# Patient Record
Sex: MALE | Race: WHITE | NOT HISPANIC OR LATINO | Employment: FULL TIME | URBAN - METROPOLITAN AREA
[De-identification: names, ages, dates, MRNs, and addresses within clinical notes are randomized per-mention and may not be internally consistent; named-entity substitution may affect disease eponyms.]

---

## 2018-06-27 ENCOUNTER — NURSE TRIAGE (OUTPATIENT)
Dept: ADMINISTRATIVE | Facility: CLINIC | Age: 53
End: 2018-06-27

## 2018-06-28 NOTE — TELEPHONE ENCOUNTER
Reason for Disposition   Fever > 100.5 F (38.1 C)    Protocols used: ST URINARY CATHETER SYMPTOMS AND [id number]-NS    Pt girlfriend states that pt has to catheterize himself after having bladder surgery in January.  Girlfriend states pt has been unable to catherterize himself over the past few days. Pt fever today was 100.8. Girlfriend states she is no longer with pt.  Girlfriend advised to ED per protocol and verbalizes understanding.

## 2023-01-01 ENCOUNTER — CLINICAL SUPPORT (OUTPATIENT)
Dept: UROLOGY | Facility: CLINIC | Age: 58
End: 2023-01-01
Payer: COMMERCIAL

## 2023-01-01 ENCOUNTER — OFFICE VISIT (OUTPATIENT)
Dept: UROLOGY | Facility: CLINIC | Age: 58
End: 2023-01-01
Payer: COMMERCIAL

## 2023-01-01 VITALS
SYSTOLIC BLOOD PRESSURE: 143 MMHG | BODY MASS INDEX: 23.06 KG/M2 | HEART RATE: 85 BPM | DIASTOLIC BLOOD PRESSURE: 87 MMHG | WEIGHT: 161.06 LBS | HEIGHT: 70 IN

## 2023-01-01 VITALS
SYSTOLIC BLOOD PRESSURE: 138 MMHG | DIASTOLIC BLOOD PRESSURE: 84 MMHG | HEART RATE: 85 BPM | HEIGHT: 70 IN | BODY MASS INDEX: 22.6 KG/M2 | WEIGHT: 157.88 LBS

## 2023-01-01 DIAGNOSIS — C79.89 BLADDER CANCER METASTASIZED TO PELVIC REGION: ICD-10-CM

## 2023-01-01 DIAGNOSIS — C67.9 MALIGNANT NEOPLASM OF URINARY BLADDER, UNSPECIFIED SITE: ICD-10-CM

## 2023-01-01 DIAGNOSIS — C67.9 BLADDER CANCER METASTASIZED TO PELVIC REGION: ICD-10-CM

## 2023-01-01 DIAGNOSIS — N35.816 OTHER STRICTURE OF OVERLAPPING SITES OF URETHRA IN MALE: ICD-10-CM

## 2023-01-01 DIAGNOSIS — N18.4 CKD (CHRONIC KIDNEY DISEASE) STAGE 4, GFR 15-29 ML/MIN: Primary | ICD-10-CM

## 2023-01-01 DIAGNOSIS — N13.30 HYDRONEPHROSIS OF RIGHT KIDNEY: ICD-10-CM

## 2023-01-01 DIAGNOSIS — N13.30 HYDRONEPHROSIS OF RIGHT KIDNEY: Primary | ICD-10-CM

## 2023-01-01 DIAGNOSIS — N18.4 CKD (CHRONIC KIDNEY DISEASE) STAGE 4, GFR 15-29 ML/MIN: ICD-10-CM

## 2023-01-01 PROCEDURE — 99215 OFFICE O/P EST HI 40 MIN: CPT | Mod: S$GLB,,, | Performed by: STUDENT IN AN ORGANIZED HEALTH CARE EDUCATION/TRAINING PROGRAM

## 2023-01-01 PROCEDURE — 99214 OFFICE O/P EST MOD 30 MIN: CPT | Mod: S$GLB,,, | Performed by: STUDENT IN AN ORGANIZED HEALTH CARE EDUCATION/TRAINING PROGRAM

## 2023-01-01 PROCEDURE — 3075F SYST BP GE 130 - 139MM HG: CPT | Mod: CPTII,S$GLB,, | Performed by: STUDENT IN AN ORGANIZED HEALTH CARE EDUCATION/TRAINING PROGRAM

## 2023-01-01 PROCEDURE — 3079F DIAST BP 80-89 MM HG: CPT | Mod: CPTII,S$GLB,, | Performed by: STUDENT IN AN ORGANIZED HEALTH CARE EDUCATION/TRAINING PROGRAM

## 2023-01-01 PROCEDURE — 3008F BODY MASS INDEX DOCD: CPT | Mod: CPTII,S$GLB,, | Performed by: STUDENT IN AN ORGANIZED HEALTH CARE EDUCATION/TRAINING PROGRAM

## 2023-01-01 PROCEDURE — 99999 PR PBB SHADOW E&M-EST. PATIENT-LVL III: CPT | Mod: PBBFAC,,, | Performed by: STUDENT IN AN ORGANIZED HEALTH CARE EDUCATION/TRAINING PROGRAM

## 2023-09-11 ENCOUNTER — TELEPHONE (OUTPATIENT)
Dept: HEMATOLOGY/ONCOLOGY | Facility: CLINIC | Age: 58
End: 2023-09-11
Payer: COMMERCIAL

## 2023-09-11 NOTE — NURSING
Oncology Navigation   Intake  Cancer Type:   Internal / External Referral: External  Initial Nurse Navigator Contact: 09/11/23  Date Worked: 09/11/23     Treatment  Current Status: Active       Medical Oncologist: Bridger Abad  Consult Date: 09/12/23                       Acuity      Follow Up  No follow-ups on file.

## 2023-09-11 NOTE — TELEPHONE ENCOUNTER
Oncology nurse navigator contacted patient to discuss self referral for a second opinion of bladder cancer. Patient has already undergone cystectomy. Requested next available appointment. Scheduled tomorrow 9/12 with Dr. Bridger Abad at 4pm. Date, time, location discussed. Contact information provided for additional assistance.

## 2023-09-11 NOTE — PROGRESS NOTES
MEDICAL ONCOLOGY - NEW PATIENT VISIT    Best Contact Phone Number(s): [phone] (home)      Cancer/Stage/TNM:    Cancer Staging   No matching staging information was found for the patient.     Reason for visit: Julio Rodríguez is a 58 y.o. male with metastatic bladder cancer previously treated with neoadjuvant ddMVAC, radical cystectomy, and then carboplatin/gemcitabine and EV+ Pembro following local and metastatic recurrence. He presents to medical oncology clinic for second opinion.    History has been obtained by chart review and discussion with the patient.    HPI:     Patient was initially diagnosed with NMIBC 2016 and managed with BCG. Developed muscle invasive recurrence 2017 and was treated with neoadjuvant MVAC and cystectomy with neobladder creation.    Post operative course was complicated by bilateral hydronephrosis with progressive kidney disease. Required ongoing CIC. Subsequently developed increasing hematuria. Imaging suspicious for local recurrence with infiltration into the pubic bone and rectum and potential retroperitoneal lymph node metastases.     Started palliative chemoimmunotherapy with gemcitabine + carboplatin + pembrolizumab 09/2022 - 04/2023. Chemotherapy course complicated by persistent and recurrent blood loss anemia requiring blood transfusions. He was diagnosed with pubic fracture presumably from local progression of the cancer 04/2023. Underwent palliative RT to the pelvis, and systemic treatment was changed to enfortumab vedotin + pembrolizumab 05/2023. Last dose of EV + pembro about 2 weeks ago.    Recent PET CT with concern for new subcentimeter pulmonary nodes c/f metastases. MRI shows local progression as well with additional local progression into the rectum and pubic ramus. Saw radiation oncology at [facility] and no plan for additional radiation treatment.      Has ongoing left buttock and sacral pain up to 4/10. Uses APAP at night with good response. Ambulating independently.  Pain not significantly worsened by ambulation. Chronic constipation since prior surgery. Recently started Linzess with improvement. Typically 1-3 normal and formed stool per day. Appetite is OK. No N/V. Weight is stable in the 150s. Continues to self catheterize his neoblader. Cloudy, but significantly less hematuria. Last blood transfusion several months ago. Last biopsy 08/2022.    History of CAD sp CABGx3 in 2015. Not on any antiplatelets. On atorvastatin 20mg daily. No HTN. History of psoriasis, Vtama. Hypothyroidism on levothyroxine 50mcg daily. Quite active, cotninues frequently walking around [facility].      Oncology History   Malignant neoplasm of urinary bladder   2016 Initial Diagnosis    Bladder CIS. Managed with BCG     2017 Notable Event    Developed recurrent muscle invasive disease.     2017 - 2017 Chemotherapy    ddMVAC      Surgery    Radical cystectomy with lymph node dissection and creation of neobladder. [id number]     7/2022 Notable Event    New onset hematuria. MRI scan of the pelvis in August showed a suspected blood clot adherent to the distal Smith catheter. There was abnormal signal and enhancement of the bilateral pubic bones adjacent to the neobladder suspicious for neoplastic infiltration. There was no pelvic lymphadenopathy.      7/2022 Imaging Significant Findings    Pet scan at the same time showed hypermetabolic retroperitoneal lymphadenopathy. There was marked activity felt to involve the bladder wall suspicious for tumor.     7/2022 Biopsy    Biopsy of the bladder neck showed recurrent high-grade urothelial carcinoma.     9/28/2022 - 5/16/2023 Chemotherapy    Mr. Rodríguez was started on chemotherapy with gemcitabine and carboplatin in September 2022.      1/25/2023 Imaging Significant Findings    Pet scan on 25 January showed changes related to prior cystoprostatectomy with neobladder creation and minimal fullness to the right renal collecting system. There was no evidence of a  discrete hypermetabolic mass or lymphadenopathy. There was diffusely increased activity throughout the osseous structures, suggestive of chemotherapy with reactive marrow.     4/11/2023 Imaging Significant Findings    CT a/p  1. Pathologic fracture of the left parasymphysis pubis secondary to lytic process. Given location adjacent to neobladder, osteomyelitis is a possibility. Metastatic disease not excluded.   2. Prior cystoprostatectomy with chronic right ureteral obstruction and hydroureteronephrosis, minimally changed from the prior. All etiologies considered including malignant obstruction. Urology consultation recommended.   3. Incidental findings including cholelithiasis, bilateral multifocal renal cortical scarring, and small hiatal hernia.       5/24/2023 -  Chemotherapy    Enfortumab vedotin + Pembrolizumab     8/10/2023 Imaging Significant Findings    PET CT  Interval development of hypermetabolic pulmonary nodules within the lingula and right lower lobe likely reflecting pulmonary metastases. Some additional tiny pulmonary nodularity is new or more conspicuous from prior but too small to accurately characterize by PET/CT. Attention on follow-up recommended.     Worsening obstructive uropathy which is relatively moderate to severe the right kidney.     Similar appearance of the urinary neobladder. There is somewhat diminished due to physiologic activity to evaluate for local neoplasm but the appearance overall appears similar to prior. Assessment of local disease could be followed with CT abdomen and pelvis with contrast.     Interval fracture of the left superior and inferior pubic rami appearing subacute in nature. Please correlate for trauma and tenderness. There is no significant FDG activity within the area to favor a pathologic fracture.     Previously seen diffuse marrow activity may have been due to previous marrow rebound or drug effect.       8/25/2023 Imaging Significant Findings    MR  Pelvis  History of cystoprostatectomy and neobladder creation.     Interval increase in multilobular mass in the pelvis infiltrating the rectum, possibly due to distal sigmoid colon, anterior left pelvic bones as well as a inferior aspect of the neobladder suspicious for progression of neoplastic process as discussed above.      Melanoma        Past Medical History:   Diagnosis Date    Bladder cancer     CAD (coronary artery disease) 9/12/2023    Prior CABG. Not on antiplatelets. Home medicatiosn include atorvastatin    Carcinoma     forehead    Encounter for blood transfusion     Hypertension     Hypothyroidism 9/12/2023    Home medications include levothyroxine    Malignant melanoma of skin, unspecified     right arm    Malignant neoplasm of urinary bladder 9/12/2023    Melanoma 9/12/2023    History of T1a melanoma; 0.5mm depth without ulceartion. SP wide local excision 02/2022          Past Surgical History:   Procedure Laterality Date    CORONARY ARTERY BYPASS GRAFT  10/2015    CYSTECTOMY, ROBOT-ASSISTED, LAPAROSCOPIC, USING DA MARY XI, WITH ILEAL CONDUIT CREATION  12/2017    DILATION OF BLADDER      dialation of bladder neck- due to claudia bladder- multiple procedures    LYMPHADENECTOMY      XI ROBOTIC PROSTECTOMY, SUPRAPUBIC  12/2017        Family History   Problem Relation Age of Onset    Heart disease Father     Heart attack Paternal Uncle     Breast cancer Maternal Cousin     Colon cancer Neg Hx     Bladder Cancer Neg Hx         Social History     Tobacco Use    Smoking status: Former     Types: Cigarettes    Smokeless tobacco: Not on file   Substance Use Topics    Alcohol use: Not Currently        I have reviewed and updated the patient's past medical, surgical, family and social histories.    Review of patient's allergies indicates:  No Known Allergies     Current Outpatient Medications   Medication Sig Dispense Refill    LIDOcaine-prilocaine (EMLA) cream Apply Dime size Amount On Port One Hour Prior To  "Use, Then Apply Saran Wrap      atorvastatin (LIPITOR) 20 MG tablet Take 20 mg by mouth every evening.      docusate sodium (COLACE) 100 MG capsule Take 1 capsule by mouth every evening.      levothyroxine (SYNTHROID) 50 MCG tablet Take 50 mcg by mouth.      LINZESS 72 mcg Cap capsule Take 72 mcg by mouth every morning.      multivitamin (THERAGRAN) per tablet Take 1 tablet by mouth.      VTAMA 1 % Crea APPLY 1 APPLICATION ON THE SKIN DAILY       No current facility-administered medications for this visit.        Physical Exam:   BP (!) 148/97 (BP Location: Right arm, Patient Position: Sitting, BP Method: Large (Automatic))   Pulse 81   Temp 98.2 °F (36.8 °C) (Oral)   Resp 18   Ht 5' 10" (1.778 m)   Wt 72.3 kg (159 lb 8 oz)   SpO2 99%   BMI 22.89 kg/m²      ECOG Performance status: 1            Physical Exam  Constitutional:       General: He is not in acute distress.     Appearance: Normal appearance.   HENT:      Head: Normocephalic.   Eyes:      General: No scleral icterus.     Extraocular Movements: Extraocular movements intact.      Conjunctiva/sclera: Conjunctivae normal.   Cardiovascular:      Rate and Rhythm: Normal rate.   Pulmonary:      Effort: Pulmonary effort is normal. No respiratory distress.   Abdominal:      General: There is no distension.      Palpations: Abdomen is soft.   Skin:     General: Skin is warm and dry.   Neurological:      Mental Status: He is alert and oriented to person, place, and time.      Motor: No weakness.   Psychiatric:         Mood and Affect: Mood normal.         Behavior: Behavior normal.         Thought Content: Thought content normal.           Labs:   No results found for this or any previous visit (from the past 48 hour(s)).       Imaging:         I have personally reviewed the above imaging including recent MRI and PET CT scan    Path:   Reviewed pathology as documented in oncology history      Assessment and Plan:   1. Malignant neoplasm of urinary bladder, " unspecified site  Overview:  Locally recurrent disease with presumed pulmonary metastases sp neoadjuvant MVAC, cystectomy, gem/carbo, pebmrolizumab, and enfortumab. No known molecular profiling. Majority of disease is from his local pelvic recurrence with infiltration into the pubis and rectum    Assessment & Plan:  - Will review at TB next week to discuss any local salvage options; consider referral to urology, rad onc, and/or colorectal surgery  - Will send ctDNA NGS ASAP and try to send Tempus xT from prior bladder biopsy  - Screen for early phase clinical trials pending molecular profiling  - SOC options could include sacituzumab govitecan or if FGFR alteration erdaftinib    Orders:  -     Tumor NGS Blood; Future; Expected date: 09/12/2023  -     Tumor NGS Blood Add-on; Future; Expected date: 09/12/2023  -     Tumor NGS Tissue; Future; Expected date: 09/12/2023  -     Tumor NGS Blood; Future; Expected date: 09/12/2023  -     Tumor NGS Blood Add-on (Deselect if not ordering Tumor NGS Blood); Future; Expected date: 09/12/2023  -     CBC Oncology; Standing  -     Comprehensive Metabolic Panel; Standing  -     IRON AND TIBC; Future; Expected date: 09/12/2023  -     FERRITIN; Future; Expected date: 09/12/2023    2. Anemia in neoplastic disease  -     IRON AND TIBC; Future; Expected date: 09/12/2023  -     FERRITIN; Future; Expected date: 09/12/2023    3. Coronary artery disease, unspecified vessel or lesion type, unspecified whether angina present, unspecified whether native or transplanted heart  Overview:  Prior CABG. Not on antiplatelets. Home medicatiosn include atorvastatin      4. Hypothyroidism, unspecified type  Overview:  Home medications include levothyroxine      5. Chronic constipation    6. Psoriasis               Follow up:   Route Chart for Scheduling    Med Onc Chart Routing      Follow up with physician 2 weeks. Can be virtual visit in two weeks   Follow up with LYNDSEY    Infusion scheduling note     Injection scheduling note    Labs CBC, CMP and other   Scheduling:  Preferred lab:  Lab interval:  Blood NGS - Please send labs ASAP   Imaging    Pharmacy appointment    Other referrals                     The above information has been reviewed with the patient and all questions have been answered to their apparent satisfaction.  They understand that they can call the clinic with any questions.    Bridger Abad MD  Hematology/Oncology  [facility]

## 2023-09-12 ENCOUNTER — OFFICE VISIT (OUTPATIENT)
Dept: HEMATOLOGY/ONCOLOGY | Facility: CLINIC | Age: 58
End: 2023-09-12
Payer: COMMERCIAL

## 2023-09-12 VITALS
HEART RATE: 81 BPM | SYSTOLIC BLOOD PRESSURE: 148 MMHG | OXYGEN SATURATION: 99 % | DIASTOLIC BLOOD PRESSURE: 97 MMHG | BODY MASS INDEX: 22.83 KG/M2 | WEIGHT: 159.5 LBS | RESPIRATION RATE: 18 BRPM | HEIGHT: 70 IN | TEMPERATURE: 98 F

## 2023-09-12 DIAGNOSIS — E03.9 HYPOTHYROIDISM, UNSPECIFIED TYPE: ICD-10-CM

## 2023-09-12 DIAGNOSIS — L40.9 PSORIASIS: ICD-10-CM

## 2023-09-12 DIAGNOSIS — K59.09 CHRONIC CONSTIPATION: ICD-10-CM

## 2023-09-12 DIAGNOSIS — I25.10 CORONARY ARTERY DISEASE, UNSPECIFIED VESSEL OR LESION TYPE, UNSPECIFIED WHETHER ANGINA PRESENT, UNSPECIFIED WHETHER NATIVE OR TRANSPLANTED HEART: ICD-10-CM

## 2023-09-12 DIAGNOSIS — C67.9 MALIGNANT NEOPLASM OF URINARY BLADDER, UNSPECIFIED SITE: Primary | ICD-10-CM

## 2023-09-12 DIAGNOSIS — D63.0 ANEMIA IN NEOPLASTIC DISEASE: ICD-10-CM

## 2023-09-12 PROBLEM — C43.9 MELANOMA: Status: ACTIVE | Noted: 2023-01-01

## 2023-09-12 PROCEDURE — 3080F PR MOST RECENT DIASTOLIC BLOOD PRESSURE >= 90 MM HG: ICD-10-PCS | Mod: CPTII,S$GLB,, | Performed by: HOSPITALIST

## 2023-09-12 PROCEDURE — 3077F PR MOST RECENT SYSTOLIC BLOOD PRESSURE >= 140 MM HG: ICD-10-PCS | Mod: CPTII,S$GLB,, | Performed by: HOSPITALIST

## 2023-09-12 PROCEDURE — 99215 PR OFFICE/OUTPT VISIT, EST, LEVL V, 40-54 MIN: ICD-10-PCS | Mod: S$GLB,,, | Performed by: HOSPITALIST

## 2023-09-12 PROCEDURE — 99215 OFFICE O/P EST HI 40 MIN: CPT | Mod: S$GLB,,, | Performed by: HOSPITALIST

## 2023-09-12 PROCEDURE — 3080F DIAST BP >= 90 MM HG: CPT | Mod: CPTII,S$GLB,, | Performed by: HOSPITALIST

## 2023-09-12 PROCEDURE — 3077F SYST BP >= 140 MM HG: CPT | Mod: CPTII,S$GLB,, | Performed by: HOSPITALIST

## 2023-09-12 PROCEDURE — 99999 PR PBB SHADOW E&M-EST. PATIENT-LVL IV: CPT | Mod: PBBFAC,,, | Performed by: HOSPITALIST

## 2023-09-12 PROCEDURE — 3008F BODY MASS INDEX DOCD: CPT | Mod: CPTII,S$GLB,, | Performed by: HOSPITALIST

## 2023-09-12 PROCEDURE — 99999 PR PBB SHADOW E&M-EST. PATIENT-LVL IV: ICD-10-PCS | Mod: PBBFAC,,, | Performed by: HOSPITALIST

## 2023-09-12 PROCEDURE — 3008F PR BODY MASS INDEX (BMI) DOCUMENTED: ICD-10-PCS | Mod: CPTII,S$GLB,, | Performed by: HOSPITALIST

## 2023-09-12 RX ORDER — METHYLPREDNISOLONE 4 MG/1
TABLET ORAL
COMMUNITY
Start: 2023-07-06 | End: 2023-09-12 | Stop reason: ALTCHOICE

## 2023-09-12 RX ORDER — CALCIPOTRIENE AND BETAMETHASONE DIPROPIONATE 50; 64 UG/G; MG/G
CREAM TOPICAL
COMMUNITY
End: 2023-09-12 | Stop reason: ALTCHOICE

## 2023-09-12 RX ORDER — LIDOCAINE AND PRILOCAINE 25; 25 MG/G; MG/G
CREAM TOPICAL
COMMUNITY
Start: 2022-09-21 | End: 2023-09-21

## 2023-09-12 RX ORDER — DOCUSATE SODIUM 100 MG/1
220 CAPSULE, LIQUID FILLED ORAL NIGHTLY
COMMUNITY

## 2023-09-12 RX ORDER — LEVOTHYROXINE SODIUM 50 UG/1
50 TABLET ORAL
COMMUNITY
Start: 2023-07-17

## 2023-09-12 RX ORDER — TAPINAROF 10 MG/1000MG
CREAM TOPICAL
COMMUNITY
Start: 2023-08-14

## 2023-09-12 RX ORDER — OXYCODONE AND ACETAMINOPHEN 10; 325 MG/1; MG/1
1 TABLET ORAL
COMMUNITY
Start: 2023-04-28 | End: 2023-09-12 | Stop reason: ALTCHOICE

## 2023-09-12 RX ORDER — LINACLOTIDE 72 UG/1
72 CAPSULE, GELATIN COATED ORAL EVERY MORNING
COMMUNITY
Start: 2023-08-22

## 2023-09-12 RX ORDER — IPRATROPIUM BROMIDE 42 UG/1
SPRAY, METERED NASAL
COMMUNITY
Start: 2023-07-17 | End: 2023-09-12 | Stop reason: ALTCHOICE

## 2023-09-12 RX ORDER — DICYCLOMINE HYDROCHLORIDE 20 MG/1
20 TABLET ORAL EVERY 6 HOURS PRN
COMMUNITY
Start: 2023-04-06 | End: 2023-09-12 | Stop reason: ALTCHOICE

## 2023-09-12 RX ORDER — AZELASTINE 1 MG/ML
2 SPRAY, METERED NASAL 2 TIMES DAILY
COMMUNITY
Start: 2023-07-02 | End: 2023-09-12 | Stop reason: ALTCHOICE

## 2023-09-12 RX ORDER — METHOCARBAMOL 500 MG/1
500 TABLET, FILM COATED ORAL 4 TIMES DAILY
COMMUNITY
Start: 2023-04-01 | End: 2023-09-12 | Stop reason: ALTCHOICE

## 2023-09-12 RX ORDER — TIZANIDINE 2 MG/1
2 TABLET ORAL 2 TIMES DAILY
COMMUNITY
Start: 2023-04-14 | End: 2023-09-12 | Stop reason: ALTCHOICE

## 2023-09-12 RX ORDER — ATORVASTATIN CALCIUM 20 MG/1
20 TABLET, FILM COATED ORAL NIGHTLY
COMMUNITY
Start: 2023-07-19

## 2023-09-12 RX ORDER — MULTIVITAMIN
1 TABLET ORAL EVERY MORNING
COMMUNITY

## 2023-09-12 NOTE — ASSESSMENT & PLAN NOTE
- Will review at TB next week to discuss any local salvage options; consider referral to urology, rad onc, and/or colorectal surgery  - Will send ctDNA NGS ASAP and try to send Tempus xT from prior bladder biopsy  - Screen for early phase clinical trials pending molecular profiling  - SOC options could include sacituzumab govitecan or if FGFR alteration erdaftinib

## 2023-09-12 NOTE — PATIENT INSTRUCTIONS
We discussed your recent bladder course. You have metastatic and locally progressive bladder cancer after prior treatment with preoperative cisplatin based chemotherapy and palliative carboplatin, gemcitabine, pembrolizumab, and enfortumab.    Recent PET CT is suspicous for new lung metastases. These are too small to biopsy at present, but will warrant close watching.    The majority of your disease is localized in the locally recurrent pelvic mass that has invaded the pelvic bone and potentially rectum.    We discussed need to answer two questions - what is the next best systemic therapy, and is there any local treatment that could help delay cancer growth.    We will review recent imaging at our tumour board with our radiation oncologists and urologic surgeons to consider local treatment options. Admittedly, any surgical procedure would likely be quite large and could not be expected to cure the cancer. Radiation may be made difficult by proximity to bladder and prior radiation.    For systemic options, we need to characterize the genetic makeup of the cancer. I will send your blood to identify and circulating tumor DNA. We will also track down you prior bladder biopsy to send for sequencing. Alternatively, may get additional tissue if urology or colorectal surgery evaluation is needed.     Potential options could include clinical trials depending on cancer genotyping (although we have no late phase clinical trials at present). Additionally, erdafitinib is an approved medication for patients with certain mutations on genetic profiling. Finally sacituzumab govitecan is another FDA approved mediation we could consider.

## 2023-09-15 ENCOUNTER — LAB VISIT (OUTPATIENT)
Dept: LAB | Facility: HOSPITAL | Age: 58
End: 2023-09-15
Attending: HOSPITALIST
Payer: COMMERCIAL

## 2023-09-15 ENCOUNTER — TELEPHONE (OUTPATIENT)
Dept: HEMATOLOGY/ONCOLOGY | Facility: CLINIC | Age: 58
End: 2023-09-15
Payer: COMMERCIAL

## 2023-09-15 DIAGNOSIS — C67.9 MALIGNANT NEOPLASM OF URINARY BLADDER, UNSPECIFIED SITE: ICD-10-CM

## 2023-09-15 DIAGNOSIS — D63.0 ANEMIA IN NEOPLASTIC DISEASE: ICD-10-CM

## 2023-09-15 LAB
ALBUMIN SERPL BCP-MCNC: 3.1 G/DL (ref 3.5–5.2)
ALP SERPL-CCNC: 84 U/L (ref 55–135)
ALT SERPL W/O P-5'-P-CCNC: 9 U/L (ref 10–44)
ANION GAP SERPL CALC-SCNC: 12 MMOL/L (ref 8–16)
AST SERPL-CCNC: 11 U/L (ref 10–40)
BILIRUB SERPL-MCNC: 0.5 MG/DL (ref 0.1–1)
BUN SERPL-MCNC: 31 MG/DL (ref 6–20)
CALCIUM SERPL-MCNC: 9.4 MG/DL (ref 8.7–10.5)
CHLORIDE SERPL-SCNC: 106 MMOL/L (ref 95–110)
CO2 SERPL-SCNC: 19 MMOL/L (ref 23–29)
CREAT SERPL-MCNC: 2.5 MG/DL (ref 0.5–1.4)
ERYTHROCYTE [DISTWIDTH] IN BLOOD BY AUTOMATED COUNT: 13.9 % (ref 11.5–14.5)
EST. GFR  (NO RACE VARIABLE): 29.1 ML/MIN/1.73 M^2
FERRITIN SERPL-MCNC: 606 NG/ML (ref 20–300)
GLUCOSE SERPL-MCNC: 104 MG/DL (ref 70–110)
HCT VFR BLD AUTO: 31 % (ref 40–54)
HGB BLD-MCNC: 10.5 G/DL (ref 14–18)
IMM GRANULOCYTES # BLD AUTO: 0.03 K/UL (ref 0–0.04)
IRON SERPL-MCNC: 60 UG/DL (ref 45–160)
MCH RBC QN AUTO: 30.3 PG (ref 27–31)
MCHC RBC AUTO-ENTMCNC: 33.9 G/DL (ref 32–36)
MCV RBC AUTO: 89 FL (ref 82–98)
NEUTROPHILS # BLD AUTO: 5.6 K/UL (ref 1.8–7.7)
PLATELET # BLD AUTO: 226 K/UL (ref 150–450)
PMV BLD AUTO: 9.6 FL (ref 9.2–12.9)
POTASSIUM SERPL-SCNC: 4.3 MMOL/L (ref 3.5–5.1)
PROT SERPL-MCNC: 7.3 G/DL (ref 6–8.4)
RBC # BLD AUTO: 3.47 M/UL (ref 4.6–6.2)
SATURATED IRON: 24 % (ref 20–50)
SODIUM SERPL-SCNC: 137 MMOL/L (ref 136–145)
TOTAL IRON BINDING CAPACITY: 253 UG/DL (ref 250–450)
TRANSFERRIN SERPL-MCNC: 171 MG/DL (ref 200–375)
WBC # BLD AUTO: 7.78 K/UL (ref 3.9–12.7)

## 2023-09-15 PROCEDURE — 84466 ASSAY OF TRANSFERRIN: CPT | Performed by: HOSPITALIST

## 2023-09-15 PROCEDURE — 80053 COMPREHEN METABOLIC PANEL: CPT | Performed by: HOSPITALIST

## 2023-09-15 PROCEDURE — 85027 COMPLETE CBC AUTOMATED: CPT | Performed by: HOSPITALIST

## 2023-09-15 PROCEDURE — 36415 COLL VENOUS BLD VENIPUNCTURE: CPT | Performed by: HOSPITALIST

## 2023-09-15 PROCEDURE — 83540 ASSAY OF IRON: CPT | Performed by: HOSPITALIST

## 2023-09-15 PROCEDURE — 82728 ASSAY OF FERRITIN: CPT | Performed by: HOSPITALIST

## 2023-09-15 NOTE — TELEPHONE ENCOUNTER
----- Message from Bridger Abad MD sent at 9/14/2023  6:10 PM CDT -----  Regarding: RE: Callback  Can we scheduled to get labs I ordered at his visit on 9/12?    Thanks!  Bridger    ----- Message -----  From: Az Tam  Sent: 9/14/2023  10:14 AM CDT  To: Bridger Abad MD  Subject: Callback                                         Patient is requesting a callback regarding his lab work that was suppose to be done after his visit. Please give the patient a callback at [phone]

## 2023-09-19 ENCOUNTER — TUMOR BOARD CONFERENCE (OUTPATIENT)
Dept: UROLOGY | Facility: HOSPITAL | Age: 58
End: 2023-09-19
Payer: COMMERCIAL

## 2023-09-19 NOTE — PROGRESS NOTES
Oncology History   Malignant neoplasm of urinary bladder   Melanoma      PATIENT SUMMARY:     59 yo M with bladder cancer initially NMIBC managed with BCG progressed to MIBC now s/p NAC (ddMVAC) followed by radical cystectomy with neobladder (2017). Developed local and metastatic disease with imaging concerning for local infiltration into pubic bone and rectum as well as RP disease. Began palliative carboplatin/gemcitabine and pembrolizumab (9/22-4/23). Underwent palliative RT to pelvis after pathologic fracture, systemic treatment changed to enfortumab vedotin + pembolizumab 5/2023.    Recent PET CT with concern for new subcentimeter pulmonary nodes c/f metastases. MRI (8/25/2023) shows local progression as well with additional local progression into the rectum and pubic ramus. Saw radiation oncology at [facility] and no plan for additional radiation treatment. Has ongoing left buttock and sacral pain up to 4/10. Uses APAP at night with good response. Ambulating independently. Weight is stable in the 150s. Continues to self catheterize his neobladder.    PERFORMANCE STATUS:  ECOG 1    Estimated GFR/CKD Stage: Cr 2.5; GFR 29.1    Clinical/Pathologic Stage (TNM): ypT2b N1c M0    DISCUSSION:  Question: any local salvage options?     No local EBRT thus far. Referred for palliative RT at  but they decided against it.      Only surgical option would be complete exenteration. Does has scattered pulmonary nodules so oncologic control is unlikely.    FACULTY IN ATTENDANCE:    Urologic Oncology: Santi Hinkle MD [x], Keon Pacheco MD [] , Reynaldo Aparicio MD [], Eder Oconnor MD [], Selwyn Leach MD []     Medical Oncology: Chan Leos MD [x], Marium Lang MD [x] , Bridger Abad MD [x], John Alcaraz MD [] Raf Miles MD [], Mila Almanzar MD [] , Nancy Rizzo MD [], Kenji Kinsey MD []    Radiation Oncology: Rubio Slater MD [x], Hoang Lo MD [x]     CONSULT NEEDED:     [] Urologic Oncology    [] Med Onc    [] Rad/Onc  [] CRS  [] Surg Onc    [x] Treatment Guidelines (NCCN and AUA) reviewed and care planned is consistent with guidelines.    TUMOR BOARD RECOMMENDATIONS/PLAN/CONSENSUS:     Case discussed among group. Pathology and radiologic images were reviewed (if applicable).    Could consider low dose palliative XRT to pubis    Due to low symptoms at the moment, would lean towards systemic therapy before palliative XRT. Defer XRT for now.    Recommend third line systemic therapy. Will defer palliative XRT at this time. Minimal if any benefit from surgical resection at this time.    Royal Conte MD  [facility] - PGY3

## 2023-09-25 LAB
DNA RANGE(S) EXAMINED NAR: NORMAL
GENE DIS ANL INTERP-IMP: NORMAL
GENE DIS ASSESSED: NORMAL
REASON FOR STUDY: NORMAL
TEMPUS LCA: NORMAL
TEMPUS PORTAL: NORMAL

## 2023-09-26 LAB
DNA RANGE(S) EXAMINED NAR: NORMAL
GENE DIS ANL INTERP-IMP: POSITIVE
GENE DIS ASSESSED: NORMAL
GENE MUT TESTED BLD/T: 10.5 M/MB
MSI CA SPEC-IMP: NORMAL
REASON FOR STUDY: NORMAL
TEMPUS FUSIONADDENDUM: NORMAL
TEMPUS PD-L1 (22C3) COMBINED POSITIVE SCORE: 50
TEMPUS PD-L1 (22C3) TUMOR PROPORTION SCORE: 45 %
TEMPUS PORTAL: NORMAL
TEMPUS TRIAL1: NORMAL
TEMPUS TRIAL2: NORMAL
TEMPUS TRIAL3: NORMAL
TEMPUS TRIALCOUNT: 3

## 2023-09-28 ENCOUNTER — OFFICE VISIT (OUTPATIENT)
Dept: HEMATOLOGY/ONCOLOGY | Facility: CLINIC | Age: 58
End: 2023-09-28
Payer: COMMERCIAL

## 2023-09-28 ENCOUNTER — LAB VISIT (OUTPATIENT)
Dept: LAB | Facility: HOSPITAL | Age: 58
End: 2023-09-28
Attending: HOSPITALIST
Payer: COMMERCIAL

## 2023-09-28 VITALS
RESPIRATION RATE: 18 BRPM | DIASTOLIC BLOOD PRESSURE: 93 MMHG | HEART RATE: 88 BPM | OXYGEN SATURATION: 100 % | SYSTOLIC BLOOD PRESSURE: 156 MMHG | HEIGHT: 70 IN | WEIGHT: 158.63 LBS | BODY MASS INDEX: 22.71 KG/M2

## 2023-09-28 DIAGNOSIS — R03.0 ELEVATED BLOOD PRESSURE READING: ICD-10-CM

## 2023-09-28 DIAGNOSIS — E03.9 HYPOTHYROIDISM, UNSPECIFIED TYPE: ICD-10-CM

## 2023-09-28 DIAGNOSIS — I25.10 CORONARY ARTERY DISEASE, UNSPECIFIED VESSEL OR LESION TYPE, UNSPECIFIED WHETHER ANGINA PRESENT, UNSPECIFIED WHETHER NATIVE OR TRANSPLANTED HEART: ICD-10-CM

## 2023-09-28 DIAGNOSIS — C67.9 MALIGNANT NEOPLASM OF URINARY BLADDER, UNSPECIFIED SITE: Primary | ICD-10-CM

## 2023-09-28 DIAGNOSIS — C67.9 MALIGNANT NEOPLASM OF URINARY BLADDER, UNSPECIFIED SITE: ICD-10-CM

## 2023-09-28 LAB
ALBUMIN SERPL BCP-MCNC: 3.2 G/DL (ref 3.5–5.2)
ALP SERPL-CCNC: 78 U/L (ref 55–135)
ALT SERPL W/O P-5'-P-CCNC: 9 U/L (ref 10–44)
ANION GAP SERPL CALC-SCNC: 8 MMOL/L (ref 8–16)
AST SERPL-CCNC: 11 U/L (ref 10–40)
BILIRUB SERPL-MCNC: 0.6 MG/DL (ref 0.1–1)
BUN SERPL-MCNC: 32 MG/DL (ref 6–20)
CALCIUM SERPL-MCNC: 9.4 MG/DL (ref 8.7–10.5)
CHLORIDE SERPL-SCNC: 105 MMOL/L (ref 95–110)
CO2 SERPL-SCNC: 22 MMOL/L (ref 23–29)
CREAT SERPL-MCNC: 2.5 MG/DL (ref 0.5–1.4)
ERYTHROCYTE [DISTWIDTH] IN BLOOD BY AUTOMATED COUNT: 13.5 % (ref 11.5–14.5)
EST. GFR  (NO RACE VARIABLE): 29.1 ML/MIN/1.73 M^2
GLUCOSE SERPL-MCNC: 88 MG/DL (ref 70–110)
HCT VFR BLD AUTO: 33.1 % (ref 40–54)
HGB BLD-MCNC: 10.5 G/DL (ref 14–18)
IMM GRANULOCYTES # BLD AUTO: 0.03 K/UL (ref 0–0.04)
MCH RBC QN AUTO: 30.9 PG (ref 27–31)
MCHC RBC AUTO-ENTMCNC: 31.7 G/DL (ref 32–36)
MCV RBC AUTO: 97 FL (ref 82–98)
NEUTROPHILS # BLD AUTO: 7.5 K/UL (ref 1.8–7.7)
PLATELET # BLD AUTO: 259 K/UL (ref 150–450)
PMV BLD AUTO: 10.2 FL (ref 9.2–12.9)
POTASSIUM SERPL-SCNC: 4.4 MMOL/L (ref 3.5–5.1)
PROT SERPL-MCNC: 7.4 G/DL (ref 6–8.4)
RBC # BLD AUTO: 3.4 M/UL (ref 4.6–6.2)
SODIUM SERPL-SCNC: 135 MMOL/L (ref 136–145)
WBC # BLD AUTO: 9.87 K/UL (ref 3.9–12.7)

## 2023-09-28 PROCEDURE — 3008F BODY MASS INDEX DOCD: CPT | Mod: CPTII,S$GLB,, | Performed by: HOSPITALIST

## 2023-09-28 PROCEDURE — 3077F SYST BP >= 140 MM HG: CPT | Mod: CPTII,S$GLB,, | Performed by: HOSPITALIST

## 2023-09-28 PROCEDURE — 99215 PR OFFICE/OUTPT VISIT, EST, LEVL V, 40-54 MIN: ICD-10-PCS | Mod: S$GLB,,, | Performed by: HOSPITALIST

## 2023-09-28 PROCEDURE — 3008F PR BODY MASS INDEX (BMI) DOCUMENTED: ICD-10-PCS | Mod: CPTII,S$GLB,, | Performed by: HOSPITALIST

## 2023-09-28 PROCEDURE — 80053 COMPREHEN METABOLIC PANEL: CPT | Performed by: HOSPITALIST

## 2023-09-28 PROCEDURE — 36415 COLL VENOUS BLD VENIPUNCTURE: CPT | Performed by: HOSPITALIST

## 2023-09-28 PROCEDURE — 99999 PR PBB SHADOW E&M-EST. PATIENT-LVL IV: ICD-10-PCS | Mod: PBBFAC,,, | Performed by: HOSPITALIST

## 2023-09-28 PROCEDURE — 99215 OFFICE O/P EST HI 40 MIN: CPT | Mod: S$GLB,,, | Performed by: HOSPITALIST

## 2023-09-28 PROCEDURE — 3077F PR MOST RECENT SYSTOLIC BLOOD PRESSURE >= 140 MM HG: ICD-10-PCS | Mod: CPTII,S$GLB,, | Performed by: HOSPITALIST

## 2023-09-28 PROCEDURE — 85027 COMPLETE CBC AUTOMATED: CPT | Performed by: HOSPITALIST

## 2023-09-28 PROCEDURE — 3080F PR MOST RECENT DIASTOLIC BLOOD PRESSURE >= 90 MM HG: ICD-10-PCS | Mod: CPTII,S$GLB,, | Performed by: HOSPITALIST

## 2023-09-28 PROCEDURE — 3080F DIAST BP >= 90 MM HG: CPT | Mod: CPTII,S$GLB,, | Performed by: HOSPITALIST

## 2023-09-28 PROCEDURE — 99999 PR PBB SHADOW E&M-EST. PATIENT-LVL IV: CPT | Mod: PBBFAC,,, | Performed by: HOSPITALIST

## 2023-09-28 NOTE — PATIENT INSTRUCTIONS
We discussed recent molecular profile results and tumor board discussion. At present, no targetable mutation and no plan for local intervention (surgery or radiation). Recommend proceeding with standard of care medical therapy with sacituzumab govitecan. Treatment is given by IV infusion weekly for two weeks in a row and then a week off. We discussed sided effects including low blood counts, infection risk, diarrhea, nausea, fatigue among others.     Simultaneously we will review your case with our early phase clinical trials team and with our [facility] colleagues.     - Repeat MRI pelvis and CT chest  - Chemo education class with our nurse practioner team  - Follow up in 2-3 weeks to start treatment

## 2023-09-28 NOTE — PROGRESS NOTES
MEDICAL ONCOLOGY - FOLLOW UP VISIT    Best Contact Phone Number(s): There are no phone numbers on file.     Cancer/Stage/TNM:    Cancer Staging   Malignant neoplasm of urinary bladder  Staging form: Urinary Bladder, AJCC 8th Edition  - Clinical: Stage IVB (cT4b, cNX, pM1b) - Signed by Bridger Abad MD on 9/28/2023       Reason for visit: Julio Rodríguez is a 58 y.o. male with metastatic bladder cancer previously treated with neoadjuvant ddMVAC, radical cystectomy, and then carboplatin/gemcitabine and EV+ Pembro following local and metastatic recurrence. He presents to medical oncology clinic for ongoing treatment planning.    History has been obtained by chart review and discussion with the patient.    Interval Events:    Reviewed case at tumor board. No plan for consolidative surgery or RT. Patient reports improvement in his right sided hip pain. Notes some increase left sided discomfort. No other new concerns. Continues to self catheterize neobladder. Also ongoing Linzess use for chronic constipation that is well controlled.      Oncology History   Malignant neoplasm of urinary bladder   2016 Initial Diagnosis    Bladder CIS. Managed with BCG     2017 Notable Event    Developed recurrent muscle invasive disease.     2017 - 2017 Chemotherapy    ddMVAC      Surgery    Radical cystectomy with lymph node dissection and creation of neobladder. [id number]     7/2022 Notable Event    New onset hematuria. MRI scan of the pelvis in August showed a suspected blood clot adherent to the distal Smith catheter. There was abnormal signal and enhancement of the bilateral pubic bones adjacent to the neobladder suspicious for neoplastic infiltration. There was no pelvic lymphadenopathy.      7/2022 Imaging Significant Findings    Pet scan at the same time showed hypermetabolic retroperitoneal lymphadenopathy. There was marked activity felt to involve the bladder wall suspicious for tumor.     7/2022 Biopsy    Biopsy of the  bladder neck showed recurrent high-grade urothelial carcinoma.     9/28/2022 - 5/16/2023 Chemotherapy    Mr. Rodríguez was started on chemotherapy with gemcitabine and carboplatin in September 2022.      1/25/2023 Imaging Significant Findings    Pet scan on 25 January showed changes related to prior cystoprostatectomy with neobladder creation and minimal fullness to the right renal collecting system. There was no evidence of a discrete hypermetabolic mass or lymphadenopathy. There was diffusely increased activity throughout the osseous structures, suggestive of chemotherapy with reactive marrow.     4/11/2023 Imaging Significant Findings    CT a/p  1. Pathologic fracture of the left parasymphysis pubis secondary to lytic process. Given location adjacent to neobladder, osteomyelitis is a possibility. Metastatic disease not excluded.   2. Prior cystoprostatectomy with chronic right ureteral obstruction and hydroureteronephrosis, minimally changed from the prior. All etiologies considered including malignant obstruction. Urology consultation recommended.   3. Incidental findings including cholelithiasis, bilateral multifocal renal cortical scarring, and small hiatal hernia.       5/24/2023 -  Chemotherapy    Enfortumab vedotin + Pembrolizumab     8/10/2023 Imaging Significant Findings    PET CT  Interval development of hypermetabolic pulmonary nodules within the lingula and right lower lobe likely reflecting pulmonary metastases. Some additional tiny pulmonary nodularity is new or more conspicuous from prior but too small to accurately characterize by PET/CT. Attention on follow-up recommended.     Worsening obstructive uropathy which is relatively moderate to severe the right kidney.     Similar appearance of the urinary neobladder. There is somewhat diminished due to physiologic activity to evaluate for local neoplasm but the appearance overall appears similar to prior. Assessment of local disease could be followed  with CT abdomen and pelvis with contrast.     Interval fracture of the left superior and inferior pubic rami appearing subacute in nature. Please correlate for trauma and tenderness. There is no significant FDG activity within the area to favor a pathologic fracture.     Previously seen diffuse marrow activity may have been due to previous marrow rebound or drug effect.       8/25/2023 Imaging Significant Findings    MR Pelvis  History of cystoprostatectomy and neobladder creation.     Interval increase in multilobular mass in the pelvis infiltrating the rectum, possibly due to distal sigmoid colon, anterior left pelvic bones as well as a inferior aspect of the neobladder suspicious for progression of neoplastic process as discussed above.      9/28/2023 Cancer Staged    Staging form: Urinary Bladder, AJCC 8th Edition  - Clinical: Stage IVB (cT4b, cNX, pM1b)     10/5/2023 -  Chemotherapy    Treatment Summary   Plan Name: OP SACITUZUMAB GOVITECAN-HZIY Q3W  Treatment Goal: Palliative  Status: Active  Start Date: 10/5/2023 (Planned)  End Date: 9/12/2024 (Planned)  Provider: Bridger Abad MD  Chemotherapy: sacituzumab govitecan-hziy (TRODELVY) 724 mg in sodium chloride 0.9% 500 mL chemo infusion, 10 mg/kg, Intravenous, Clinic/HOD 1 time, 0 of 17 cycles     Melanoma        Past Medical History:   Diagnosis Date    Bladder cancer     CAD (coronary artery disease) 9/12/2023    Prior CABG. Not on antiplatelets. Home medicatiosn include atorvastatin    Carcinoma     forehead    Encounter for blood transfusion     Hypertension     Hypothyroidism 9/12/2023    Home medications include levothyroxine    Malignant melanoma of skin, unspecified     right arm    Malignant neoplasm of urinary bladder 9/12/2023    Melanoma 9/12/2023    History of T1a melanoma; 0.5mm depth without ulceartion. SP wide local excision 02/2022          Past Surgical History:   Procedure Laterality Date    CORONARY ARTERY BYPASS GRAFT  10/2015     "CYSTECTOMY, ROBOT-ASSISTED, LAPAROSCOPIC, USING DA MARY XI, WITH ILEAL CONDUIT CREATION  12/2017    DILATION OF BLADDER      dialation of bladder neck- due to claudia bladder- multiple procedures    LYMPHADENECTOMY      XI ROBOTIC PROSTECTOMY, SUPRAPUBIC  12/2017        Family History   Problem Relation Age of Onset    Heart disease Father     Heart attack Paternal Uncle     Breast cancer Maternal Cousin     Colon cancer Neg Hx     Bladder Cancer Neg Hx         Social History     Tobacco Use    Smoking status: Former     Types: Cigarettes    Smokeless tobacco: Not on file   Substance Use Topics    Alcohol use: Not Currently        I have reviewed and updated the patient's past medical, surgical, family and social histories.    Review of patient's allergies indicates:  No Known Allergies     Current Outpatient Medications   Medication Sig Dispense Refill    atorvastatin (LIPITOR) 20 MG tablet Take 20 mg by mouth every evening.      docusate sodium (COLACE) 100 MG capsule Take 1 capsule by mouth every evening.      levothyroxine (SYNTHROID) 50 MCG tablet Take 50 mcg by mouth.      LINZESS 72 mcg Cap capsule Take 72 mcg by mouth every morning.      multivitamin (THERAGRAN) per tablet Take 1 tablet by mouth.      VTAMA 1 % Crea APPLY 1 APPLICATION ON THE SKIN DAILY       No current facility-administered medications for this visit.        Physical Exam:   BP (!) 156/93 (BP Location: Left arm, Patient Position: Sitting, BP Method: Medium (Automatic))   Pulse 88   Resp 18   Ht 5' 10" (1.778 m)   Wt 72 kg (158 lb 9.9 oz)   SpO2 100%   BMI 22.76 kg/m²      ECOG Performance status: 1            Physical Exam  Constitutional:       General: He is not in acute distress.     Appearance: Normal appearance.   HENT:      Head: Normocephalic.   Eyes:      General: No scleral icterus.     Extraocular Movements: Extraocular movements intact.      Conjunctiva/sclera: Conjunctivae normal.   Cardiovascular:      Rate and Rhythm: " Normal rate.   Pulmonary:      Effort: Pulmonary effort is normal. No respiratory distress.   Abdominal:      General: There is no distension.      Palpations: Abdomen is soft.   Skin:     General: Skin is warm and dry.   Neurological:      Mental Status: He is alert and oriented to person, place, and time.      Motor: No weakness.   Psychiatric:         Mood and Affect: Mood normal.         Behavior: Behavior normal.         Thought Content: Thought content normal.           Labs:                 Lab Results   Component Value Date     09/15/2023    K 4.3 09/15/2023    CREATININE 2.5 (H) 09/15/2023    WBC 7.78 09/15/2023    HGB 10.5 (L) 09/15/2023     09/15/2023    AST 11 09/15/2023    ALT 9 (L) 09/15/2023    ALKPHOS 84 09/15/2023    BILITOT 0.5 09/15/2023          Imaging:         I have personally reviewed the above imaging including recent MRI and PET CT scan    Path:   Reviewed pathology as documented in oncology history      Assessment and Plan:   1. Malignant neoplasm of urinary bladder, unspecified site  Overview:  Locally recurrent disease with presumed pulmonary metastases sp neoadjuvant MVAC, cystectomy, gem/carbo, pebmrolizumab, and enfortumab. Molecular profiling without FGFR alteration.  Majority of disease is from his local pelvic recurrence with infiltration into the pubis and rectum. ECOG PS remains quite good.    Assessment & Plan:  Reviewed at  TB. No plan for local intervention. No late phase trials open here. Will run though our early phase clinical trial program and also through [facility]. In the meantime plan to start sacituzumab govitecan.  - Early phase clinical trial review  - Repeat MR pelvis and CT chest for new treatment baseline  - FU in 2 weeks for chemo teachning and sacituzumab govitecan    Orders:  -     PHARMACOGENOMICS PANEL; Future; Expected date: 09/28/2023  -     MRI Abdomen-Pelvis w w/o Contrast (XPD); Future; Expected date: 09/28/2023  -     CT Chest Without  Contrast; Future; Expected date: 09/28/2023    2. Coronary artery disease, unspecified vessel or lesion type, unspecified whether angina present, unspecified whether native or transplanted heart  Overview:  Prior CABG. Not on antiplatelets. Home medicatiosn include atorvastatin      3. Elevated blood pressure reading  Assessment & Plan:  No known history of HTN. Offfered Care Companion. Patient to consider.      4. Hypothyroidism, unspecified type  Overview:  Home medications include levothyroxine                   Follow up:   Route Chart for Scheduling    Med Onc Chart Routing      Follow up with physician Kennedy Abad 2-3 weeks to start sacituzumab   Follow up with LYNDSEY . 1-2 weeks for chemo education   Infusion scheduling note New or changed treatment   sacizumab goviten to start in 2-3 weeks; please schedule D1 and D8 treatment   Injection scheduling note    Labs CBC and CMP   Scheduling:  Preferred lab:  Lab interval:     Imaging CT chest abdomen pelvis and MRI      Pharmacy appointment    Other referrals                  Treatment Plan Information   OP SACITUZUMAB GOVITECAN-HZIY Q3W   Bridger Abad MD   Upcoming Treatment Dates - OP SACITUZUMAB GOVITECAN-HZIY Q3W    10/5/2023       Pre-Medications       acetaminophen tablet 650 mg       diphenhydrAMINE (BENADRYL) 25 mg in sodium chloride 0.9% 50 mL IVPB       famotidine (PF) injection 20 mg       Chemotherapy       sacituzumab govitecan-hziy (TRODELVY) 724 mg in sodium chloride 0.9% 500 mL chemo infusion       Supportive Care       atropine injection 0.4 mg       Antiemetics       aprepitant (CINVANTI) injection 130 mg       palonosetron (ALOXI) 0.25 mg with Dexamethasone (DECADRON) 12 mg in NS 50 mL IVPB  10/12/2023       Pre-Medications       ACETAMINOPHEN  325 MG ORAL TAB       DIPHENHYDRAMINE IV ORDERABLE       FAMOTIDINE (PF) 20 MG/2 ML IV SOLN       Chemotherapy       sacituzumab govitecan-hziy (TRODELVY) 724 mg in sodium chloride 0.9% 500 mL chemo  infusion       Supportive Care       atropine injection 0.4 mg       Antiemetics       APREPITANT 7.2 MG/ML IV EMUL       PALONOSETRON 0.25MG/DEXAMETHASONE 12MG ORDERABLE  10/26/2023       Pre-Medications       ACETAMINOPHEN  325 MG ORAL TAB       DIPHENHYDRAMINE IV ORDERABLE       FAMOTIDINE (PF) 20 MG/2 ML IV SOLN       Chemotherapy       sacituzumab govitecan-hziy (TRODELVY) 724 mg in sodium chloride 0.9% 500 mL chemo infusion       Supportive Care       atropine injection 0.4 mg       Antiemetics       APREPITANT 7.2 MG/ML IV EMUL       PALONOSETRON 0.25MG/DEXAMETHASONE 12MG ORDERABLE  11/2/2023       Pre-Medications       ACETAMINOPHEN  325 MG ORAL TAB       DIPHENHYDRAMINE IV ORDERABLE       FAMOTIDINE (PF) 20 MG/2 ML IV SOLN       Chemotherapy       sacituzumab govitecan-hziy (TRODELVY) 724 mg in sodium chloride 0.9% 500 mL chemo infusion       Supportive Care       atropine injection 0.4 mg       Antiemetics       APREPITANT 7.2 MG/ML IV EMUL       PALONOSETRON 0.25MG/DEXAMETHASONE 12MG ORDERABLE        The above information has been reviewed with the patient and all questions have been answered to their apparent satisfaction.  They understand that they can call the clinic with any questions.    Bridger Abad MD  Hematology/Oncology  [facility]

## 2023-09-28 NOTE — ASSESSMENT & PLAN NOTE
Reviewed at  TB. No plan for local intervention. No late phase trials open here. Will run though our early phase clinical trial program and also through [facility]. In the meantime plan to start sacituzumab govitecan.  - Early phase clinical trial review  - Repeat MR pelvis and CT chest for new treatment baseline  - FU in 2 weeks for chemo teachning and sacituzumab govitecan

## 2023-09-28 NOTE — Clinical Note
Ava / Marium - do you know how we can have a patient case get the expedited reveiew through [facility]? Bladder cancer patient that has progressed through platinum, pembro, and EV. I'm going to start sacituzumab govitecan, but he would be a good early phase trial if our PTCP doesn't haven' anything here.  Bridger

## 2023-10-04 ENCOUNTER — TUMOR BOARD CONFERENCE (OUTPATIENT)
Dept: HEMATOLOGY/ONCOLOGY | Facility: CLINIC | Age: 58
End: 2023-10-04
Payer: COMMERCIAL

## 2023-10-04 NOTE — Clinical Note
Dr. Abad,  Thank you for sending this patient for review at the [facility] Tumor Board. We do not have any trials for you patient at this time. I will add him to our phase I waitlist so we can review him again if something becomes available in the future.    Thank You, Catalina Luu, NP

## 2023-10-04 NOTE — PROGRESS NOTES
[facility] Tumor Board    Date: 10/4/2023    Patient Name: Julio Rodríguez    MRN: [id number]    Diagnosis: Metastatic Bladder Cancer -  Diagnosed in 2016.    Referring Provider: Dr. Abad    Present PCTP Providers:     Dr. John Alcaraz, Shirley Kaur NP, Catalina Luu NP    Patient Summary:  Pathology:    Has failed Chemotherapy  Failed chemotherapy: 1.[place]/Carbo (9/2022-?) - progression. 2. Padcev+ Keytruda (5/2023-8/2023) - progression.    Current treatment(s):    ECOG: Restricted in physically strenuous activity but ambulatory and able to carry out work of a light or sedentary nature, e.g., light house work, office work    Molecular Workup:    Other  Other Molecular Workup: Tempus: PD-L1 50%, , TP53, TERT, CASP8, PTPRD, ATRX, CDKN2A, CDKN2B, TMB 10.5      Board Recommendations:    Standard of care recommendations:     Trial recommendations: Late phase: no trials. Early phase: no trials. Add to phase I waitlist.

## 2023-10-06 DIAGNOSIS — C67.9 MALIGNANT NEOPLASM OF URINARY BLADDER, UNSPECIFIED SITE: Primary | ICD-10-CM

## 2023-10-06 DIAGNOSIS — E88.89 UGT1A1 POOR METABOLIZER: ICD-10-CM

## 2023-10-06 LAB
ONEOME COMMENT: NORMAL
ONEOME METHOD: NORMAL

## 2023-10-09 ENCOUNTER — DOCUMENTATION ONLY (OUTPATIENT)
Dept: HEMATOLOGY/ONCOLOGY | Facility: CLINIC | Age: 58
End: 2023-10-09
Payer: COMMERCIAL

## 2023-10-09 DIAGNOSIS — C67.9 MALIGNANT NEOPLASM OF URINARY BLADDER, UNSPECIFIED SITE: Primary | ICD-10-CM

## 2023-10-09 NOTE — Clinical Note
Patient is at an increased risk of developing severe neutropenia and other adverse effects given his UGT1A1 Poor Metabolism. Recommend to increase monitoring and dose per individual tolerability as current guidelines do not list dose adjustment recommendations.

## 2023-10-09 NOTE — PROGRESS NOTES
PGx Consult Note    Clinical Recommendations based on PGx Results  Patient is at an increased risk for neutropenia and other adverse reactions with sacituzumab due to his UGT1A1 poor metabolism. Current FDA recommendations are to closely monitor patients with reduced UGT1A1 activity for severe neutropenia.   There are currently no dose adjustment recommendations for patients with UGT1A1 reduced activity and dose should be adjusted based on individual patient tolerance to treatment.    Past intolerance/inefficacies and allergies   No comments on previous medication therapies and PGx     Future Medication Considerations based on PGx Results  Patient has 5 actionable phenotypes that may impact future medication selection.   UGT1A1 Poor Metabolizer:  Irinotecan: Serious life-threatening adverse reactions can occur. Start with 70% of the standard dose and guide therapy by the neutrophil count.   Belinostat: Reduce the starting dose to 750 mg/m2 due to higher systemic concentrations and higher adverse reaction risk.   HLA-B*15:07 Detected:  Abacavir: Use is contraindicated due to the strongly increased risk of hypersensitivity to abacavir. 48% develop severe, life-threatening reactions.   KTD6Q12 Ultrarapid Metabolizer  Citalopram/Escitalopram: Consider an alternative agent not metabolized by ZAG1C37 due to increased metabolism, leading to lower plasma concentrations and higher probability of therapeutic failure.  Voriconazole: Choose an alternative agent that is not dependent on SVA3Z49 metabolism as primary therapy in lieu of voriconazole. Such agents include isavuconazole, liposomal amphotericin B, and posaconazole. The probability of therapeutic concentrations is modest with standard voriconazole dosing.   TCAs: Avoid tertiary amine use (I.e., Amitriptyline, Doxepin, etc.) due to potential for sub-optimal response. Consider alternative drug not metabolized by PIS5W25. TCAs without major PRG0L61 metabolism include the  secondary amines nortriptyline and desipramine. If a tertiary amine is warranted, utilize therapeutic drug monitoring to guide dose adjustments.   PPIs (excluding esomeprazole): Initiate standard starting daily dose. Consider increasing dose by % for treatment of H. pylori and erosive esophagitis. Monitor for efficacy.    CYP2D6 Intermediate Metabolizer:  Tramadol/Codeine: Use tramadol or codeine label recommended dosing. If no response and opioid use is warranted, consider alternative to codeine and tramadol due to reduced active metabolite formation.   Hydrocodone: Use hydrocodone label recommended dosing. If no response and opioid use is warranted, consider non-codeine or non-tramadol opioid.   Metoprolol: If a gradual reduction in HR is desired, or in the event of symptomatic bradycardia: use smaller steps in dose titration and/or prescribe no more than 50% of the standard dose. All other cases: no action required.   Paroxetine: Consider a lower starting dose and slower titration schedule due to reduced metabolism to less active compounds.    CYP2B6 Intermediate Metabolizer:  Efavirenz: Consider initiating efavirenz with decreased dose of 400 mg/day due to increased risk of CNS adverse effects.  Sertraline: Initiate therapy with the recommended starting dose. Consider a slower titration schedule and lower maintenance dose due to reduced metabolism to less active compounds.    Mari Lino, PharmD  Clinical Pharmacogenomics Pharmacist

## 2023-10-12 ENCOUNTER — TELEPHONE (OUTPATIENT)
Dept: HEMATOLOGY/ONCOLOGY | Facility: CLINIC | Age: 58
End: 2023-10-12
Payer: COMMERCIAL

## 2023-10-12 NOTE — TELEPHONE ENCOUNTER
"----- Message from Porter Aramis sent at 10/12/2023  2:17 PM CDT -----  Consult/Advisory:          Name Of Caller: Self      Contact Preference?: [phone] (Mobile)      Provider Name: Jenniffer      Does patient feel the need to be seen today? No      What is the nature of the call?: Inquiring if he needs to fast prior to 10/15 MRI and 10/16 CT appts          Additional Notes:  "Thank you for all that you do for our patients"       "

## 2023-10-15 ENCOUNTER — HOSPITAL ENCOUNTER (OUTPATIENT)
Dept: RADIOLOGY | Facility: HOSPITAL | Age: 58
Discharge: HOME | End: 2023-10-15
Attending: HOSPITALIST
Payer: COMMERCIAL

## 2023-10-15 DIAGNOSIS — C67.9 MALIGNANT NEOPLASM OF URINARY BLADDER, UNSPECIFIED SITE: ICD-10-CM

## 2023-10-15 PROCEDURE — 72195 MRI PELVIS W/O DYE: CPT | Mod: 26,,, | Performed by: RADIOLOGY

## 2023-10-15 PROCEDURE — 72195 MRI PELVIS WITHOUT CONTRAST: ICD-10-PCS | Mod: 26,,, | Performed by: RADIOLOGY

## 2023-10-15 PROCEDURE — 72195 MRI PELVIS W/O DYE: CPT | Mod: TC

## 2023-10-16 ENCOUNTER — HOSPITAL ENCOUNTER (OUTPATIENT)
Dept: RADIOLOGY | Facility: HOSPITAL | Age: 58
Discharge: HOME | End: 2023-10-16
Attending: HOSPITALIST
Payer: COMMERCIAL

## 2023-10-16 DIAGNOSIS — C67.9 MALIGNANT NEOPLASM OF URINARY BLADDER, UNSPECIFIED SITE: ICD-10-CM

## 2023-10-16 PROCEDURE — 71250 CT CHEST WITHOUT CONTRAST: ICD-10-PCS | Mod: 26,,, | Performed by: STUDENT IN AN ORGANIZED HEALTH CARE EDUCATION/TRAINING PROGRAM

## 2023-10-16 PROCEDURE — 71250 CT THORAX DX C-: CPT | Mod: TC

## 2023-10-16 PROCEDURE — 71250 CT THORAX DX C-: CPT | Mod: 26,,, | Performed by: STUDENT IN AN ORGANIZED HEALTH CARE EDUCATION/TRAINING PROGRAM

## 2023-10-17 ENCOUNTER — TELEPHONE (OUTPATIENT)
Dept: HEMATOLOGY/ONCOLOGY | Facility: CLINIC | Age: 58
End: 2023-10-17
Payer: COMMERCIAL

## 2023-10-17 NOTE — TELEPHONE ENCOUNTER
Called patient to inquire about interest in a possible phase I trial through the [facility].     No answer. He is coming in tomorrow for education regarding sacituzumab govitecan for standard of care. Will keep that visit, but I'll try to connect with him to see if [facility].        ----- Message from John Alcaraz MD sent at 10/17/2023  2:27 PM CDT -----  Regarding: RE: Tumor Board Patient for NTX for Mr. Rodríguez  Happy to see him for visits until they get Dr. Abad on trial. Gilisa, could you see when Dr. Leos, myself, or Dr. Miles could see this patient in PCTP clinic?    ----- Message -----  From: Evelyn Rogers RN  Sent: 10/17/2023   8:54 AM CDT  To: July Sommers RN; Catalina Luu NP; #  Subject: RE: Tumor Board Patient for NTX for Mr. Hilton#    Hi Dr. Abad,    I asked the sponsor if they are able to provide more details. Mr. Rodríguez would be assigned the combo arm, likely receiving 300 mg NTX study drug + 200 mg pembro. At this dose level of study drug, the sponsor would NOT require a biopsy. However, they cautioned that they cannot guarantee that this would be the assigned dose until after the safety meeting on Nov 1. Per the sponsor we could anticipate C1D1 to be Nov 2 or very shortly thereafter.    The most recent MRI does not have recist measurements so we would need a CT CAP with recist to ensure that he has measurable disease per the protocol.     Did you already speak to the patient about potentially enrolling in the trial? If you and the patient are agreeable, I could reach out to him this week to provide further info and send the consent for him to review. It looks like he has an appt with you next Monday. Would you want to consent him Monday so we could start screening him?    Also you are not listed on the trial as a sub-I. Would you prefer for me to ask the regulatory department to add you to the study so you can continue to see this patient? Or would you  prefer to transfer care to one of the doctors on the trial? (He might need to start with one of the doctors already listed on the trial)    Let me know your thoughts.    Evelyn    ----- Message -----  From: Bridger Abad MD  Sent: 10/16/2023   6:04 PM CDT  To: July Sommers RN; Catalina Luu NP; #  Subject: RE: Tumor Board Patient for NTX for Mr. Hilton#    Mayela Rivas - biopsy may not be the easiest, but he does have a ~9-10mm left lung nodule that may be gettable, along with intrapelvic recurrence but I'm not sure if that would be targetable or not.    Do you know how quickly he would be able to start treatment after November 1? Will want to be able to give him a timeline when I see him in clinic next week.    Bridger    ----- Message -----  From: Evelyn Rogers RN  Sent: 10/16/2023   4:00 PM CDT  To: July Sommers RN; Catalina Luu NP; #  Subject: RE: Tumor Board Patient for NTX for Mr. Hilton#    Good Afternoon Dr. Abad,    I wanted to give you an update regarding the NTX clinical trial. The sponsor will be holding their Safety Review Committee on November 1 and will announce their next dose level after the meeting. I reached out to the sponsor and as of today, they stated they plan to offer a slot to Mr. Rodríguez in one of the upcoming dose cohorts. It is probably going to be 600 mg NTX 1088 for the monotherapy arm and 300 mg NTX 1088+200 mg pembrolizumab for the combo arm. Once these dose levels have been confirmed, I will be able to schedule a consent visit if the patient is interested in participating.     If Mr. Rodríguez is in the 600mg NTX monotherapy arm, he will need a biopsy at screening and another biopsy at the end of cycle 4. In your opinion, does he have anything accessible that we could biopsy.  He would need repeat imaging in order to fall within the window of 28 days prior to C1D1.  His CrCl has been 31-33. The inclusion criteria states CrCl needs to be >30, so   Marcos technically qualifies at this time. I just wanted to point it out in case his CrCl drops .  He would need an Echo at screening, and then every 2 cycles.  He appears to be hypertensive (-150's as documented at [facility]). He would be excluded if he is considered to have uncontrolled hypertension.  It looks like his last dose of treatment was 8/24/2023. There is a 28 day washout period, so that should not limit his NTX start day.    I'll let you know as soon as I hear from the sponsor regarding the dose level. Please let me know if you would like any further information on the trial (see below for very brief summary of protocol)    Thanks!    Evelyn          Basic Info on the NTX Trial:    Phase 1 trial in dose escalation. NTX  is a monoclonal antibody targeting PVR. The trial is looking at NTX-1088 as a monotherapy vs in combo with pembro. It's a 21 day cycle, with infusion on day 1 of each cycle. The infusion takes 60 minutes and the first day of each cycle has labs through 4 hours post infusion. Observation for 6 hours after infusion for the first two infusion. He would have to come in on days 2, 8, and 15 of each cycles for labs, EKGs, AE assessment. MD appt once per week.     Imaging/ CT at screening and then every 2 cycles. He may need a screening biopsy depending on dose of study drug assigned. There's a screening ECHO and Echo every 2 cycles.     Potential side effects based on animal research: CAD, blood clots to coronary arteries, cardiac inflammation, elevated liver enzymes., build up of antibodies against NTX.     ----- Message -----  From: Catalina Luu NP  Sent: 10/16/2023   9:12 AM CDT  To: July Sommers RN; John Alcaraz MD; #  Subject: RE: Tumor Board Patient for NTX                  Adding Dr. Abad.   ----- Message -----  From: Evelyn Rogers RN  Sent: 10/11/2023   9:20 AM CDT  To: July Sommers RN; Catalina Luu NP; #  Subject: RE: Tumor Board Patient  for NTX                  Request emailed to NTX. They said they will have an update on slot availability by the end of the week.    ----- Message -----  From: John Alcaraz MD  Sent: 10/10/2023   3:42 PM CDT  To: July Sommers RN; Catalina Luu NP; #  Subject: RE: Tumor Board Patient for NTX                  Yes, definitely! Do we know who referred.    ----- Message -----  From: Evelyn Rogers RN  Sent: 10/10/2023   2:46 PM CDT  To: July Sommers RN; Catalina Luu NP; #  Subject: Tumor Board Patient for NTX                      Hi All,    We discussed this patient at tumor board last week. NTX stated they will take patients with bladder cancer. His CrCl is marginal (31-33, needs to be >30) but overall he looks like he may qualify based on prescreening his chart. Would you like for me to ask the sponsor to add him to their wait list?    Evelyn

## 2023-10-18 ENCOUNTER — CLINICAL SUPPORT (OUTPATIENT)
Dept: HEMATOLOGY/ONCOLOGY | Facility: CLINIC | Age: 58
End: 2023-10-18
Payer: COMMERCIAL

## 2023-10-18 VITALS
RESPIRATION RATE: 18 BRPM | WEIGHT: 158.94 LBS | DIASTOLIC BLOOD PRESSURE: 71 MMHG | OXYGEN SATURATION: 98 % | HEIGHT: 70 IN | SYSTOLIC BLOOD PRESSURE: 133 MMHG | HEART RATE: 97 BPM | BODY MASS INDEX: 22.75 KG/M2

## 2023-10-18 DIAGNOSIS — D49.9 IMMUNODEFICIENCY SECONDARY TO NEOPLASM: ICD-10-CM

## 2023-10-18 DIAGNOSIS — C67.9 MALIGNANT NEOPLASM OF URINARY BLADDER, UNSPECIFIED SITE: Primary | ICD-10-CM

## 2023-10-18 DIAGNOSIS — D84.81 IMMUNODEFICIENCY SECONDARY TO NEOPLASM: ICD-10-CM

## 2023-10-18 DIAGNOSIS — E88.89 UGT1A1 POOR METABOLIZER: ICD-10-CM

## 2023-10-18 PROCEDURE — 99215 OFFICE O/P EST HI 40 MIN: CPT | Mod: S$GLB,,, | Performed by: REGISTERED NURSE

## 2023-10-18 PROCEDURE — 99999 PR PBB SHADOW E&M-EST. PATIENT-LVL III: CPT | Mod: PBBFAC,,, | Performed by: REGISTERED NURSE

## 2023-10-18 PROCEDURE — 99999 PR PBB SHADOW E&M-EST. PATIENT-LVL III: ICD-10-PCS | Mod: PBBFAC,,, | Performed by: REGISTERED NURSE

## 2023-10-18 PROCEDURE — 99215 PR OFFICE/OUTPT VISIT, EST, LEVL V, 40-54 MIN: ICD-10-PCS | Mod: S$GLB,,, | Performed by: REGISTERED NURSE

## 2023-10-18 NOTE — PROGRESS NOTES
Julio Rodríguez was consented for chemotherapy/immunotherapy to treat bladder cancer. Julio Rodríguez was consented to receive sacituzumab govitecan-hziy. The consent was discussed and reviewed with patient and mother. The patient is aware that he may be eligible for phase I clinical trial. He is agreeable to meeting with PCTP team to discuss best options at this time.    2. Patient was educated on what to expect when receiving chemotherapy including: checking in, receiving an ID band, 1 guest allowed in the infusion suite during infusion, can alternate people as well, pole going with you once you are hooked up, warm blankets are available, you may bring lunch and snacks, minimal snacks are available, take medications as regularly unless told otherwise, warm blankets, will be available. Education about what to expect during their chemo cycle and how often their regimen is given.     3. An extensive discussion was had which included a thorough discussion of the risk and benefits of treatment and alternatives.  Risks, including but not limited to, possible hair loss, bone marrow damage (anemia, thrombocytopenia, immune suppression, neutropenia), damage to body organs (brain, heart, liver, kidney, lungs, nervous system, skin, and others), allergic reactions, sterility, nausea/vomiting, constipation/diarrhea, sores in the mouth, secondary cancers, local damage at possible injection sites, and rarely death were all discussed. Specific side effects pertaining to their chemotherapy/immunotherapy medications were discussed as well. Consented the patient to the treatment plan and the patient was educated on the planned duration of the treatment and schedule of the treatment administration. The patient agrees with the plan, and all questions and their support system's questions have been answered to their satisfaction. Contraindications and potential side effects discussed as listed in chemocare.     4. Patient was given binder  which includes: contact information for the [facility], an immunotherapy side effect guide (if applicable to patient), resources including, but not limited to: women's wellness, acupuncture, physical therapy, , urgent care within oncology and financial assistance.     5. Patient was educated on when to call (and given the numbers to call and knows to message via [facility] if possible) or notify the provider including, but not limited to:   Persistent Nausea and/or Vomiting  Dehydration  Persistent Diarrhea  Fever of 100.4 > 1 hour in duration or any isolated fever > 101   Rash (while on active chemotherapy or immunotherapy)   Severe pain or new onset pain not controlled by current medication regimen  Or any other symptom you feel is related to your current hematology or oncology treatment    6. Patient was provided with additional resources that [facility] offers including, but not limited to: financial counseling, , psychologist, palliative care, support groups, transportation, dietician, rehab services, women's wellness and urgent care visits within the oncology department.     7. Patient was offered and refused chemo care companion. Educated on daily vital signs and daily questionnaire. He is hesitant to enroll now but will consider this and let us know if he decides to enroll in the future.     8. Patient has an established PCP Dr. Coffey/Dr. Ng at [facility].    9. Advance Care Planning     Date: 10/18/2023    I engaged the patient and family in a voluntary conversation about advance care planning and we specifically addressed what the goals of care would be moving forward, in light of the patient's change in clinical status, specifically cancer diagnosis. We have decided that the best plan to meet the patient's goals includes continuing with treatment. I provided the ACP planning booklet to the patient and reviewed the included documents. Encouraged the patient to complete and  return for upload into the chart.     I did not explain the role for hospice care at this stage of the patient's illness, including its ability to help the patient live with the best quality of life possible.  We will not be making a hospice referral.    I spent a total of 10 minutes engaging the patient in this advance care planning discussion.     Patient will Proceed with cycle 1 day 1 of sacituzumab govitecan-hziy as scheduled on 10/24/23 unless he decides to enroll in clinical trial.     Patient is in agreement with the proposed treatment plan. All questions were answered to the patient's satisfaction. Pt knows to call clinic if anything is needed before the next clinic visit.    Patient discussed with collaborating physician, Dr. Abad.    At least 40 minutes were spent today on this encounter including face to face time with the patient, data gathering/interpretation and documentation.       Isabell Summers, MSN, APRN, ACCNS-AG  Hematology and Medical Oncology  Clinical Nurse Specialist to Dr. Miles, Dr. Lang & Dr. Kinsey    Route Chart for Scheduling    Med Onc Chart Routing      Follow up with physician . As scheduled   Follow up with LYNDSEY    Infusion scheduling note    Injection scheduling note    Labs    Imaging    Pharmacy appointment    Other referrals                  Treatment Plan Information   OP SACITUZUMAB GOVITECAN-HZIY Q3W   Bridger Abad MD   Upcoming Treatment Dates - OP SACITUZUMAB GOVITECAN-HZIY Q3W    10/24/2023       Pre-Medications       acetaminophen tablet 650 mg       diphenhydrAMINE (BENADRYL) 25 mg in sodium chloride 0.9% 50 mL IVPB       famotidine (PF) injection 20 mg       Chemotherapy       sacituzumab govitecan-hziy (TRODELVY) 543 mg in sodium chloride 0.9% 500 mL chemo infusion       Supportive Care       atropine injection 0.4 mg       prochlorperazine injection Soln 10 mg       Antiemetics       aprepitant (CINVANTI) injection 130 mg       palonosetron (ALOXI) 0.25 mg  with Dexamethasone (DECADRON) 12 mg in NS 50 mL IVPB  10/31/2023       Pre-Medications       acetaminophen tablet 650 mg       diphenhydrAMINE (BENADRYL) 25 mg in sodium chloride 0.9% 50 mL IVPB       famotidine (PF) injection 20 mg       Chemotherapy       sacituzumab govitecan-hziy (TRODELVY) 543 mg in sodium chloride 0.9% 500 mL chemo infusion       Supportive Care       atropine injection 0.4 mg       prochlorperazine injection Soln 10 mg       Antiemetics       aprepitant (CINVANTI) injection 130 mg       palonosetron (ALOXI) 0.25 mg with Dexamethasone (DECADRON) 12 mg in NS 50 mL IVPB  11/14/2023       Pre-Medications       acetaminophen tablet 650 mg       diphenhydrAMINE (BENADRYL) 25 mg in sodium chloride 0.9% 50 mL IVPB       famotidine (PF) injection 20 mg       Chemotherapy       sacituzumab govitecan-hziy (TRODELVY) 543 mg in sodium chloride 0.9% 500 mL chemo infusion       Supportive Care       atropine injection 0.4 mg       prochlorperazine injection Soln 10 mg       Antiemetics       aprepitant (CINVANTI) injection 130 mg       palonosetron (ALOXI) 0.25 mg with Dexamethasone (DECADRON) 12 mg in NS 50 mL IVPB  11/21/2023       Pre-Medications       acetaminophen tablet 650 mg       diphenhydrAMINE (BENADRYL) 25 mg in sodium chloride 0.9% 50 mL IVPB       famotidine (PF) injection 20 mg       Chemotherapy       sacituzumab govitecan-hziy (TRODELVY) 543 mg in sodium chloride 0.9% 500 mL chemo infusion       Supportive Care       atropine injection 0.4 mg       prochlorperazine injection Soln 10 mg       Antiemetics       aprepitant (CINVANTI) injection 130 mg       palonosetron (ALOXI) 0.25 mg with Dexamethasone (DECADRON) 12 mg in NS 50 mL IVPB

## 2023-10-19 ENCOUNTER — TELEPHONE (OUTPATIENT)
Dept: RESEARCH | Facility: HOSPITAL | Age: 58
End: 2023-10-19
Payer: COMMERCIAL

## 2023-10-19 DIAGNOSIS — C67.9 MALIGNANT NEOPLASM OF URINARY BLADDER, UNSPECIFIED SITE: Primary | ICD-10-CM

## 2023-10-19 NOTE — TELEPHONE ENCOUNTER
Spoke with patient via telephone. Gave patient brief information on the NTX clinical trial. Consent form sent to patient via [employer] for review. This RNs contact information provided to the patient. Plan to follow-up with patient in person following his appt with Dr. Abad on Monday October 23. Patient verbalized understanding.

## 2023-10-23 ENCOUNTER — HOSPITAL ENCOUNTER (OUTPATIENT)
Dept: CARDIOLOGY | Facility: HOSPITAL | Age: 58
Discharge: HOME | End: 2023-10-23
Attending: INTERNAL MEDICINE
Payer: COMMERCIAL

## 2023-10-23 ENCOUNTER — OFFICE VISIT (OUTPATIENT)
Dept: HEMATOLOGY/ONCOLOGY | Facility: CLINIC | Age: 58
End: 2023-10-23
Payer: COMMERCIAL

## 2023-10-23 ENCOUNTER — RESEARCH ENCOUNTER (OUTPATIENT)
Dept: RESEARCH | Facility: HOSPITAL | Age: 58
End: 2023-10-23
Payer: COMMERCIAL

## 2023-10-23 VITALS
HEART RATE: 76 BPM | DIASTOLIC BLOOD PRESSURE: 70 MMHG | BODY MASS INDEX: 22.85 KG/M2 | SYSTOLIC BLOOD PRESSURE: 130 MMHG | RESPIRATION RATE: 18 BRPM | HEIGHT: 70 IN | OXYGEN SATURATION: 100 % | WEIGHT: 159.63 LBS

## 2023-10-23 VITALS
HEART RATE: 78 BPM | BODY MASS INDEX: 22.76 KG/M2 | WEIGHT: 159 LBS | SYSTOLIC BLOOD PRESSURE: 130 MMHG | HEIGHT: 70 IN | DIASTOLIC BLOOD PRESSURE: 70 MMHG

## 2023-10-23 DIAGNOSIS — C67.9 MALIGNANT NEOPLASM OF URINARY BLADDER, UNSPECIFIED SITE: Primary | ICD-10-CM

## 2023-10-23 DIAGNOSIS — N18.32 STAGE 3B CHRONIC KIDNEY DISEASE: ICD-10-CM

## 2023-10-23 DIAGNOSIS — I25.10 CORONARY ARTERY DISEASE, UNSPECIFIED VESSEL OR LESION TYPE, UNSPECIFIED WHETHER ANGINA PRESENT, UNSPECIFIED WHETHER NATIVE OR TRANSPLANTED HEART: ICD-10-CM

## 2023-10-23 DIAGNOSIS — C67.9 MALIGNANT NEOPLASM OF URINARY BLADDER, UNSPECIFIED SITE: ICD-10-CM

## 2023-10-23 DIAGNOSIS — E03.9 HYPOTHYROIDISM, UNSPECIFIED TYPE: ICD-10-CM

## 2023-10-23 PROBLEM — N18.9 CKD (CHRONIC KIDNEY DISEASE): Status: ACTIVE | Noted: 2023-10-23

## 2023-10-23 LAB
ASCENDING AORTA: 3.53 CM
AV INDEX (PROSTH): 0.91
AV MEAN GRADIENT: 4 MMHG
AV PEAK GRADIENT: 6 MMHG
AV VALVE AREA BY VELOCITY RATIO: 3.75 CM²
AV VALVE AREA: 3.79 CM²
AV VELOCITY RATIO: 0.9
BSA FOR ECHO PROCEDURE: 1.89 M2
CV ECHO LV RWT: 0.28 CM
DOP CALC AO PEAK VEL: 1.25 M/S
DOP CALC AO VTI: 21.21 CM
DOP CALC LVOT AREA: 4.2 CM2
DOP CALC LVOT DIAMETER: 2.31 CM
DOP CALC LVOT PEAK VEL: 1.12 M/S
DOP CALC LVOT STROKE VOLUME: 80.43 CM3
DOP CALCLVOT PEAK VEL VTI: 19.2 CM
E WAVE DECELERATION TIME: 210.36 MSEC
E/A RATIO: 0.64
E/E' RATIO: 4.56 M/S
ECHO LV POSTERIOR WALL: 0.73 CM (ref 0.6–1.1)
FRACTIONAL SHORTENING: 31 % (ref 28–44)
INTERVENTRICULAR SEPTUM: 0.68 CM (ref 0.6–1.1)
IVRT: 76.12 MSEC
LA MAJOR: 4.78 CM
LA MINOR: 5.06 CM
LA WIDTH: 3.75 CM
LEFT ATRIUM SIZE: 3.44 CM
LEFT ATRIUM VOLUME INDEX MOD: 24 ML/M2
LEFT ATRIUM VOLUME INDEX: 28.5 ML/M2
LEFT ATRIUM VOLUME MOD: 45.36 CM3
LEFT ATRIUM VOLUME: 53.9 CM3
LEFT INTERNAL DIMENSION IN SYSTOLE: 3.55 CM (ref 2.1–4)
LEFT VENTRICLE DIASTOLIC VOLUME INDEX: 67.17 ML/M2
LEFT VENTRICLE DIASTOLIC VOLUME: 126.96 ML
LEFT VENTRICLE MASS INDEX: 65 G/M2
LEFT VENTRICLE SYSTOLIC VOLUME INDEX: 27.9 ML/M2
LEFT VENTRICLE SYSTOLIC VOLUME: 52.68 ML
LEFT VENTRICULAR INTERNAL DIMENSION IN DIASTOLE: 5.16 CM (ref 3.5–6)
LEFT VENTRICULAR MASS: 122.24 G
LV LATERAL E/E' RATIO: 3.73 M/S
LV SEPTAL E/E' RATIO: 5.86 M/S
MV A" WAVE DURATION": 9.13 MSEC
MV PEAK A VEL: 0.64 M/S
MV PEAK E VEL: 0.41 M/S
MV STENOSIS PRESSURE HALF TIME: 61.01 MS
MV VALVE AREA P 1/2 METHOD: 3.61 CM2
OHS LV EJECTION FRACTION SIMPSONS BIPLANE MOD: 56 %
PISA TR MAX VEL: 2.33 M/S
PULM VEIN S/D RATIO: 1.02
PV PEAK D VEL: 0.55 M/S
PV PEAK S VEL: 0.56 M/S
RA MAJOR: 4.88 CM
RA PRESSURE ESTIMATED: 3 MMHG
RA WIDTH: 3.85 CM
RIGHT VENTRICULAR END-DIASTOLIC DIMENSION: 4.62 CM
RV TB RVSP: 5 MMHG
SINUS: 3.6 CM
STJ: 3.11 CM
TDI LATERAL: 0.11 M/S
TDI SEPTAL: 0.07 M/S
TDI: 0.09 M/S
TR MAX PG: 22 MMHG
TRICUSPID ANNULAR PLANE SYSTOLIC EXCURSION: 1.78 CM
TV REST PULMONARY ARTERY PRESSURE: 25 MMHG
Z-SCORE OF LEFT VENTRICULAR DIMENSION IN END DIASTOLE: -0.25
Z-SCORE OF LEFT VENTRICULAR DIMENSION IN END SYSTOLE: 0.67

## 2023-10-23 PROCEDURE — 93306 TTE W/DOPPLER COMPLETE: CPT | Mod: 26,,, | Performed by: INTERNAL MEDICINE

## 2023-10-23 PROCEDURE — 93306 TTE W/DOPPLER COMPLETE: CPT

## 2023-10-23 PROCEDURE — 3075F PR MOST RECENT SYSTOLIC BLOOD PRESS GE 130-139MM HG: ICD-10-PCS | Mod: CPTII,S$GLB,, | Performed by: HOSPITALIST

## 2023-10-23 PROCEDURE — 99214 OFFICE O/P EST MOD 30 MIN: CPT | Mod: S$GLB,,, | Performed by: HOSPITALIST

## 2023-10-23 PROCEDURE — 99999 PR PBB SHADOW E&M-EST. PATIENT-LVL III: ICD-10-PCS | Mod: PBBFAC,,, | Performed by: HOSPITALIST

## 2023-10-23 PROCEDURE — 99214 PR OFFICE/OUTPT VISIT, EST, LEVL IV, 30-39 MIN: ICD-10-PCS | Mod: S$GLB,,, | Performed by: HOSPITALIST

## 2023-10-23 PROCEDURE — 3008F PR BODY MASS INDEX (BMI) DOCUMENTED: ICD-10-PCS | Mod: CPTII,S$GLB,, | Performed by: HOSPITALIST

## 2023-10-23 PROCEDURE — 3078F PR MOST RECENT DIASTOLIC BLOOD PRESSURE < 80 MM HG: ICD-10-PCS | Mod: CPTII,S$GLB,, | Performed by: HOSPITALIST

## 2023-10-23 PROCEDURE — 3075F SYST BP GE 130 - 139MM HG: CPT | Mod: CPTII,S$GLB,, | Performed by: HOSPITALIST

## 2023-10-23 PROCEDURE — 3078F DIAST BP <80 MM HG: CPT | Mod: CPTII,S$GLB,, | Performed by: HOSPITALIST

## 2023-10-23 PROCEDURE — 99999 PR PBB SHADOW E&M-EST. PATIENT-LVL III: CPT | Mod: PBBFAC,,, | Performed by: HOSPITALIST

## 2023-10-23 PROCEDURE — 93306 ECHO (CUPID ONLY): ICD-10-PCS | Mod: 26,,, | Performed by: INTERNAL MEDICINE

## 2023-10-23 PROCEDURE — 3008F BODY MASS INDEX DOCD: CPT | Mod: CPTII,S$GLB,, | Performed by: HOSPITALIST

## 2023-10-23 NOTE — PROGRESS NOTES
Protocol: A Phase 1, First-in-Human Study of NTX-1088, a Monoclonal Antibody Targeting the Poliovirus Receptor (PVR), as Monotherapy and Combined with Pembrolizumab, in Patients with Advanced Solid Malignancies  Protocol #: NTX 1088-01  Protocol Version: V2; [id number]  Sponsor: [employer]  IRB #: [id number]  P.I.: Chan Leos MD        Informed Consent Note 23 October 2023     This research RN and Amena Stephens RN, met with subject and his mother today in person to discuss the above mentioned clinical trial at Dr. Abad's request. Dr. Abad would like to offer this patient clinical trials as a treatment option and briefly introduced the NTX 1088 clinical trial to the patient. The following aspects of the consent version 3.0 dated 07Mar2023, IRB approved [id number] were discussed in detail by this RN with the patient and his mother:  - the purpose of the study, why he is being asked to participate.  - dose escalation and dose expansion. Pt understands he will be in the dose escalation portion of the trial and will receive 300 mg NTX-1088 along with 200 mg pembrolizumab combination therapy. Discussed with patient that the sponsor is hosting a safety meeting on November 1 to confirm or adjust this dose level. Patient verbalized understanding.  - the length of the study, that he will be on the medication until progression, if he can no longer tolerate the treatment or complete remission.  - the tasks he will need to complete prior to enrolling in the study, which tasks he has completed already and which he will still need to complete.  - the tasks he will need to complete during the study timepoints and why.  - the risks associated with study medication and study tasks and the likelihood of these risks, including (but not limited to) general/unforeseeable risks and changes in liver or heart function.  - the benefits of participating, for both the patient and future cancer patients.  - the costs of the  study, what will be provided by the study and what will be covered by insurance.   -  alternative options besides study participation.  - the contact information for the PI Dr. Leos and the [facility] Institutional Review Board.  - that participation in the study is completely voluntary. That he can withdraw his consent at any time without penalty. That  Dr. Leos and/or one the site's study sub-I's can also withdraw consent if they no longer thinks the study is medically beneficial.    - that we keep all information we share with study personnel for 10 years after the study closes.   - Confidentiality and HIPAA authorization. What information may be shared with the study, what groups may have access to that information, and why.      After all of the above was discussed in detail, patient was given the opportunity to ask questions. Patient's questions answered to his satisfaction by RNs. Patient verbalized understanding of all information. Patient given the opportunity to take the consent form home to review prior to signing. Patient states he is ready to sign the consent and freely signed main consent at 0920 on 23Oct2023. This research RN gave patient a copy of the signed and dated consent along with contact card, and encouraged patient to call with any questions or concerns. Patient consented to protocol version 2.0 dated 21Feb2023. Subject began screening procedures on 23Oct2023 at 0930 with potential start date of 02Nov2023. Patient provided with a calendar listing dates and times of screening activities. Patient verbalizes understanding of schedule and agrees with plan. Patient provided with this RN and Amena Stephens's contact information and instructed patient to call us with questions or concerns.     No study procedures were completed before consent was signed.     Please see attached copy of signed ICF.

## 2023-10-23 NOTE — ASSESSMENT & PLAN NOTE
Will hold off on starting sacituzumab govitecan pending potential enrollment in phase I trial. Meetign with PCTP next week. Notably, his eGFR is borderline which may be an enrollment issue. Will schedule C1 SG in 3 weeks in case trial is not possible.  - FU with PCTP next week  - Tentative plan for C1 dose reduced govitecent (UGT1A1 intermediate metabolism) 11/13/23

## 2023-10-23 NOTE — PROGRESS NOTES
MEDICAL ONCOLOGY - FOLLOW UP VISIT    Best Contact Phone Number(s): There are no phone numbers on file.     Cancer/Stage/TNM:    Cancer Staging   Malignant neoplasm of urinary bladder  Staging form: Urinary Bladder, AJCC 8th Edition  - Clinical: Stage IVB (cT4b, cNX, pM1b) - Signed by Bridger Abad MD on 9/28/2023       Reason for visit: Julio Rodríguez is a 58 y.o. male with metastatic bladder cancer previously treated with neoadjuvant ddMVAC, radical cystectomy, and then carboplatin/gemcitabine and EV+ Pembro following local and metastatic recurrence. He presents to medical oncology clinic for ongoing treatment planning.    History has been obtained by chart review and discussion with the patient.    Interval Events:    Potentially eligible for NTX 1088 phase I trial. Meeting with PCTP next week to discuss pending eligibility. Anticipate start would be early November.    Overall feels good. Went zip-lining yestedray requiring climbing up platform ladders etc. Ongoing pain within his left ischium. Generally mild. Occassionally uses tylenol at nigh. Has not had to use over the last few nights. Continues on Linzess. Up to 2-3 BM per day, but miss a day occassionally. On average 1 per day. Normal stool without blood. Sometimes with mucous. Ongoing catheterization for neobladder. No CP, SOB or cough. Notes a chronic abdominal hernia    Had COVID vaccine last Saturday.         Oncology History   Malignant neoplasm of urinary bladder   2016 Initial Diagnosis    Bladder CIS. Managed with BCG     2017 Notable Event    Developed recurrent muscle invasive disease.     2017 - 2017 Chemotherapy    ddMVAC      Surgery    Radical cystectomy with lymph node dissection and creation of neobladder. [id number]     7/2022 Notable Event    New onset hematuria. MRI scan of the pelvis in August showed a suspected blood clot adherent to the distal Smith catheter. There was abnormal signal and enhancement of the bilateral pubic bones  adjacent to the neobladder suspicious for neoplastic infiltration. There was no pelvic lymphadenopathy.      7/2022 Imaging Significant Findings    Pet scan at the same time showed hypermetabolic retroperitoneal lymphadenopathy. There was marked activity felt to involve the bladder wall suspicious for tumor.     7/2022 Biopsy    Biopsy of the bladder neck showed recurrent high-grade urothelial carcinoma.     9/28/2022 - 5/16/2023 Chemotherapy    Mr. Rodríguez was started on chemotherapy with gemcitabine and carboplatin in September 2022.      1/25/2023 Imaging Significant Findings    Pet scan on 25 January showed changes related to prior cystoprostatectomy with neobladder creation and minimal fullness to the right renal collecting system. There was no evidence of a discrete hypermetabolic mass or lymphadenopathy. There was diffusely increased activity throughout the osseous structures, suggestive of chemotherapy with reactive marrow.     4/11/2023 Imaging Significant Findings    CT a/p  1. Pathologic fracture of the left parasymphysis pubis secondary to lytic process. Given location adjacent to neobladder, osteomyelitis is a possibility. Metastatic disease not excluded.   2. Prior cystoprostatectomy with chronic right ureteral obstruction and hydroureteronephrosis, minimally changed from the prior. All etiologies considered including malignant obstruction. Urology consultation recommended.   3. Incidental findings including cholelithiasis, bilateral multifocal renal cortical scarring, and small hiatal hernia.       5/24/2023 -  Chemotherapy    Enfortumab vedotin + Pembrolizumab     8/10/2023 Imaging Significant Findings    PET CT  Interval development of hypermetabolic pulmonary nodules within the lingula and right lower lobe likely reflecting pulmonary metastases. Some additional tiny pulmonary nodularity is new or more conspicuous from prior but too small to accurately characterize by PET/CT. Attention on follow-up  recommended.     Worsening obstructive uropathy which is relatively moderate to severe the right kidney.     Similar appearance of the urinary neobladder. There is somewhat diminished due to physiologic activity to evaluate for local neoplasm but the appearance overall appears similar to prior. Assessment of local disease could be followed with CT abdomen and pelvis with contrast.     Interval fracture of the left superior and inferior pubic rami appearing subacute in nature. Please correlate for trauma and tenderness. There is no significant FDG activity within the area to favor a pathologic fracture.     Previously seen diffuse marrow activity may have been due to previous marrow rebound or drug effect.       8/25/2023 Imaging Significant Findings    MR Pelvis  History of cystoprostatectomy and neobladder creation.     Interval increase in multilobular mass in the pelvis infiltrating the rectum, possibly due to distal sigmoid colon, anterior left pelvic bones as well as a inferior aspect of the neobladder suspicious for progression of neoplastic process as discussed above.      9/28/2023 Cancer Staged    Staging form: Urinary Bladder, AJCC 8th Edition  - Clinical: Stage IVB (cT4b, cNX, pM1b)     10/16/2023 Imaging Significant Findings    CT chest   Impression:  - Multiple solid bilateral pulmonary nodules up to 9mm showing interval increase in size concerning for metastatic disease in this patient with history of prior bladder malignancy.  - Multiple hepatic hypodensities, which may represent cystic lesions however some which are too small to characterize.  - Partially imaged right kidney showing parenchymal atrophy and suspected hydronephrosis..    MR Pelvis   Impression:     Interval increased size of multilobular mass in the cystoprostatectomy surgical bed that invades the neobladder, rectum, sigmoid colon, and left pelvis.  Multiple pelvic lymph nodes are more conspicuous from prior exam and concerning for  additional metastatic disease.     10/24/2023 -  Chemotherapy    Treatment Summary   Plan Name: OP SACITUZUMAB GOVITECAN-HZIY Q3W  Treatment Goal: Palliative  Status: Active  Start Date: 10/24/2023 (Planned)  End Date: 10/2/2024 (Planned)  Provider: Bridger Abad MD  Chemotherapy: sacituzumab govitecan-hziy (TRODELVY) 543 mg in sodium chloride 0.9% 500 mL chemo infusion, 7.5 mg/kg = 543 mg (original dose ), Intravenous, Clinic/HOD 1 time, 0 of 17 cycles  Dose modification: 7.5 mg/kg (Cycle 1, Reason: MD Discretion, Comment: UGT1A1 PM )     Melanoma        Past Medical History:   Diagnosis Date    Bladder cancer     CAD (coronary artery disease) 9/12/2023    Prior CABG. Not on antiplatelets. Home medicatiosn include atorvastatin    Carcinoma     forehead    Encounter for blood transfusion     Hypertension     Hypothyroidism 9/12/2023    Home medications include levothyroxine    Malignant melanoma of skin, unspecified     right arm    Malignant neoplasm of urinary bladder 9/12/2023    Melanoma 9/12/2023    History of T1a melanoma; 0.5mm depth without ulceartion. SP wide local excision 02/2022          Past Surgical History:   Procedure Laterality Date    CORONARY ARTERY BYPASS GRAFT  10/2015    CYSTECTOMY, ROBOT-ASSISTED, LAPAROSCOPIC, USING DA MARY XI, WITH ILEAL CONDUIT CREATION  12/2017    DILATION OF BLADDER      dialation of bladder neck- due to claudia bladder- multiple procedures    LYMPHADENECTOMY      XI ROBOTIC PROSTECTOMY, SUPRAPUBIC  12/2017        Family History   Problem Relation Age of Onset    Heart disease Father     Heart attack Paternal Uncle     Breast cancer Maternal Cousin     Colon cancer Neg Hx     Bladder Cancer Neg Hx         Social History     Tobacco Use    Smoking status: Former     Types: Cigarettes    Smokeless tobacco: Not on file   Substance Use Topics    Alcohol use: Not Currently        I have reviewed and updated the patient's past medical, surgical, family and social  "histories.    Review of patient's allergies indicates:  No Known Allergies     Current Outpatient Medications   Medication Sig Dispense Refill    atorvastatin (LIPITOR) 20 MG tablet Take 20 mg by mouth every evening.      docusate sodium (COLACE) 100 MG capsule Take 1 capsule by mouth every evening.      levothyroxine (SYNTHROID) 50 MCG tablet Take 50 mcg by mouth.      LINZESS 72 mcg Cap capsule Take 72 mcg by mouth every morning.      multivitamin (THERAGRAN) per tablet Take 1 tablet by mouth.      VTAMA 1 % Crea APPLY 1 APPLICATION ON THE SKIN DAILY       No current facility-administered medications for this visit.        Physical Exam:   /70 (BP Location: Left arm, Patient Position: Sitting, BP Method: Medium (Automatic))   Pulse 76   Resp 18   Ht 5' 10" (1.778 m)   Wt 72.4 kg (159 lb 9.8 oz)   SpO2 100%   BMI 22.90 kg/m²      ECOG Performance status: 1            Physical Exam  Constitutional:       General: He is not in acute distress.     Appearance: Normal appearance.   HENT:      Head: Normocephalic.   Eyes:      General: No scleral icterus.     Extraocular Movements: Extraocular movements intact.      Conjunctiva/sclera: Conjunctivae normal.   Cardiovascular:      Rate and Rhythm: Normal rate.   Pulmonary:      Effort: Pulmonary effort is normal. No respiratory distress.   Abdominal:      General: There is no distension.      Palpations: Abdomen is soft.   Skin:     General: Skin is warm and dry.   Neurological:      Mental Status: He is alert and oriented to person, place, and time.      Motor: No weakness.   Psychiatric:         Mood and Affect: Mood normal.         Behavior: Behavior normal.         Thought Content: Thought content normal.           Labs:                 Lab Results   Component Value Date     (L) 10/23/2023    K 4.4 10/23/2023    CREATININE 2.6 (H) 10/23/2023    WBC 8.40 10/23/2023    HGB 10.2 (L) 10/23/2023     10/23/2023    AST 10 10/23/2023    ALT 10 " 10/23/2023    ALKPHOS 74 10/23/2023    BILITOT 0.5 10/23/2023          Imaging:         I have personally reviewed the above imaging including recent MRI and PET CT scan    Path:   Reviewed pathology as documented in oncology history      Assessment and Plan:   1. Malignant neoplasm of urinary bladder, unspecified site  Overview:  Locally recurrent disease with presumed pulmonary metastases sp neoadjuvant MVAC, cystectomy, gem/carbo, pebmrolizumab, and enfortumab. Molecular profiling without FGFR alteration.  Majority of disease is from his local pelvic recurrence with infiltration into the pubis and rectum. Also with pulmonary mets on imaging. ECOG PS remains quite good.    Assessment & Plan:  Will hold off on starting sacituzumab govitecan pending potential enrollment in phase I trial. Meetign with PCTP next week. Notably, his eGFR is borderline which may be an enrollment issue. Will schedule C1 SG in 3 weeks in case trial is not possible.  - FU with PCTP next week  - Tentative plan for C1 dose reduced govitecent (UGT1A1 intermediate metabolism) 11/13/23      2. Coronary artery disease, unspecified vessel or lesion type, unspecified whether angina present, unspecified whether native or transplanted heart  Overview:  Prior CABG. Not on antiplatelets. Home medicatiosn include atorvastatin      3. Stage 3b chronic kidney disease    4. Hypothyroidism, unspecified type  Overview:  Home medications include levothyroxine                     Follow up:   Route Chart for Scheduling    Med Onc Chart Routing      Follow up with physician . Jenniffer 11/13/23   Follow up with [facility]    Infusion scheduling note New or changed treatment   Sacituzumab Govitecan 11/13/23   Injection scheduling note    Labs CBC and CMP   Scheduling:  Preferred lab:  Lab interval:     Imaging    Pharmacy appointment    Other referrals                  Treatment Plan Information   OP SACITUZUMAB GOVITECAN-HZIY Q3W   Bridger Abad MD   Upcoming  Treatment Dates - OP SACITUZUMAB GOVITECAN-HZIY Q3W    10/24/2023       Pre-Medications       acetaminophen tablet 650 mg       diphenhydrAMINE (BENADRYL) 25 mg in sodium chloride 0.9% 50 mL IVPB       famotidine (PF) injection 20 mg       Chemotherapy       sacituzumab govitecan-hziy (TRODELVY) 543 mg in sodium chloride 0.9% 500 mL chemo infusion       Supportive Care       atropine injection 0.4 mg       prochlorperazine injection Soln 10 mg       Antiemetics       aprepitant (CINVANTI) injection 130 mg       palonosetron (ALOXI) 0.25 mg with Dexamethasone (DECADRON) 12 mg in NS 50 mL IVPB  10/31/2023       Pre-Medications       acetaminophen tablet 650 mg       diphenhydrAMINE (BENADRYL) 25 mg in sodium chloride 0.9% 50 mL IVPB       famotidine (PF) injection 20 mg       Chemotherapy       sacituzumab govitecan-hziy (TRODELVY) 543 mg in sodium chloride 0.9% 500 mL chemo infusion       Supportive Care       atropine injection 0.4 mg       prochlorperazine injection Soln 10 mg       Antiemetics       aprepitant (CINVANTI) injection 130 mg       palonosetron (ALOXI) 0.25 mg with Dexamethasone (DECADRON) 12 mg in NS 50 mL IVPB  11/14/2023       Pre-Medications       acetaminophen tablet 650 mg       diphenhydrAMINE (BENADRYL) 25 mg in sodium chloride 0.9% 50 mL IVPB       famotidine (PF) injection 20 mg       Chemotherapy       sacituzumab govitecan-hziy (TRODELVY) 543 mg in sodium chloride 0.9% 500 mL chemo infusion       Supportive Care       atropine injection 0.4 mg       prochlorperazine injection Soln 10 mg       Antiemetics       aprepitant (CINVANTI) injection 130 mg       palonosetron (ALOXI) 0.25 mg with Dexamethasone (DECADRON) 12 mg in NS 50 mL IVPB  11/21/2023       Pre-Medications       acetaminophen tablet 650 mg       diphenhydrAMINE (BENADRYL) 25 mg in sodium chloride 0.9% 50 mL IVPB       famotidine (PF) injection 20 mg       Chemotherapy       sacituzumab govitecan-hziy (TRODELVY) 543 mg in sodium  chloride 0.9% 500 mL chemo infusion       Supportive Care       atropine injection 0.4 mg       prochlorperazine injection Soln 10 mg       Antiemetics       aprepitant (CINVANTI) injection 130 mg       palonosetron (ALOXI) 0.25 mg with Dexamethasone (DECADRON) 12 mg in NS 50 mL IVPB        The above information has been reviewed with the patient and all questions have been answered to their apparent satisfaction.  They understand that they can call the clinic with any questions.    Bridger Abad MD  Hematology/Oncology  [facility]

## 2023-10-27 DIAGNOSIS — Z00.6 RESEARCH STUDY PATIENT: ICD-10-CM

## 2023-10-27 DIAGNOSIS — C67.9 MALIGNANT NEOPLASM OF URINARY BLADDER, UNSPECIFIED SITE: Primary | ICD-10-CM

## 2023-10-30 ENCOUNTER — HOSPITAL ENCOUNTER (OUTPATIENT)
Dept: RADIOLOGY | Facility: HOSPITAL | Age: 58
Discharge: HOME | End: 2023-10-30
Attending: INTERNAL MEDICINE
Payer: COMMERCIAL

## 2023-10-30 DIAGNOSIS — Z00.6 RESEARCH STUDY PATIENT: ICD-10-CM

## 2023-10-30 DIAGNOSIS — C67.9 MALIGNANT NEOPLASM OF URINARY BLADDER, UNSPECIFIED SITE: Primary | ICD-10-CM

## 2023-10-30 DIAGNOSIS — C67.9 MALIGNANT NEOPLASM OF URINARY BLADDER, UNSPECIFIED SITE: ICD-10-CM

## 2023-10-30 PROCEDURE — 74176 CT ABD & PELVIS W/O CONTRAST: CPT | Mod: 26,,, | Performed by: STUDENT IN AN ORGANIZED HEALTH CARE EDUCATION/TRAINING PROGRAM

## 2023-10-30 PROCEDURE — 71250 CT THORAX DX C-: CPT | Mod: 26,,, | Performed by: STUDENT IN AN ORGANIZED HEALTH CARE EDUCATION/TRAINING PROGRAM

## 2023-10-30 PROCEDURE — 74176 CT CHEST ABDOMEN PELVIS WITHOUT CONTRAST(XPD): ICD-10-PCS | Mod: 26,,, | Performed by: STUDENT IN AN ORGANIZED HEALTH CARE EDUCATION/TRAINING PROGRAM

## 2023-10-30 PROCEDURE — 74176 CT ABD & PELVIS W/O CONTRAST: CPT | Mod: TC

## 2023-10-30 PROCEDURE — 71250 CT CHEST ABDOMEN PELVIS WITHOUT CONTRAST(XPD): ICD-10-PCS | Mod: 26,,, | Performed by: STUDENT IN AN ORGANIZED HEALTH CARE EDUCATION/TRAINING PROGRAM

## 2023-10-31 ENCOUNTER — TELEPHONE (OUTPATIENT)
Dept: HEMATOLOGY/ONCOLOGY | Facility: CLINIC | Age: 58
End: 2023-10-31
Payer: COMMERCIAL

## 2023-10-31 ENCOUNTER — OFFICE VISIT (OUTPATIENT)
Dept: HEMATOLOGY/ONCOLOGY | Facility: CLINIC | Age: 58
End: 2023-10-31
Payer: COMMERCIAL

## 2023-10-31 ENCOUNTER — LAB VISIT (OUTPATIENT)
Dept: LAB | Facility: HOSPITAL | Age: 58
End: 2023-10-31
Payer: COMMERCIAL

## 2023-10-31 ENCOUNTER — RESEARCH ENCOUNTER (OUTPATIENT)
Dept: RESEARCH | Facility: HOSPITAL | Age: 58
End: 2023-10-31
Payer: COMMERCIAL

## 2023-10-31 VITALS
OXYGEN SATURATION: 99 % | HEIGHT: 70 IN | SYSTOLIC BLOOD PRESSURE: 124 MMHG | TEMPERATURE: 98 F | BODY MASS INDEX: 23.07 KG/M2 | DIASTOLIC BLOOD PRESSURE: 77 MMHG | HEART RATE: 99 BPM | WEIGHT: 161.19 LBS | RESPIRATION RATE: 18 BRPM

## 2023-10-31 DIAGNOSIS — Z00.6 RESEARCH STUDY PATIENT: ICD-10-CM

## 2023-10-31 DIAGNOSIS — N18.32 STAGE 3B CHRONIC KIDNEY DISEASE: ICD-10-CM

## 2023-10-31 DIAGNOSIS — C67.9 MALIGNANT NEOPLASM OF URINARY BLADDER, UNSPECIFIED SITE: ICD-10-CM

## 2023-10-31 DIAGNOSIS — C67.9 MALIGNANT NEOPLASM OF URINARY BLADDER, UNSPECIFIED SITE: Primary | ICD-10-CM

## 2023-10-31 DIAGNOSIS — I25.10 CORONARY ARTERY DISEASE, UNSPECIFIED VESSEL OR LESION TYPE, UNSPECIFIED WHETHER ANGINA PRESENT, UNSPECIFIED WHETHER NATIVE OR TRANSPLANTED HEART: ICD-10-CM

## 2023-10-31 LAB
ALBUMIN SERPL BCP-MCNC: 2.7 G/DL (ref 3.5–5.2)
ALP SERPL-CCNC: 72 U/L (ref 55–135)
ALT SERPL W/O P-5'-P-CCNC: 8 U/L (ref 10–44)
ANION GAP SERPL CALC-SCNC: 9 MMOL/L (ref 8–16)
APTT PPP: 24.9 SEC (ref 21–32)
AST SERPL-CCNC: 13 U/L (ref 10–40)
BASOPHILS # BLD AUTO: 0.05 K/UL (ref 0–0.2)
BASOPHILS NFR BLD: 0.6 % (ref 0–1.9)
BILIRUB SERPL-MCNC: 0.3 MG/DL (ref 0.1–1)
BUN SERPL-MCNC: 39 MG/DL (ref 6–20)
CALCIUM SERPL-MCNC: 9.5 MG/DL (ref 8.7–10.5)
CHLORIDE SERPL-SCNC: 104 MMOL/L (ref 95–110)
CK SERPL-CCNC: 98 U/L (ref 20–200)
CO2 SERPL-SCNC: 22 MMOL/L (ref 23–29)
CREAT SERPL-MCNC: 2.6 MG/DL (ref 0.5–1.4)
CRP SERPL-MCNC: 52.6 MG/L (ref 0–8.2)
DIFFERENTIAL METHOD: ABNORMAL
EOSINOPHIL # BLD AUTO: 0.2 K/UL (ref 0–0.5)
EOSINOPHIL NFR BLD: 2.6 % (ref 0–8)
ERYTHROCYTE [DISTWIDTH] IN BLOOD BY AUTOMATED COUNT: 12.8 % (ref 11.5–14.5)
EST. GFR  (NO RACE VARIABLE): 27.7 ML/MIN/1.73 M^2
FERRITIN SERPL-MCNC: 649 NG/ML (ref 20–300)
GLUCOSE SERPL-MCNC: 89 MG/DL (ref 70–110)
HCT VFR BLD AUTO: 32.1 % (ref 40–54)
HGB BLD-MCNC: 10.1 G/DL (ref 14–18)
IMM GRANULOCYTES # BLD AUTO: 0.02 K/UL (ref 0–0.04)
IMM GRANULOCYTES NFR BLD AUTO: 0.2 % (ref 0–0.5)
INR PPP: 1 (ref 0.8–1.2)
LYMPHOCYTES # BLD AUTO: 1.1 K/UL (ref 1–4.8)
LYMPHOCYTES NFR BLD: 12.3 % (ref 18–48)
MCH RBC QN AUTO: 29.8 PG (ref 27–31)
MCHC RBC AUTO-ENTMCNC: 31.5 G/DL (ref 32–36)
MCV RBC AUTO: 95 FL (ref 82–98)
MONOCYTES # BLD AUTO: 0.7 K/UL (ref 0.3–1)
MONOCYTES NFR BLD: 7.9 % (ref 4–15)
NEUTROPHILS # BLD AUTO: 6.5 K/UL (ref 1.8–7.7)
NEUTROPHILS NFR BLD: 76.4 % (ref 38–73)
NRBC BLD-RTO: 0 /100 WBC
PHOSPHATE SERPL-MCNC: 3.4 MG/DL (ref 2.7–4.5)
PLATELET # BLD AUTO: 288 K/UL (ref 150–450)
PMV BLD AUTO: 9.5 FL (ref 9.2–12.9)
POTASSIUM SERPL-SCNC: 4.5 MMOL/L (ref 3.5–5.1)
PROT SERPL-MCNC: 6.7 G/DL (ref 6–8.4)
PROTHROMBIN TIME: 10.6 SEC (ref 9–12.5)
RBC # BLD AUTO: 3.39 M/UL (ref 4.6–6.2)
SODIUM SERPL-SCNC: 135 MMOL/L (ref 136–145)
T3FREE SERPL-MCNC: 2.2 PG/ML (ref 2.3–4.2)
T4 FREE SERPL-MCNC: 0.87 NG/DL (ref 0.71–1.51)
TRIGL SERPL-MCNC: 52 MG/DL (ref 30–150)
TSH SERPL DL<=0.005 MIU/L-ACNC: 4.44 UIU/ML (ref 0.4–4)
WBC # BLD AUTO: 8.51 K/UL (ref 3.9–12.7)

## 2023-10-31 PROCEDURE — 99215 PR OFFICE/OUTPT VISIT, EST, LEVL V, 40-54 MIN: ICD-10-PCS | Mod: S$GLB,,, | Performed by: INTERNAL MEDICINE

## 2023-10-31 PROCEDURE — 85025 COMPLETE CBC W/AUTO DIFF WBC: CPT | Performed by: INTERNAL MEDICINE

## 2023-10-31 PROCEDURE — 84100 ASSAY OF PHOSPHORUS: CPT | Performed by: INTERNAL MEDICINE

## 2023-10-31 PROCEDURE — 3074F SYST BP LT 130 MM HG: CPT | Mod: CPTII,S$GLB,, | Performed by: INTERNAL MEDICINE

## 2023-10-31 PROCEDURE — 3078F PR MOST RECENT DIASTOLIC BLOOD PRESSURE < 80 MM HG: ICD-10-PCS | Mod: CPTII,S$GLB,, | Performed by: INTERNAL MEDICINE

## 2023-10-31 PROCEDURE — 82550 ASSAY OF CK (CPK): CPT | Mod: 91 | Performed by: INTERNAL MEDICINE

## 2023-10-31 PROCEDURE — 3078F DIAST BP <80 MM HG: CPT | Mod: CPTII,S$GLB,, | Performed by: INTERNAL MEDICINE

## 2023-10-31 PROCEDURE — 99999 PR PBB SHADOW E&M-EST. PATIENT-LVL III: CPT | Mod: PBBFAC,,, | Performed by: INTERNAL MEDICINE

## 2023-10-31 PROCEDURE — 1159F MED LIST DOCD IN RCRD: CPT | Mod: CPTII,S$GLB,, | Performed by: INTERNAL MEDICINE

## 2023-10-31 PROCEDURE — 84484 ASSAY OF TROPONIN QUANT: CPT | Performed by: INTERNAL MEDICINE

## 2023-10-31 PROCEDURE — 85610 PROTHROMBIN TIME: CPT | Performed by: INTERNAL MEDICINE

## 2023-10-31 PROCEDURE — 82552 ASSAY OF CPK IN BLOOD: CPT | Performed by: INTERNAL MEDICINE

## 2023-10-31 PROCEDURE — 84481 FREE ASSAY (FT-3): CPT | Performed by: INTERNAL MEDICINE

## 2023-10-31 PROCEDURE — 85730 THROMBOPLASTIN TIME PARTIAL: CPT | Performed by: INTERNAL MEDICINE

## 2023-10-31 PROCEDURE — 99999 PR PBB SHADOW E&M-EST. PATIENT-LVL III: ICD-10-PCS | Mod: PBBFAC,,, | Performed by: INTERNAL MEDICINE

## 2023-10-31 PROCEDURE — 3008F PR BODY MASS INDEX (BMI) DOCUMENTED: ICD-10-PCS | Mod: CPTII,S$GLB,, | Performed by: INTERNAL MEDICINE

## 2023-10-31 PROCEDURE — 82728 ASSAY OF FERRITIN: CPT | Performed by: INTERNAL MEDICINE

## 2023-10-31 PROCEDURE — 84439 ASSAY OF FREE THYROXINE: CPT | Performed by: INTERNAL MEDICINE

## 2023-10-31 PROCEDURE — 80053 COMPREHEN METABOLIC PANEL: CPT | Performed by: INTERNAL MEDICINE

## 2023-10-31 PROCEDURE — 99215 OFFICE O/P EST HI 40 MIN: CPT | Mod: S$GLB,,, | Performed by: INTERNAL MEDICINE

## 2023-10-31 PROCEDURE — 86140 C-REACTIVE PROTEIN: CPT | Performed by: INTERNAL MEDICINE

## 2023-10-31 PROCEDURE — 84443 ASSAY THYROID STIM HORMONE: CPT | Performed by: INTERNAL MEDICINE

## 2023-10-31 PROCEDURE — 36415 COLL VENOUS BLD VENIPUNCTURE: CPT | Performed by: INTERNAL MEDICINE

## 2023-10-31 PROCEDURE — 3008F BODY MASS INDEX DOCD: CPT | Mod: CPTII,S$GLB,, | Performed by: INTERNAL MEDICINE

## 2023-10-31 PROCEDURE — 84478 ASSAY OF TRIGLYCERIDES: CPT | Performed by: INTERNAL MEDICINE

## 2023-10-31 PROCEDURE — 1159F PR MEDICATION LIST DOCUMENTED IN MEDICAL RECORD: ICD-10-PCS | Mod: CPTII,S$GLB,, | Performed by: INTERNAL MEDICINE

## 2023-10-31 PROCEDURE — 3074F PR MOST RECENT SYSTOLIC BLOOD PRESSURE < 130 MM HG: ICD-10-PCS | Mod: CPTII,S$GLB,, | Performed by: INTERNAL MEDICINE

## 2023-10-31 NOTE — PROGRESS NOTES
0Protocol: A Phase 1, First-in-Human Study of NTX-1088, a Monoclonal Antibody Targeting the Poliovirus Receptor (PVR), as Monotherapy and Combined with Pembrolizumab, in Patients with Advanced Solid Malignancies  Protocol #: NTX 1088-01  Protocol Version: V2; [id number]  Sponsor: [employer]  IRB #: [id number]  P.I.: Chan Leos MD     Subject ID: [id number]     Significant Events:  23.Oct.2023: signed ICF       Screening Visit: 31.Oct.2023:  Patient presents to appointment today with his mom. Patient is AAOx3, ambulating w/o difficulty, of appropriate mood and affect. Patient expresses willingness to continue to participate in above mentioned protocol. Patient denies any c/o pain, nausea/vomiting, diarrhea, SOB, fever, chills, headaches or dizziness.      Fasting labs drawn at 0751 am from left AC, peripheral stick, per lab department. Patient tolerated well, gauze and coban placed over the site. Urine specimen collected by lab department. Reviewed medical and surgical history with patient. Reviewed and confirmed with concomitant medications the patient is currently taking. Refer to shadow chart for baseline history and most up-to-date medication list.    Dr. Alcaraz performed physical exam, reviewed VS and labs. Lab work out of range, not clinically significant unless otherwise indicated. Per MD note, patient's ECOG status is 0. Dr. Alcaraz reviewed treatment plan with patient.     All of the patient's questions were answered to his satisfaction by Dr. Alcaraz and myself. Instructed patient to notify Dr. Alcaraz, Dr. Leos, and/or me with any questions, concerns, or changes in health status. Patient verbalized understanding.       Reviewed upcoming appointments with patient. Tentative C1D1 of NTX treatment scheduled for Thursday 02.Nov.2023. Activities scheduled for C1D1 reviewed with patient. Patient with lab appt on Thursday 02.Nov.2023 and will return to clinic on Thursday 02.Nov.2023 for C1D1.  Patient is aware that once screening procedures are complete, patient enrollment eligibility form will be sent to sponsor for review to confirm eligibly.

## 2023-10-31 NOTE — PROGRESS NOTES
MEDICAL ONCOLOGY - FOLLOW UP VISIT    Cancer/Stage/TNM:    Cancer Staging   Malignant neoplasm of urinary bladder  Staging form: Urinary Bladder, AJCC 8th Edition  - Clinical: Stage IVB (cT4b, cNX, pM1b) - Signed by Bridger Abad MD on 9/28/2023       Reason for visit: Julio Rodríguez is a 58 y.o. male with metastatic bladder cancer previously treated with neoadjuvant ddMVAC, radical cystectomy, and then carboplatin/gemcitabine and EV+ Pembro following local and metastatic recurrence. He presents to medical oncology clinic for enrollment onto clinical trial.    History has been obtained by chart review and discussion with the patient.    Interval Events:    Enrolling onto NTX 1088 phase I trial. Patient appears to be a candidate. He exercises regularly without much limitations. Recent beach trip where he was able to swim in the [place] without limitations. ECOG PS 0. Ongoing pain within his left ischium. Generally mild. Occassionally uses tylenol at nigh. Continues on Linzess for constipation. Ongoing catheterization for neobladder. No CP, SOB or cough. Notes a chronic abdominal hernia      Oncology History   Malignant neoplasm of urinary bladder   2016 Initial Diagnosis    Bladder CIS. Managed with BCG     2017 Notable Event    Developed recurrent muscle invasive disease.     2017 - 2017 Chemotherapy    ddMVAC      Surgery    Radical cystectomy with lymph node dissection and creation of neobladder. [id number]     7/2022 Notable Event    New onset hematuria. MRI scan of the pelvis in August showed a suspected blood clot adherent to the distal Smith catheter. There was abnormal signal and enhancement of the bilateral pubic bones adjacent to the neobladder suspicious for neoplastic infiltration. There was no pelvic lymphadenopathy.      7/2022 Imaging Significant Findings    Pet scan at the same time showed hypermetabolic retroperitoneal lymphadenopathy. There was marked activity felt to involve the bladder wall  suspicious for tumor.     7/2022 Biopsy    Biopsy of the bladder neck showed recurrent high-grade urothelial carcinoma.     9/28/2022 - 5/16/2023 Chemotherapy    Mr. Rodríguez was started on chemotherapy with gemcitabine and carboplatin in September 2022.      1/25/2023 Imaging Significant Findings    Pet scan on 25 January showed changes related to prior cystoprostatectomy with neobladder creation and minimal fullness to the right renal collecting system. There was no evidence of a discrete hypermetabolic mass or lymphadenopathy. There was diffusely increased activity throughout the osseous structures, suggestive of chemotherapy with reactive marrow.     4/11/2023 Imaging Significant Findings    CT a/p  1. Pathologic fracture of the left parasymphysis pubis secondary to lytic process. Given location adjacent to neobladder, osteomyelitis is a possibility. Metastatic disease not excluded.   2. Prior cystoprostatectomy with chronic right ureteral obstruction and hydroureteronephrosis, minimally changed from the prior. All etiologies considered including malignant obstruction. Urology consultation recommended.   3. Incidental findings including cholelithiasis, bilateral multifocal renal cortical scarring, and small hiatal hernia.       5/24/2023 -  Chemotherapy    Enfortumab vedotin + Pembrolizumab     8/10/2023 Imaging Significant Findings    PET CT  Interval development of hypermetabolic pulmonary nodules within the lingula and right lower lobe likely reflecting pulmonary metastases. Some additional tiny pulmonary nodularity is new or more conspicuous from prior but too small to accurately characterize by PET/CT. Attention on follow-up recommended.     Worsening obstructive uropathy which is relatively moderate to severe the right kidney.     Similar appearance of the urinary neobladder. There is somewhat diminished due to physiologic activity to evaluate for local neoplasm but the appearance overall appears similar to  prior. Assessment of local disease could be followed with CT abdomen and pelvis with contrast.     Interval fracture of the left superior and inferior pubic rami appearing subacute in nature. Please correlate for trauma and tenderness. There is no significant FDG activity within the area to favor a pathologic fracture.     Previously seen diffuse marrow activity may have been due to previous marrow rebound or drug effect.       8/25/2023 Imaging Significant Findings    MR Pelvis  History of cystoprostatectomy and neobladder creation.     Interval increase in multilobular mass in the pelvis infiltrating the rectum, possibly due to distal sigmoid colon, anterior left pelvic bones as well as a inferior aspect of the neobladder suspicious for progression of neoplastic process as discussed above.      9/28/2023 Cancer Staged    Staging form: Urinary Bladder, AJCC 8th Edition  - Clinical: Stage IVB (cT4b, cNX, pM1b)     10/16/2023 Imaging Significant Findings    CT chest   Impression:  - Multiple solid bilateral pulmonary nodules up to 9mm showing interval increase in size concerning for metastatic disease in this patient with history of prior bladder malignancy.  - Multiple hepatic hypodensities, which may represent cystic lesions however some which are too small to characterize.  - Partially imaged right kidney showing parenchymal atrophy and suspected hydronephrosis..    MR Pelvis   Impression:     Interval increased size of multilobular mass in the cystoprostatectomy surgical bed that invades the neobladder, rectum, sigmoid colon, and left pelvis.  Multiple pelvic lymph nodes are more conspicuous from prior exam and concerning for additional metastatic disease.     10/24/2023 -  Chemotherapy    Treatment Summary   Plan Name: OP SACITUZUMAB GOVITECAN-HZIY Q3W  Treatment Goal: Palliative  Status: Active  Start Date: 10/24/2023 (Planned)  End Date: 10/2/2024 (Planned)  Provider: Bridger Abad MD  Chemotherapy:  sacituzumab govitecan-hziy (TRODELVY) 543 mg in sodium chloride 0.9% 500 mL chemo infusion, 7.5 mg/kg = 543 mg (original dose ), Intravenous, Clinic/HOD 1 time, 0 of 17 cycles  Dose modification: 7.5 mg/kg (Cycle 1, Reason: MD Discretion, Comment: [id number] PM )     Melanoma        Past Medical History:   Diagnosis Date    Bladder cancer     CAD (coronary artery disease) 9/12/2023    Prior CABG. Not on antiplatelets. Home medicatiosn include atorvastatin    Carcinoma     forehead    Encounter for blood transfusion     Hypertension     Hypothyroidism 9/12/2023    Home medications include levothyroxine    Malignant melanoma of skin, unspecified     right arm    Malignant neoplasm of urinary bladder 9/12/2023    Melanoma 9/12/2023    History of T1a melanoma; 0.5mm depth without ulceartion. SP wide local excision 02/2022          Past Surgical History:   Procedure Laterality Date    CORONARY ARTERY BYPASS GRAFT  10/2015    CYSTECTOMY, ROBOT-ASSISTED, LAPAROSCOPIC, USING DA MARY XI, WITH ILEAL CONDUIT CREATION  12/2017    DILATION OF BLADDER      dialation of bladder neck- due to claudia bladder- multiple procedures    LYMPHADENECTOMY      XI ROBOTIC PROSTECTOMY, SUPRAPUBIC  12/2017        Family History   Problem Relation Age of Onset    Heart disease Father     Heart attack Paternal Uncle     Breast cancer Maternal Cousin     Colon cancer Neg Hx     Bladder Cancer Neg Hx         Social History     Tobacco Use    Smoking status: Former     Types: Cigarettes    Smokeless tobacco: Not on file   Substance Use Topics    Alcohol use: Not Currently        I have reviewed and updated the patient's past medical, surgical, family and social histories.    Review of patient's allergies indicates:  No Known Allergies     Current Outpatient Medications   Medication Sig Dispense Refill    atorvastatin (LIPITOR) 20 MG tablet Take 20 mg by mouth every evening.      docusate sodium (COLACE) 100 MG capsule Take 1  "capsule by mouth every evening.      levothyroxine (SYNTHROID) 50 MCG tablet Take 50 mcg by mouth.      LINZESS 72 mcg Cap capsule Take 72 mcg by mouth every morning.      multivitamin (THERAGRAN) per tablet Take 1 tablet by mouth.      VTAMA 1 % Crea APPLY 1 APPLICATION ON THE SKIN DAILY       No current facility-administered medications for this visit.        Physical Exam:   /77 (BP Location: Right arm, Patient Position: Sitting, BP Method: Medium (Automatic))   Pulse 99   Temp 98.1 °F (36.7 °C) (Oral)   Resp 18   Ht 5' 10" (1.778 m)   Wt 73.1 kg (161 lb 2.5 oz)   SpO2 99%   BMI 23.12 kg/m²      ECOG Performance status: 0  Life expectancy > 6 months            Physical Exam  Constitutional:       General: He is not in acute distress.     Appearance: Normal appearance.   HENT:      Head: Normocephalic.   Eyes:      General: No scleral icterus.     Extraocular Movements: Extraocular movements intact.      Conjunctiva/sclera: Conjunctivae normal.   Cardiovascular:      Rate and Rhythm: Normal rate.   Pulmonary:      Effort: Pulmonary effort is normal. No respiratory distress.   Abdominal:      General: There is no distension.      Palpations: Abdomen is soft.   Skin:     General: Skin is warm and dry.   Neurological:      Mental Status: He is alert and oriented to person, place, and time.      Motor: No weakness.   Psychiatric:         Mood and Affect: Mood normal.         Behavior: Behavior normal.         Thought Content: Thought content normal.         Labs:                 Lab Results   Component Value Date     (L) 10/31/2023    K 4.5 10/31/2023    CREATININE 2.6 (H) 10/31/2023    WBC 8.51 10/31/2023    HGB 10.1 (L) 10/31/2023     10/31/2023    AST 13 10/31/2023    ALT 8 (L) 10/31/2023    ALKPHOS 72 10/31/2023    BILITOT 0.3 10/31/2023          Imaging:         I have personally reviewed the above imaging including recent MRI and PET CT scan    Path:   Reviewed pathology as " documented in oncology history      Assessment and Plan:   1. Malignant neoplasm of urinary bladder, unspecified site  Overview:  Locally recurrent disease with presumed pulmonary metastases sp neoadjuvant MVAC, cystectomy, gem/carbo, pebmrolizumab, and enfortumab. Molecular profiling without FGFR alteration.  Majority of disease is from his local pelvic recurrence with infiltration into the pubis and rectum. Also with pulmonary mets on imaging. ECOG PS remains quite good.      2. Coronary artery disease, unspecified vessel or lesion type, unspecified whether angina present, unspecified whether native or transplanted heart  Overview:  Prior CABG. Not on antiplatelets. Home medicatiosn include atorvastatin      3. Stage 3b chronic kidney disease                   Follow up:   Route Chart for Scheduling    Med Onc Chart Routing      Follow up with physician No follow up needed. RTC per research nurse   Follow up with [facility]    Infusion scheduling note    Injection scheduling note    Labs    Imaging    Pharmacy appointment    Other referrals              Treatment Plan Information   OP SACITUZUMAB GOVITECAN-HZIY Q3W   Bridger Abad MD   Upcoming Treatment Dates - OP SACITUZUMAB GOVITECAN-HZIY Q3W    10/24/2023       Pre-Medications       acetaminophen tablet 650 mg       diphenhydrAMINE (BENADRYL) 25 mg in sodium chloride 0.9% 50 mL IVPB       famotidine (PF) injection 20 mg       Chemotherapy       sacituzumab govitecan-hziy (TRODELVY) 543 mg in sodium chloride 0.9% 500 mL chemo infusion       Supportive Care       atropine injection 0.4 mg       prochlorperazine injection Soln 10 mg       Antiemetics       aprepitant (CINVANTI) injection 130 mg       palonosetron (ALOXI) 0.25 mg with Dexamethasone (DECADRON) 12 mg in NS 50 mL IVPB  10/31/2023       Pre-Medications       acetaminophen tablet 650 mg       diphenhydrAMINE (BENADRYL) 25 mg in sodium chloride 0.9% 50 mL IVPB       famotidine (PF) injection 20 mg        Chemotherapy       sacituzumab govitecan-hziy (TRODELVY) 543 mg in sodium chloride 0.9% 500 mL chemo infusion       Supportive Care       atropine injection 0.4 mg       prochlorperazine injection Soln 10 mg       Antiemetics       aprepitant (CINVANTI) injection 130 mg       palonosetron (ALOXI) 0.25 mg with Dexamethasone (DECADRON) 12 mg in NS 50 mL IVPB  11/14/2023       Pre-Medications       acetaminophen tablet 650 mg       diphenhydrAMINE (BENADRYL) 25 mg in sodium chloride 0.9% 50 mL IVPB       famotidine (PF) injection 20 mg       Chemotherapy       sacituzumab govitecan-hziy (TRODELVY) 543 mg in sodium chloride 0.9% 500 mL chemo infusion       Supportive Care       atropine injection 0.4 mg       prochlorperazine injection Soln 10 mg       Antiemetics       aprepitant (CINVANTI) injection 130 mg       palonosetron (ALOXI) 0.25 mg with Dexamethasone (DECADRON) 12 mg in NS 50 mL IVPB  11/21/2023       Pre-Medications       acetaminophen tablet 650 mg       diphenhydrAMINE (BENADRYL) 25 mg in sodium chloride 0.9% 50 mL IVPB       famotidine (PF) injection 20 mg       Chemotherapy       sacituzumab govitecan-hziy (TRODELVY) 543 mg in sodium chloride 0.9% 500 mL chemo infusion       Supportive Care       atropine injection 0.4 mg       prochlorperazine injection Soln 10 mg       Antiemetics       aprepitant (CINVANTI) injection 130 mg       palonosetron (ALOXI) 0.25 mg with Dexamethasone (DECADRON) 12 mg in NS 50 mL IVPB        The above information has been reviewed with the patient and all questions have been answered to their apparent satisfaction.  They understand that they can call the clinic with any questions.    MDM includes:    - Acute or chronic illness or injury that poses a threat to life or bodily function  - Independent review and explanation of 2 results from unique tests  - Discussion of management and ordering 2 unique tests  - Extensive discussion of treatment and management  - Prescription  drug management  - Drug therapy requiring intensive monitoring for toxicity    John Alcaraz M.D.  Hematology/Oncology Attending  Director [facility]  [facility]

## 2023-11-01 ENCOUNTER — RESEARCH ENCOUNTER (OUTPATIENT)
Dept: RESEARCH | Facility: HOSPITAL | Age: 58
End: 2023-11-01
Payer: COMMERCIAL

## 2023-11-01 ENCOUNTER — TELEPHONE (OUTPATIENT)
Dept: RESEARCH | Facility: HOSPITAL | Age: 58
End: 2023-11-01
Payer: COMMERCIAL

## 2023-11-01 LAB — TROPONIN T SERPL-MCNC: 18 NG/L

## 2023-11-01 NOTE — PROGRESS NOTES
MEDICAL ONCOLOGY - FOLLOW UP VISIT    Cancer/Stage/TNM:    Cancer Staging   Malignant neoplasm of urinary bladder  Staging form: Urinary Bladder, AJCC 8th Edition  - Clinical: Stage IVB (cT4b, cNX, pM1b) - Signed by Bridger Abad MD on 9/28/2023       Reason for visit: Julio Rodríguez is a 58 y.o. male with metastatic bladder cancer previously treated with neoadjuvant ddMVAC, radical cystectomy, and then carboplatin/gemcitabine and EV+ Pembro following local and metastatic recurrence. He presents to our Phase 1 Clinic today for C1D1 on our NTX trial.      History has been obtained by chart review and discussion with the patient.    Interval Events:    Overall feels good.  Enjoying outdoor activities.  Ongoing pain within his left ischium. Generally mild. Occassionally uses tylenol at nigh. Ongoing catheterization for neobladder. No CP, SOB or cough. Notes a chronic abdominal hernia    Presents with his mother.  They are [place], but have lived here many years.  Resides in [place].  And works for the [employer].      ECOG is 0 and life expectancy is >6 mos.     Oncology History   Malignant neoplasm of urinary bladder   2016 Initial Diagnosis    Bladder CIS. Managed with BCG     2017 Notable Event    Developed recurrent muscle invasive disease.     2017 - 2017 Chemotherapy    ddMVAC      Surgery    Radical cystectomy with lymph node dissection and creation of neobladder. [id number]     7/2022 Notable Event    New onset hematuria. MRI scan of the pelvis in August showed a suspected blood clot adherent to the distal Smith catheter. There was abnormal signal and enhancement of the bilateral pubic bones adjacent to the neobladder suspicious for neoplastic infiltration. There was no pelvic lymphadenopathy.      7/2022 Imaging Significant Findings    Pet scan at the same time showed hypermetabolic retroperitoneal lymphadenopathy. There was marked activity felt to involve the bladder wall suspicious for  tumor.     7/2022 Biopsy    Biopsy of the bladder neck showed recurrent high-grade urothelial carcinoma.     9/28/2022 - 5/16/2023 Chemotherapy    Mr. Rodríguez was started on chemotherapy with gemcitabine and carboplatin in September 2022.      1/25/2023 Imaging Significant Findings    Pet scan on 25 January showed changes related to prior cystoprostatectomy with neobladder creation and minimal fullness to the right renal collecting system. There was no evidence of a discrete hypermetabolic mass or lymphadenopathy. There was diffusely increased activity throughout the osseous structures, suggestive of chemotherapy with reactive marrow.     4/11/2023 Imaging Significant Findings    CT a/p  1. Pathologic fracture of the left parasymphysis pubis secondary to lytic process. Given location adjacent to neobladder, osteomyelitis is a possibility. Metastatic disease not excluded.   2. Prior cystoprostatectomy with chronic right ureteral obstruction and hydroureteronephrosis, minimally changed from the prior. All etiologies considered including malignant obstruction. Urology consultation recommended.   3. Incidental findings including cholelithiasis, bilateral multifocal renal cortical scarring, and small hiatal hernia.       5/24/2023 -  Chemotherapy    Enfortumab vedotin + Pembrolizumab     8/10/2023 Imaging Significant Findings    PET CT  Interval development of hypermetabolic pulmonary nodules within the lingula and right lower lobe likely reflecting pulmonary metastases. Some additional tiny pulmonary nodularity is new or more conspicuous from prior but too small to accurately characterize by PET/CT. Attention on follow-up recommended.     Worsening obstructive uropathy which is relatively moderate to severe the right kidney.     Similar appearance of the urinary neobladder. There is somewhat diminished due to physiologic activity to evaluate for local neoplasm but the appearance overall appears similar to prior.  Assessment of local disease could be followed with CT abdomen and pelvis with contrast.     Interval fracture of the left superior and inferior pubic rami appearing subacute in nature. Please correlate for trauma and tenderness. There is no significant FDG activity within the area to favor a pathologic fracture.     Previously seen diffuse marrow activity may have been due to previous marrow rebound or drug effect.       8/25/2023 Imaging Significant Findings    MR Pelvis  History of cystoprostatectomy and neobladder creation.     Interval increase in multilobular mass in the pelvis infiltrating the rectum, possibly due to distal sigmoid colon, anterior left pelvic bones as well as a inferior aspect of the neobladder suspicious for progression of neoplastic process as discussed above.      9/28/2023 Cancer Staged    Staging form: Urinary Bladder, AJCC 8th Edition  - Clinical: Stage IVB (cT4b, cNX, pM1b)     10/16/2023 Imaging Significant Findings    CT chest   Impression:  - Multiple solid bilateral pulmonary nodules up to 9mm showing interval increase in size concerning for metastatic disease in this patient with history of prior bladder malignancy.  - Multiple hepatic hypodensities, which may represent cystic lesions however some which are too small to characterize.  - Partially imaged right kidney showing parenchymal atrophy and suspected hydronephrosis..    MR Pelvis   Impression:     Interval increased size of multilobular mass in the cystoprostatectomy surgical bed that invades the neobladder, rectum, sigmoid colon, and left pelvis.  Multiple pelvic lymph nodes are more conspicuous from prior exam and concerning for additional metastatic disease.     10/24/2023 -  Chemotherapy    Treatment Summary   Plan Name: OP SACITUZUMAB GOVITECAN-HZIY Q3W  Treatment Goal: Palliative  Status: Active  Start Date: 10/24/2023 (Planned)  End Date: 10/2/2024 (Planned)  Provider: Bridger Abad MD  Chemotherapy: sacituzumab  govitecan-hziy (TRODELVY) 543 mg in sodium chloride 0.9% 500 mL chemo infusion, 7.5 mg/kg = 543 mg (original dose ), Intravenous, Clinic/HOD 1 time, 0 of 17 cycles  Dose modification: 7.5 mg/kg (Cycle 1, Reason: MD Discretion, Comment: UGT1A1 PM )     Melanoma        Past Medical History:   Diagnosis Date    Bladder cancer     CAD (coronary artery disease) 9/12/2023    Prior CABG. Not on antiplatelets. Home medicatiosn include atorvastatin    Carcinoma     forehead    Encounter for blood transfusion     Hypertension     Hypothyroidism 9/12/2023    Home medications include levothyroxine    Malignant melanoma of skin, unspecified     right arm    Malignant neoplasm of urinary bladder 9/12/2023    Melanoma 9/12/2023    History of T1a melanoma; 0.5mm depth without ulceartion. SP wide local excision 02/2022          Past Surgical History:   Procedure Laterality Date    CORONARY ARTERY BYPASS GRAFT  10/2015    CYSTECTOMY, ROBOT-ASSISTED, LAPAROSCOPIC, USING DA MARY XI, WITH ILEAL CONDUIT CREATION  12/2017    DILATION OF BLADDER      dialation of bladder neck- due to claudia bladder- multiple procedures    LYMPHADENECTOMY      XI ROBOTIC PROSTECTOMY, SUPRAPUBIC  12/2017        Family History   Problem Relation Age of Onset    Heart disease Father     Heart attack Paternal Uncle     Breast cancer Maternal Cousin     Colon cancer Neg Hx     Bladder Cancer Neg Hx         Social History     Tobacco Use    Smoking status: Former     Types: Cigarettes    Smokeless tobacco: Not on file   Substance Use Topics    Alcohol use: Not Currently        I have reviewed and updated the patient's past medical, surgical, family and social histories.    Review of patient's allergies indicates:  No Known Allergies     Current Outpatient Medications   Medication Sig Dispense Refill    atorvastatin (LIPITOR) 20 MG tablet Take 20 mg by mouth every evening.      docusate sodium (COLACE) 100 MG capsule Take 1 capsule by mouth every evening.       "levothyroxine (SYNTHROID) 50 MCG tablet Take 50 mcg by mouth.      LINZESS 72 mcg Cap capsule Take 72 mcg by mouth every morning.      multivitamin (THERAGRAN) per tablet Take 1 tablet by mouth.      VTAMA 1 % Crea APPLY 1 APPLICATION ON THE SKIN DAILY       No current facility-administered medications for this visit.        Physical Exam:   /86   Pulse 83   Temp 98 °F (36.7 °C) (Oral)   Resp 18   Ht 5' 10" (1.778 m)   Wt 74.7 kg (164 lb 10.9 oz)   SpO2 100%   BMI 23.63 kg/m²      ECOG Performance status: 1            Physical Exam  Constitutional:       General: He is not in acute distress.     Appearance: Normal appearance.   HENT:      Head: Normocephalic.   Eyes:      General: No scleral icterus.     Extraocular Movements: Extraocular movements intact.      Conjunctiva/sclera: Conjunctivae normal.   Cardiovascular:      Rate and Rhythm: Normal rate and regular rhythm.      Heart sounds: No murmur heard.     No friction rub. No gallop.   Pulmonary:      Effort: Pulmonary effort is normal. No respiratory distress.      Breath sounds: Normal breath sounds. No stridor. No wheezing, rhonchi or rales.   Abdominal:      General: There is no distension.   Skin:     General: Skin is warm and dry.   Neurological:      Mental Status: He is alert and oriented to person, place, and time.      Motor: No weakness.   Psychiatric:         Mood and Affect: Mood normal.         Behavior: Behavior normal.         Thought Content: Thought content normal.         Labs:                 Lab Results   Component Value Date     (L) 10/31/2023    K 4.5 10/31/2023    CREATININE 2.6 (H) 10/31/2023    WBC 8.51 10/31/2023    HGB 10.1 (L) 10/31/2023     10/31/2023    AST 13 10/31/2023    ALT 8 (L) 10/31/2023    ALKPHOS 72 10/31/2023    BILITOT 0.3 10/31/2023          Imaging:         I have personally reviewed the above imaging including recent MRI and PET CT scan    Path:   Reviewed pathology as documented in oncology " history      Assessment and Plan:      Metastatic Bladder Cancer:    Pt recently signed consent for NTX trial, combo novel agent with pembro.  Here for C1D1.   Labs are acceptable to proceed with therapy.  Will add 1L NS IV given slightly incr creatinine at 2.7.     RTC per Research RN    The patient agrees with the plan, and all questions have been answered to their satisfaction.      More than 40 mins total time were spent during this encounter.    Chan Leos M.D., M.S., F.A.C.P.  Hematology and Oncology Attending  [facility]             Follow up:   Route Chart for Scheduling    Med Onc Chart Routing      Follow up with physician Other. RTC per Research RN   Follow up with LYNDSEY    Infusion scheduling note    Injection scheduling note    Labs    Imaging    Pharmacy appointment    Other referrals

## 2023-11-01 NOTE — PROGRESS NOTES
"Protocol: A Phase 1, First-in-Human Study of NTX-1088, a Monoclonal Antibody Targeting the Poliovirus Receptor (PVR), as Monotherapy and Combined with Pembrolizumab, in Patients with Advanced Solid Malignancies  Protocol #: NTX 1088-01  Protocol Version: V2; 21Feb2023  Sponsor: [employer]  IRB #: 2023.068  P.I.: Chan Leos MD     Subject ID: [id number]     Significant Events:  23OCT2023: signed ICF     Eligibility Note: 01NOV2023        Subject deemed eligible for above mentioned trial by meeting all of the inclusion criteria and none of the exclusion criteria per protocol version 2.0 dated 21Feb2023. Please see attached "Patient Enrollment Eligibility Form" signed by Sub-I Dr. Alcaraz and medical monitor. Inclusion/exclusion criteria attached and is double checked and signed by Evelyn beckham RN and JC Correa. Per sponsor, patient assigned to the combination therapy cohort, dose NTX 1088 300mg and Pembro 200mg.      Subject is aware and in agreement and wishes to continue to participate in the trial. He is scheduled to begin cycle 1 day 1 on Thursday, 02NOV2023.      Please see attached copy of signed "Patient Enrollment Eligibility Form" and eligibility checklist.     "

## 2023-11-01 NOTE — TELEPHONE ENCOUNTER
Spoke with patient via telephone regarding NTX -1088-01 clinical trial. Updated patient that the sponsor confirmed his eligibility and that he will be able to start treatment tomorrow 02.Nov.2023. Reviewed schedule of events tomorrow  including lab appts, urine appt, MD appt with Dr. Leos, infusion appt and post-infusion monitoring. Patient verbalized acknowledgment and is agreeable with plan of care.

## 2023-11-02 ENCOUNTER — OFFICE VISIT (OUTPATIENT)
Dept: HEMATOLOGY/ONCOLOGY | Facility: CLINIC | Age: 58
End: 2023-11-02
Payer: COMMERCIAL

## 2023-11-02 ENCOUNTER — RESEARCH ENCOUNTER (OUTPATIENT)
Dept: RESEARCH | Facility: HOSPITAL | Age: 58
End: 2023-11-02
Payer: COMMERCIAL

## 2023-11-02 ENCOUNTER — LAB VISIT (OUTPATIENT)
Dept: LAB | Facility: HOSPITAL | Age: 58
End: 2023-11-02
Payer: COMMERCIAL

## 2023-11-02 ENCOUNTER — INFUSION (OUTPATIENT)
Dept: INFUSION THERAPY | Facility: HOSPITAL | Age: 58
End: 2023-11-02
Payer: COMMERCIAL

## 2023-11-02 VITALS
SYSTOLIC BLOOD PRESSURE: 129 MMHG | RESPIRATION RATE: 18 BRPM | HEART RATE: 83 BPM | OXYGEN SATURATION: 100 % | TEMPERATURE: 98 F | DIASTOLIC BLOOD PRESSURE: 86 MMHG | BODY MASS INDEX: 23.58 KG/M2 | WEIGHT: 164.69 LBS | HEIGHT: 70 IN

## 2023-11-02 VITALS
RESPIRATION RATE: 18 BRPM | HEART RATE: 77 BPM | SYSTOLIC BLOOD PRESSURE: 137 MMHG | DIASTOLIC BLOOD PRESSURE: 77 MMHG | TEMPERATURE: 99 F | OXYGEN SATURATION: 99 %

## 2023-11-02 VITALS
RESPIRATION RATE: 18 BRPM | SYSTOLIC BLOOD PRESSURE: 140 MMHG | DIASTOLIC BLOOD PRESSURE: 80 MMHG | OXYGEN SATURATION: 100 % | HEART RATE: 81 BPM | TEMPERATURE: 98 F

## 2023-11-02 DIAGNOSIS — C67.9 MALIGNANT NEOPLASM OF URINARY BLADDER, UNSPECIFIED SITE: Primary | ICD-10-CM

## 2023-11-02 DIAGNOSIS — Z00.6 RESEARCH STUDY PATIENT: ICD-10-CM

## 2023-11-02 DIAGNOSIS — N18.32 STAGE 3B CHRONIC KIDNEY DISEASE: ICD-10-CM

## 2023-11-02 DIAGNOSIS — D63.0 ANEMIA IN NEOPLASTIC DISEASE: ICD-10-CM

## 2023-11-02 DIAGNOSIS — C67.9 MALIGNANT NEOPLASM OF URINARY BLADDER, UNSPECIFIED SITE: ICD-10-CM

## 2023-11-02 DIAGNOSIS — E88.89 UGT1A1 POOR METABOLIZER: ICD-10-CM

## 2023-11-02 LAB
ALBUMIN SERPL BCP-MCNC: 2.8 G/DL (ref 3.5–5.2)
ALP SERPL-CCNC: 73 U/L (ref 55–135)
ALT SERPL W/O P-5'-P-CCNC: 9 U/L (ref 10–44)
ANION GAP SERPL CALC-SCNC: 10 MMOL/L (ref 8–16)
APTT PPP: 25.3 SEC (ref 21–32)
AST SERPL-CCNC: 10 U/L (ref 10–40)
BASOPHILS # BLD AUTO: 0.06 K/UL (ref 0–0.2)
BASOPHILS NFR BLD: 0.6 % (ref 0–1.9)
BILIRUB SERPL-MCNC: 0.3 MG/DL (ref 0.1–1)
BUN SERPL-MCNC: 37 MG/DL (ref 6–20)
CALCIUM SERPL-MCNC: 9.1 MG/DL (ref 8.7–10.5)
CHLORIDE SERPL-SCNC: 104 MMOL/L (ref 95–110)
CK SERPL-CCNC: 92 U/L (ref 20–200)
CO2 SERPL-SCNC: 21 MMOL/L (ref 23–29)
CREAT SERPL-MCNC: 2.7 MG/DL (ref 0.5–1.4)
CRP SERPL-MCNC: 41.2 MG/L (ref 0–8.2)
DIFFERENTIAL METHOD: ABNORMAL
EOSINOPHIL # BLD AUTO: 0.2 K/UL (ref 0–0.5)
EOSINOPHIL NFR BLD: 2.4 % (ref 0–8)
ERYTHROCYTE [DISTWIDTH] IN BLOOD BY AUTOMATED COUNT: 12.8 % (ref 11.5–14.5)
EST. GFR  (NO RACE VARIABLE): 26.5 ML/MIN/1.73 M^2
FERRITIN SERPL-MCNC: 526 NG/ML (ref 20–300)
GLUCOSE SERPL-MCNC: 88 MG/DL (ref 70–110)
HCT VFR BLD AUTO: 31.5 % (ref 40–54)
HGB BLD-MCNC: 10.3 G/DL (ref 14–18)
IMM GRANULOCYTES # BLD AUTO: 0.04 K/UL (ref 0–0.04)
IMM GRANULOCYTES NFR BLD AUTO: 0.4 % (ref 0–0.5)
INR PPP: 1 (ref 0.8–1.2)
LYMPHOCYTES # BLD AUTO: 0.9 K/UL (ref 1–4.8)
LYMPHOCYTES NFR BLD: 9.7 % (ref 18–48)
MCH RBC QN AUTO: 30.9 PG (ref 27–31)
MCHC RBC AUTO-ENTMCNC: 32.7 G/DL (ref 32–36)
MCV RBC AUTO: 95 FL (ref 82–98)
MONOCYTES # BLD AUTO: 0.7 K/UL (ref 0.3–1)
MONOCYTES NFR BLD: 7.9 % (ref 4–15)
NEUTROPHILS # BLD AUTO: 7.3 K/UL (ref 1.8–7.7)
NEUTROPHILS NFR BLD: 79 % (ref 38–73)
NRBC BLD-RTO: 0 /100 WBC
PHOSPHATE SERPL-MCNC: 3 MG/DL (ref 2.7–4.5)
PLATELET # BLD AUTO: 300 K/UL (ref 150–450)
PMV BLD AUTO: 9.6 FL (ref 9.2–12.9)
POTASSIUM SERPL-SCNC: 4.4 MMOL/L (ref 3.5–5.1)
PROT SERPL-MCNC: 7 G/DL (ref 6–8.4)
PROTHROMBIN TIME: 10.4 SEC (ref 9–12.5)
RBC # BLD AUTO: 3.33 M/UL (ref 4.6–6.2)
SODIUM SERPL-SCNC: 135 MMOL/L (ref 136–145)
TRIGL SERPL-MCNC: 64 MG/DL (ref 30–150)
WBC # BLD AUTO: 9.27 K/UL (ref 3.9–12.7)

## 2023-11-02 PROCEDURE — 99999 PR PBB SHADOW E&M-EST. PATIENT-LVL III: ICD-10-PCS | Mod: PBBFAC,,, | Performed by: INTERNAL MEDICINE

## 2023-11-02 PROCEDURE — 86140 C-REACTIVE PROTEIN: CPT | Performed by: INTERNAL MEDICINE

## 2023-11-02 PROCEDURE — 93010 ELECTROCARDIOGRAM REPORT: CPT | Mod: 76,S$GLB,, | Performed by: INTERNAL MEDICINE

## 2023-11-02 PROCEDURE — 3074F PR MOST RECENT SYSTOLIC BLOOD PRESSURE < 130 MM HG: ICD-10-PCS | Mod: CPTII,S$GLB,, | Performed by: INTERNAL MEDICINE

## 2023-11-02 PROCEDURE — 85610 PROTHROMBIN TIME: CPT | Performed by: INTERNAL MEDICINE

## 2023-11-02 PROCEDURE — 3008F PR BODY MASS INDEX (BMI) DOCUMENTED: ICD-10-PCS | Mod: CPTII,S$GLB,, | Performed by: INTERNAL MEDICINE

## 2023-11-02 PROCEDURE — 96413 CHEMO IV INFUSION 1 HR: CPT

## 2023-11-02 PROCEDURE — 99215 PR OFFICE/OUTPT VISIT, EST, LEVL V, 40-54 MIN: ICD-10-PCS | Mod: S$GLB,,, | Performed by: INTERNAL MEDICINE

## 2023-11-02 PROCEDURE — 96417 CHEMO IV INFUS EACH ADDL SEQ: CPT

## 2023-11-02 PROCEDURE — 3079F PR MOST RECENT DIASTOLIC BLOOD PRESSURE 80-89 MM HG: ICD-10-PCS | Mod: CPTII,S$GLB,, | Performed by: INTERNAL MEDICINE

## 2023-11-02 PROCEDURE — 1159F PR MEDICATION LIST DOCUMENTED IN MEDICAL RECORD: ICD-10-PCS | Mod: CPTII,S$GLB,, | Performed by: INTERNAL MEDICINE

## 2023-11-02 PROCEDURE — 82728 ASSAY OF FERRITIN: CPT | Performed by: INTERNAL MEDICINE

## 2023-11-02 PROCEDURE — 3074F SYST BP LT 130 MM HG: CPT | Mod: CPTII,S$GLB,, | Performed by: INTERNAL MEDICINE

## 2023-11-02 PROCEDURE — 85025 COMPLETE CBC W/AUTO DIFF WBC: CPT | Performed by: INTERNAL MEDICINE

## 2023-11-02 PROCEDURE — 96361 HYDRATE IV INFUSION ADD-ON: CPT

## 2023-11-02 PROCEDURE — 82552 ASSAY OF CPK IN BLOOD: CPT | Performed by: INTERNAL MEDICINE

## 2023-11-02 PROCEDURE — 3008F BODY MASS INDEX DOCD: CPT | Mod: CPTII,S$GLB,, | Performed by: INTERNAL MEDICINE

## 2023-11-02 PROCEDURE — 93005 EKG 12-LEAD: ICD-10-PCS | Mod: 76,S$GLB,, | Performed by: INTERNAL MEDICINE

## 2023-11-02 PROCEDURE — 3079F DIAST BP 80-89 MM HG: CPT | Mod: CPTII,S$GLB,, | Performed by: INTERNAL MEDICINE

## 2023-11-02 PROCEDURE — 80053 COMPREHEN METABOLIC PANEL: CPT | Performed by: INTERNAL MEDICINE

## 2023-11-02 PROCEDURE — 93005 ELECTROCARDIOGRAM TRACING: CPT | Mod: 76,S$GLB,, | Performed by: INTERNAL MEDICINE

## 2023-11-02 PROCEDURE — 82550 ASSAY OF CK (CPK): CPT | Performed by: INTERNAL MEDICINE

## 2023-11-02 PROCEDURE — 99999 PR PBB SHADOW E&M-EST. PATIENT-LVL III: CPT | Mod: PBBFAC,,, | Performed by: INTERNAL MEDICINE

## 2023-11-02 PROCEDURE — 84478 ASSAY OF TRIGLYCERIDES: CPT | Performed by: INTERNAL MEDICINE

## 2023-11-02 PROCEDURE — 84100 ASSAY OF PHOSPHORUS: CPT | Performed by: INTERNAL MEDICINE

## 2023-11-02 PROCEDURE — 1159F MED LIST DOCD IN RCRD: CPT | Mod: CPTII,S$GLB,, | Performed by: INTERNAL MEDICINE

## 2023-11-02 PROCEDURE — 99215 OFFICE O/P EST HI 40 MIN: CPT | Mod: S$GLB,,, | Performed by: INTERNAL MEDICINE

## 2023-11-02 PROCEDURE — 84484 ASSAY OF TROPONIN QUANT: CPT | Performed by: INTERNAL MEDICINE

## 2023-11-02 PROCEDURE — 85730 THROMBOPLASTIN TIME PARTIAL: CPT | Performed by: INTERNAL MEDICINE

## 2023-11-02 PROCEDURE — 25000003 PHARM REV CODE 250: Performed by: INTERNAL MEDICINE

## 2023-11-02 PROCEDURE — 93010 EKG 12-LEAD: ICD-10-PCS | Mod: 76,S$GLB,, | Performed by: INTERNAL MEDICINE

## 2023-11-02 RX ORDER — EPINEPHRINE 0.3 MG/.3ML
0.3 INJECTION SUBCUTANEOUS ONCE AS NEEDED
Status: CANCELLED | OUTPATIENT
Start: 2023-11-02

## 2023-11-02 RX ORDER — DIPHENHYDRAMINE HYDROCHLORIDE 50 MG/ML
50 INJECTION INTRAMUSCULAR; INTRAVENOUS ONCE AS NEEDED
Status: CANCELLED | OUTPATIENT
Start: 2023-11-02

## 2023-11-02 RX ORDER — EPINEPHRINE 0.3 MG/.3ML
0.3 INJECTION SUBCUTANEOUS ONCE AS NEEDED
Status: DISCONTINUED | OUTPATIENT
Start: 2023-11-02 | End: 2023-11-02 | Stop reason: HOSPADM

## 2023-11-02 RX ORDER — SODIUM CHLORIDE 9 MG/ML
INJECTION, SOLUTION INTRAVENOUS
Status: COMPLETED | OUTPATIENT
Start: 2023-11-02 | End: 2023-11-02

## 2023-11-02 RX ORDER — SODIUM CHLORIDE 0.9 % (FLUSH) 0.9 %
10 SYRINGE (ML) INJECTION
Status: CANCELLED | OUTPATIENT
Start: 2023-11-02

## 2023-11-02 RX ORDER — HEPARIN 100 UNIT/ML
500 SYRINGE INTRAVENOUS
Status: DISCONTINUED | OUTPATIENT
Start: 2023-11-02 | End: 2023-11-02 | Stop reason: HOSPADM

## 2023-11-02 RX ORDER — SODIUM CHLORIDE 0.9 % (FLUSH) 0.9 %
10 SYRINGE (ML) INJECTION
Status: DISCONTINUED | OUTPATIENT
Start: 2023-11-02 | End: 2023-11-02 | Stop reason: HOSPADM

## 2023-11-02 RX ORDER — DIPHENHYDRAMINE HYDROCHLORIDE 50 MG/ML
50 INJECTION INTRAMUSCULAR; INTRAVENOUS ONCE AS NEEDED
Status: DISCONTINUED | OUTPATIENT
Start: 2023-11-02 | End: 2023-11-02 | Stop reason: HOSPADM

## 2023-11-02 RX ORDER — SODIUM CHLORIDE 9 MG/ML
INJECTION, SOLUTION INTRAVENOUS
Status: CANCELLED
Start: 2023-11-02

## 2023-11-02 RX ORDER — HEPARIN 100 UNIT/ML
500 SYRINGE INTRAVENOUS
Status: CANCELLED | OUTPATIENT
Start: 2023-11-02

## 2023-11-02 RX ADMIN — SODIUM CHLORIDE: 9 INJECTION, SOLUTION INTRAVENOUS at 11:11

## 2023-11-02 NOTE — PLAN OF CARE
1030 Labs, hx, and medications reviewed, pt meets parameters for treatment today. Assessment completed and plan of care reviewed. Pt verbalized understanding. Huddle with research CRC Evelyn prior to treatment. PAC accessed and labs drawn per research protocol with no complications. Pt voices no new complaints or concerns, will continue to monitor for safety.

## 2023-11-02 NOTE — PROGRESS NOTES
Protocol: A Phase 1, First-in-Human Study of NTX-1088, a Monoclonal Antibody Targeting the Poliovirus Receptor (PVR), as Monotherapy and Combined with Pembrolizumab, in Patients with Advanced Solid Malignancies  Protocol #: NTX 1088-01  Protocol Version: V2; [id number]  Sponsor: [employer]  IRB #: [id number]  P.I.: Chan Leos MD     Subject ID: [id number]     Significant Events:  23OCT2023 signed ICF  02NOV2023 Begin Treatment C1D1     NTX 1088-01 C1D1 Visit: 02NOV2023     Patient presented to the clinic today with his mother to start treatment on the above mentioned protocol. Patient alert and oriented x 3. Mood and affect are appropriate to the situation. Patient expresses willingness to continue to participate in above mentioned protocol. Pt denies fever, nausea, vomiting, chest pain, shortness of breath, headaches, and dizziness.      Fasting local labs performed in the morning prior to C1D1. MD Leos completed patient physical examination, reviewed VS, labs, and AEs. Labs nWNL not deemed clinically significant unless noted in MD note or research shadow chart. MD re-reviewed the clinical trial with the patient including potential side effects. MD reiterated to patient to report any new symptoms to research since not all side effects of study drug are known. Patient verbalized understanding. Per MD, ok to proceed with Pembro/NTX-1088 infusion today. MD ordered additional 1L NS for low CrCl.      Reviewed and confirmed which concomitant medications the patient is currently taking. See concomitant therapy worksheet.      Pt given opportunity to ask questions, all concerns addressed, and pt verbalized understanding. Instructed patient to notify MD and/or RN with any questions, concerns, or changes in health status. Patient verbalized understanding.       Research time out completed with MD Leos before signing orders in Epic. Patient assigned to NTX Combination Therapy arm and will be receiving 200mg  Pembro infusion and 300mg NTX-1088 infusion. All doses agreed upon and per protocol. Infusion protocol and hand-out on labs, EKGs, Pks and VS discussed with Olivia Mejia RN who voices understanding.      EKG times noted below. Patient allowed to rest in supine position for at least 10 minutes prior to performing EKGs.  Pre-dose triplicate EKGs: 1039, 1041, 1044  1-hour post-dose triplicate EKGs: 1450, 1452, 1455    Vitals noted below. Patient allowed to rest in seated position for 5 minutes prior to collection.  Pre dose: 1208 T 36.6C /85 HR 70 O2 100 RR 18  Post dose: 1348 T 36.6C /80 HR 81 O2 100 RR 18    Central Lab-Draw Times noted below. All central labs drawn from right subclavian port.  Pre-dose central labs: 1110  EOI central labs: 1350  30-minute post-infusion central labs: 1416  1-hour post infusion central labs: 1445  2 hour post infusion central labs: 1546  4-hour post infusion central labs: 1743     Per infusion nurse notes: Pembro infusion began at 1209 and ended at 1239 and NTX infusion began at 1243 and ended at 1344. Intermittent hypertensive episodes noted after Pembro infusion, pt asymptomatic. All blood pressures measured in right arm. Following infusion, EOI labs and VS, patient transported to clinic PK room for further observation. Patient observed in PK room by clinic research staff for roughly 6 hours after infusion complete. Patient remains free of complaints, no adverse events occurred this visit. Patient discharged from clinic at 1950, able to ambulate freely.     Pt to return clinic tomorrow for C1D2. Patient instructed to arrive to clinic at 1145. Calendar of upcoming appointments provided to patient along with this RNs contact information.      Most up to date Baseline log and Adverse event log in shadow, paper, research chart.     Active Adverse Events:  Proteinuria Grade 1  Intermittent Hypertension Grade 2

## 2023-11-02 NOTE — PLAN OF CARE
1400 Pt tolerated C1 NTX 1088 trial infusion well with no complaints or complications, VSS throughout treatment.Educated patient on medications administered including side effects, possible reactions, and chemotherapy precautions, verbalized understanding. Pt aware to call provider with any questions or concerns, aware of upcoming appts, ambulatory from clinic with steady gait, no distress noted, with research RN for post treatment 6 hour obs.

## 2023-11-03 ENCOUNTER — CLINICAL SUPPORT (OUTPATIENT)
Dept: HEMATOLOGY/ONCOLOGY | Facility: CLINIC | Age: 58
End: 2023-11-03
Payer: COMMERCIAL

## 2023-11-03 ENCOUNTER — RESEARCH ENCOUNTER (OUTPATIENT)
Dept: RESEARCH | Facility: HOSPITAL | Age: 58
End: 2023-11-03
Payer: COMMERCIAL

## 2023-11-03 VITALS
RESPIRATION RATE: 18 BRPM | SYSTOLIC BLOOD PRESSURE: 130 MMHG | DIASTOLIC BLOOD PRESSURE: 98 MMHG | HEART RATE: 85 BPM | OXYGEN SATURATION: 100 % | TEMPERATURE: 98 F

## 2023-11-03 DIAGNOSIS — Z00.6 RESEARCH STUDY PATIENT: ICD-10-CM

## 2023-11-03 DIAGNOSIS — C67.9 MALIGNANT NEOPLASM OF URINARY BLADDER, UNSPECIFIED SITE: ICD-10-CM

## 2023-11-03 LAB
CK BB CFR SERPL ELPH: 0 %
CK MB CFR SERPL ELPH: 0 % (ref 0–3.3)
CK MM CFR SERPL ELPH: 100 % (ref 96.7–100)
CK SERPL-CCNC: 82 U/L (ref 30–223)
TROPONIN T SERPL-MCNC: 18 NG/L

## 2023-11-03 PROCEDURE — 93010 EKG 12-LEAD: ICD-10-PCS | Mod: S$GLB,,, | Performed by: INTERNAL MEDICINE

## 2023-11-03 PROCEDURE — 93010 ELECTROCARDIOGRAM REPORT: CPT | Mod: S$GLB,,, | Performed by: INTERNAL MEDICINE

## 2023-11-03 PROCEDURE — 93005 EKG 12-LEAD: ICD-10-PCS | Mod: S$GLB,,, | Performed by: INTERNAL MEDICINE

## 2023-11-03 PROCEDURE — 93005 ELECTROCARDIOGRAM TRACING: CPT | Mod: S$GLB,,, | Performed by: INTERNAL MEDICINE

## 2023-11-03 NOTE — PROGRESS NOTES
Protocol: A Phase 1, First-in-Human Study of NTX-1088, a Monoclonal Antibody Targeting the Poliovirus Receptor (PVR), as Monotherapy and Combined with Pembrolizumab, in Patients with Advanced Solid Malignancies  Protocol #: NTX 1088-01  Protocol Version: V2; [id number]  Sponsor: [employer]  IRB #: [id number]  P.I.: Chan Leos MD     Subject ID: [id number]     Significant Events:  23OCT2023: signed ICF  02NOV2023: Begin Treatment C1D1     NTX 1088-01 C1D2 Visit: 03NOV2023     Pt presented to the clinic today with self for C1D2 of the above mentioned protocol. Alert and oriented x 3. Mood and affect are appropriate to the situation. Pt expresses willingness to continue to participate in above mentioned protocol. No new symptoms or complaints to report. Denies fever, vomiting, new pain, chest pain, shortness of breath, headaches, and dizziness.      EKG times noted below. Patient allowed to rest in supine position for at least 10 minutes prior to performing EKGs:  24 hour post dose triplicate EKGs: 1151, 1153, 1155     Central Lab-Draw Time noted below:  24 hour pose dose: 1202 via peripheral stick to the right arm      Reviewed and confirmed which concomitant medications the patient is currently taking. See concomitant therapy worksheet.      Pt verbalized understanding of above information and is in agreement of this treatment plan.      Pt to return clinic for C1D8 on 07NOV2023 at 0730 for protocol defined assessments. Pt given opportunity to ask questions, all concerns addressed, and pt verbalized understanding.       Most up to date Baseline log and Adverse event log in shadow, paper, research chart.     Active Adverse Events:   Proteinuria Grade 1  Intermittent Hypertension Grade 2

## 2023-11-07 ENCOUNTER — LAB VISIT (OUTPATIENT)
Dept: LAB | Facility: HOSPITAL | Age: 58
End: 2023-11-07
Payer: COMMERCIAL

## 2023-11-07 ENCOUNTER — PATIENT MESSAGE (OUTPATIENT)
Dept: RESEARCH | Facility: HOSPITAL | Age: 58
End: 2023-11-07
Payer: COMMERCIAL

## 2023-11-07 ENCOUNTER — RESEARCH ENCOUNTER (OUTPATIENT)
Dept: RESEARCH | Facility: HOSPITAL | Age: 58
End: 2023-11-07
Payer: COMMERCIAL

## 2023-11-07 ENCOUNTER — OFFICE VISIT (OUTPATIENT)
Dept: HEMATOLOGY/ONCOLOGY | Facility: CLINIC | Age: 58
End: 2023-11-07
Payer: COMMERCIAL

## 2023-11-07 VITALS
BODY MASS INDEX: 22.82 KG/M2 | RESPIRATION RATE: 18 BRPM | SYSTOLIC BLOOD PRESSURE: 110 MMHG | WEIGHT: 159.38 LBS | TEMPERATURE: 99 F | OXYGEN SATURATION: 99 % | HEIGHT: 70 IN | DIASTOLIC BLOOD PRESSURE: 73 MMHG | HEART RATE: 96 BPM

## 2023-11-07 DIAGNOSIS — Z00.6 RESEARCH STUDY PATIENT: ICD-10-CM

## 2023-11-07 DIAGNOSIS — C67.9 MALIGNANT NEOPLASM OF URINARY BLADDER, UNSPECIFIED SITE: ICD-10-CM

## 2023-11-07 DIAGNOSIS — N18.32 STAGE 3B CHRONIC KIDNEY DISEASE: Primary | ICD-10-CM

## 2023-11-07 LAB
ALBUMIN SERPL BCP-MCNC: 2.8 G/DL (ref 3.5–5.2)
ALP SERPL-CCNC: 74 U/L (ref 55–135)
ALT SERPL W/O P-5'-P-CCNC: 8 U/L (ref 10–44)
ANION GAP SERPL CALC-SCNC: 7 MMOL/L (ref 8–16)
AST SERPL-CCNC: 10 U/L (ref 10–40)
BASOPHILS # BLD AUTO: 0.05 K/UL (ref 0–0.2)
BASOPHILS NFR BLD: 0.5 % (ref 0–1.9)
BILIRUB SERPL-MCNC: 0.8 MG/DL (ref 0.1–1)
BUN SERPL-MCNC: 36 MG/DL (ref 6–20)
CALCIUM SERPL-MCNC: 9.7 MG/DL (ref 8.7–10.5)
CHLORIDE SERPL-SCNC: 103 MMOL/L (ref 95–110)
CK BB CFR SERPL ELPH: 0 %
CK MB CFR SERPL ELPH: 0 % (ref 0–3.3)
CK MM CFR SERPL ELPH: 100 % (ref 96.7–100)
CK SERPL-CCNC: 67 U/L (ref 20–200)
CK SERPL-CCNC: 76 U/L (ref 30–223)
CO2 SERPL-SCNC: 24 MMOL/L (ref 23–29)
CREAT SERPL-MCNC: 2.6 MG/DL (ref 0.5–1.4)
CRP SERPL-MCNC: 153.2 MG/L (ref 0–8.2)
DIFFERENTIAL METHOD: ABNORMAL
DRUG STUDY SPECIMEN TYPE: NORMAL
DRUG STUDY TEST NAME: NORMAL
DRUG STUDY TEST RESULT: NORMAL
EOSINOPHIL # BLD AUTO: 0.1 K/UL (ref 0–0.5)
EOSINOPHIL NFR BLD: 1.1 % (ref 0–8)
ERYTHROCYTE [DISTWIDTH] IN BLOOD BY AUTOMATED COUNT: 12.5 % (ref 11.5–14.5)
EST. GFR  (NO RACE VARIABLE): 27.7 ML/MIN/1.73 M^2
FERRITIN SERPL-MCNC: 825 NG/ML (ref 20–300)
GLUCOSE SERPL-MCNC: 100 MG/DL (ref 70–110)
HCT VFR BLD AUTO: 31.5 % (ref 40–54)
HGB BLD-MCNC: 10.3 G/DL (ref 14–18)
IMM GRANULOCYTES # BLD AUTO: 0.05 K/UL (ref 0–0.04)
IMM GRANULOCYTES NFR BLD AUTO: 0.5 % (ref 0–0.5)
LYMPHOCYTES # BLD AUTO: 1.3 K/UL (ref 1–4.8)
LYMPHOCYTES NFR BLD: 12.2 % (ref 18–48)
MCH RBC QN AUTO: 30.7 PG (ref 27–31)
MCHC RBC AUTO-ENTMCNC: 32.7 G/DL (ref 32–36)
MCV RBC AUTO: 94 FL (ref 82–98)
MONOCYTES # BLD AUTO: 1 K/UL (ref 0.3–1)
MONOCYTES NFR BLD: 8.8 % (ref 4–15)
NEUTROPHILS # BLD AUTO: 8.4 K/UL (ref 1.8–7.7)
NEUTROPHILS NFR BLD: 76.9 % (ref 38–73)
NRBC BLD-RTO: 0 /100 WBC
PHOSPHATE SERPL-MCNC: 3.2 MG/DL (ref 2.7–4.5)
PLATELET # BLD AUTO: 308 K/UL (ref 150–450)
PMV BLD AUTO: 9.1 FL (ref 9.2–12.9)
POTASSIUM SERPL-SCNC: 4.5 MMOL/L (ref 3.5–5.1)
PROT SERPL-MCNC: 7.3 G/DL (ref 6–8.4)
RBC # BLD AUTO: 3.36 M/UL (ref 4.6–6.2)
SODIUM SERPL-SCNC: 134 MMOL/L (ref 136–145)
TRIGL SERPL-MCNC: 62 MG/DL (ref 30–150)
WBC # BLD AUTO: 10.85 K/UL (ref 3.9–12.7)

## 2023-11-07 PROCEDURE — 99999 PR PBB SHADOW E&M-EST. PATIENT-LVL III: CPT | Mod: PBBFAC,,, | Performed by: INTERNAL MEDICINE

## 2023-11-07 PROCEDURE — 84100 ASSAY OF PHOSPHORUS: CPT | Performed by: INTERNAL MEDICINE

## 2023-11-07 PROCEDURE — 3008F BODY MASS INDEX DOCD: CPT | Mod: CPTII,S$GLB,, | Performed by: INTERNAL MEDICINE

## 2023-11-07 PROCEDURE — 86140 C-REACTIVE PROTEIN: CPT | Performed by: INTERNAL MEDICINE

## 2023-11-07 PROCEDURE — 82552 ASSAY OF CPK IN BLOOD: CPT | Performed by: INTERNAL MEDICINE

## 2023-11-07 PROCEDURE — 99000 SPECIMEN HANDLING OFFICE-LAB: CPT | Performed by: INTERNAL MEDICINE

## 2023-11-07 PROCEDURE — 3078F PR MOST RECENT DIASTOLIC BLOOD PRESSURE < 80 MM HG: ICD-10-PCS | Mod: CPTII,S$GLB,, | Performed by: INTERNAL MEDICINE

## 2023-11-07 PROCEDURE — 82728 ASSAY OF FERRITIN: CPT | Performed by: INTERNAL MEDICINE

## 2023-11-07 PROCEDURE — 80053 COMPREHEN METABOLIC PANEL: CPT | Performed by: INTERNAL MEDICINE

## 2023-11-07 PROCEDURE — 85025 COMPLETE CBC W/AUTO DIFF WBC: CPT | Performed by: INTERNAL MEDICINE

## 2023-11-07 PROCEDURE — 99215 PR OFFICE/OUTPT VISIT, EST, LEVL V, 40-54 MIN: ICD-10-PCS | Mod: S$GLB,,, | Performed by: INTERNAL MEDICINE

## 2023-11-07 PROCEDURE — 93005 ELECTROCARDIOGRAM TRACING: CPT | Mod: 76,S$GLB,, | Performed by: INTERNAL MEDICINE

## 2023-11-07 PROCEDURE — 99215 OFFICE O/P EST HI 40 MIN: CPT | Mod: S$GLB,,, | Performed by: INTERNAL MEDICINE

## 2023-11-07 PROCEDURE — 93005 EKG 12-LEAD: ICD-10-PCS | Mod: 76,S$GLB,, | Performed by: INTERNAL MEDICINE

## 2023-11-07 PROCEDURE — 1159F PR MEDICATION LIST DOCUMENTED IN MEDICAL RECORD: ICD-10-PCS | Mod: CPTII,S$GLB,, | Performed by: INTERNAL MEDICINE

## 2023-11-07 PROCEDURE — 93010 ELECTROCARDIOGRAM REPORT: CPT | Mod: 76,S$GLB,, | Performed by: INTERNAL MEDICINE

## 2023-11-07 PROCEDURE — 3008F PR BODY MASS INDEX (BMI) DOCUMENTED: ICD-10-PCS | Mod: CPTII,S$GLB,, | Performed by: INTERNAL MEDICINE

## 2023-11-07 PROCEDURE — 99999 PR PBB SHADOW E&M-EST. PATIENT-LVL III: ICD-10-PCS | Mod: PBBFAC,,, | Performed by: INTERNAL MEDICINE

## 2023-11-07 PROCEDURE — 3078F DIAST BP <80 MM HG: CPT | Mod: CPTII,S$GLB,, | Performed by: INTERNAL MEDICINE

## 2023-11-07 PROCEDURE — 3074F PR MOST RECENT SYSTOLIC BLOOD PRESSURE < 130 MM HG: ICD-10-PCS | Mod: CPTII,S$GLB,, | Performed by: INTERNAL MEDICINE

## 2023-11-07 PROCEDURE — 82550 ASSAY OF CK (CPK): CPT | Mod: 91 | Performed by: INTERNAL MEDICINE

## 2023-11-07 PROCEDURE — 93010 EKG 12-LEAD: ICD-10-PCS | Mod: 76,S$GLB,, | Performed by: INTERNAL MEDICINE

## 2023-11-07 PROCEDURE — 84484 ASSAY OF TROPONIN QUANT: CPT | Performed by: INTERNAL MEDICINE

## 2023-11-07 PROCEDURE — 3074F SYST BP LT 130 MM HG: CPT | Mod: CPTII,S$GLB,, | Performed by: INTERNAL MEDICINE

## 2023-11-07 PROCEDURE — 1159F MED LIST DOCD IN RCRD: CPT | Mod: CPTII,S$GLB,, | Performed by: INTERNAL MEDICINE

## 2023-11-07 PROCEDURE — 84478 ASSAY OF TRIGLYCERIDES: CPT | Performed by: INTERNAL MEDICINE

## 2023-11-07 NOTE — PROGRESS NOTES
"MEDICAL ONCOLOGY - FOLLOW UP VISIT    Cancer/Stage/TNM:    Cancer Staging   Malignant neoplasm of urinary bladder  Staging form: Urinary Bladder, AJCC 8th Edition  - Clinical: Stage IVB (cT4b, cNX, pM1b) - Signed by Bridger Abad MD on 9/28/2023       Reason for visit: Julio Rodríguez is a 58 y.o. male with metastatic bladder cancer previously treated with neoadjuvant ddMVAC, radical cystectomy, and then carboplatin/gemcitabine and EV+ Pembro following local and metastatic recurrence. He presents to medical oncology clinic for enrollment onto clinical trial.    History has been obtained by chart review and discussion with the patient.    Interval Events:    S/p one cycle on NTX 1088 phase I trial. Only complaint was one day of "stomach upset". He continues to struggle with diarrhea versus constipation, which is chronic since before treatment. He is still searching for a good bowel regimen. ECOG PS 0. Continues on Linzess for constipation. Ongoing catheterization for neobladder. No CP, SOB or cough. Notes a chronic abdominal hernia. Discussed in detail timing of a hernia repair.      Oncology History   Malignant neoplasm of urinary bladder   2016 Initial Diagnosis    Bladder CIS. Managed with BCG     2017 Notable Event    Developed recurrent muscle invasive disease.     2017 - 2017 Chemotherapy    ddMVAC      Surgery    Radical cystectomy with lymph node dissection and creation of neobladder. [id number]     7/2022 Notable Event    New onset hematuria. MRI scan of the pelvis in August showed a suspected blood clot adherent to the distal Smith catheter. There was abnormal signal and enhancement of the bilateral pubic bones adjacent to the neobladder suspicious for neoplastic infiltration. There was no pelvic lymphadenopathy.      7/2022 Imaging Significant Findings    Pet scan at the same time showed hypermetabolic retroperitoneal lymphadenopathy. There was marked activity felt to involve the bladder wall suspicious " for tumor.     7/2022 Biopsy    Biopsy of the bladder neck showed recurrent high-grade urothelial carcinoma.     9/28/2022 - 5/16/2023 Chemotherapy    Mr. Rodríguez was started on chemotherapy with gemcitabine and carboplatin in September 2022.      1/25/2023 Imaging Significant Findings    Pet scan on 25 January showed changes related to prior cystoprostatectomy with neobladder creation and minimal fullness to the right renal collecting system. There was no evidence of a discrete hypermetabolic mass or lymphadenopathy. There was diffusely increased activity throughout the osseous structures, suggestive of chemotherapy with reactive marrow.     4/11/2023 Imaging Significant Findings    CT a/p  1. Pathologic fracture of the left parasymphysis pubis secondary to lytic process. Given location adjacent to neobladder, osteomyelitis is a possibility. Metastatic disease not excluded.   2. Prior cystoprostatectomy with chronic right ureteral obstruction and hydroureteronephrosis, minimally changed from the prior. All etiologies considered including malignant obstruction. Urology consultation recommended.   3. Incidental findings including cholelithiasis, bilateral multifocal renal cortical scarring, and small hiatal hernia.       5/24/2023 -  Chemotherapy    Enfortumab vedotin + Pembrolizumab     8/10/2023 Imaging Significant Findings    PET CT  Interval development of hypermetabolic pulmonary nodules within the lingula and right lower lobe likely reflecting pulmonary metastases. Some additional tiny pulmonary nodularity is new or more conspicuous from prior but too small to accurately characterize by PET/CT. Attention on follow-up recommended.     Worsening obstructive uropathy which is relatively moderate to severe the right kidney.     Similar appearance of the urinary neobladder. There is somewhat diminished due to physiologic activity to evaluate for local neoplasm but the appearance overall appears similar to prior.  Assessment of local disease could be followed with CT abdomen and pelvis with contrast.     Interval fracture of the left superior and inferior pubic rami appearing subacute in nature. Please correlate for trauma and tenderness. There is no significant FDG activity within the area to favor a pathologic fracture.     Previously seen diffuse marrow activity may have been due to previous marrow rebound or drug effect.       8/25/2023 Imaging Significant Findings    MR Pelvis  History of cystoprostatectomy and neobladder creation.     Interval increase in multilobular mass in the pelvis infiltrating the rectum, possibly due to distal sigmoid colon, anterior left pelvic bones as well as a inferior aspect of the neobladder suspicious for progression of neoplastic process as discussed above.      9/28/2023 Cancer Staged    Staging form: Urinary Bladder, AJCC 8th Edition  - Clinical: Stage IVB (cT4b, cNX, pM1b)     10/16/2023 Imaging Significant Findings    CT chest   Impression:  - Multiple solid bilateral pulmonary nodules up to 9mm showing interval increase in size concerning for metastatic disease in this patient with history of prior bladder malignancy.  - Multiple hepatic hypodensities, which may represent cystic lesions however some which are too small to characterize.  - Partially imaged right kidney showing parenchymal atrophy and suspected hydronephrosis..    MR Pelvis   Impression:     Interval increased size of multilobular mass in the cystoprostatectomy surgical bed that invades the neobladder, rectum, sigmoid colon, and left pelvis.  Multiple pelvic lymph nodes are more conspicuous from prior exam and concerning for additional metastatic disease.     10/24/2023 - 10/24/2023 Chemotherapy    Treatment Summary   Plan Name: OP SACITUZUMAB GOVITECAN-HZIY Q3W  Treatment Goal: Palliative  Status: Inactive  Start Date:   End Date:   Provider: Bridger Abad MD  Chemotherapy: sacituzumab govitecan-hziy (TRODELVY)  543 mg in sodium chloride 0.9% 500 mL chemo infusion, 7.5 mg/kg = 543 mg (original dose ), Intravenous, Clinic/HOD 1 time, 0 of 17 cycles  Dose modification: 7.5 mg/kg (Cycle 1, Reason: MD Discretion, Comment: UGT1A1 PM )     11/2/2023 -  Chemotherapy    Treatment Summary   Plan Name: [facility] NTX-1088-01 Dose Escalation INV-NTX + INV pembrolizumab  Treatment Goal: Control  Status: Active  Start Date: 11/2/2023  End Date: 10/16/2025 (Planned)  Provider: Chan Leos MD  Chemotherapy: INV [id number] (pembrolizumab) 200 mg in INV sodium chloride 0.9 % 100 mL chemo infusion, 200 mg, Intravenous, Clinic/HOD 1 time, 1 of 35 cycles     Melanoma        Past Medical History:   Diagnosis Date    Bladder cancer     CAD (coronary artery disease) 9/12/2023    Prior CABG. Not on antiplatelets. Home medicatiosn include atorvastatin    Carcinoma     forehead    Encounter for blood transfusion     Hypertension     Hypothyroidism 9/12/2023    Home medications include levothyroxine    Malignant melanoma of skin, unspecified     right arm    Malignant neoplasm of urinary bladder 9/12/2023    Melanoma 9/12/2023    History of T1a melanoma; 0.5mm depth without ulceartion. SP wide local excision 02/2022          Past Surgical History:   Procedure Laterality Date    CORONARY ARTERY BYPASS GRAFT  10/2015    CYSTECTOMY, ROBOT-ASSISTED, LAPAROSCOPIC, USING DA MARY XI, WITH ILEAL CONDUIT CREATION  12/2017    DILATION OF BLADDER      dialation of bladder neck- due to claudia bladder- multiple procedures    LYMPHADENECTOMY      XI ROBOTIC PROSTECTOMY, SUPRAPUBIC  12/2017        Family History   Problem Relation Age of Onset    Heart disease Father     Heart attack Paternal Uncle     Breast cancer Maternal Cousin     Colon cancer Neg Hx     Bladder Cancer Neg Hx         Social History     Tobacco Use    Smoking status: Former     Types: Cigarettes    Smokeless tobacco: Not on file   Substance Use Topics    Alcohol use: Not Currently        I have  "reviewed and updated the patient's past medical, surgical, family and social histories.    Review of patient's allergies indicates:  No Known Allergies     Current Outpatient Medications   Medication Sig Dispense Refill    atorvastatin (LIPITOR) 20 MG tablet Take 20 mg by mouth every evening.      docusate sodium (COLACE) 100 MG capsule Take 1 capsule by mouth every evening.      levothyroxine (SYNTHROID) 50 MCG tablet Take 50 mcg by mouth.      LINZESS 72 mcg Cap capsule Take 72 mcg by mouth every morning.      multivitamin (THERAGRAN) per tablet Take 1 tablet by mouth.      VTAMA 1 % Crea APPLY 1 APPLICATION ON THE SKIN DAILY       No current facility-administered medications for this visit.        Physical Exam:   /73 (BP Location: Left arm, Patient Position: Sitting, BP Method: Medium (Automatic))   Pulse 96   Temp 98.5 °F (36.9 °C) (Oral)   Resp 18   Ht 5' 10" (1.778 m)   Wt 72.3 kg (159 lb 6.3 oz)   SpO2 99%   BMI 22.87 kg/m²      ECOG Performance status: 0  Life expectancy > 6 months            Physical Exam  Constitutional:       General: He is not in acute distress.     Appearance: Normal appearance.   HENT:      Head: Normocephalic.   Eyes:      General: No scleral icterus.     Extraocular Movements: Extraocular movements intact.      Conjunctiva/sclera: Conjunctivae normal.   Cardiovascular:      Rate and Rhythm: Normal rate.   Pulmonary:      Effort: Pulmonary effort is normal. No respiratory distress.   Abdominal:      General: There is no distension.      Palpations: Abdomen is soft.   Skin:     General: Skin is warm and dry.   Neurological:      Mental Status: He is alert and oriented to person, place, and time.      Motor: No weakness.   Psychiatric:         Mood and Affect: Mood normal.         Behavior: Behavior normal.         Thought Content: Thought content normal.           Labs:                 Lab Results   Component Value Date     (L) 11/07/2023    K 4.5 11/07/2023    " CREATININE 2.6 (H) 11/07/2023    WBC 10.85 11/07/2023    HGB 10.3 (L) 11/07/2023     11/07/2023    AST 10 11/07/2023    ALT 8 (L) 11/07/2023    ALKPHOS 74 11/07/2023    BILITOT 0.8 11/07/2023          Imaging:         I have personally reviewed the above imaging including recent MRI and PET CT scan    Path:   Reviewed pathology as documented in oncology history      Assessment and Plan:   1. Stage 3b chronic kidney disease    2. Malignant neoplasm of urinary bladder, unspecified site  Overview:  Locally recurrent disease with presumed pulmonary metastases sp neoadjuvant MVAC, cystectomy, gem/carbo, pebmrolizumab and enfortumab. Molecular profiling without FGFR alteration.  Majority of disease is from his local pelvic recurrence with infiltration into the pubis and rectum. Also with pulmonary mets on imaging. ECOG PS 0.  Tolerated first cycle with no AEs. Labs without clinically significant abnormalities.    Orders:  -     EKG 12-lead  -     EKG 12-lead  -     EKG 12-lead    3. Research study patient  -     EKG 12-lead  -     EKG 12-lead  -     EKG 12-lead                   Follow up:   Route Chart for Scheduling  Med Onc Route Chart for Scheduling  Treatment Plan Information   [facility] NTX-1088-01 Dose Escalation INV-NTX + INV pembrolizumab   Chan Leos MD   Upcoming Treatment Dates - [facility] NTX-1088-01 Dose Escalation INV-NTX + INV pembrolizumab    11/23/2023       Chemotherapy       INV ntx-1088 300 mg in INV sodium chloride 0.9 % SolP 50 mL chemo infusion  12/14/2023       Chemotherapy       INV ntx-1088 300 mg in INV sodium chloride 0.9 % SolP 50 mL chemo infusion  1/4/2024       Chemotherapy       INV ntx-1088 300 mg in INV sodium chloride 0.9 % SolP 50 mL chemo infusion  1/25/2024       Chemotherapy       INV ntx-1088 300 mg in INV sodium chloride 0.9 % SolP 50 mL chemo infusion        The above information has been reviewed with the patient and all questions have been answered to their apparent  satisfaction.  They understand that they can call the clinic with any questions.    MDM includes:    - Acute or chronic illness or injury that poses a threat to life or bodily function  - Independent review and explanation of 2 results from unique tests  - Discussion of management and ordering 2 unique tests  - Extensive discussion of treatment and management  - Prescription drug management  - Drug therapy requiring intensive monitoring for toxicity    John Alcaraz M.D.  Hematology/Oncology Attending  Director [facility] Program  [facility]

## 2023-11-07 NOTE — PROGRESS NOTES
Protocol: A Phase 1, First-in-Human Study of NTX-1088, a Monoclonal Antibody Targeting the Poliovirus Receptor (PVR), as Monotherapy and Combined with Pembrolizumab, in Patients with Advanced Solid Malignancies  Protocol #: NTX 1088-01  Protocol Version: V2; [id number]  Sponsor: [employer]  IRB #: [id number]  P.I.: Chan Leos MD     Subject ID: [id number]     Significant Events:  23OCT2023: signed ICF  02NOV2023: Begin Treatment C1D1    NTX 1088-01 C1D8 Visit: 07NOV2023     Pt presented to the clinic today with his mother for C1D8 of the above mentioned protocol. Alert and oriented x 3. Mood and affect are appropriate to the situation. Pt expresses willingness to continue to participate in above mentioned protocol. No new symptoms or complaints to report. Denies fever, vomiting, new pain, chest pain, shortness of breath, headaches, and dizziness.       EKG times noted below. Patient allowed to rest in supine position for at least 10 minutes prior to performing EKGs:  Triplicate EKGs: 0853, 0854, 0857     Vitals noted below. Patient allowed to rest in seated position for 5 minutes prior to collection.  Time: 0850  T 36.9C /73 HR 96 O2 99 RR 18    Central Lab-Draw Time: 0739     Reviewed and confirmed which concomitant medications the patient is currently taking. See concomitant therapy worksheet.      Pt verbalized understanding of above information and is in agreement of this treatment plan.      Pt to return clinic for C1D15 on [id number] at 0730 for protocol defined assessments. Pt given opportunity to ask questions, all concerns addressed, and pt verbalized understanding.      Most up to date Baseline log and Adverse event log in shadow, paper, research chart.     Active Adverse Events:   Proteinuria Grade 1  Intermittent Hypertension Grade 2

## 2023-11-08 DIAGNOSIS — C67.9 MALIGNANT NEOPLASM OF URINARY BLADDER, UNSPECIFIED SITE: Primary | ICD-10-CM

## 2023-11-08 DIAGNOSIS — Z00.6 RESEARCH STUDY PATIENT: ICD-10-CM

## 2023-11-08 LAB — TROPONIN T SERPL-MCNC: 21 NG/L

## 2023-11-10 ENCOUNTER — PATIENT MESSAGE (OUTPATIENT)
Dept: RESEARCH | Facility: HOSPITAL | Age: 58
End: 2023-11-10
Payer: COMMERCIAL

## 2023-11-10 LAB
CK BB CFR SERPL ELPH: 0 %
CK MB CFR SERPL ELPH: 0 % (ref 0–3.3)
CK MM CFR SERPL ELPH: 100 % (ref 96.7–100)
CK SERPL-CCNC: 58 U/L (ref 30–223)

## 2023-11-14 NOTE — PROGRESS NOTES
MEDICAL ONCOLOGY - FOLLOW UP VISIT    Cancer/Stage/TNM:    Cancer Staging   Malignant neoplasm of urinary bladder  Staging form: Urinary Bladder, AJCC 8th Edition  - Clinical: Stage IVB (cT4b, cNX, pM1b) - Signed by Bridger Abad MD on 9/28/2023       Reason for visit: Julio Rodríguez is a 58 y.o. male with metastatic bladder cancer previously treated with neoadjuvant ddMVAC, radical cystectomy, and then carboplatin/gemcitabine and EV+ Pembro following local and metastatic recurrence. He presents to our Phase 1 Clinic today following C1D1 on our NTX trial.   Tolerated well, no AEs.  Feeling well.       History has been obtained by chart review and discussion with the patient.    Interval Events:    Overall feels good.  Enjoying outdoor activities.  Ongoing pain within his left ischium. Generally mild. Occassionally uses tylenol at nigh. Ongoing catheterization for neobladder. No CP, SOB or cough. Notes a chronic abdominal hernia    Presents with his mother.  They are Citizen of the [place], but have lived here many years.  Resides in [place].  And works for the [employer].      ECOG is 0 and life expectancy is >6 mos.     Oncology History   Malignant neoplasm of urinary bladder   2016 Initial Diagnosis    Bladder CIS. Managed with BCG     2017 Notable Event    Developed recurrent muscle invasive disease.     2017 - 2017 Chemotherapy    ddMVAC      Surgery    Radical cystectomy with lymph node dissection and creation of neobladder. [id number]     7/2022 Notable Event    New onset hematuria. MRI scan of the pelvis in August showed a suspected blood clot adherent to the distal Smith catheter. There was abnormal signal and enhancement of the bilateral pubic bones adjacent to the neobladder suspicious for neoplastic infiltration. There was no pelvic lymphadenopathy.      7/2022 Imaging Significant Findings    Pet scan at the same time showed hypermetabolic retroperitoneal lymphadenopathy. There was marked activity  felt to involve the bladder wall suspicious for tumor.     7/2022 Biopsy    Biopsy of the bladder neck showed recurrent high-grade urothelial carcinoma.     9/28/2022 - 5/16/2023 Chemotherapy    Mr. Rodríguez was started on chemotherapy with gemcitabine and carboplatin in September 2022.      1/25/2023 Imaging Significant Findings    Pet scan on 25 January showed changes related to prior cystoprostatectomy with neobladder creation and minimal fullness to the right renal collecting system. There was no evidence of a discrete hypermetabolic mass or lymphadenopathy. There was diffusely increased activity throughout the osseous structures, suggestive of chemotherapy with reactive marrow.     4/11/2023 Imaging Significant Findings    CT a/p  1. Pathologic fracture of the left parasymphysis pubis secondary to lytic process. Given location adjacent to neobladder, osteomyelitis is a possibility. Metastatic disease not excluded.   2. Prior cystoprostatectomy with chronic right ureteral obstruction and hydroureteronephrosis, minimally changed from the prior. All etiologies considered including malignant obstruction. Urology consultation recommended.   3. Incidental findings including cholelithiasis, bilateral multifocal renal cortical scarring, and small hiatal hernia.       5/24/2023 -  Chemotherapy    Enfortumab vedotin + Pembrolizumab     8/10/2023 Imaging Significant Findings    PET CT  Interval development of hypermetabolic pulmonary nodules within the lingula and right lower lobe likely reflecting pulmonary metastases. Some additional tiny pulmonary nodularity is new or more conspicuous from prior but too small to accurately characterize by PET/CT. Attention on follow-up recommended.     Worsening obstructive uropathy which is relatively moderate to severe the right kidney.     Similar appearance of the urinary neobladder. There is somewhat diminished due to physiologic activity to evaluate for local neoplasm but the  appearance overall appears similar to prior. Assessment of local disease could be followed with CT abdomen and pelvis with contrast.     Interval fracture of the left superior and inferior pubic rami appearing subacute in nature. Please correlate for trauma and tenderness. There is no significant FDG activity within the area to favor a pathologic fracture.     Previously seen diffuse marrow activity may have been due to previous marrow rebound or drug effect.       8/25/2023 Imaging Significant Findings    MR Pelvis  History of cystoprostatectomy and neobladder creation.     Interval increase in multilobular mass in the pelvis infiltrating the rectum, possibly due to distal sigmoid colon, anterior left pelvic bones as well as a inferior aspect of the neobladder suspicious for progression of neoplastic process as discussed above.      9/28/2023 Cancer Staged    Staging form: Urinary Bladder, AJCC 8th Edition  - Clinical: Stage IVB (cT4b, cNX, pM1b)     10/16/2023 Imaging Significant Findings    CT chest   Impression:  - Multiple solid bilateral pulmonary nodules up to 9mm showing interval increase in size concerning for metastatic disease in this patient with history of prior bladder malignancy.  - Multiple hepatic hypodensities, which may represent cystic lesions however some which are too small to characterize.  - Partially imaged right kidney showing parenchymal atrophy and suspected hydronephrosis..    MR Pelvis   Impression:     Interval increased size of multilobular mass in the cystoprostatectomy surgical bed that invades the neobladder, rectum, sigmoid colon, and left pelvis.  Multiple pelvic lymph nodes are more conspicuous from prior exam and concerning for additional metastatic disease.     10/24/2023 - 10/24/2023 Chemotherapy    Treatment Summary   Plan Name: OP SACITUZUMAB GOVITECAN-HZIY Q3W  Treatment Goal: Palliative  Status: Inactive  Start Date:   End Date:   Provider: Bridger Abad,  MD  Chemotherapy: sacituzumab govitecan-hziy (TRODELVY) 543 mg in sodium chloride 0.9% 500 mL chemo infusion, 7.5 mg/kg = 543 mg (original dose ), Intravenous, Clinic/HOD 1 time, 0 of 17 cycles  Dose modification: 7.5 mg/kg (Cycle 1, Reason: MD Discretion, Comment: UGT1A1 PM )     11/2/2023 -  Chemotherapy    Treatment Summary   Plan Name: [facility] NTX-1088-01 Dose Escalation INV-NTX + INV pembrolizumab  Treatment Goal: Control  Status: Active  Start Date: 11/2/2023  End Date: 10/16/2025 (Planned)  Provider: Chan Leos MD  Chemotherapy: INV [id number] (pembrolizumab) 200 mg in INV sodium chloride 0.9 % 100 mL chemo infusion, 200 mg, Intravenous, Clinic/HOD 1 time, 1 of 35 cycles     Melanoma        Past Medical History:   Diagnosis Date    Bladder cancer     CAD (coronary artery disease) 9/12/2023    Prior CABG. Not on antiplatelets. Home medicatiosn include atorvastatin    Carcinoma     forehead    Encounter for blood transfusion     Hypertension     Hypothyroidism 9/12/2023    Home medications include levothyroxine    Malignant melanoma of skin, unspecified     right arm    Malignant neoplasm of urinary bladder 9/12/2023    Melanoma 9/12/2023    History of T1a melanoma; 0.5mm depth without ulceartion. SP wide local excision 02/2022          Past Surgical History:   Procedure Laterality Date    CORONARY ARTERY BYPASS GRAFT  10/2015    CYSTECTOMY, ROBOT-ASSISTED, LAPAROSCOPIC, USING DA MARY XI, WITH ILEAL CONDUIT CREATION  12/2017    DILATION OF BLADDER      dialation of bladder neck- due to claudia bladder- multiple procedures    LYMPHADENECTOMY      XI ROBOTIC PROSTECTOMY, SUPRAPUBIC  12/2017        Family History   Problem Relation Age of Onset    Heart disease Father     Heart attack Paternal Uncle     Breast cancer Maternal Cousin     Colon cancer Neg Hx     Bladder Cancer Neg Hx         Social History     Tobacco Use    Smoking status: Former     Types: Cigarettes    Smokeless tobacco: Not on file   Substance Use  Topics    Alcohol use: Not Currently        I have reviewed and updated the patient's past medical, surgical, family and social histories.    Review of patient's allergies indicates:  No Known Allergies     Current Outpatient Medications   Medication Sig Dispense Refill    atorvastatin (LIPITOR) 20 MG tablet Take 20 mg by mouth every evening.      docusate sodium (COLACE) 100 MG capsule Take 1 capsule by mouth every evening.      levothyroxine (SYNTHROID) 50 MCG tablet Take 50 mcg by mouth.      LINZESS 72 mcg Cap capsule Take 72 mcg by mouth every morning.      multivitamin (THERAGRAN) per tablet Take 1 tablet by mouth.      VTAMA 1 % Crea APPLY 1 APPLICATION ON THE SKIN DAILY       No current facility-administered medications for this visit.        Physical Exam:   There were no vitals taken for this visit.     ECOG Performance status: 1            Physical Exam  Constitutional:       General: He is not in acute distress.     Appearance: Normal appearance.   HENT:      Head: Normocephalic.   Eyes:      General: No scleral icterus.     Extraocular Movements: Extraocular movements intact.      Conjunctiva/sclera: Conjunctivae normal.   Cardiovascular:      Rate and Rhythm: Normal rate and regular rhythm.      Heart sounds: No murmur heard.     No friction rub. No gallop.   Pulmonary:      Effort: Pulmonary effort is normal. No respiratory distress.      Breath sounds: Normal breath sounds. No stridor. No wheezing, rhonchi or rales.   Abdominal:      General: There is no distension.   Skin:     General: Skin is warm and dry.   Neurological:      Mental Status: He is alert and oriented to person, place, and time.      Motor: No weakness.   Psychiatric:         Mood and Affect: Mood normal.         Behavior: Behavior normal.         Thought Content: Thought content normal.           Labs:                 Lab Results   Component Value Date     (L) 11/07/2023    K 4.5 11/07/2023    CREATININE 2.6 (H) 11/07/2023     WBC 10.85 11/07/2023    HGB 10.3 (L) 11/07/2023     11/07/2023    AST 10 11/07/2023    ALT 8 (L) 11/07/2023    ALKPHOS 74 11/07/2023    BILITOT 0.8 11/07/2023          Imaging:         I have personally reviewed the above imaging including recent MRI and PET CT scan    Path:   Reviewed pathology as documented in oncology history      Assessment and Plan:      Metastatic Bladder Cancer:    Pt recently signed consent for NTX trial, combo novel agent with pembro.  Here following C1D1.       Labs reviewed.  Creatinine increasing.  Will refer to nephrology.      RTC per Research RN    The patient agrees with the plan, and all questions have been answered to their satisfaction.      More than 40 mins total time were spent during this encounter.    Chan Leos M.D., M.S., F.A.C.P.  Hematology and Oncology Attending  [facility]             Follow up:   Route Chart for Scheduling    Med Onc Chart Routing      Follow up with physician Other. RTC per Research RN   Follow up with LYNDSEY    Infusion scheduling note    Injection scheduling note    Labs    Imaging    Pharmacy appointment    Other referrals       Additional referrals needed  Neprhrology ASAP

## 2023-11-15 ENCOUNTER — RESEARCH ENCOUNTER (OUTPATIENT)
Dept: RESEARCH | Facility: HOSPITAL | Age: 58
End: 2023-11-15
Payer: COMMERCIAL

## 2023-11-15 ENCOUNTER — LAB VISIT (OUTPATIENT)
Dept: LAB | Facility: HOSPITAL | Age: 58
End: 2023-11-15
Attending: HOSPITALIST
Payer: COMMERCIAL

## 2023-11-15 ENCOUNTER — OFFICE VISIT (OUTPATIENT)
Dept: HEMATOLOGY/ONCOLOGY | Facility: CLINIC | Age: 58
End: 2023-11-15
Payer: COMMERCIAL

## 2023-11-15 VITALS
SYSTOLIC BLOOD PRESSURE: 136 MMHG | WEIGHT: 158.06 LBS | BODY MASS INDEX: 22.63 KG/M2 | DIASTOLIC BLOOD PRESSURE: 89 MMHG | OXYGEN SATURATION: 99 % | HEART RATE: 99 BPM | HEIGHT: 70 IN | TEMPERATURE: 98 F | RESPIRATION RATE: 18 BRPM

## 2023-11-15 DIAGNOSIS — Z00.6 RESEARCH STUDY PATIENT: ICD-10-CM

## 2023-11-15 DIAGNOSIS — C67.9 MALIGNANT NEOPLASM OF URINARY BLADDER, UNSPECIFIED SITE: Primary | ICD-10-CM

## 2023-11-15 DIAGNOSIS — C67.9 MALIGNANT NEOPLASM OF URINARY BLADDER, UNSPECIFIED SITE: ICD-10-CM

## 2023-11-15 LAB
ALBUMIN SERPL BCP-MCNC: 2.9 G/DL (ref 3.5–5.2)
ALP SERPL-CCNC: 84 U/L (ref 55–135)
ALT SERPL W/O P-5'-P-CCNC: 14 U/L (ref 10–44)
ANION GAP SERPL CALC-SCNC: 10 MMOL/L (ref 8–16)
AST SERPL-CCNC: 11 U/L (ref 10–40)
BASOPHILS # BLD AUTO: 0.06 K/UL (ref 0–0.2)
BASOPHILS NFR BLD: 0.8 % (ref 0–1.9)
BILIRUB SERPL-MCNC: 0.3 MG/DL (ref 0.1–1)
BUN SERPL-MCNC: 40 MG/DL (ref 6–20)
CALCIUM SERPL-MCNC: 9.9 MG/DL (ref 8.7–10.5)
CHLORIDE SERPL-SCNC: 102 MMOL/L (ref 95–110)
CK SERPL-CCNC: 98 U/L (ref 20–200)
CO2 SERPL-SCNC: 23 MMOL/L (ref 23–29)
CREAT SERPL-MCNC: 3.1 MG/DL (ref 0.5–1.4)
CRP SERPL-MCNC: 49.2 MG/L (ref 0–8.2)
DIFFERENTIAL METHOD: ABNORMAL
DRUG STUDY SPECIMEN TYPE: NORMAL
DRUG STUDY TEST NAME: NORMAL
DRUG STUDY TEST RESULT: NORMAL
EOSINOPHIL # BLD AUTO: 0.2 K/UL (ref 0–0.5)
EOSINOPHIL NFR BLD: 2.8 % (ref 0–8)
ERYTHROCYTE [DISTWIDTH] IN BLOOD BY AUTOMATED COUNT: 12.2 % (ref 11.5–14.5)
EST. GFR  (NO RACE VARIABLE): 22.4 ML/MIN/1.73 M^2
FERRITIN SERPL-MCNC: 910 NG/ML (ref 20–300)
GLUCOSE SERPL-MCNC: 92 MG/DL (ref 70–110)
HCT VFR BLD AUTO: 32.3 % (ref 40–54)
HGB BLD-MCNC: 10.4 G/DL (ref 14–18)
IMM GRANULOCYTES # BLD AUTO: 0.03 K/UL (ref 0–0.04)
IMM GRANULOCYTES NFR BLD AUTO: 0.4 % (ref 0–0.5)
LYMPHOCYTES # BLD AUTO: 1.4 K/UL (ref 1–4.8)
LYMPHOCYTES NFR BLD: 19.6 % (ref 18–48)
MCH RBC QN AUTO: 29.9 PG (ref 27–31)
MCHC RBC AUTO-ENTMCNC: 32.2 G/DL (ref 32–36)
MCV RBC AUTO: 93 FL (ref 82–98)
MONOCYTES # BLD AUTO: 0.6 K/UL (ref 0.3–1)
MONOCYTES NFR BLD: 8.1 % (ref 4–15)
NEUTROPHILS # BLD AUTO: 4.9 K/UL (ref 1.8–7.7)
NEUTROPHILS NFR BLD: 68.3 % (ref 38–73)
NRBC BLD-RTO: 0 /100 WBC
PHOSPHATE SERPL-MCNC: 2.7 MG/DL (ref 2.7–4.5)
PLATELET # BLD AUTO: 387 K/UL (ref 150–450)
PMV BLD AUTO: 9.2 FL (ref 9.2–12.9)
POTASSIUM SERPL-SCNC: 4.5 MMOL/L (ref 3.5–5.1)
PROT SERPL-MCNC: 7.7 G/DL (ref 6–8.4)
RBC # BLD AUTO: 3.48 M/UL (ref 4.6–6.2)
SODIUM SERPL-SCNC: 135 MMOL/L (ref 136–145)
TRIGL SERPL-MCNC: 64 MG/DL (ref 30–150)
WBC # BLD AUTO: 7.2 K/UL (ref 3.9–12.7)

## 2023-11-15 PROCEDURE — 84478 ASSAY OF TRIGLYCERIDES: CPT | Performed by: INTERNAL MEDICINE

## 2023-11-15 PROCEDURE — 3008F BODY MASS INDEX DOCD: CPT | Mod: CPTII,S$GLB,, | Performed by: INTERNAL MEDICINE

## 2023-11-15 PROCEDURE — 82552 ASSAY OF CPK IN BLOOD: CPT | Performed by: INTERNAL MEDICINE

## 2023-11-15 PROCEDURE — 3079F DIAST BP 80-89 MM HG: CPT | Mod: CPTII,S$GLB,, | Performed by: INTERNAL MEDICINE

## 2023-11-15 PROCEDURE — 99215 PR OFFICE/OUTPT VISIT, EST, LEVL V, 40-54 MIN: ICD-10-PCS | Mod: S$GLB,,, | Performed by: INTERNAL MEDICINE

## 2023-11-15 PROCEDURE — 84484 ASSAY OF TROPONIN QUANT: CPT | Performed by: INTERNAL MEDICINE

## 2023-11-15 PROCEDURE — 3079F PR MOST RECENT DIASTOLIC BLOOD PRESSURE 80-89 MM HG: ICD-10-PCS | Mod: CPTII,S$GLB,, | Performed by: INTERNAL MEDICINE

## 2023-11-15 PROCEDURE — 99000 SPECIMEN HANDLING OFFICE-LAB: CPT | Performed by: INTERNAL MEDICINE

## 2023-11-15 PROCEDURE — 82550 ASSAY OF CK (CPK): CPT | Mod: 91 | Performed by: INTERNAL MEDICINE

## 2023-11-15 PROCEDURE — 99215 OFFICE O/P EST HI 40 MIN: CPT | Mod: S$GLB,,, | Performed by: INTERNAL MEDICINE

## 2023-11-15 PROCEDURE — 1159F PR MEDICATION LIST DOCUMENTED IN MEDICAL RECORD: ICD-10-PCS | Mod: CPTII,S$GLB,, | Performed by: INTERNAL MEDICINE

## 2023-11-15 PROCEDURE — 1159F MED LIST DOCD IN RCRD: CPT | Mod: CPTII,S$GLB,, | Performed by: INTERNAL MEDICINE

## 2023-11-15 PROCEDURE — 85025 COMPLETE CBC W/AUTO DIFF WBC: CPT | Performed by: INTERNAL MEDICINE

## 2023-11-15 PROCEDURE — 99999 PR PBB SHADOW E&M-EST. PATIENT-LVL IV: ICD-10-PCS | Mod: PBBFAC,,, | Performed by: INTERNAL MEDICINE

## 2023-11-15 PROCEDURE — 99999 PR PBB SHADOW E&M-EST. PATIENT-LVL IV: CPT | Mod: PBBFAC,,, | Performed by: INTERNAL MEDICINE

## 2023-11-15 PROCEDURE — 82728 ASSAY OF FERRITIN: CPT | Performed by: INTERNAL MEDICINE

## 2023-11-15 PROCEDURE — 3008F PR BODY MASS INDEX (BMI) DOCUMENTED: ICD-10-PCS | Mod: CPTII,S$GLB,, | Performed by: INTERNAL MEDICINE

## 2023-11-15 PROCEDURE — 3075F SYST BP GE 130 - 139MM HG: CPT | Mod: CPTII,S$GLB,, | Performed by: INTERNAL MEDICINE

## 2023-11-15 PROCEDURE — 36415 COLL VENOUS BLD VENIPUNCTURE: CPT | Performed by: INTERNAL MEDICINE

## 2023-11-15 PROCEDURE — 84100 ASSAY OF PHOSPHORUS: CPT | Performed by: INTERNAL MEDICINE

## 2023-11-15 PROCEDURE — 80053 COMPREHEN METABOLIC PANEL: CPT | Performed by: INTERNAL MEDICINE

## 2023-11-15 PROCEDURE — 3075F PR MOST RECENT SYSTOLIC BLOOD PRESS GE 130-139MM HG: ICD-10-PCS | Mod: CPTII,S$GLB,, | Performed by: INTERNAL MEDICINE

## 2023-11-15 PROCEDURE — 86140 C-REACTIVE PROTEIN: CPT | Performed by: INTERNAL MEDICINE

## 2023-11-15 NOTE — PROGRESS NOTES
Protocol: A Phase 1, First-in-Human Study of NTX-1088, a Monoclonal Antibody Targeting the Poliovirus Receptor (PVR), as Monotherapy and Combined with Pembrolizumab, in Patients with Advanced Solid Malignancies  Protocol #: NTX 1088-01  Protocol Version: V2; [id number]  Sponsor: [employer]  IRB #: 2023.068  P.I.: Chan Leos MD     Subject ID: [id number]     Significant Events:  23OCT2023: signed ICF  02NOV2023: Begin Treatment C1D1  NTX 1088-01 C1D15 Visit: 15NOV2023     Pt presented to the clinic today with his mother for C1D15 of the above mentioned protocol. Alert and oriented x 3. Mood and affect are appropriate to the situation. Pt expresses willingness to continue to participate in above mentioned protocol. No new symptoms or complaints to report. Denies fever, vomiting, new pain, chest pain, shortness of breath, headaches, and dizziness.      Vitals noted below. Patient allowed to rest in seated position for 5 minutes prior to collection.  Time: 0809   T 36.9C /99 HR 99 O2 99% RR 18    Central Lab-Draw Time: 0756     Reviewed and confirmed which concomitant medications the patient is currently taking. See concomitant therapy worksheet.      Pt verbalized understanding of above information and is in agreement of this treatment plan.      Pt to return clinic for C2D1 on 20NOV2023 at 0700 for protocol defined assessments. Pt given opportunity to ask questions, all concerns addressed, and pt verbalized understanding.      Most up to date Baseline log and Adverse event log in shadow, paper, research chart.     Active Adverse Events:   Proteinuria Grade 2  Intermittent Hypertension Grade 2   Creatinine Clearance Grade 3  Chronic Kidney Disease Grade 3

## 2023-11-16 LAB — TROPONIN T SERPL-MCNC: 20 NG/L

## 2023-11-17 ENCOUNTER — PATIENT MESSAGE (OUTPATIENT)
Dept: RESEARCH | Facility: HOSPITAL | Age: 58
End: 2023-11-17
Payer: COMMERCIAL

## 2023-11-17 LAB
CK BB CFR SERPL ELPH: 0 %
CK MB CFR SERPL ELPH: 0 % (ref 0–3.3)
CK MM CFR SERPL ELPH: 100 % (ref 96.7–100)
CK SERPL-CCNC: 79 U/L (ref 30–223)

## 2023-11-18 ENCOUNTER — LAB VISIT (OUTPATIENT)
Dept: LAB | Facility: HOSPITAL | Age: 58
End: 2023-11-18
Attending: INTERNAL MEDICINE
Payer: COMMERCIAL

## 2023-11-18 DIAGNOSIS — C67.9 MALIGNANT NEOPLASM OF URINARY BLADDER, UNSPECIFIED SITE: ICD-10-CM

## 2023-11-18 DIAGNOSIS — Z00.6 RESEARCH STUDY PATIENT: ICD-10-CM

## 2023-11-18 LAB
ALBUMIN SERPL BCP-MCNC: 3 G/DL (ref 3.5–5.2)
ALP SERPL-CCNC: 89 U/L (ref 55–135)
ALT SERPL W/O P-5'-P-CCNC: 10 U/L (ref 10–44)
ANION GAP SERPL CALC-SCNC: 10 MMOL/L (ref 8–16)
APTT PPP: 23.5 SEC (ref 21–32)
AST SERPL-CCNC: 14 U/L (ref 10–40)
BASOPHILS # BLD AUTO: 0.06 K/UL (ref 0–0.2)
BASOPHILS NFR BLD: 0.7 % (ref 0–1.9)
BILIRUB SERPL-MCNC: 0.4 MG/DL (ref 0.1–1)
BUN SERPL-MCNC: 35 MG/DL (ref 6–20)
CALCIUM SERPL-MCNC: 9.9 MG/DL (ref 8.7–10.5)
CHLORIDE SERPL-SCNC: 103 MMOL/L (ref 95–110)
CK SERPL-CCNC: 101 U/L (ref 20–200)
CO2 SERPL-SCNC: 23 MMOL/L (ref 23–29)
CREAT SERPL-MCNC: 2.8 MG/DL (ref 0.5–1.4)
CRP SERPL-MCNC: 27.4 MG/L (ref 0–8.2)
DIFFERENTIAL METHOD: ABNORMAL
EOSINOPHIL # BLD AUTO: 0.2 K/UL (ref 0–0.5)
EOSINOPHIL NFR BLD: 1.9 % (ref 0–8)
ERYTHROCYTE [DISTWIDTH] IN BLOOD BY AUTOMATED COUNT: 12.2 % (ref 11.5–14.5)
EST. GFR  (NO RACE VARIABLE): 25.4 ML/MIN/1.73 M^2
FERRITIN SERPL-MCNC: 747 NG/ML (ref 20–300)
GLUCOSE SERPL-MCNC: 93 MG/DL (ref 70–110)
HCT VFR BLD AUTO: 34.6 % (ref 40–54)
HGB BLD-MCNC: 11.2 G/DL (ref 14–18)
IMM GRANULOCYTES # BLD AUTO: 0.03 K/UL (ref 0–0.04)
IMM GRANULOCYTES NFR BLD AUTO: 0.4 % (ref 0–0.5)
INR PPP: 1 (ref 0.8–1.2)
LYMPHOCYTES # BLD AUTO: 1.3 K/UL (ref 1–4.8)
LYMPHOCYTES NFR BLD: 15.8 % (ref 18–48)
MCH RBC QN AUTO: 30 PG (ref 27–31)
MCHC RBC AUTO-ENTMCNC: 32.4 G/DL (ref 32–36)
MCV RBC AUTO: 93 FL (ref 82–98)
MONOCYTES # BLD AUTO: 0.6 K/UL (ref 0.3–1)
MONOCYTES NFR BLD: 6.6 % (ref 4–15)
NEUTROPHILS # BLD AUTO: 6.3 K/UL (ref 1.8–7.7)
NEUTROPHILS NFR BLD: 74.6 % (ref 38–73)
NRBC BLD-RTO: 0 /100 WBC
PHOSPHATE SERPL-MCNC: 3.2 MG/DL (ref 2.7–4.5)
PLATELET # BLD AUTO: 458 K/UL (ref 150–450)
PMV BLD AUTO: 9.1 FL (ref 9.2–12.9)
POTASSIUM SERPL-SCNC: 4.8 MMOL/L (ref 3.5–5.1)
PROT SERPL-MCNC: 7.8 G/DL (ref 6–8.4)
PROTHROMBIN TIME: 10.6 SEC (ref 9–12.5)
RBC # BLD AUTO: 3.73 M/UL (ref 4.6–6.2)
SODIUM SERPL-SCNC: 136 MMOL/L (ref 136–145)
TRIGL SERPL-MCNC: 73 MG/DL (ref 30–150)
WBC # BLD AUTO: 8.44 K/UL (ref 3.9–12.7)

## 2023-11-18 PROCEDURE — 82550 ASSAY OF CK (CPK): CPT | Performed by: INTERNAL MEDICINE

## 2023-11-18 PROCEDURE — 36415 COLL VENOUS BLD VENIPUNCTURE: CPT | Performed by: INTERNAL MEDICINE

## 2023-11-18 PROCEDURE — 85610 PROTHROMBIN TIME: CPT | Performed by: INTERNAL MEDICINE

## 2023-11-18 PROCEDURE — 84100 ASSAY OF PHOSPHORUS: CPT | Performed by: INTERNAL MEDICINE

## 2023-11-18 PROCEDURE — 84478 ASSAY OF TRIGLYCERIDES: CPT | Performed by: INTERNAL MEDICINE

## 2023-11-18 PROCEDURE — 80053 COMPREHEN METABOLIC PANEL: CPT | Performed by: INTERNAL MEDICINE

## 2023-11-18 PROCEDURE — 84484 ASSAY OF TROPONIN QUANT: CPT | Performed by: INTERNAL MEDICINE

## 2023-11-18 PROCEDURE — 82552 ASSAY OF CPK IN BLOOD: CPT | Performed by: INTERNAL MEDICINE

## 2023-11-18 PROCEDURE — 85730 THROMBOPLASTIN TIME PARTIAL: CPT | Performed by: INTERNAL MEDICINE

## 2023-11-18 PROCEDURE — 85025 COMPLETE CBC W/AUTO DIFF WBC: CPT | Performed by: INTERNAL MEDICINE

## 2023-11-18 PROCEDURE — 82728 ASSAY OF FERRITIN: CPT | Performed by: INTERNAL MEDICINE

## 2023-11-18 PROCEDURE — 86140 C-REACTIVE PROTEIN: CPT | Performed by: INTERNAL MEDICINE

## 2023-11-20 ENCOUNTER — PATIENT MESSAGE (OUTPATIENT)
Dept: RESEARCH | Facility: HOSPITAL | Age: 58
End: 2023-11-20
Payer: COMMERCIAL

## 2023-11-20 ENCOUNTER — LAB VISIT (OUTPATIENT)
Dept: LAB | Facility: HOSPITAL | Age: 58
End: 2023-11-20
Attending: INTERNAL MEDICINE
Payer: COMMERCIAL

## 2023-11-20 ENCOUNTER — INFUSION (OUTPATIENT)
Dept: INFUSION THERAPY | Facility: HOSPITAL | Age: 58
End: 2023-11-20
Payer: COMMERCIAL

## 2023-11-20 ENCOUNTER — RESEARCH ENCOUNTER (OUTPATIENT)
Dept: RESEARCH | Facility: HOSPITAL | Age: 58
End: 2023-11-20
Payer: COMMERCIAL

## 2023-11-20 ENCOUNTER — OFFICE VISIT (OUTPATIENT)
Dept: HEMATOLOGY/ONCOLOGY | Facility: CLINIC | Age: 58
End: 2023-11-20
Payer: COMMERCIAL

## 2023-11-20 VITALS
SYSTOLIC BLOOD PRESSURE: 150 MMHG | OXYGEN SATURATION: 99 % | HEART RATE: 72 BPM | TEMPERATURE: 98 F | DIASTOLIC BLOOD PRESSURE: 87 MMHG

## 2023-11-20 DIAGNOSIS — Z00.6 RESEARCH STUDY PATIENT: ICD-10-CM

## 2023-11-20 DIAGNOSIS — C67.9 MALIGNANT NEOPLASM OF URINARY BLADDER, UNSPECIFIED SITE: Primary | ICD-10-CM

## 2023-11-20 DIAGNOSIS — C67.9 MALIGNANT NEOPLASM OF URINARY BLADDER, UNSPECIFIED SITE: ICD-10-CM

## 2023-11-20 LAB
BACTERIA #/AREA URNS AUTO: ABNORMAL /HPF
BILIRUB UR QL STRIP: NEGATIVE
CLARITY UR REFRACT.AUTO: ABNORMAL
COLOR UR AUTO: YELLOW
GLUCOSE UR QL STRIP: NEGATIVE
HGB UR QL STRIP: ABNORMAL
HYALINE CASTS UR QL AUTO: 0 /LPF
KETONES UR QL STRIP: NEGATIVE
LEUKOCYTE ESTERASE UR QL STRIP: ABNORMAL
MICROSCOPIC COMMENT: ABNORMAL
NITRITE UR QL STRIP: NEGATIVE
PH UR STRIP: 7 [PH] (ref 5–8)
PROT UR QL STRIP: ABNORMAL
RBC #/AREA URNS AUTO: 11 /HPF (ref 0–4)
SP GR UR STRIP: 1 (ref 1–1.03)
SQUAMOUS #/AREA URNS AUTO: 0 /HPF
URN SPEC COLLECT METH UR: ABNORMAL
WBC #/AREA URNS AUTO: >100 /HPF (ref 0–5)
WBC CLUMPS UR QL AUTO: ABNORMAL

## 2023-11-20 PROCEDURE — 99999 PR PBB SHADOW E&M-EST. PATIENT-LVL I: ICD-10-PCS | Mod: PBBFAC,,, | Performed by: INTERNAL MEDICINE

## 2023-11-20 PROCEDURE — 99999 PR PBB SHADOW E&M-EST. PATIENT-LVL I: CPT | Mod: PBBFAC,,, | Performed by: INTERNAL MEDICINE

## 2023-11-20 PROCEDURE — 99215 OFFICE O/P EST HI 40 MIN: CPT | Mod: S$GLB,,, | Performed by: INTERNAL MEDICINE

## 2023-11-20 PROCEDURE — 25000003 PHARM REV CODE 250: Performed by: INTERNAL MEDICINE

## 2023-11-20 PROCEDURE — 96360 HYDRATION IV INFUSION INIT: CPT

## 2023-11-20 PROCEDURE — 99215 PR OFFICE/OUTPT VISIT, EST, LEVL V, 40-54 MIN: ICD-10-PCS | Mod: S$GLB,,, | Performed by: INTERNAL MEDICINE

## 2023-11-20 PROCEDURE — 81001 URINALYSIS AUTO W/SCOPE: CPT | Performed by: INTERNAL MEDICINE

## 2023-11-20 RX ORDER — SODIUM CHLORIDE 9 MG/ML
INJECTION, SOLUTION INTRAVENOUS
Status: CANCELLED
Start: 2023-11-20

## 2023-11-20 RX ORDER — SODIUM CHLORIDE 9 MG/ML
INJECTION, SOLUTION INTRAVENOUS
Status: COMPLETED | OUTPATIENT
Start: 2023-11-20 | End: 2023-11-20

## 2023-11-20 RX ADMIN — SODIUM CHLORIDE: 9 INJECTION, SOLUTION INTRAVENOUS at 10:11

## 2023-11-20 NOTE — PROGRESS NOTES
Protocol: A Phase 1, First-in-Human Study of NTX-1088, a Monoclonal Antibody Targeting the Poliovirus Receptor (PVR), as Monotherapy and Combined with Pembrolizumab, in Patients with Advanced Solid Malignancies  Protocol #: NTX 1088-01  Protocol Version: V2; [id number]  Sponsor: [employer]  IRB #: 2023.068  P.I.: Chan Leos MD     Subject ID: [id number]     Significant Events:  23OCT2023: signed ICF  02NOV2023: Begin Treatment C1D1  20NOV2023: C2D1 dose held    NTX 1088-01 Unscheduled Visit: 20NOV2023     Pt presented to the clinic today with his mother for an unscheduled visit of the above mentioned protocol. Alert and oriented x 3. Mood and affect are appropriate to the situation. Pt expresses willingness to continue to participate in above mentioned protocol. Pt reports visit to urgent care on 17NOV2023 for kidney infection for which he is taking Levofloxacin 500mg QD starting on 18NOV2023. Mild kidney pain associated with this new AE. Denies fever, vomiting, chest pain, shortness of breath, headaches, and dizziness.      Per sponsor request, C2D1 infusion is being held until kidney status can be further evaluated. Pt referred to nephrology and kidney US scheduled for 24NOV2023. Pt also to receive 1L of NS IV fluids today.      Reviewed and confirmed which concomitant medications the patient is currently taking. See concomitant therapy worksheet.      Pt verbalized understanding of above information and is in agreement of this treatment plan.      Pt to return clinic for C2D1 pending nephrology and US results and communication from sponsor on retreatment. Pt given opportunity to ask questions, all concerns addressed, and pt verbalized understanding.      Most up to date Baseline log and Adverse event log in shadow, paper, research chart.     Active Adverse Events:   Proteinuria Grade 2  Intermittent Hypertension Grade 2   Chronic Kidney Disease Grade 3   Kidney Infection Grade 2

## 2023-11-20 NOTE — PLAN OF CARE
Ambulatory to clinic with mother.  No C/O pain, adverse effects or S/S of infection.  VSS. Scheduled therapy treatment cancelled due to BUN 35 and crt 2.8.  Port accessed and flushed without difficulty.  Blood return noted. IVF tolerated well without problems.  Port flush, heparin locked and de accessed.  Ambulatory home with mother.  NAD noted.

## 2023-11-21 LAB — TROPONIN T SERPL-MCNC: 19 NG/L

## 2023-11-21 NOTE — PROGRESS NOTES
MEDICAL ONCOLOGY - FOLLOW UP VISIT    Cancer/Stage/TNM:    Cancer Staging   Malignant neoplasm of urinary bladder  Staging form: Urinary Bladder, AJCC 8th Edition  - Clinical: Stage IVB (cT4b, cNX, pM1b) - Signed by Bridger Abad MD on 9/28/2023       Reason for visit: Julio Rodríguez is a 58 y.o. male with metastatic bladder cancer previously treated with neoadjuvant ddMVAC, radical cystectomy, and then carboplatin/gemcitabine and EV+ Pembro following local and metastatic recurrence. He presents to our Phase 1 Clinic today following C1D1 on our NTX trial.   Tolerated well, but creatinine has risen.      History has been obtained by chart review and discussion with the patient.    Interval Events:    Overall feels good.  Enjoying outdoor activities.  Ongoing pain within his left ischium. Generally mild. Occassionally uses tylenol at nigh. Ongoing catheterization for neobladder. No CP, SOB or cough. Notes a chronic abdominal hernia    Presents with his mother.  They are [place], but have lived here many years.  Resides in [place].  And works for the [employer].      ECOG is 0 and life expectancy is >6 mos.     Oncology History   Malignant neoplasm of urinary bladder   2016 Initial Diagnosis    Bladder CIS. Managed with BCG     2017 Notable Event    Developed recurrent muscle invasive disease.     2017 - 2017 Chemotherapy    ddMVAC      Surgery    Radical cystectomy with lymph node dissection and creation of neobladder. [id number]     7/2022 Notable Event    New onset hematuria. MRI scan of the pelvis in August showed a suspected blood clot adherent to the distal Smith catheter. There was abnormal signal and enhancement of the bilateral pubic bones adjacent to the neobladder suspicious for neoplastic infiltration. There was no pelvic lymphadenopathy.      7/2022 Imaging Significant Findings    Pet scan at the same time showed hypermetabolic retroperitoneal lymphadenopathy. There was marked  activity felt to involve the bladder wall suspicious for tumor.     7/2022 Biopsy    Biopsy of the bladder neck showed recurrent high-grade urothelial carcinoma.     9/28/2022 - 5/16/2023 Chemotherapy    Mr. Rodríguez was started on chemotherapy with gemcitabine and carboplatin in September 2022.      1/25/2023 Imaging Significant Findings    Pet scan on 25 January showed changes related to prior cystoprostatectomy with neobladder creation and minimal fullness to the right renal collecting system. There was no evidence of a discrete hypermetabolic mass or lymphadenopathy. There was diffusely increased activity throughout the osseous structures, suggestive of chemotherapy with reactive marrow.     4/11/2023 Imaging Significant Findings    CT a/p  1. Pathologic fracture of the left parasymphysis pubis secondary to lytic process. Given location adjacent to neobladder, osteomyelitis is a possibility. Metastatic disease not excluded.   2. Prior cystoprostatectomy with chronic right ureteral obstruction and hydroureteronephrosis, minimally changed from the prior. All etiologies considered including malignant obstruction. Urology consultation recommended.   3. Incidental findings including cholelithiasis, bilateral multifocal renal cortical scarring, and small hiatal hernia.       5/24/2023 -  Chemotherapy    Enfortumab vedotin + Pembrolizumab     8/10/2023 Imaging Significant Findings    PET CT  Interval development of hypermetabolic pulmonary nodules within the lingula and right lower lobe likely reflecting pulmonary metastases. Some additional tiny pulmonary nodularity is new or more conspicuous from prior but too small to accurately characterize by PET/CT. Attention on follow-up recommended.     Worsening obstructive uropathy which is relatively moderate to severe the right kidney.     Similar appearance of the urinary neobladder. There is somewhat diminished due to physiologic activity to evaluate for local neoplasm but  the appearance overall appears similar to prior. Assessment of local disease could be followed with CT abdomen and pelvis with contrast.     Interval fracture of the left superior and inferior pubic rami appearing subacute in nature. Please correlate for trauma and tenderness. There is no significant FDG activity within the area to favor a pathologic fracture.     Previously seen diffuse marrow activity may have been due to previous marrow rebound or drug effect.       8/25/2023 Imaging Significant Findings    MR Pelvis  History of cystoprostatectomy and neobladder creation.     Interval increase in multilobular mass in the pelvis infiltrating the rectum, possibly due to distal sigmoid colon, anterior left pelvic bones as well as a inferior aspect of the neobladder suspicious for progression of neoplastic process as discussed above.      9/28/2023 Cancer Staged    Staging form: Urinary Bladder, AJCC 8th Edition  - Clinical: Stage IVB (cT4b, cNX, pM1b)     10/16/2023 Imaging Significant Findings    CT chest   Impression:  - Multiple solid bilateral pulmonary nodules up to 9mm showing interval increase in size concerning for metastatic disease in this patient with history of prior bladder malignancy.  - Multiple hepatic hypodensities, which may represent cystic lesions however some which are too small to characterize.  - Partially imaged right kidney showing parenchymal atrophy and suspected hydronephrosis..    MR Pelvis   Impression:     Interval increased size of multilobular mass in the cystoprostatectomy surgical bed that invades the neobladder, rectum, sigmoid colon, and left pelvis.  Multiple pelvic lymph nodes are more conspicuous from prior exam and concerning for additional metastatic disease.     10/24/2023 - 10/24/2023 Chemotherapy    Treatment Summary   Plan Name: OP SACITUZUMAB GOVITECAN-HZIY Q3W  Treatment Goal: Palliative  Status: Inactive  Start Date:   End Date:   Provider: Bridger Abad,  MD  Chemotherapy: sacituzumab govitecan-hziy (TRODELVY) 543 mg in sodium chloride 0.9% 500 mL chemo infusion, 7.5 mg/kg = 543 mg (original dose ), Intravenous, Clinic/HOD 1 time, 0 of 17 cycles  Dose modification: 7.5 mg/kg (Cycle 1, Reason: MD Discretion, Comment: [id number] PM )     11/2/2023 -  Chemotherapy    Treatment Summary   Plan Name: [facility] [id number] Dose Escalation INV-NTX + INV pembrolizumab  Treatment Goal: Control  Status: Active  Start Date: 11/2/2023  End Date: 10/16/2025 (Planned)  Provider: Chan Leos MD  Chemotherapy: INV [id number] (pembrolizumab) 200 mg in INV sodium chloride 0.9 % 100 mL chemo infusion, 200 mg, Intravenous, Clinic/HOD 1 time, 1 of 35 cycles  Administration: 200 mg (11/2/2023)     Melanoma        Past Medical History:   Diagnosis Date    Bladder cancer     CAD (coronary artery disease) 9/12/2023    Prior CABG. Not on antiplatelets. Home medicatiosn include atorvastatin    Carcinoma     forehead    Encounter for blood transfusion     Hypertension     Hypothyroidism 9/12/2023    Home medications include levothyroxine    Malignant melanoma of skin, unspecified     right arm    Malignant neoplasm of urinary bladder 9/12/2023    Melanoma 9/12/2023    History of T1a melanoma; 0.5mm depth without ulceartion. SP wide local excision 02/2022          Past Surgical History:   Procedure Laterality Date    CORONARY ARTERY BYPASS GRAFT  10/2015    CYSTECTOMY, ROBOT-ASSISTED, LAPAROSCOPIC, USING DA MARY XI, WITH ILEAL CONDUIT CREATION  12/2017    DILATION OF BLADDER      dialation of bladder neck- due to claudia bladder- multiple procedures    LYMPHADENECTOMY      XI ROBOTIC PROSTECTOMY, SUPRAPUBIC  12/2017        Family History   Problem Relation Age of Onset    Heart disease Father     Heart attack Paternal Uncle     Breast cancer Maternal Cousin     Colon cancer Neg Hx     Bladder Cancer Neg Hx         Social History     Tobacco Use    Smoking status: Former     Types:  Cigarettes    Smokeless tobacco: Not on file   Substance Use Topics    Alcohol use: Not Currently        I have reviewed and updated the patient's past medical, surgical, family and social histories.    Review of patient's allergies indicates:  No Known Allergies     Current Outpatient Medications   Medication Sig Dispense Refill    atorvastatin (LIPITOR) 20 MG tablet Take 20 mg by mouth every evening.      docusate sodium (COLACE) 100 MG capsule Take 1 capsule by mouth every evening.      levothyroxine (SYNTHROID) 50 MCG tablet Take 50 mcg by mouth.      LINZESS 72 mcg Cap capsule Take 72 mcg by mouth every morning.      multivitamin (THERAGRAN) per tablet Take 1 tablet by mouth.      VTAMA 1 % Crea APPLY 1 APPLICATION ON THE SKIN DAILY       No current facility-administered medications for this visit.        Physical Exam:   There were no vitals taken for this visit.     ECOG Performance status: 1            Physical Exam  Constitutional:       General: He is not in acute distress.     Appearance: Normal appearance.   HENT:      Head: Normocephalic.   Eyes:      General: No scleral icterus.     Extraocular Movements: Extraocular movements intact.      Conjunctiva/sclera: Conjunctivae normal.   Cardiovascular:      Rate and Rhythm: Normal rate and regular rhythm.      Heart sounds: No murmur heard.     No friction rub. No gallop.   Pulmonary:      Effort: Pulmonary effort is normal. No respiratory distress.      Breath sounds: Normal breath sounds. No stridor. No wheezing, rhonchi or rales.   Abdominal:      General: There is no distension.   Skin:     General: Skin is warm and dry.   Neurological:      Mental Status: He is alert and oriented to person, place, and time.      Motor: No weakness.   Psychiatric:         Mood and Affect: Mood normal.         Behavior: Behavior normal.         Thought Content: Thought content normal.         Labs:                 Lab Results   Component Value Date      11/18/2023    K 4.8 11/18/2023    CREATININE 2.8 (H) 11/18/2023    WBC 8.44 11/18/2023    HGB 11.2 (L) 11/18/2023     (H) 11/18/2023    AST 14 11/18/2023    ALT 10 11/18/2023    ALKPHOS 89 11/18/2023    BILITOT 0.4 11/18/2023          Imaging:         I have personally reviewed the above imaging including recent MRI and PET CT scan    Path:   Reviewed pathology as documented in oncology history      Assessment and Plan:      Metastatic Bladder Cancer:    Pt recently signed consent for NTX trial, combo novel agent with pembro.  Here following C1D1.       Labs reviewed.  Creatinine increasing.  Will refer to nephrology and obtain a renal US.  Discussed with study team and sponsor.  Will hold therapy today while awaiting kidney function to improve.      RTC per Research RN    The patient agrees with the plan, and all questions have been answered to their satisfaction.      More than 40 mins total time were spent during this encounter.    Chan Leos M.D., M.S., F.A.C.P.  Hematology and Oncology Attending  [facility]             Follow up:   Route Chart for Scheduling    Med Onc Chart Routing      Follow up with physician Other. RTC per Research RN   Follow up with LYNDSEY    Infusion scheduling note    Injection scheduling note    Labs    Imaging    Pharmacy appointment    Other referrals

## 2023-11-22 ENCOUNTER — TELEPHONE (OUTPATIENT)
Dept: HEMATOLOGY/ONCOLOGY | Facility: CLINIC | Age: 58
End: 2023-11-22
Payer: COMMERCIAL

## 2023-11-22 ENCOUNTER — HOSPITAL ENCOUNTER (OUTPATIENT)
Dept: RADIOLOGY | Facility: HOSPITAL | Age: 58
Discharge: HOME | End: 2023-11-22
Attending: INTERNAL MEDICINE
Payer: COMMERCIAL

## 2023-11-22 DIAGNOSIS — Z00.6 RESEARCH STUDY PATIENT: ICD-10-CM

## 2023-11-22 DIAGNOSIS — C67.9 MALIGNANT NEOPLASM OF URINARY BLADDER, UNSPECIFIED SITE: ICD-10-CM

## 2023-11-22 DIAGNOSIS — C67.9 MALIGNANT NEOPLASM OF URINARY BLADDER, UNSPECIFIED SITE: Primary | ICD-10-CM

## 2023-11-22 PROCEDURE — 76775 US KIDNEY: ICD-10-PCS | Mod: 26,,, | Performed by: RADIOLOGY

## 2023-11-22 PROCEDURE — 76775 US EXAM ABDO BACK WALL LIM: CPT | Mod: 26,,, | Performed by: RADIOLOGY

## 2023-11-22 PROCEDURE — 76770 US EXAM ABDO BACK WALL COMP: CPT | Mod: TC

## 2023-11-22 NOTE — NURSING
Oncology nurse navigator contacted patient for scheduling urology referral placed by Dr. Leos. Patient agreed to appointment on 11/27/23 at 10:45am. Date, time, and location discussed with patient. All questions/concerns addressed. Patient verbalized understanding. Contact information provided for further assistance.

## 2023-11-22 NOTE — TELEPHONE ENCOUNTER
----- Message from Judi Balderrama sent at 11/22/2023 12:45 PM CST -----  Regarding: FW: Urology Referral  Here you go!    ----- Message -----  From: Chan Leos MD  Sent: 11/22/2023  12:38 PM CST  To: Judi Balderrama; Trinidad Hutchinson RN; #  Subject: Urology Referral                                 Please schedule this phase 1 clinical trial patient with urology ASAP for chronic hydronephrosis with worsening kidney function.  He needs to see him next week at the latest.  If an appt is not available next week, please message me back so that I can escalate.      Trinidad, please keep an eye on this and let me know when he is scheduled so that I can reach out to the urologist to discuss with them ahead of his appt.       Thank you.     Chan Leos M.D., M.S., F.A.C.P.  Hematology and Oncology Attending  [facility] [place]  [facility]

## 2023-11-24 LAB
CK BB CFR SERPL ELPH: 0 %
CK MB CFR SERPL ELPH: 0 % (ref 0–3.3)
CK MM CFR SERPL ELPH: 100 % (ref 96.7–100)
CK SERPL-CCNC: 74 U/L (ref 30–223)

## 2023-11-27 ENCOUNTER — PATIENT MESSAGE (OUTPATIENT)
Dept: SURGERY | Facility: HOSPITAL | Age: 58
End: 2023-11-27
Payer: COMMERCIAL

## 2023-11-27 ENCOUNTER — OFFICE VISIT (OUTPATIENT)
Dept: UROLOGY | Facility: CLINIC | Age: 58
End: 2023-11-27
Payer: COMMERCIAL

## 2023-11-27 VITALS
DIASTOLIC BLOOD PRESSURE: 88 MMHG | BODY MASS INDEX: 23.42 KG/M2 | WEIGHT: 163.56 LBS | HEART RATE: 76 BPM | SYSTOLIC BLOOD PRESSURE: 151 MMHG | HEIGHT: 70 IN

## 2023-11-27 DIAGNOSIS — Z00.6 RESEARCH STUDY PATIENT: ICD-10-CM

## 2023-11-27 DIAGNOSIS — N13.30 HYDRONEPHROSIS OF RIGHT KIDNEY: ICD-10-CM

## 2023-11-27 DIAGNOSIS — N35.816 OTHER STRICTURE OF OVERLAPPING SITES OF URETHRA IN MALE: Primary | ICD-10-CM

## 2023-11-27 DIAGNOSIS — C67.9 MALIGNANT NEOPLASM OF URINARY BLADDER, UNSPECIFIED SITE: ICD-10-CM

## 2023-11-27 DIAGNOSIS — C67.9 MALIGNANT NEOPLASM OF URINARY BLADDER, UNSPECIFIED SITE: Primary | ICD-10-CM

## 2023-11-27 PROCEDURE — 3079F DIAST BP 80-89 MM HG: CPT | Mod: CPTII,S$GLB,, | Performed by: STUDENT IN AN ORGANIZED HEALTH CARE EDUCATION/TRAINING PROGRAM

## 2023-11-27 PROCEDURE — 1159F PR MEDICATION LIST DOCUMENTED IN MEDICAL RECORD: ICD-10-PCS | Mod: CPTII,S$GLB,, | Performed by: STUDENT IN AN ORGANIZED HEALTH CARE EDUCATION/TRAINING PROGRAM

## 2023-11-27 PROCEDURE — 1159F MED LIST DOCD IN RCRD: CPT | Mod: CPTII,S$GLB,, | Performed by: STUDENT IN AN ORGANIZED HEALTH CARE EDUCATION/TRAINING PROGRAM

## 2023-11-27 PROCEDURE — 99205 PR OFFICE/OUTPT VISIT, NEW, LEVL V, 60-74 MIN: ICD-10-PCS | Mod: S$GLB,,, | Performed by: STUDENT IN AN ORGANIZED HEALTH CARE EDUCATION/TRAINING PROGRAM

## 2023-11-27 PROCEDURE — 99205 OFFICE O/P NEW HI 60 MIN: CPT | Mod: S$GLB,,, | Performed by: STUDENT IN AN ORGANIZED HEALTH CARE EDUCATION/TRAINING PROGRAM

## 2023-11-27 PROCEDURE — 99999 PR PBB SHADOW E&M-EST. PATIENT-LVL V: CPT | Mod: PBBFAC,,, | Performed by: STUDENT IN AN ORGANIZED HEALTH CARE EDUCATION/TRAINING PROGRAM

## 2023-11-27 PROCEDURE — 3008F BODY MASS INDEX DOCD: CPT | Mod: CPTII,S$GLB,, | Performed by: STUDENT IN AN ORGANIZED HEALTH CARE EDUCATION/TRAINING PROGRAM

## 2023-11-27 PROCEDURE — 3008F PR BODY MASS INDEX (BMI) DOCUMENTED: ICD-10-PCS | Mod: CPTII,S$GLB,, | Performed by: STUDENT IN AN ORGANIZED HEALTH CARE EDUCATION/TRAINING PROGRAM

## 2023-11-27 PROCEDURE — 99999 PR PBB SHADOW E&M-EST. PATIENT-LVL V: ICD-10-PCS | Mod: PBBFAC,,, | Performed by: STUDENT IN AN ORGANIZED HEALTH CARE EDUCATION/TRAINING PROGRAM

## 2023-11-27 PROCEDURE — 3077F PR MOST RECENT SYSTOLIC BLOOD PRESSURE >= 140 MM HG: ICD-10-PCS | Mod: CPTII,S$GLB,, | Performed by: STUDENT IN AN ORGANIZED HEALTH CARE EDUCATION/TRAINING PROGRAM

## 2023-11-27 PROCEDURE — 3077F SYST BP >= 140 MM HG: CPT | Mod: CPTII,S$GLB,, | Performed by: STUDENT IN AN ORGANIZED HEALTH CARE EDUCATION/TRAINING PROGRAM

## 2023-11-27 PROCEDURE — 3079F PR MOST RECENT DIASTOLIC BLOOD PRESSURE 80-89 MM HG: ICD-10-PCS | Mod: CPTII,S$GLB,, | Performed by: STUDENT IN AN ORGANIZED HEALTH CARE EDUCATION/TRAINING PROGRAM

## 2023-11-27 NOTE — PROGRESS NOTES
[facility]  Urologic Oncology Clinic Note        Date of Service: 11/27/2023      Chief Complaint/Reason for Consultation: Metastatic Bladder Cancer    Requesting Provider:   Chan Leos MD  [address] [place] [address]      History of Present Illness:   Patient 58 y.o. 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 He underwent radical cystectomy w/ ileal neobladder in 2017. He then subsequently developed local and systemic recurrence. He is being followed by Dr Leos and part of the Phase 1 program. He did also receive palliative radiation to the pelvis after pathologic pelvic fracture.     He currently catheterizes his pouch as needed, but reports he has had multiple dilations in the past and continues to struggle to catheterize his bladder. He is currently down to 12F.. He says this is negatively impacting his quality of life.    He presents today due to a decline in his renal function since the start of the Phase 1 program. GFR at enrollment was 30, currently at 22-24 with a creatinine at 2.7. He was hospitalized in February with right hydronephrosis and [facility] and images obtained at that time showed a soft tissue recurrence in the left distal ureter proximal to his anastomosis. He is not aware of any functional studies for his hydronephrosis and has not received a stent/nephrostomy tube as his hydronephrosis appeared chronic with thin parenchyma.      Blood thinners:  None    No Hx of TIA, MI, and CVA   Abdominal surgeries:  Radical cystectomy with creation of ileal neobladder        Patient Active Problem List    Diagnosis Date Noted    CKD (chronic kidney disease) 10/23/2023    Elevated blood pressure reading 09/28/2023    Malignant neoplasm of urinary bladder 09/12/2023    Melanoma 09/12/2023    CAD (coronary artery disease) 09/12/2023    Hypothyroidism 09/12/2023    Psoriasis 09/12/2023          Review of patient's allergies indicates:  No Known Allergies      Past Medical History:   Diagnosis Date    Bladder cancer     CAD (coronary artery disease) 9/12/2023    Prior CABG. Not on antiplatelets. Home medicatiosn include atorvastatin    Carcinoma     forehead    Encounter for blood transfusion     Hypertension     Hypothyroidism 9/12/2023    Home medications include levothyroxine    Malignant melanoma of skin, unspecified     right arm    Malignant neoplasm of urinary bladder 9/12/2023    Melanoma 9/12/2023    History of T1a melanoma; 0.5mm depth without ulceartion. SP wide local excision 02/2022      Past Surgical History:   Procedure Laterality Date    CORONARY ARTERY BYPASS GRAFT  10/2015    CYSTECTOMY, ROBOT-ASSISTED, LAPAROSCOPIC, USING DA MARY XI, WITH ILEAL CONDUIT CREATION  12/2017    DILATION OF BLADDER      dialation of bladder neck- due to claudia bladder- multiple procedures    LYMPHADENECTOMY      XI ROBOTIC PROSTECTOMY, SUPRAPUBIC  12/2017      Family History   Problem Relation Age of Onset    Heart disease Father     Heart attack Paternal Uncle     Breast cancer Maternal Cousin     Colon cancer Neg Hx     Bladder Cancer Neg Hx       Social History     Tobacco Use    Smoking status: Former     Types: Cigarettes    Smokeless tobacco: Not on file   Substance Use Topics    Alcohol use: Not Currently        Review of Systems   Genitourinary:  Negative for difficulty urinating, flank pain, hematuria and urgency.             OBJECTIVE:     There were no vitals filed for this visit.       General Appearance: Alert, cooperative, no distress  Head: Normocephalic  Eyes: Clear conjunctiva  Neck: No obvious LND or JVD  Lungs: Normal chest excursion, no accessory muscle use  Chest: Regular rate rhythm by palpation, no pedal edema  Abdomen: Soft, non-tender, non-distended, surgical scars lower midline well-healed  Extremities: Atraumatic   Lymph Nodes: No appreciable lymph adenopathy  Neurologic: No gross gait, motor or sensory deficits      LAB:      CMP  Sodium   Date  Value Ref Range Status   2023 136 136 - 145 mmol/L Final     Potassium   Date Value Ref Range Status   2023 4.8 3.5 - 5.1 mmol/L Final     Chloride   Date Value Ref Range Status   2023 103 95 - 110 mmol/L Final     CO2   Date Value Ref Range Status   2023 23 23 - 29 mmol/L Final     Glucose   Date Value Ref Range Status   2023 93 70 - 110 mg/dL Final     BUN   Date Value Ref Range Status   2023 35 (H) 6 - 20 mg/dL Final     Creatinine   Date Value Ref Range Status   2023 2.8 (H) 0.5 - 1.4 mg/dL Final     Calcium   Date Value Ref Range Status   2023 9.9 8.7 - 10.5 mg/dL Final     Total Protein   Date Value Ref Range Status   2023 7.8 6.0 - 8.4 g/dL Final     Albumin   Date Value Ref Range Status   2023 3.0 (L) 3.5 - 5.2 g/dL Final     Total Bilirubin   Date Value Ref Range Status   2023 0.4 0.1 - 1.0 mg/dL Final     Comment:     For infants and newborns, interpretation of results should be based  on gestational age, weight and in agreement with clinical  observations.    Premature Infant recommended reference ranges:  Up to 24 hours.............<8.0 mg/dL  Up to 48 hours............<12.0 mg/dL  3-5 days..................<15.0 mg/dL  6-29 days.................<15.0 mg/dL       Alkaline Phosphatase   Date Value Ref Range Status   2023 89 55 - 135 U/L Final     AST   Date Value Ref Range Status   2023 14 10 - 40 U/L Final     ALT   Date Value Ref Range Status   2023 10 10 - 44 U/L Final     Anion Gap   Date Value Ref Range Status   2023 10 8 - 16 mmol/L Final     eGFR   Date Value Ref Range Status   2023 25.4 (A) >60 mL/min/1.73 m^2 Final     Lab Results   Component Value Date    WBC 8.44 2023    HGB 11.2 (L) 2023    HCT 34.6 (L) 2023    MCV 93 2023     (H) 2023             IMAGIN/22/2023  US KIDNEY     CLINICAL HISTORY:  NTX clinical trial; Encounter for examination for normal  comparison and control in clinical research program     TECHNIQUE:  Ultrasound of the kidneys was performed including color flow and Doppler evaluation of the kidneys.     COMPARISON:  CT chest abdomen pelvis 10/30/2023     FINDINGS:  Right kidney: The right kidney measures 12.1 cm. Cortical thinning and loss of corticomedullary distinction.  Resistive index measures 0.74.  No mass. No renal stone. Severe hydronephrosis with dilatation of partially imaged proximal ureter.  There is associated cortical thinning.     Left kidney: The left kidney measures 10.5 cm. No cortical thinning. No loss of corticomedullary distinction. Resistive index measures 0.57.  No mass. No renal stone. No hydronephrosis.     Splenic resistive index measures 0.48.     Impression:     Severe right-sided hydronephrosis with cortical thinning suggesting that this is chronic.  Findings are similar to recent 10/30/2023 CT.     Electronically signed by resident: Chan Cedillo  Date:                                            11/22/2023  Time:                                           07:50     Electronically signed by: Ermias Matthew MD  Date:                                            11/22/2023  Time:                                           08:06      10/30/2023  CT CHEST ABDOMEN PELVIS WITHOUT CONTRAST(XPD)     CLINICAL HISTORY:  Bladder cancer, invasive, assess treatment response;Research Patient Evaluation... Please include Recist Measurements in report.;Malignant neoplasm of bladder, unspecified     TECHNIQUE:  Low dose axial images, sagittal and coronal reformations were obtained from the thoracic inlet to the pubic synthesis without contrast.     COMPARISON:  CT chest 10/16/2023, MRI pelvis 10/15/2023, PET-CT 08/10/2023     FINDINGS:  Thoracic soft tissues: Right chest port with transvenous catheter tip terminating in the SVC.     Aorta: Normal caliber.  Mild calcific atherosclerosis.     Heart: Normal size.  No pericardial effusion.   Postoperative changes of CABG.  Coronary artery calcific atherosclerosis.     Ashley/Mediastinum: No pathologic lymphadenopathy.     Lungs: Stable scattered bilateral solid pulmonary nodules.  Largest nodule within the left lung measures 0.9 x 0.9 cm within the upper lobe (series 6, image 230), previously 0.9 x 0.9 cm.  Largest nodule within the right lung measures 0.9 x 0.8 cm within the lower lobe (series 6, image 340), previously 0.9 x 0.8 cm.  No definite new nodule.  No consolidation or pleural effusion.     Liver: Few scattered hepatic cysts.  Few additional subcentimeter hypodensities too small to characterize, similar to prior exam.     Gallbladder: Calcified gallstones.  No wall thickening.     Bile Ducts: No evidence of dilated ducts.     Pancreas: No mass or peripancreatic fat stranding.     Spleen: Unremarkable.     Adrenals: Unremarkable.     Kidneys/ Ureters: Similar bilateral renal cortical thinning and cortical scarring.  Stable severe right hydronephrosis and ureteral dilatation.  Abrupt caliber change of the distal right ureter with soft tissue attenuation (series 3, image 111).     Bladder: Postoperative changes of cystoproctectomy with creation of neobladder.  Redemonstration of a multilobular soft tissue mass within the surgical bed involving the neobladder.  Mass abuts the rectum and sigmoid colon.  Findings appear similar compared to prior MRI allowing for differences in modality.     Reproductive organs: As above.     GI Tract/Mesentery: Postsurgical changes of the bowel.  No evidence of bowel obstruction or inflammation.     Peritoneal Space: No ascites. No free air.     Lymph nodes: Few mildly prominent pelvic and retroperitoneal lymph nodes, similar to prior exam.  Mildly prominent left para-aortic lymph node measuring 1 cm (series 3, image 65), previously 1 cm.  Right external iliac chain lymph node measuring 0.9 cm (series 3, image 132), previously 1.0 cm.     Abdominal wall: Small fat  containing supraumbilical hernia.     Vasculature: Mild aortoiliac calcific atherosclerosis.  No aneurysm.     Bones: Remote fracture of the left pubic symphysis involving the superior and inferior pubic rami with associated lytic lesion, similar to prior exam.  Prior median sternotomy.  No new aggressive lesion or acute fracture.     Impression:     1. Postoperative changes of cystoproctectomy with creation of neobladder.  Persistent multilobular soft tissue mass within the surgical bed involving the neobladder and abutting the rectum and sigmoid colon, difficult to evaluate on noncontrast CT.  Overall findings appear similar to prior MRI 10/15/2023.  2. Few stable mildly prominent pelvic and retroperitoneal lymph nodes.  3. Stable bilateral subcentimeter pulmonary nodules.  4. Stable severe right hydronephrosis and ureteral dilatation concerning for obstruction.  Abrupt caliber change of the distal right ureter with soft tissue attenuation which may represent stricture or neoplasm.  5. Stable appearing fracture of the left pubic symphysis with associated lytic lesion.  6. Additional findings as above.  Pelvic mass is difficult to measure on noncontrast CT.  Remain described lymph nodes and pulmonary nodules do not meet RECIST inclusion criteria.        Electronically signed by: Marcelino Miguel  Date:                                            10/30/2023  Time:                                           10:22        ASSESSMENT/PLAN:     57 yo M w hx of MIBC s/p radical cystectomy w/ ileal neobladder in 2017 now with local and systemic recurrence undergoing phase 1 clinical trial     Plan:  Right Hydronephrosis  - Images reviewed: he has severe right hydronephrosis with a thin parenchyma. There is hydroureter to the level of a soft tissue recurrence. We discussed management options for him in order to maximize his renal function for the phase 1 program. I do not believe that a stent is feasible given the soft tissue  recurrence. Will refer to IR for a nephrostomy tube    Muscle Invasive Bladder Cancer  - see above. Will need to optimize his renal function with nephrostomy drainage  - Phase 1 program per med onc    Bladder Neck Contracture  -Discussed his inability to pass catheter into his bladder effectively, last dilation was one year ago  -Will take to OR for cystoscopy and urethral dilation on 12/5       Teodoro Collins MD  Urology Resident PGY-5      Attending Note:   I have personally seen and examined the patient and agree with the findings above.       The total time for the new patient visit was at least 60 minutes in both face-to-face and non-face-to-face activities, which included chart review, interpretation of results, counseling, education, ordering meds/tests/procedures, and/or coordination of care.        Eder Oconnor MD  Urologic Oncology  P: [phone]

## 2023-11-27 NOTE — H&P (VIEW-ONLY)
[facility]  Urologic Oncology Clinic Note        Date of Service: 11/27/2023      Chief Complaint/Reason for Consultation: Metastatic Bladder Cancer    Requesting Provider:   Chan Leos MD  [address] [place] [address]      History of Present Illness:   Patient 58 y.o. 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 He underwent radical cystectomy w/ ileal neobladder in 2017. He then subsequently developed local and systemic recurrence. He is being followed by Dr Leos and part of the Phase 1 program. He did also receive palliative radiation to the pelvis after pathologic pelvic fracture.     He currently catheterizes his pouch as needed, but reports he has had multiple dilations in the past and continues to struggle to catheterize his bladder. He is currently down to 12F.. He says this is negatively impacting his quality of life.    He presents today due to a decline in his renal function since the start of the Phase 1 program. GFR at enrollment was 30, currently at 22-24 with a creatinine at 2.7. He was hospitalized in February with right hydronephrosis and [facility] and images obtained at that time showed a soft tissue recurrence in the left distal ureter proximal to his anastomosis. He is not aware of any functional studies for his hydronephrosis and has not received a stent/nephrostomy tube as his hydronephrosis appeared chronic with thin parenchyma.      Blood thinners:  None    No Hx of TIA, MI, and CVA   Abdominal surgeries:  Radical cystectomy with creation of ileal neobladder        Patient Active Problem List    Diagnosis Date Noted    CKD (chronic kidney disease) 10/23/2023    Elevated blood pressure reading 09/28/2023    Malignant neoplasm of urinary bladder 09/12/2023    Melanoma 09/12/2023    CAD (coronary artery disease) 09/12/2023    Hypothyroidism 09/12/2023    Psoriasis 09/12/2023          Review of patient's allergies indicates:  No Known Allergies      Past Medical History:   Diagnosis Date    Bladder cancer     CAD (coronary artery disease) 9/12/2023    Prior CABG. Not on antiplatelets. Home medicatiosn include atorvastatin    Carcinoma     forehead    Encounter for blood transfusion     Hypertension     Hypothyroidism 9/12/2023    Home medications include levothyroxine    Malignant melanoma of skin, unspecified     right arm    Malignant neoplasm of urinary bladder 9/12/2023    Melanoma 9/12/2023    History of T1a melanoma; 0.5mm depth without ulceartion. SP wide local excision 02/2022      Past Surgical History:   Procedure Laterality Date    CORONARY ARTERY BYPASS GRAFT  10/2015    CYSTECTOMY, ROBOT-ASSISTED, LAPAROSCOPIC, USING DA MARY XI, WITH ILEAL CONDUIT CREATION  12/2017    DILATION OF BLADDER      dialation of bladder neck- due to claudia bladder- multiple procedures    LYMPHADENECTOMY      XI ROBOTIC PROSTECTOMY, SUPRAPUBIC  12/2017      Family History   Problem Relation Age of Onset    Heart disease Father     Heart attack Paternal Uncle     Breast cancer Maternal Cousin     Colon cancer Neg Hx     Bladder Cancer Neg Hx       Social History     Tobacco Use    Smoking status: Former     Types: Cigarettes    Smokeless tobacco: Not on file   Substance Use Topics    Alcohol use: Not Currently        Review of Systems   Genitourinary:  Negative for difficulty urinating, flank pain, hematuria and urgency.             OBJECTIVE:     There were no vitals filed for this visit.       General Appearance: Alert, cooperative, no distress  Head: Normocephalic  Eyes: Clear conjunctiva  Neck: No obvious LND or JVD  Lungs: Normal chest excursion, no accessory muscle use  Chest: Regular rate rhythm by palpation, no pedal edema  Abdomen: Soft, non-tender, non-distended, surgical scars lower midline well-healed  Extremities: Atraumatic   Lymph Nodes: No appreciable lymph adenopathy  Neurologic: No gross gait, motor or sensory deficits      LAB:      CMP  Sodium   Date  Value Ref Range Status   2023 136 136 - 145 mmol/L Final     Potassium   Date Value Ref Range Status   2023 4.8 3.5 - 5.1 mmol/L Final     Chloride   Date Value Ref Range Status   2023 103 95 - 110 mmol/L Final     CO2   Date Value Ref Range Status   2023 23 23 - 29 mmol/L Final     Glucose   Date Value Ref Range Status   2023 93 70 - 110 mg/dL Final     BUN   Date Value Ref Range Status   2023 35 (H) 6 - 20 mg/dL Final     Creatinine   Date Value Ref Range Status   2023 2.8 (H) 0.5 - 1.4 mg/dL Final     Calcium   Date Value Ref Range Status   2023 9.9 8.7 - 10.5 mg/dL Final     Total Protein   Date Value Ref Range Status   2023 7.8 6.0 - 8.4 g/dL Final     Albumin   Date Value Ref Range Status   2023 3.0 (L) 3.5 - 5.2 g/dL Final     Total Bilirubin   Date Value Ref Range Status   2023 0.4 0.1 - 1.0 mg/dL Final     Comment:     For infants and newborns, interpretation of results should be based  on gestational age, weight and in agreement with clinical  observations.    Premature Infant recommended reference ranges:  Up to 24 hours.............<8.0 mg/dL  Up to 48 hours............<12.0 mg/dL  3-5 days..................<15.0 mg/dL  6-29 days.................<15.0 mg/dL       Alkaline Phosphatase   Date Value Ref Range Status   2023 89 55 - 135 U/L Final     AST   Date Value Ref Range Status   2023 14 10 - 40 U/L Final     ALT   Date Value Ref Range Status   2023 10 10 - 44 U/L Final     Anion Gap   Date Value Ref Range Status   2023 10 8 - 16 mmol/L Final     eGFR   Date Value Ref Range Status   2023 25.4 (A) >60 mL/min/1.73 m^2 Final     Lab Results   Component Value Date    WBC 8.44 2023    HGB 11.2 (L) 2023    HCT 34.6 (L) 2023    MCV 93 2023     (H) 2023             IMAGIN/22/2023  US KIDNEY     CLINICAL HISTORY:  NTX clinical trial; Encounter for examination for normal  comparison and control in clinical research program     TECHNIQUE:  Ultrasound of the kidneys was performed including color flow and Doppler evaluation of the kidneys.     COMPARISON:  CT chest abdomen pelvis 10/30/2023     FINDINGS:  Right kidney: The right kidney measures 12.1 cm. Cortical thinning and loss of corticomedullary distinction.  Resistive index measures 0.74.  No mass. No renal stone. Severe hydronephrosis with dilatation of partially imaged proximal ureter.  There is associated cortical thinning.     Left kidney: The left kidney measures 10.5 cm. No cortical thinning. No loss of corticomedullary distinction. Resistive index measures 0.57.  No mass. No renal stone. No hydronephrosis.     Splenic resistive index measures 0.48.     Impression:     Severe right-sided hydronephrosis with cortical thinning suggesting that this is chronic.  Findings are similar to recent 10/30/2023 CT.     Electronically signed by resident: Chan Cedillo  Date:                                            11/22/2023  Time:                                           07:50     Electronically signed by: Ermias Matthew MD  Date:                                            11/22/2023  Time:                                           08:06      10/30/2023  CT CHEST ABDOMEN PELVIS WITHOUT CONTRAST(XPD)     CLINICAL HISTORY:  Bladder cancer, invasive, assess treatment response;Research Patient Evaluation... Please include Recist Measurements in report.;Malignant neoplasm of bladder, unspecified     TECHNIQUE:  Low dose axial images, sagittal and coronal reformations were obtained from the thoracic inlet to the pubic synthesis without contrast.     COMPARISON:  CT chest 10/16/2023, MRI pelvis 10/15/2023, PET-CT 08/10/2023     FINDINGS:  Thoracic soft tissues: Right chest port with transvenous catheter tip terminating in the SVC.     Aorta: Normal caliber.  Mild calcific atherosclerosis.     Heart: Normal size.  No pericardial effusion.   Postoperative changes of CABG.  Coronary artery calcific atherosclerosis.     Ashley/Mediastinum: No pathologic lymphadenopathy.     Lungs: Stable scattered bilateral solid pulmonary nodules.  Largest nodule within the left lung measures 0.9 x 0.9 cm within the upper lobe (series 6, image 230), previously 0.9 x 0.9 cm.  Largest nodule within the right lung measures 0.9 x 0.8 cm within the lower lobe (series 6, image 340), previously 0.9 x 0.8 cm.  No definite new nodule.  No consolidation or pleural effusion.     Liver: Few scattered hepatic cysts.  Few additional subcentimeter hypodensities too small to characterize, similar to prior exam.     Gallbladder: Calcified gallstones.  No wall thickening.     Bile Ducts: No evidence of dilated ducts.     Pancreas: No mass or peripancreatic fat stranding.     Spleen: Unremarkable.     Adrenals: Unremarkable.     Kidneys/ Ureters: Similar bilateral renal cortical thinning and cortical scarring.  Stable severe right hydronephrosis and ureteral dilatation.  Abrupt caliber change of the distal right ureter with soft tissue attenuation (series 3, image 111).     Bladder: Postoperative changes of cystoproctectomy with creation of neobladder.  Redemonstration of a multilobular soft tissue mass within the surgical bed involving the neobladder.  Mass abuts the rectum and sigmoid colon.  Findings appear similar compared to prior MRI allowing for differences in modality.     Reproductive organs: As above.     GI Tract/Mesentery: Postsurgical changes of the bowel.  No evidence of bowel obstruction or inflammation.     Peritoneal Space: No ascites. No free air.     Lymph nodes: Few mildly prominent pelvic and retroperitoneal lymph nodes, similar to prior exam.  Mildly prominent left para-aortic lymph node measuring 1 cm (series 3, image 65), previously 1 cm.  Right external iliac chain lymph node measuring 0.9 cm (series 3, image 132), previously 1.0 cm.     Abdominal wall: Small fat  containing supraumbilical hernia.     Vasculature: Mild aortoiliac calcific atherosclerosis.  No aneurysm.     Bones: Remote fracture of the left pubic symphysis involving the superior and inferior pubic rami with associated lytic lesion, similar to prior exam.  Prior median sternotomy.  No new aggressive lesion or acute fracture.     Impression:     1. Postoperative changes of cystoproctectomy with creation of neobladder.  Persistent multilobular soft tissue mass within the surgical bed involving the neobladder and abutting the rectum and sigmoid colon, difficult to evaluate on noncontrast CT.  Overall findings appear similar to prior MRI 10/15/2023.  2. Few stable mildly prominent pelvic and retroperitoneal lymph nodes.  3. Stable bilateral subcentimeter pulmonary nodules.  4. Stable severe right hydronephrosis and ureteral dilatation concerning for obstruction.  Abrupt caliber change of the distal right ureter with soft tissue attenuation which may represent stricture or neoplasm.  5. Stable appearing fracture of the left pubic symphysis with associated lytic lesion.  6. Additional findings as above.  Pelvic mass is difficult to measure on noncontrast CT.  Remain described lymph nodes and pulmonary nodules do not meet RECIST inclusion criteria.        Electronically signed by: Marcelino Miguel  Date:                                            10/30/2023  Time:                                           10:22        ASSESSMENT/PLAN:     59 yo M w hx of MIBC s/p radical cystectomy w/ ileal neobladder in 2017 now with local and systemic recurrence undergoing phase 1 clinical trial     Plan:  Right Hydronephrosis  - Images reviewed: he has severe right hydronephrosis with a thin parenchyma. There is hydroureter to the level of a soft tissue recurrence. We discussed management options for him in order to maximize his renal function for the phase 1 program. I do not believe that a stent is feasible given the soft tissue  recurrence. Will refer to IR for a nephrostomy tube    Muscle Invasive Bladder Cancer  - see above. Will need to optimize his renal function with nephrostomy drainage  - Phase 1 program per med onc    Bladder Neck Contracture  -Discussed his inability to pass catheter into his bladder effectively, last dilation was one year ago  -Will take to OR for cystoscopy and urethral dilation on 12/5       Teodoro Collins MD  Urology Resident PGY-5      Attending Note:   I have personally seen and examined the patient and agree with the findings above.       The total time for the new patient visit was at least 60 minutes in both face-to-face and non-face-to-face activities, which included chart review, interpretation of results, counseling, education, ordering meds/tests/procedures, and/or coordination of care.        Eder Oconnor MD  Urologic Oncology  P: [phone]

## 2023-11-29 ENCOUNTER — HOSPITAL ENCOUNTER (OUTPATIENT)
Dept: RADIOLOGY | Facility: HOSPITAL | Age: 58
Discharge: HOME | End: 2023-11-29
Attending: INTERNAL MEDICINE
Payer: COMMERCIAL

## 2023-11-29 DIAGNOSIS — C67.9 MALIGNANT NEOPLASM OF URINARY BLADDER, UNSPECIFIED SITE: ICD-10-CM

## 2023-11-29 DIAGNOSIS — Z00.6 RESEARCH STUDY PATIENT: ICD-10-CM

## 2023-11-29 PROCEDURE — 74176 CT ABD & PELVIS W/O CONTRAST: CPT | Mod: TC

## 2023-11-29 PROCEDURE — 71250 CT CHEST ABDOMEN PELVIS WITHOUT CONTRAST(XPD): ICD-10-PCS | Mod: 26,,, | Performed by: STUDENT IN AN ORGANIZED HEALTH CARE EDUCATION/TRAINING PROGRAM

## 2023-11-29 PROCEDURE — 74176 CT ABD & PELVIS W/O CONTRAST: CPT | Mod: 26,,, | Performed by: STUDENT IN AN ORGANIZED HEALTH CARE EDUCATION/TRAINING PROGRAM

## 2023-11-29 PROCEDURE — 74176 CT CHEST ABDOMEN PELVIS WITHOUT CONTRAST(XPD): ICD-10-PCS | Mod: 26,,, | Performed by: STUDENT IN AN ORGANIZED HEALTH CARE EDUCATION/TRAINING PROGRAM

## 2023-11-29 PROCEDURE — 71250 CT THORAX DX C-: CPT | Mod: 26,,, | Performed by: STUDENT IN AN ORGANIZED HEALTH CARE EDUCATION/TRAINING PROGRAM

## 2023-12-03 ENCOUNTER — PATIENT MESSAGE (OUTPATIENT)
Dept: UROLOGY | Facility: CLINIC | Age: 58
End: 2023-12-03
Payer: COMMERCIAL

## 2023-12-04 ENCOUNTER — ANESTHESIA EVENT (OUTPATIENT)
Dept: SURGERY | Facility: HOSPITAL | Age: 58
End: 2023-12-04
Payer: COMMERCIAL

## 2023-12-04 ENCOUNTER — TELEPHONE (OUTPATIENT)
Dept: UROLOGY | Facility: CLINIC | Age: 58
End: 2023-12-04
Payer: COMMERCIAL

## 2023-12-04 DIAGNOSIS — C67.9 MALIGNANT NEOPLASM OF URINARY BLADDER, UNSPECIFIED SITE: Primary | ICD-10-CM

## 2023-12-04 NOTE — TELEPHONE ENCOUNTER
Spoke with patient who was able to provide acceptable patient identifiers prior to start of conversation. The following instructions provided:  Instructions for Day of Surgery at [facility]      Report To:    The [facility], located across from [place] side of the 1st floor of the hospital    Arrival time: 1115    Please note:     If you are allergic to any medication please let your care team know the day of surgery.  If you have ever had adverse reaction to anesthesia please let your care team know the day of surgery   Please arrange transportation from the hospital, you will not be allowed to drive yourself home from surgery since you have been under sedation or anesthesia   Notify your doctor or care team of any diabetic medications that you take as these may need to be held or adjusted prior to surgery     Before Surgery     You should stop taking any blood thinners for up to 7 days depending on the blood thinner, please let care team know what you are taking so you can be directed when to stop certain medications.    You should hold any aspirin containing products for 7 days    Stop taking any herbal supplements 14 days prior to surgery     Night Before Surgery     Nothing to eat or drink after midnight the night before your surgery  Take medications as instructed the morning of surgery  No alcoholic beverages 24 hours prior to surgery

## 2023-12-05 ENCOUNTER — HOSPITAL ENCOUNTER (OUTPATIENT)
Facility: HOSPITAL | Age: 58
Discharge: HOME | End: 2023-12-05
Attending: STUDENT IN AN ORGANIZED HEALTH CARE EDUCATION/TRAINING PROGRAM | Admitting: STUDENT IN AN ORGANIZED HEALTH CARE EDUCATION/TRAINING PROGRAM
Payer: COMMERCIAL

## 2023-12-05 ENCOUNTER — ANESTHESIA (OUTPATIENT)
Dept: SURGERY | Facility: HOSPITAL | Age: 58
End: 2023-12-05
Payer: COMMERCIAL

## 2023-12-05 VITALS
RESPIRATION RATE: 20 BRPM | SYSTOLIC BLOOD PRESSURE: 117 MMHG | BODY MASS INDEX: 22.62 KG/M2 | WEIGHT: 158 LBS | HEART RATE: 59 BPM | OXYGEN SATURATION: 100 % | DIASTOLIC BLOOD PRESSURE: 73 MMHG | HEIGHT: 70 IN | TEMPERATURE: 98 F

## 2023-12-05 DIAGNOSIS — C67.9 BLADDER CANCER: ICD-10-CM

## 2023-12-05 PROCEDURE — 52281 PR CYSTOSCOPY,DIL URETHRAL STRICTURE: ICD-10-PCS | Mod: ,,, | Performed by: STUDENT IN AN ORGANIZED HEALTH CARE EDUCATION/TRAINING PROGRAM

## 2023-12-05 PROCEDURE — 37000008 HC ANESTHESIA 1ST 15 MINUTES: Performed by: STUDENT IN AN ORGANIZED HEALTH CARE EDUCATION/TRAINING PROGRAM

## 2023-12-05 PROCEDURE — C1758 CATHETER, URETERAL: HCPCS | Performed by: STUDENT IN AN ORGANIZED HEALTH CARE EDUCATION/TRAINING PROGRAM

## 2023-12-05 PROCEDURE — 25500020 PHARM REV CODE 255: Performed by: STUDENT IN AN ORGANIZED HEALTH CARE EDUCATION/TRAINING PROGRAM

## 2023-12-05 PROCEDURE — 63600175 PHARM REV CODE 636 W HCPCS: Performed by: NURSE ANESTHETIST, CERTIFIED REGISTERED

## 2023-12-05 PROCEDURE — D9220A PRA ANESTHESIA: ICD-10-PCS | Mod: CRNA,,, | Performed by: NURSE ANESTHETIST, CERTIFIED REGISTERED

## 2023-12-05 PROCEDURE — 25000003 PHARM REV CODE 250: Performed by: STUDENT IN AN ORGANIZED HEALTH CARE EDUCATION/TRAINING PROGRAM

## 2023-12-05 PROCEDURE — 25000003 PHARM REV CODE 250: Performed by: NURSE ANESTHETIST, CERTIFIED REGISTERED

## 2023-12-05 PROCEDURE — C1769 GUIDE WIRE: HCPCS | Performed by: STUDENT IN AN ORGANIZED HEALTH CARE EDUCATION/TRAINING PROGRAM

## 2023-12-05 PROCEDURE — D9220A PRA ANESTHESIA: Mod: ANES,,, | Performed by: STUDENT IN AN ORGANIZED HEALTH CARE EDUCATION/TRAINING PROGRAM

## 2023-12-05 PROCEDURE — 74430 CONTRAST X-RAY BLADDER: CPT | Mod: 26,,, | Performed by: STUDENT IN AN ORGANIZED HEALTH CARE EDUCATION/TRAINING PROGRAM

## 2023-12-05 PROCEDURE — 71000044 HC DOSC ROUTINE RECOVERY FIRST HOUR: Performed by: STUDENT IN AN ORGANIZED HEALTH CARE EDUCATION/TRAINING PROGRAM

## 2023-12-05 PROCEDURE — 36000707: Performed by: STUDENT IN AN ORGANIZED HEALTH CARE EDUCATION/TRAINING PROGRAM

## 2023-12-05 PROCEDURE — D9220A PRA ANESTHESIA: ICD-10-PCS | Mod: ANES,,, | Performed by: STUDENT IN AN ORGANIZED HEALTH CARE EDUCATION/TRAINING PROGRAM

## 2023-12-05 PROCEDURE — 74430 PR  X-RAY CYSTOGRAM, MIN 3 VIEW: ICD-10-PCS | Mod: 26,,, | Performed by: STUDENT IN AN ORGANIZED HEALTH CARE EDUCATION/TRAINING PROGRAM

## 2023-12-05 PROCEDURE — 71000015 HC POSTOP RECOV 1ST HR: Performed by: STUDENT IN AN ORGANIZED HEALTH CARE EDUCATION/TRAINING PROGRAM

## 2023-12-05 PROCEDURE — 27201423 OPTIME MED/SURG SUP & DEVICES STERILE SUPPLY: Performed by: STUDENT IN AN ORGANIZED HEALTH CARE EDUCATION/TRAINING PROGRAM

## 2023-12-05 PROCEDURE — 37000009 HC ANESTHESIA EA ADD 15 MINS: Performed by: STUDENT IN AN ORGANIZED HEALTH CARE EDUCATION/TRAINING PROGRAM

## 2023-12-05 PROCEDURE — 52281 CYSTOSCOPY AND TREATMENT: CPT | Mod: ,,, | Performed by: STUDENT IN AN ORGANIZED HEALTH CARE EDUCATION/TRAINING PROGRAM

## 2023-12-05 PROCEDURE — D9220A PRA ANESTHESIA: Mod: CRNA,,, | Performed by: NURSE ANESTHETIST, CERTIFIED REGISTERED

## 2023-12-05 PROCEDURE — 36000706: Performed by: STUDENT IN AN ORGANIZED HEALTH CARE EDUCATION/TRAINING PROGRAM

## 2023-12-05 RX ORDER — FENTANYL CITRATE 50 UG/ML
INJECTION, SOLUTION INTRAMUSCULAR; INTRAVENOUS
Status: DISCONTINUED | OUTPATIENT
Start: 2023-12-05 | End: 2023-12-05

## 2023-12-05 RX ORDER — MIDAZOLAM HYDROCHLORIDE 1 MG/ML
INJECTION, SOLUTION INTRAMUSCULAR; INTRAVENOUS
Status: DISCONTINUED | OUTPATIENT
Start: 2023-12-05 | End: 2023-12-05

## 2023-12-05 RX ORDER — PROPOFOL 10 MG/ML
VIAL (ML) INTRAVENOUS CONTINUOUS PRN
Status: DISCONTINUED | OUTPATIENT
Start: 2023-12-05 | End: 2023-12-05

## 2023-12-05 RX ORDER — LIDOCAINE HYDROCHLORIDE 10 MG/ML
1 INJECTION, SOLUTION EPIDURAL; INFILTRATION; INTRACAUDAL; PERINEURAL ONCE AS NEEDED
Status: COMPLETED | OUTPATIENT
Start: 2023-12-05 | End: 2023-12-05

## 2023-12-05 RX ORDER — DEXMEDETOMIDINE HYDROCHLORIDE 100 UG/ML
INJECTION, SOLUTION INTRAVENOUS
Status: DISCONTINUED | OUTPATIENT
Start: 2023-12-05 | End: 2023-12-05

## 2023-12-05 RX ORDER — SODIUM CHLORIDE 9 MG/ML
INJECTION, SOLUTION INTRAVENOUS CONTINUOUS
Status: DISCONTINUED | OUTPATIENT
Start: 2023-12-05 | End: 2023-12-05 | Stop reason: HOSPADM

## 2023-12-05 RX ORDER — CEFAZOLIN SODIUM 1 G/3ML
INJECTION, POWDER, FOR SOLUTION INTRAMUSCULAR; INTRAVENOUS
Status: DISCONTINUED | OUTPATIENT
Start: 2023-12-05 | End: 2023-12-05

## 2023-12-05 RX ADMIN — SODIUM CHLORIDE: 9 INJECTION, SOLUTION INTRAVENOUS at 09:12

## 2023-12-05 RX ADMIN — Medication 85 MCG/KG/MIN: at 10:12

## 2023-12-05 RX ADMIN — DEXMEDETOMIDINE 8 MCG: 100 INJECTION, SOLUTION, CONCENTRATE INTRAVENOUS at 10:12

## 2023-12-05 RX ADMIN — CEFAZOLIN 2 G: 330 INJECTION, POWDER, FOR SOLUTION INTRAMUSCULAR; INTRAVENOUS at 10:12

## 2023-12-05 RX ADMIN — MIDAZOLAM HYDROCHLORIDE 2 MG: 1 INJECTION, SOLUTION INTRAMUSCULAR; INTRAVENOUS at 10:12

## 2023-12-05 RX ADMIN — SODIUM CHLORIDE: 0.9 INJECTION, SOLUTION INTRAVENOUS at 10:12

## 2023-12-05 RX ADMIN — LIDOCAINE HYDROCHLORIDE 10 MG: 10 INJECTION, SOLUTION EPIDURAL; INFILTRATION; INTRACAUDAL; PERINEURAL at 09:12

## 2023-12-05 RX ADMIN — FENTANYL CITRATE 100 MCG: 50 INJECTION, SOLUTION INTRAMUSCULAR; INTRAVENOUS at 10:12

## 2023-12-05 RX ADMIN — Medication 30 MG: at 10:12

## 2023-12-05 NOTE — TRANSFER OF CARE
"Anesthesia Transfer of Care Note    Patient: Julio Rodríguez    Procedure(s) Performed: Procedure(s) (LRB):  CYSTOSCOPY (N/A)  DILATION, URETHRA (N/A)  CYSTOGRAM (N/A)  PLACEMENT (N/A)    Patient location: PACU    Anesthesia Type: general    Transport from OR: Transported from OR on 6-10 L/min O2 by face mask with adequate spontaneous ventilation    Post pain: adequate analgesia    Post assessment: no apparent anesthetic complications and tolerated procedure well    Post vital signs: stable    Level of consciousness: sedated    Nausea/Vomiting: no nausea/vomiting    Complications: none    Transfer of care protocol was followed      Last vitals: Visit Vitals  /79 (BP Location: Left arm, Patient Position: Lying)   Pulse 78   Temp 36.6 °C (97.9 °F) (Temporal)   Resp 16   Ht 5' 10" (1.778 m)   Wt 71.7 kg (158 lb)   SpO2 100%   BMI 22.67 kg/m²     "

## 2023-12-05 NOTE — ANESTHESIA POSTPROCEDURE EVALUATION
Anesthesia Post Evaluation    Patient: Julio Rodríguez    Procedure(s) Performed: Procedure(s) (LRB):  CYSTOSCOPY (N/A)  DILATION, URETHRA (N/A)  CYSTOGRAM (N/A)  PLACEMENT (N/A)    Final Anesthesia Type: general      Patient location during evaluation: PACU  Patient participation: Yes- Able to Participate  Level of consciousness: awake  Post-procedure vital signs: reviewed and stable  Pain management: adequate  Airway patency: patent    PONV status at discharge: No PONV  Anesthetic complications: no      Cardiovascular status: blood pressure returned to baseline  Respiratory status: unassisted, spontaneous ventilation and room air                Vitals Value Taken Time   /87 12/05/23 1216   Temp 36.7 °C (98 °F) 12/05/23 1123   Pulse 59 12/05/23 1221   Resp 20 12/05/23 1145   SpO2 100 % 12/05/23 1221   Vitals shown include unvalidated device data.      No case tracking events are documented in the log.      Pain/Aurelio Score: Aurelio Score: 10 (12/5/2023 11:45 AM)

## 2023-12-05 NOTE — PLAN OF CARE
Awake , alert, oriented. Wife at bedside. No apparent distress. Discharge instructions given with wife in attendance. Accepting PO fluids

## 2023-12-05 NOTE — BRIEF OP NOTE
[facility] - Surgery (1st Fl)  Brief Operative Note    Surgery Date: 12/5/2023     Surgeon(s) and Role:     * Eder Oconnor MD - Primary     * Michelle Bradley MD - Resident - Assisting     * Harris Duke MD - Resident - Assisting        Pre-op Diagnosis:  Malignant neoplasm of urinary bladder, unspecified site [C67.9]  Other stricture of overlapping sites of urethra in male [N35.816]    Post-op Diagnosis:  Post-Op Diagnosis Codes:     * Malignant neoplasm of urinary bladder, unspecified site [C67.9]     * Other stricture of overlapping sites of urethra in male [N35.816]    Procedure(s) (LRB):  CYSTOSCOPY (N/A)  DILATION, URETHRA (N/A)  CYSTOGRAM (N/A)  PLACEMENT (N/A)    Anesthesia: General/MAC    Operative Findings:   - Unable to pass cystoscope into bladder due to stricture. Cystogram to confirm proper wire placement. Stricture dilated from 10Fr-20Fr over a wire. 16Fr Smith placed.     Estimated Blood Loss: * No values recorded between 12/5/2023 10:44 AM and 12/5/2023 11:12 AM *         Specimens:   Specimen (24h ago, onward)      None              Discharge Note    OUTCOME: Patient tolerated treatment/procedure well without complication and is now ready for discharge.    DISPOSITION: Home or Self Care    FINAL DIAGNOSIS:  <principal problem not specified>    FOLLOWUP: In clinic    DISCHARGE INSTRUCTIONS:  No discharge procedures on file.

## 2023-12-05 NOTE — ANESTHESIA PREPROCEDURE EVALUATION
Pre-operative evaluation for Procedure(s) (LRB):  CYSTOSCOPY (N/A)  CYSTOSCOPY, WITH URETHRAL DILATION (N/A)    Julio Rodríguez is a 58 y.o. male w/ HLD, CAD (s/p 3v CABG in 2015) and bladder cancer (now with neobladder, self-catheterizes daily) with lung mets who presents for the above procedure.       Prev airway (8/4/2022):   Direct laryngoscopy; Oral Standard; 7.5 mm; Cuffed; Auscultation, Capnometry, Symmetrical chest wall movement; Nava; 1     EKG (11/7/2023):   Vent. Rate : 093 BPM     Atrial Rate : 093 BPM      P-R Int : 136 ms          QRS Dur : 086 ms       QT Int : 322 ms       P-R-T Axes : 082 048 -23 degrees      QTc Int : 400 ms     Normal sinus rhythm   ST and T wave abnormality, consider inferolateral ischemia   Abnormal ECG     2D Echo (10/18/2023):    Left Ventricle: The left ventricle is normal in size. Normal wall thickness. Normal wall motion. There is normal systolic function with a visually estimated ejection fraction of 55 - 60%. Biplane (2D) method of discs ejection fraction is 56%. There is normal diastolic function.    Right Ventricle: Normal right ventricular cavity size. Wall thickness is normal. Right ventricle wall motion  is normal. Systolic function is normal.    Aortic Valve: The aortic valve is a trileaflet valve.    Tricuspid Valve: There is mild regurgitation.    IVC/SVC: Normal venous pressure at 3 mmHg.      Patient Active Problem List   Diagnosis    Malignant neoplasm of urinary bladder    Melanoma    CAD (coronary artery disease)    Hypothyroidism    Psoriasis    Elevated blood pressure reading    CKD (chronic kidney disease)       Review of patient's allergies indicates:  No Known Allergies     No current facility-administered medications on file prior to encounter.     Current Outpatient Medications on File Prior to Encounter   Medication Sig Dispense Refill    atorvastatin (LIPITOR) 20 MG tablet Take 20 mg by mouth every evening.      docusate sodium (COLACE) 100 MG  "capsule Take 1 capsule by mouth every evening.      levothyroxine (SYNTHROID) 50 MCG tablet Take 50 mcg by mouth before breakfast.      LINZESS 72 mcg Cap capsule Take 72 mcg by mouth every morning.      multivitamin (THERAGRAN) per tablet Take 1 tablet by mouth every morning.      Lactobacillus rhamnosus GG (CULTURELLE) 10 billion cell capsule Take 1 capsule by mouth once daily.      TURMERIC ORAL Take by mouth every morning.      VTAMA 1 % Crea APPLY 1 APPLICATION ON THE SKIN DAILY         Past Surgical History:   Procedure Laterality Date    CORONARY ARTERY BYPASS GRAFT  10/2015    CYSTECTOMY, ROBOT-ASSISTED, LAPAROSCOPIC, USING DA MARY XI, WITH ILEAL CONDUIT CREATION  12/2017    DILATION OF BLADDER      dialation of bladder neck- due to claudia bladder- multiple procedures    LYMPHADENECTOMY      XI ROBOTIC PROSTECTOMY, SUPRAPUBIC  12/2017       Social History     Socioeconomic History    Marital status: Single   Tobacco Use    Smoking status: Former     Types: Cigarettes   Substance and Sexual Activity    Alcohol use: Not Currently    Drug use: Never   Social History Narrative    Lives independently in [facility]. 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          Vital Signs Range (Last 24H):  Temp:  [36.6 °C (97.9 °F)]       CBC: No results for input(s): "WBC", "RBC", "HGB", "HCT", "PLT", "MCV", "MCH", "MCHC" in the last 72 hours.    CMP: No results for input(s): "NA", "K", "CL", "CO2", "BUN", "CREATININE", "GLU", "MG", "PHOS", "CALCIUM", "ALBUMIN", "PROT", "ALKPHOS", "ALT", "AST", "BILITOT" in the last 72 hours.    INR  No results for input(s): "PT", "INR", "PROTIME", "APTT" in the last 72 hours.              Pre-op Assessment    I have reviewed the Patient Summary Reports.     I have reviewed the Nursing Notes. I have reviewed the NPO Status.   I have reviewed the Medications.     Review of Systems  Anesthesia Hx:  No problems with previous Anesthesia             Denies Family Hx " of Anesthesia complications.     Hematology/Oncology:                      Current/Recent Cancer.                Cardiovascular:     Hypertension  Denies Valvular problems/Murmurs.  CAD              ECG has been reviewed.                          Renal/:  Chronic Renal Disease                Hepatic/GI:  Hepatic/GI Normal                 Endocrine:   Hypothyroidism              Physical Exam  General: Alert and Oriented    Airway:  Mallampati: II   Mouth Opening: Normal  TM Distance: Normal  Tongue: Normal    Dental:  Intact    Chest/Lungs:  Clear to auscultation, Normal Respiratory Rate    Heart:  Rate: Normal  Rhythm: Regular Rhythm        Anesthesia Plan  Type of Anesthesia, risks & benefits discussed:    Anesthesia Type: Gen Natural Airway  Intra-op Monitoring Plan: Standard ASA Monitors  Post Op Pain Control Plan: IV/PO Opioids PRN and multimodal analgesia  Induction:  IV  Airway Plan: Direct and Video  Informed Consent: Informed consent signed with the Patient and all parties understand the risks and agree with anesthesia plan.  All questions answered.   ASA Score: 3  Day of Surgery Review of History & Physical: H&P Update referred to the surgeon/provider.    Ready For Surgery From Anesthesia Perspective.     .

## 2023-12-05 NOTE — PROGRESS NOTES
Per brody NORMAN w/ cysto, no pre op urine needed. Patient has neobladder and self cath at home, last self cath was 0800.    imelda

## 2023-12-05 NOTE — OP NOTE
[facility]  Operative Note    Date: 12/05/2023    Pre-Op Diagnosis: urethral stricture  Patient Active Problem List    Diagnosis Date Noted    CKD (chronic kidney disease) 10/23/2023    Elevated blood pressure reading 09/28/2023    Malignant neoplasm of urinary bladder 09/12/2023    Melanoma 09/12/2023    CAD (coronary artery disease) 09/12/2023    Hypothyroidism 09/12/2023    Psoriasis 09/12/2023      Post-Op Diagnosis: same    Procedure(s) Performed:   1. Cystourethroscopy   2. Urethral dilation  3. Cystogram    Specimen(s): none    Staff Surgeon: Eder Oconnor MD    Assistant Surgeon: Michelle Bradley MD     Anesthesia: General mask inhalational anesthesia    Indications: Julio Rodríguez is a 58 y.o. male with history of cystectomy with Neobladder now with urethral stricture. He presents today for cystoscopy    Findings:   - Bulbar urethral stricture, unable to pass with scope.   - Cystogram performed to ensure proper wire placement.  - Dilated from 10Fr to 20Fr.     Estimated Blood Loss: min    Drains: 16Fr    Procedure in Detail:  After risks, benefits and possible complications of cystoscopy were explained, the patient elected to undergo the procedure and informed consent was obtained. All questions were answered in the puja-operative area. The patient was transferred to the cystoscopy suite and placed in the supine position.  SCDs were applied and working.  Anesthesia was administered.  Once the patient was adequately sedated, he was placed in the dorsal lithotomy position and prepped and draped in the usual sterile fashion. Time out was performed, puja-procedural antibiotics were confirmed.     A rigid cystoscope in a 22 Fr sheath was introduced into the urethra. Stricture encountered in the bulbar urethra. A wire was passed through the stricture into the Neobladder.  The wire was exchanged for a 5 Albanian open-ended ureteral catheter and the wire was removed.  A cystogram was performed to  ensure proper wire placement.  The 5 Belgian open-ended ureteral catheter was exchanged for a super stiff wire. The urethral stricture was dilated serially from 10-20 Belgian.  A 26Fr [place] tipped Smith catheter was placed over the wire into the Neobladder. Wire removed. 10cc in balloon.    The bladder was drained, and the patient was removed from lithotomy.     The patient tolerated the procedure well and was transferred to recovery in stable condition.    Disposition:  The patient will follow up with Dr. Oconnor in clinic.      Michelle Bradley MD

## 2023-12-05 NOTE — DISCHARGE INSTRUCTIONS
Post Cystoscopy Instructions  Do not strain to have a bowel movement  No strenuous exercise x 7 days  No driving while you are on narcotic pain medications or if your valiente  catheter is in place    You can expect:  See blood in your urine for a few days    If you have a catheter, please return to the ER if your catheter stops draining or you are having abdominal pain.    Call the doctor if:  Temperature is greater than 101F  Persistent vomiting and inability to keep food down  Inability to void if you do not have a catheter     Mikala Miguel), Neurology  [facility]  [address] [place] [address]  Phone: [phone]  Fax: [phone]

## 2023-12-05 NOTE — INTERVAL H&P NOTE
The patient has been examined and the H&P has been reviewed:    I concur with the findings and no changes have occurred since H&P was written.    Surgery risks, benefits and alternative options discussed and understood by patient/family.\    No blood thinners.     Patient Active Problem List   Diagnosis    Malignant neoplasm of urinary bladder    Melanoma    CAD (coronary artery disease)    Hypothyroidism    Psoriasis    Elevated blood pressure reading    CKD (chronic kidney disease)

## 2023-12-06 ENCOUNTER — PATIENT MESSAGE (OUTPATIENT)
Dept: RESEARCH | Facility: HOSPITAL | Age: 58
End: 2023-12-06
Payer: COMMERCIAL

## 2023-12-07 ENCOUNTER — LAB VISIT (OUTPATIENT)
Dept: LAB | Facility: HOSPITAL | Age: 58
End: 2023-12-07
Payer: COMMERCIAL

## 2023-12-07 ENCOUNTER — PATIENT MESSAGE (OUTPATIENT)
Dept: UROLOGY | Facility: CLINIC | Age: 58
End: 2023-12-07
Payer: COMMERCIAL

## 2023-12-07 DIAGNOSIS — Z00.6 RESEARCH STUDY PATIENT: ICD-10-CM

## 2023-12-07 DIAGNOSIS — C67.9 MALIGNANT NEOPLASM OF URINARY BLADDER, UNSPECIFIED SITE: ICD-10-CM

## 2023-12-07 LAB
ALBUMIN SERPL BCP-MCNC: 3 G/DL (ref 3.5–5.2)
ALP SERPL-CCNC: 78 U/L (ref 55–135)
ALT SERPL W/O P-5'-P-CCNC: 5 U/L (ref 10–44)
ANION GAP SERPL CALC-SCNC: 10 MMOL/L (ref 8–16)
AST SERPL-CCNC: 13 U/L (ref 10–40)
BASOPHILS # BLD AUTO: 0.04 K/UL (ref 0–0.2)
BASOPHILS NFR BLD: 0.5 % (ref 0–1.9)
BILIRUB SERPL-MCNC: 0.5 MG/DL (ref 0.1–1)
BUN SERPL-MCNC: 36 MG/DL (ref 6–20)
CALCIUM SERPL-MCNC: 9.7 MG/DL (ref 8.7–10.5)
CHLORIDE SERPL-SCNC: 104 MMOL/L (ref 95–110)
CO2 SERPL-SCNC: 24 MMOL/L (ref 23–29)
CREAT SERPL-MCNC: 2.7 MG/DL (ref 0.5–1.4)
DIFFERENTIAL METHOD: ABNORMAL
EOSINOPHIL # BLD AUTO: 0.2 K/UL (ref 0–0.5)
EOSINOPHIL NFR BLD: 2.8 % (ref 0–8)
ERYTHROCYTE [DISTWIDTH] IN BLOOD BY AUTOMATED COUNT: 12.1 % (ref 11.5–14.5)
EST. GFR  (NO RACE VARIABLE): 26.5 ML/MIN/1.73 M^2
GLUCOSE SERPL-MCNC: 94 MG/DL (ref 70–110)
HCT VFR BLD AUTO: 33.4 % (ref 40–54)
HGB BLD-MCNC: 11.1 G/DL (ref 14–18)
IMM GRANULOCYTES # BLD AUTO: 0.02 K/UL (ref 0–0.04)
IMM GRANULOCYTES NFR BLD AUTO: 0.3 % (ref 0–0.5)
LYMPHOCYTES # BLD AUTO: 1.2 K/UL (ref 1–4.8)
LYMPHOCYTES NFR BLD: 16.5 % (ref 18–48)
MCH RBC QN AUTO: 30.8 PG (ref 27–31)
MCHC RBC AUTO-ENTMCNC: 33.2 G/DL (ref 32–36)
MCV RBC AUTO: 93 FL (ref 82–98)
MONOCYTES # BLD AUTO: 0.7 K/UL (ref 0.3–1)
MONOCYTES NFR BLD: 8.9 % (ref 4–15)
NEUTROPHILS # BLD AUTO: 5.3 K/UL (ref 1.8–7.7)
NEUTROPHILS NFR BLD: 71 % (ref 38–73)
NRBC BLD-RTO: 0 /100 WBC
PLATELET # BLD AUTO: 254 K/UL (ref 150–450)
PMV BLD AUTO: 9.5 FL (ref 9.2–12.9)
POTASSIUM SERPL-SCNC: 4.3 MMOL/L (ref 3.5–5.1)
PROT SERPL-MCNC: 7.3 G/DL (ref 6–8.4)
RBC # BLD AUTO: 3.6 M/UL (ref 4.6–6.2)
SODIUM SERPL-SCNC: 138 MMOL/L (ref 136–145)
WBC # BLD AUTO: 7.52 K/UL (ref 3.9–12.7)

## 2023-12-07 PROCEDURE — 36415 COLL VENOUS BLD VENIPUNCTURE: CPT | Performed by: INTERNAL MEDICINE

## 2023-12-07 PROCEDURE — 80053 COMPREHEN METABOLIC PANEL: CPT | Performed by: INTERNAL MEDICINE

## 2023-12-07 PROCEDURE — 85025 COMPLETE CBC W/AUTO DIFF WBC: CPT | Performed by: INTERNAL MEDICINE

## 2023-12-08 ENCOUNTER — PATIENT MESSAGE (OUTPATIENT)
Dept: UROLOGY | Facility: CLINIC | Age: 58
End: 2023-12-08
Payer: COMMERCIAL

## 2023-12-08 ENCOUNTER — HOSPITAL ENCOUNTER (OUTPATIENT)
Facility: OTHER | Age: 58
Discharge: HOME | End: 2023-12-08
Attending: RADIOLOGY | Admitting: RADIOLOGY
Payer: COMMERCIAL

## 2023-12-08 ENCOUNTER — NURSE TRIAGE (OUTPATIENT)
Dept: ADMINISTRATIVE | Facility: CLINIC | Age: 58
End: 2023-12-08
Payer: COMMERCIAL

## 2023-12-08 VITALS
SYSTOLIC BLOOD PRESSURE: 130 MMHG | DIASTOLIC BLOOD PRESSURE: 76 MMHG | HEART RATE: 77 BPM | TEMPERATURE: 98 F | RESPIRATION RATE: 16 BRPM | OXYGEN SATURATION: 98 %

## 2023-12-08 DIAGNOSIS — C67.9 MALIGNANT NEOPLASM OF URINARY BLADDER, UNSPECIFIED SITE: ICD-10-CM

## 2023-12-08 DIAGNOSIS — N13.30 HYDRONEPHROSIS OF RIGHT KIDNEY: ICD-10-CM

## 2023-12-08 PROCEDURE — C1761 OPTIME CATH, TRANSLUMINAL INTRAVASC LITHO, CORONARY: HCPCS | Performed by: RADIOLOGY

## 2023-12-08 PROCEDURE — 63600175 PHARM REV CODE 636 W HCPCS: Performed by: RADIOLOGY

## 2023-12-08 PROCEDURE — C1769 GUIDE WIRE: HCPCS | Performed by: RADIOLOGY

## 2023-12-08 PROCEDURE — 87086 URINE CULTURE/COLONY COUNT: CPT | Performed by: RADIOLOGY

## 2023-12-08 PROCEDURE — 99152 MOD SED SAME PHYS/QHP 5/>YRS: CPT | Performed by: RADIOLOGY

## 2023-12-08 PROCEDURE — 25000003 PHARM REV CODE 250: Performed by: RADIOLOGY

## 2023-12-08 RX ORDER — MIDAZOLAM HYDROCHLORIDE 1 MG/ML
INJECTION INTRAMUSCULAR; INTRAVENOUS
Status: DISCONTINUED | OUTPATIENT
Start: 2023-12-08 | End: 2023-12-08 | Stop reason: HOSPADM

## 2023-12-08 RX ORDER — FENTANYL CITRATE 50 UG/ML
INJECTION, SOLUTION INTRAMUSCULAR; INTRAVENOUS
Status: DISCONTINUED | OUTPATIENT
Start: 2023-12-08 | End: 2023-12-08 | Stop reason: HOSPADM

## 2023-12-08 RX ORDER — HEPARIN SOD,PORCINE/0.9 % NACL 1000/500ML
INTRAVENOUS SOLUTION INTRAVENOUS
Status: DISCONTINUED | OUTPATIENT
Start: 2023-12-08 | End: 2023-12-08 | Stop reason: HOSPADM

## 2023-12-08 RX ORDER — ACETAMINOPHEN 325 MG/1
650 TABLET ORAL ONCE
Status: COMPLETED | OUTPATIENT
Start: 2023-12-08 | End: 2023-12-08

## 2023-12-08 RX ORDER — CEFTRIAXONE 1 G/1
INJECTION, POWDER, FOR SOLUTION INTRAMUSCULAR; INTRAVENOUS
Status: DISCONTINUED | OUTPATIENT
Start: 2023-12-08 | End: 2023-12-08 | Stop reason: HOSPADM

## 2023-12-08 RX ADMIN — ACETAMINOPHEN 650 MG: 325 TABLET, FILM COATED ORAL at 11:12

## 2023-12-08 NOTE — H&P
Radiology History & Physical      SUBJECTIVE:     Chief Complaint: bladder cancer s/p ileal neobladder with right hydronephrosis    History of Present Illness:  Julio Rodríguez is a 58 y.o. male who presents for right percutaneous nephrostomy  Past Medical History:   Diagnosis Date    Bladder cancer     CAD (coronary artery disease) 9/12/2023    Prior CABG. Not on antiplatelets. Home medicatiosn include atorvastatin    Carcinoma     forehead    Encounter for blood transfusion     Hypertension     Hypothyroidism 9/12/2023    Home medications include levothyroxine    Malignant melanoma of skin, unspecified     right arm    Malignant neoplasm of urinary bladder 9/12/2023    Melanoma 9/12/2023    History of T1a melanoma; 0.5mm depth without ulceartion. SP wide local excision 02/2022     Past Surgical History:   Procedure Laterality Date    CORONARY ARTERY BYPASS GRAFT  10/2015    CYSTECTOMY, ROBOT-ASSISTED, LAPAROSCOPIC, USING DA Clarisonic XI, WITH ILEAL CONDUIT CREATION  12/2017    CYSTOGRAM N/A 12/5/2023    Procedure: CYSTOGRAM;  Surgeon: Eder Oconnor MD;  Location: [facility] OR [address] [place] [address];  Service: Urology;  Laterality: N/A;    CYSTOSCOPY N/A 12/5/2023    Procedure: CYSTOSCOPY;  Surgeon: Eder Oconnor MD;  Location: [facility] OR [address] [place] [address];  Service: Urology;  Laterality: N/A;    DILATION OF BLADDER      dialation of bladder neck- due to claudia bladder- multiple procedures    DILATION OF URETHRA N/A 12/5/2023    Procedure: DILATION, URETHRA;  Surgeon: Eder Oconnor MD;  Location: [facility] OR [address] [place] [address];  Service: Urology;  Laterality: N/A;    LYMPHADENECTOMY      PLACEMENT N/A 12/5/2023    Procedure: PLACEMENT;  Surgeon: Eder Oconnor MD;  Location: [facility] OR [address] [place] [address];  Service: Urology;  Laterality: N/A;  placement of valiente over a wire by MD MOHR ROBOTIC PROSTECTOMY, SUPRAPUBIC  12/2017       Home Meds:   Prior to Admission medications    Medication Sig Start Date End Date Taking? Authorizing Provider   atorvastatin (LIPITOR) 20 MG tablet Take  20 mg by mouth every evening. 7/19/23  Yes Provider, Historical   docusate sodium (COLACE) 100 MG capsule Take 220 capsules by mouth every evening.   Yes Provider, Historical   Lactobacillus rhamnosus GG (CULTURELLE) 10 billion cell capsule Take 1 capsule by mouth once daily.   Yes Provider, Historical   levothyroxine (SYNTHROID) 50 MCG tablet Take 50 mcg by mouth before breakfast. 7/17/23  Yes Provider, Historical   LINZESS 72 mcg Cap capsule Take 72 mcg by mouth every morning. 8/22/23  Yes Provider, Historical   multivitamin (THERAGRAN) per tablet Take 1 tablet by mouth every morning.   Yes Provider, Historical   TURMERIC ORAL Take by mouth every morning.   Yes Provider, Historical   VTAMA 1 % Crea APPLY 1 APPLICATION ON THE SKIN DAILY 8/14/23  Yes Provider, Historical     Anticoagulants/Antiplatelets: no anticoagulation    Allergies: Review of patient's allergies indicates:  No Known Allergies  Sedation History:  no adverse reactions    Review of Systems:   Hematological: no known coagulopathies  Respiratory: no shortness of breath  Cardiovascular: no chest pain  Gastrointestinal: no abdominal pain, c/o bloating sensation  Genito-Urinary: no dysuria, valiente catheter in place  Musculoskeletal: negative  Neurological: no TIA or stroke symptoms         OBJECTIVE:     Vital Signs (Most Recent)  Temp: 98.3 °F (36.8 °C) (12/08/23 0829)  Pulse: 75 (12/08/23 0829)  Resp: 16 (12/08/23 0829)  BP: 138/83 (12/08/23 0829)  SpO2: 100 % (12/08/23 0829)    Physical Exam:  ASA: 2  Mallampati: 2    General: no acute distress  Mental Status: alert and oriented to person, place and time  HEENT: normocephalic, atraumatic  Chest: unlabored breathing  Heart: regular heart rate  Abdomen: nondistended, valiente catheter in place  Extremity: moves all extremities    Laboratory  Lab Results   Component Value Date    INR 1.0 11/18/2023       Lab Results   Component Value Date    WBC 7.52 12/07/2023    HGB 11.1 (L) 12/07/2023    HCT 33.4 (L)  12/07/2023    MCV 93 12/07/2023     12/07/2023      Lab Results   Component Value Date    GLU 94 12/07/2023     12/07/2023    K 4.3 12/07/2023     12/07/2023    CO2 24 12/07/2023    BUN 36 (H) 12/07/2023    CREATININE 2.7 (H) 12/07/2023    CALCIUM 9.7 12/07/2023    ALT 5 (L) 12/07/2023    AST 13 12/07/2023    ALBUMIN 3.0 (L) 12/07/2023    BILITOT 0.5 12/07/2023       ASSESSMENT/PLAN:     Sedation Plan: moderate  Patient will undergo right PCN placement.    Jens Nunez MD  Staff Radiologist  Department of Radiology  Pager: [phone]

## 2023-12-08 NOTE — Clinical Note
The site was marked. The right back and right flank was prepped. The site was prepped with ChloraPrep. The site was clipped. The patient was draped.

## 2023-12-08 NOTE — OR NURSING
Patient has many questions and would like DR Nunez to come talk to him prior to discharge.  Dr Nunez notified.

## 2023-12-08 NOTE — PROCEDURES
Radiology Post-Procedure Note    Pre Op Diagnosis: right hydronephrosis, hx of bladder cancer s/p ileal neobladder    Post Op Diagnosis: same    Procedure: right percutaneous nephrostomy    Procedure performed by: Jens Nunez MD    Written Informed Consent Obtained: Yes    Specimen Removed: YES cloudy pink urine for C/S    Estimated Blood Loss: Less than 50 mL    Findings: Local anesthesia and moderate sedation were used.      Ultrasound and fluoroscopy were used to localize the right collecting system and a percutaneous catheter was introduced.  Position of the catheter in the collecting system was confirmed using injection of iodinated contrast.      The patient tolerated the procedure well and there were no complications.  A specimen was sent to the lab for further analysis and culture. Please see Imaging report for further details.    Jens Nunez MD  Staff Radiologist  Department of Radiology  Pager: [phone]

## 2023-12-08 NOTE — DISCHARGE SUMMARY
Radiology Discharge Summary      Hospital Course: No complications    Admit Date: 12/8/2023  Discharge Date: 12/08/2023     Instructions Given to Patient: Yes  Diet: Resume prior diet  Activity: activity as tolerated and no driving for today    Description of Condition on Discharge: Stable  Vital Signs (Most Recent): Temp: 98 °F (36.7 °C) (12/08/23 1103)  Pulse: 77 (12/08/23 1333)  Resp: 16 (12/08/23 1333)  BP: 130/76 (12/08/23 1333)  SpO2: 98 % (12/08/23 1333)    Discharge Disposition: Home    Discharge Diagnosis: bladder cancer with ileal neobladder s/p right PCN placement for hydronephrosis     Follow-up: with Urology    Jens Nunez MD  Staff Radiologist  Department of Radiology  Pager: [phone]

## 2023-12-08 NOTE — OR NURSING
Dr Nunez and Alicia RN came to bedside and gave thorough instructions to the patient.  He is comfortable going home now.

## 2023-12-08 NOTE — DISCHARGE INSTRUCTIONS
Anesthesia: Monitored Anesthesia Care (MAC)    Anesthesia Safety  Have an adult family member or friend drive you home after the procedure.  For the first 24 hours after your surgery:  Do not drive or use heavy equipment.  Do not make important decisions or sign documents.  Avoid alcohol.  Have someone stay with you, if possible. They can watch for problems and help keep you safe.        FOLLOW ANY INSTRUCTIONS GIVEN BY DR GORDON

## 2023-12-08 NOTE — PLAN OF CARE
Julio Marcos has met all discharge criteria from Phase II. Vital Signs are stable, ambulating  without difficulty.Pain is now under control and tolerable for the pt. Pain score 4 at this time.  Discharge instructions given, patient verbalized understanding. Discharged from facility via wheelchair in stable condition.

## 2023-12-09 ENCOUNTER — HOSPITAL ENCOUNTER (EMERGENCY)
Facility: HOSPITAL | Age: 58
Discharge: HOME | End: 2023-12-09
Attending: STUDENT IN AN ORGANIZED HEALTH CARE EDUCATION/TRAINING PROGRAM
Payer: COMMERCIAL

## 2023-12-09 VITALS
WEIGHT: 159 LBS | HEART RATE: 91 BPM | RESPIRATION RATE: 17 BRPM | HEIGHT: 70 IN | TEMPERATURE: 98 F | BODY MASS INDEX: 22.76 KG/M2 | SYSTOLIC BLOOD PRESSURE: 137 MMHG | DIASTOLIC BLOOD PRESSURE: 81 MMHG | OXYGEN SATURATION: 100 %

## 2023-12-09 DIAGNOSIS — T83.098A MALFUNCTION OF NEPHROSTOMY TUBE: Primary | ICD-10-CM

## 2023-12-09 LAB
ALBUMIN SERPL BCP-MCNC: 2.9 G/DL (ref 3.5–5.2)
ALP SERPL-CCNC: 76 U/L (ref 55–135)
ALT SERPL W/O P-5'-P-CCNC: 5 U/L (ref 10–44)
ANION GAP SERPL CALC-SCNC: 9 MMOL/L (ref 8–16)
AST SERPL-CCNC: 13 U/L (ref 10–40)
BASOPHILS # BLD AUTO: 0.04 K/UL (ref 0–0.2)
BASOPHILS NFR BLD: 0.5 % (ref 0–1.9)
BILIRUB SERPL-MCNC: 0.5 MG/DL (ref 0.1–1)
BUN SERPL-MCNC: 31 MG/DL (ref 6–20)
CALCIUM SERPL-MCNC: 9.1 MG/DL (ref 8.7–10.5)
CHLORIDE SERPL-SCNC: 99 MMOL/L (ref 95–110)
CO2 SERPL-SCNC: 23 MMOL/L (ref 23–29)
CREAT SERPL-MCNC: 2.4 MG/DL (ref 0.5–1.4)
DIFFERENTIAL METHOD: ABNORMAL
EOSINOPHIL # BLD AUTO: 0.2 K/UL (ref 0–0.5)
EOSINOPHIL NFR BLD: 2.4 % (ref 0–8)
ERYTHROCYTE [DISTWIDTH] IN BLOOD BY AUTOMATED COUNT: 12.2 % (ref 11.5–14.5)
EST. GFR  (NO RACE VARIABLE): 30.5 ML/MIN/1.73 M^2
GLUCOSE SERPL-MCNC: 88 MG/DL (ref 70–110)
HCT VFR BLD AUTO: 29.7 % (ref 40–54)
HGB BLD-MCNC: 10.1 G/DL (ref 14–18)
IMM GRANULOCYTES # BLD AUTO: 0.02 K/UL (ref 0–0.04)
IMM GRANULOCYTES NFR BLD AUTO: 0.2 % (ref 0–0.5)
LIPASE SERPL-CCNC: 14 U/L (ref 4–60)
LYMPHOCYTES # BLD AUTO: 1.1 K/UL (ref 1–4.8)
LYMPHOCYTES NFR BLD: 12.8 % (ref 18–48)
MCH RBC QN AUTO: 29.7 PG (ref 27–31)
MCHC RBC AUTO-ENTMCNC: 34 G/DL (ref 32–36)
MCV RBC AUTO: 87 FL (ref 82–98)
MONOCYTES # BLD AUTO: 0.8 K/UL (ref 0.3–1)
MONOCYTES NFR BLD: 9 % (ref 4–15)
NEUTROPHILS # BLD AUTO: 6.3 K/UL (ref 1.8–7.7)
NEUTROPHILS NFR BLD: 75.1 % (ref 38–73)
NRBC BLD-RTO: 0 /100 WBC
PLATELET # BLD AUTO: 248 K/UL (ref 150–450)
PMV BLD AUTO: 9.3 FL (ref 9.2–12.9)
POTASSIUM SERPL-SCNC: 4.2 MMOL/L (ref 3.5–5.1)
PROT SERPL-MCNC: 6.9 G/DL (ref 6–8.4)
RBC # BLD AUTO: 3.4 M/UL (ref 4.6–6.2)
SODIUM SERPL-SCNC: 131 MMOL/L (ref 136–145)
WBC # BLD AUTO: 8.33 K/UL (ref 3.9–12.7)

## 2023-12-09 PROCEDURE — 83690 ASSAY OF LIPASE: CPT | Performed by: STUDENT IN AN ORGANIZED HEALTH CARE EDUCATION/TRAINING PROGRAM

## 2023-12-09 PROCEDURE — 99284 EMERGENCY DEPT VISIT MOD MDM: CPT | Mod: 25

## 2023-12-09 PROCEDURE — 80053 COMPREHEN METABOLIC PANEL: CPT | Performed by: STUDENT IN AN ORGANIZED HEALTH CARE EDUCATION/TRAINING PROGRAM

## 2023-12-09 PROCEDURE — 85025 COMPLETE CBC W/AUTO DIFF WBC: CPT | Performed by: STUDENT IN AN ORGANIZED HEALTH CARE EDUCATION/TRAINING PROGRAM

## 2023-12-09 NOTE — ED PROVIDER NOTES
Encounter Date: 12/9/2023       History     Chief Complaint   Patient presents with    Decreased Urinary Output      Nephrostomy placed yesterday. Pt reports no output today.      HPI  59 yo M with history of bladder cancer s/p ileal neobladder, R PCN placement for hydronephrosis yesterday (12/8/23) presenting with decreased nephrostomy output.  Reports 300 cc output yesterday from nephrostomy, none today, drainage from nephrostomy tube has lightened in color now is straw-colored with mild sanguinous tint, fluid and bag is red, with no visible clots in the tube.  Patient has had 100 cc/hour roughly of urine output from Smith catheter.  Denies any significant abdominal pain/back pain/nausea/vomiting/fever.  Has localized pain to tube insertion site in right flank.  Called the on-call interventional radiology number last night, was told by a nurse and physician to come in to ED today for evaluation.    Review of patient's allergies indicates:  No Known Allergies  Past Medical History:   Diagnosis Date    Bladder cancer     CAD (coronary artery disease) 9/12/2023    Prior CABG. Not on antiplatelets. Home medicatiosn include atorvastatin    Carcinoma     forehead    Encounter for blood transfusion     Hypertension     Hypothyroidism 9/12/2023    Home medications include levothyroxine    Malignant melanoma of skin, unspecified     right arm    Malignant neoplasm of urinary bladder 9/12/2023    Melanoma 9/12/2023    History of T1a melanoma; 0.5mm depth without ulceartion. SP wide local excision 02/2022     Past Surgical History:   Procedure Laterality Date    CORONARY ARTERY BYPASS GRAFT  10/2015    CYSTECTOMY, ROBOT-ASSISTED, LAPAROSCOPIC, USING DA MARY XI, WITH ILEAL CONDUIT CREATION  12/2017    CYSTOGRAM N/A 12/5/2023    Procedure: CYSTOGRAM;  Surgeon: Eder Oconnor MD;  Location: [facility] OR [address] [place] [address];  Service: Urology;  Laterality: N/A;    CYSTOSCOPY N/A 12/5/2023    Procedure: CYSTOSCOPY;  Surgeon: Eder Oconnor MD;   [facility];  Service: Urology;  Laterality: N/A;    DILATION OF BLADDER      dialation of bladder neck- due to claudia bladder- multiple procedures    DILATION OF URETHRA N/A 12/5/2023    Procedure: DILATION, URETHRA;  Surgeon: Eder Oconnor MD;  [facility];  Service: Urology;  Laterality: N/A;    LYMPHADENECTOMY      PLACEMENT N/A 12/5/2023    Procedure: PLACEMENT;  Surgeon: Eder Oconnor MD;  [facility];  Service: Urology;  Laterality: N/A;  placement of valiente over a wire by MD MOHR ROBOTIC PROSTECTOMY, SUPRAPUBIC  12/2017     Family History   Problem Relation Age of Onset    Heart disease Father     Heart attack Paternal Uncle     Breast cancer Maternal Cousin     Colon cancer Neg Hx     Bladder Cancer Neg Hx      Social History     Tobacco Use    Smoking status: Former     Types: Cigarettes   Substance Use Topics    Alcohol use: Not Currently    Drug use: Never     Review of Systems    Physical Exam     Initial Vitals [12/09/23 1231]   BP Pulse Resp Temp SpO2   (!) 153/95 73 16 98.2 °F (36.8 °C) 100 %      MAP       --         Physical Exam    Constitutional: He appears well-developed and well-nourished.   HENT:   Head: Normocephalic and atraumatic.   Eyes: EOM are normal. Pupils are equal, round, and reactive to light.   Cardiovascular:  Normal rate and regular rhythm.           Pulmonary/Chest: Effort normal.   Abdominal: He exhibits no distension.   Genitourinary:    Genitourinary Comments: Valiente catheter in place draining straw-colored urine     Musculoskeletal:         General: No tenderness or edema.     Neurological: He is alert and oriented to person, place, and time.   Skin: Skin is warm and dry.   Surgical dressing in place around PCN on right flank, no erythema/tenderness surrounding incision site   Psychiatric: He has a normal mood and affect.         ED Course   Procedures  Labs Reviewed   CBC W/ AUTO DIFFERENTIAL - Abnormal; Notable for the following  components:       Result Value    RBC 3.40 (*)     Hemoglobin 10.1 (*)     Hematocrit 29.7 (*)     Gran % 75.1 (*)     Lymph % 12.8 (*)     All other components within normal limits   COMPREHENSIVE METABOLIC PANEL - Abnormal; Notable for the following components:    Sodium 131 (*)     BUN 31 (*)     Creatinine 2.4 (*)     Albumin 2.9 (*)     ALT 5 (*)     eGFR 30.5 (*)     All other components within normal limits   LIPASE          Imaging Results               CT Abdomen Pelvis  Without Contrast (Final result)  Result time 12/09/23 18:48:46      Final result by Barron Lyle MD (12/09/23 18:48:46)                   Impression:      1. Right nephrostomy in appropriate position.  2. Postoperative change of cystoproctectomy with ileal neobladder.  Similar appearance of large pelvic mass surrounding the neobladder.  3. Ill-defined hypoattenuating hepatic lesions, concerning for metastases in light of history.  4. Increased retroperitoneal and pelvic adenopathy.  5. Stable pulmonary nodules.  6. Additional findings as above.  This report was flagged in Epic as abnormal.    Electronically signed by resident: Laazro Butler  Date:    12/09/2023  Time:    17:38    Electronically signed by: Barron Lyle  Date:    12/09/2023  Time:    18:48               Narrative:    EXAMINATION:  CT ABDOMEN PELVIS WITHOUT CONTRAST    CLINICAL HISTORY:  Abdominal pain, post-op;    TECHNIQUE:  Axial CT images of the abdomen and pelvis were obtained via helical, multi detector CT technique.  No intravenous contrast material was administered.    COMPARISON:  CT chest abdomen pelvis 11/29/2023.  IR nephrostomy placement 12/08/2023.    FINDINGS:  Lack of intravenous contrast limits sensitivity for parenchymal organ disease.    Heart: No cardiomegaly or pericardial effusion.    Lung Bases: Stable bilateral pulmonary nodules (axial images 1, 8, 11, 19), largest measuring up to 9 mm.    Liver: Upper normal in size.  Normal parenchymal  attenuation.  Ill-defined hypodense lesions of the right hepatic lobe (axial images 37, 51), suspicious for metastatic disease.  Limited visualization on this noncontrast study.  Additional stable rounded hypodensities, most too small to characterize, though the largest is compatible with cyst (axial image 51).    Gallbladder: Cholelithiasis.    Bile Ducts: No intra or extrahepatic biliary ductal dilation.    Pancreas: No pancreatic mass lesion or duct dilatation.    Spleen: Normal.    Adrenals: Normal.    Genitourinary: Right nephrostomy in appropriate position.  Bilateral renal cortical thinning and scarring, right greater than left.  No nephrolithiasis or hydroureteronephrosis.    Postoperative change of cystoproctectomy with ileal neobladder.  Large soft tissue mass about the neobladder, adjacent to the sigmoid colon.  Smith catheter within the neobladder.    Reproductive organs: Normal.    GI tract/Mesentery: Stomach is normal in appearance.  No evidence for bowel obstruction or inflammation.  Moderate retained feces in the colon.    Peritoneal Space: Free air in the puja- and perirenal space, likely related to recent procedure.  No abdominopelvic ascites.    Lymph nodes: Retroperitoneal herman conglomerate measures 2.7 x 1.6 cm (axial image 87), increased in size compared to priors.  Additional prominent periureteric (axial image 111), and retroperitoneal nodes (axial image 62).    Abdominal wall: Fat-containing paraumbilical ventral abdominal wall hernias, similar.    Vasculature: Abdominal aorta is normal in caliber.  Minimal aortoiliac significant calcific atherosclerosis.    Bones: Degenerative change without acute displaced fracture.  Stable pathologic fractures of the left inferior and superior pubic rami with sclerotic change.  No new suspicious lytic or sclerotic lesion.  Stable small osteochondroma off the right iliac wing.                                       Medications - No data to display  Medical  Decision Making  57 yo M with history of bladder cancer s/p ileal neobladder, R PCN placement for hydronephrosis yesterday (12/8/23) presenting with decreased nephrostomy output.     Differential includes:  Normalizing decrease in drainage due to resolving hydronephrosis vs. PCN migration vs CHI vs PCN obstruction  Less concern for infection or postsurgical complication given no nausea/vomiting/fever/chills/abdominal or flank pain.  Good output via Smith catheter.  Less likely to be PCN obstruction given no visualized clots in the PCN tubing, lightening in color compared to drainage bag urine and PCN tubing is yellow with small amount of blood.    Discussed case with MATTHIEU Leon MD who reviewed imaging, with evaluation of the patient by resident, with no findings of abnormalities on CT scan, reports decreased nephrostomy drainage less likely normal for this patient, with no abnormalities of PCN tube on evaluation.  Recommended flushing of the tube with 10 cc saline, which was performed by ED nurse, with increased drainage following, patient discharge to scheduled follow-up in several days.    I discussed with the patient/family the diagnosis, treatment plan, indications for return to the emergency department, as well as for expected follow-up. The patient/family verbalized an understanding. The patient/family is asked if there were any questions or concerns, which were addressed to patient/family satisfaction. Patient/family understands and is agreeable to the plan.     DISCLAIMER: This note was prepared with Lean[employer] voice recognition transcription software. Garbled syntax, mangled pronouns, and other bizarre constructions may be attributed to that software system.      Amount and/or Complexity of Data Reviewed  Labs: ordered.  Radiology: ordered.                                      Clinical Impression:  Final diagnoses:  [T83.098A] Malfunction of nephrostomy tube (Primary)          ED Disposition Condition     Discharge Stable          ED Prescriptions    None       Follow-up Information    None          Deyanira Angel MD  12/09/23 3896

## 2023-12-09 NOTE — TELEPHONE ENCOUNTER
Pt calling regarding Nephrectomy that has not drained since discharge. I have spoke with Dr. Schwab in this regard. Reports tube should be draining and advises patient be evaluated in the ED tomorrow. Patient verbalizes understanding.    Reason for Disposition   [1] Caller has URGENT question AND [2] triager unable to answer question    Additional Information   Negative: Sounds like a life-threatening emergency to the triager   Negative: [1] Widespread rash AND [2] bright red, sunburn-like   Negative: [1] SEVERE headache AND [2] after spinal (epidural) anesthesia   Negative: [1] Vomiting AND [2] persists > 4 hours   Negative: [1] Vomiting AND [2] abdomen looks much more swollen than usual   Negative: [1] Drinking very little AND [2] dehydration suspected (e.g., no urine > 12 hours, very dry mouth, very lightheaded)   Negative: Patient sounds very sick or weak to the triager   Negative: Sounds like a serious complication to the triager   Negative: Fever > 100.4 F (38.0 C)    Protocols used: Post-Op Symptoms and [id number]

## 2023-12-09 NOTE — ED TRIAGE NOTES
Julio Rodríguez, a 58 y.o. male presents to the ED w/ complaint of decreased output in recent nephrostomy collection bag.  Patient has a valiente in place as well and endorses adequate valiente output stating he drink 0137-7512 ml since yesterday and has since emptied close to an equal amount out of his valiente bag.  Patient states the color of the drainage of his right nephrostomy has improved but he states he has only had approximately 100ml of drainage since placement. Patient endorses minor pain to surgery site states he took tylenol at home. Denies any new acute pain, chest pain, sob, n/v/d.     Triage note:  Chief Complaint   Patient presents with    Decreased Urinary Output      Nephrostomy placed yesterday. Pt reports no output today.      Review of patient's allergies indicates:  No Known Allergies  Past Medical History:   Diagnosis Date    Bladder cancer     CAD (coronary artery disease) 9/12/2023    Prior CABG. Not on antiplatelets. Home medicatiosn include atorvastatin    Carcinoma     forehead    Encounter for blood transfusion     Hypertension     Hypothyroidism 9/12/2023    Home medications include levothyroxine    Malignant melanoma of skin, unspecified     right arm    Malignant neoplasm of urinary bladder 9/12/2023    Melanoma 9/12/2023    History of T1a melanoma; 0.5mm depth without ulceartion. SP wide local excision 02/2022   Patient identifiers for Julio Rodríguez checked and correct.    LOC: The patient is awake, alert and aware of environment with an appropriate affect, the patient is oriented x 4 and speaking appropriately.    APPEARANCE: Patient resting comfortably and in no acute distress, patient is clean and well groomed, patient's clothing is properly fastened.    SKIN: The skin is warm and dry, color consistent with ethnicity, patient has normal skin turgor and moist mucus membranes, skin intact, no breakdown or bruising noted.    MUSCULOSKELETAL: Patient moving all extremities well, no obvious  swelling or deformities noted.    RESPIRATORY: Airway is open and patent, respirations are spontaneous and even, patient has a normal effort and rate.    CARDIAC: Patient has a normal rate .    ABDOMEN: Soft and non tender to palpation, no distention noted. Patient denies any nausea, vomiting, diarrhea, or constipation.     NEUROLOGIC: Eyes open spontaneously, PERRL, behavior appropriate to situation, follows commands, facial expression symmetrical, bilateral hand grasp equal and even, purposeful motor response noted, normal sensation in all extremities.     HEENT: No abnormalities noted. White sclera and pupils equal round and reactive to light. Denies headache, dizziness.     : valiente to gravity leg bag and right nephrotomy to collection bag(red tinged drainage.)

## 2023-12-10 LAB — BACTERIA UR CULT: NORMAL

## 2023-12-10 NOTE — ED NOTES
Nurse bedside with SOD to flush patients PCN per nursing communication.  Sterile 10cc flush used to flush patient's PCN, no resistance felt, no complaints of pain or discomfort from patient.  Nephrostomy seen draining freely after flush, clear drainage noted.  Patient tolerated well, Provider made ware.

## 2023-12-10 NOTE — DISCHARGE INSTRUCTIONS
You were seen in the ER today for decreased drainage from your nephrostomy tube, evaluated by Radiology and a CT was performed with no abnormalities, and your swelling of your right kidney has resolved on imaging.  Your nephrostomy tube is functioning well, most likely drainage is decreasing because the swelling has decreased.  If you have fever/chills/nausea/vomiting/abdominal or flank pain, please return to the ER.  Follow up with your surgeon as scheduled otherwise.

## 2023-12-11 NOTE — PROGRESS NOTES
MEDICAL ONCOLOGY - FOLLOW UP VISIT    Cancer/Stage/TNM:    Cancer Staging   Malignant neoplasm of urinary bladder  Staging form: Urinary Bladder, AJCC 8th Edition  - Clinical: Stage IVB (cT4b, cNX, pM1b) - Signed by Bridger Abad MD on 9/28/2023       Reason for visit: Julio Rodríguez is a 58 y.o. male with metastatic bladder cancer previously treated with neoadjuvant ddMVAC, radical cystectomy, and then carboplatin/gemcitabine and EV+ Pembro following local and metastatic recurrence. He presents to our Phase 1 Clinic today following C1D1 on our NTX trial.   Tolerated well, but creatinine has risen.  He recently underwent nephrostomy tube placement with urology to try and relieve hydronephrosis.      History has been obtained by chart review and discussion with the patient.    Interval Events:    Overall feels good.  Enjoying outdoor activities.  Ongoing pain within his left ischium. Generally mild. Occassionally uses tylenol at nigh. Ongoing catheterization for neobladder. No CP, SOB or cough. Notes a chronic abdominal hernia    Presents with his mother.  They are [place], but have lived here many years.  Resides in [place].  And works for [employer].      ECOG is 0 and life expectancy is >6 mos.     Oncology History   Malignant neoplasm of urinary bladder   2016 Initial Diagnosis    Bladder CIS. Managed with BCG     2017 Notable Event    Developed recurrent muscle invasive disease.     2017 - 2017 Chemotherapy    ddMVAC      Surgery    Radical cystectomy with lymph node dissection and creation of neobladder. [id number]     7/2022 Notable Event    New onset hematuria. MRI scan of the pelvis in August showed a suspected blood clot adherent to the distal Smith catheter. There was abnormal signal and enhancement of the bilateral pubic bones adjacent to the neobladder suspicious for neoplastic infiltration. There was no pelvic lymphadenopathy.      7/2022 Imaging Significant Findings    Pet  scan at the same time showed hypermetabolic retroperitoneal lymphadenopathy. There was marked activity felt to involve the bladder wall suspicious for tumor.     7/2022 Biopsy    Biopsy of the bladder neck showed recurrent high-grade urothelial carcinoma.     9/28/2022 - 5/16/2023 Chemotherapy    Mr. Rodríguez was started on chemotherapy with gemcitabine and carboplatin in September 2022.      1/25/2023 Imaging Significant Findings    Pet scan on 25 January showed changes related to prior cystoprostatectomy with neobladder creation and minimal fullness to the right renal collecting system. There was no evidence of a discrete hypermetabolic mass or lymphadenopathy. There was diffusely increased activity throughout the osseous structures, suggestive of chemotherapy with reactive marrow.     4/11/2023 Imaging Significant Findings    CT a/p  1. Pathologic fracture of the left parasymphysis pubis secondary to lytic process. Given location adjacent to neobladder, osteomyelitis is a possibility. Metastatic disease not excluded.   2. Prior cystoprostatectomy with chronic right ureteral obstruction and hydroureteronephrosis, minimally changed from the prior. All etiologies considered including malignant obstruction. Urology consultation recommended.   3. Incidental findings including cholelithiasis, bilateral multifocal renal cortical scarring, and small hiatal hernia.       5/24/2023 -  Chemotherapy    Enfortumab vedotin + Pembrolizumab     8/10/2023 Imaging Significant Findings    PET CT  Interval development of hypermetabolic pulmonary nodules within the lingula and right lower lobe likely reflecting pulmonary metastases. Some additional tiny pulmonary nodularity is new or more conspicuous from prior but too small to accurately characterize by PET/CT. Attention on follow-up recommended.     Worsening obstructive uropathy which is relatively moderate to severe the right kidney.     Similar appearance of the urinary  neobladder. There is somewhat diminished due to physiologic activity to evaluate for local neoplasm but the appearance overall appears similar to prior. Assessment of local disease could be followed with CT abdomen and pelvis with contrast.     Interval fracture of the left superior and inferior pubic rami appearing subacute in nature. Please correlate for trauma and tenderness. There is no significant FDG activity within the area to favor a pathologic fracture.     Previously seen diffuse marrow activity may have been due to previous marrow rebound or drug effect.       8/25/2023 Imaging Significant Findings    MR Pelvis  History of cystoprostatectomy and neobladder creation.     Interval increase in multilobular mass in the pelvis infiltrating the rectum, possibly due to distal sigmoid colon, anterior left pelvic bones as well as a inferior aspect of the neobladder suspicious for progression of neoplastic process as discussed above.      9/28/2023 Cancer Staged    Staging form: Urinary Bladder, AJCC 8th Edition  - Clinical: Stage IVB (cT4b, cNX, pM1b)     10/16/2023 Imaging Significant Findings    CT chest   Impression:  - Multiple solid bilateral pulmonary nodules up to 9mm showing interval increase in size concerning for metastatic disease in this patient with history of prior bladder malignancy.  - Multiple hepatic hypodensities, which may represent cystic lesions however some which are too small to characterize.  - Partially imaged right kidney showing parenchymal atrophy and suspected hydronephrosis..    MR Pelvis   Impression:     Interval increased size of multilobular mass in the cystoprostatectomy surgical bed that invades the neobladder, rectum, sigmoid colon, and left pelvis.  Multiple pelvic lymph nodes are more conspicuous from prior exam and concerning for additional metastatic disease.     10/24/2023 - 10/24/2023 Chemotherapy    Treatment Summary   Plan Name: OP SACITUZUMAB GOVITECAN-HZIY  Q3W  Treatment Goal: Palliative  Status: Inactive  Start Date:   End Date:   Provider: Bridger Abad MD  Chemotherapy: sacituzumab govitecan-hziy (TRODELVY) 543 mg in sodium chloride 0.9% 500 mL chemo infusion, 7.5 mg/kg = 543 mg (original dose ), Intravenous, Clinic/HOD 1 time, 0 of 17 cycles  Dose modification: 7.5 mg/kg (Cycle 1, Reason: MD Discretion, Comment: UGT1A1 PM )     11/2/2023 -  Chemotherapy    Treatment Summary   Plan Name: [facility] NTX-1088-01 Dose Escalation INV-NTX + INV pembrolizumab  Treatment Goal: Control  Status: Active  Start Date: 11/2/2023  End Date: 10/16/2025 (Planned)  Provider: Chan Leos MD  Chemotherapy: INV [id number] (pembrolizumab) 200 mg in INV sodium chloride 0.9 % 100 mL chemo infusion, 200 mg, Intravenous, Clinic/HOD 1 time, 1 of 35 cycles  Administration: 200 mg (11/2/2023)     Melanoma        Past Medical History:   Diagnosis Date    Bladder cancer     CAD (coronary artery disease) 9/12/2023    Prior CABG. Not on antiplatelets. Home medicatiosn include atorvastatin    Carcinoma     forehead    Encounter for blood transfusion     Hypertension     Hypothyroidism 9/12/2023    Home medications include levothyroxine    Malignant melanoma of skin, unspecified     right arm    Malignant neoplasm of urinary bladder 9/12/2023    Melanoma 9/12/2023    History of T1a melanoma; 0.5mm depth without ulceartion. SP wide local excision 02/2022          Past Surgical History:   Procedure Laterality Date    CORONARY ARTERY BYPASS GRAFT  10/2015    CYSTECTOMY, ROBOT-ASSISTED, LAPAROSCOPIC, USING DA MARY XI, WITH ILEAL CONDUIT CREATION  12/2017    CYSTOGRAM N/A 12/5/2023    Procedure: CYSTOGRAM;  Surgeon: Eder Oconnor MD;  [facility];  Service: Urology;  Laterality: N/A;    CYSTOSCOPY N/A 12/5/2023    Procedure: CYSTOSCOPY;  Surgeon: Eder Oconnor MD;  Location: [facility] OR 1ST FLR;  Service: Urology;  Laterality: N/A;    DILATION OF BLADDER      dialation of bladder neck- due to  claudia bladder- multiple procedures    DILATION OF URETHRA N/A 12/5/2023    Procedure: DILATION, URETHRA;  Surgeon: Edre Oconnor MD;  [facility];  Service: Urology;  Laterality: N/A;    LYMPHADENECTOMY      PERCUTANEOUS NEPHROSTOMY Right 12/8/2023    Procedure: Creation, Nephrostomy, Percutaneous;  Surgeon: Jens Nunez MD;  Location: [facility] CATH LAB;  Service: Radiology;  Laterality: Right;    PLACEMENT N/A 12/5/2023    Procedure: PLACEMENT;  Surgeon: Eder Oconnor MD;  Location: [facility] OR [facility] FLR;  Service: Urology;  Laterality: N/A;  placement of valiente over a wire by MD MOHR ROBOTIC PROSTECTOMY, SUPRAPUBIC  12/2017        Family History   Problem Relation Age of Onset    Heart disease Father     Heart attack Paternal Uncle     Breast cancer Maternal Cousin     Colon cancer Neg Hx     Bladder Cancer Neg Hx         Social History     Tobacco Use    Smoking status: Former     Types: Cigarettes    Smokeless tobacco: Not on file   Substance Use Topics    Alcohol use: Not Currently        I have reviewed and updated the patient's past medical, surgical, family and social histories.    Review of patient's allergies indicates:  No Known Allergies     Current Outpatient Medications   Medication Sig Dispense Refill    atorvastatin (LIPITOR) 20 MG tablet Take 20 mg by mouth every evening.      docusate sodium (COLACE) 100 MG capsule Take 220 capsules by mouth every evening.      Lactobacillus rhamnosus GG (CULTURELLE) 10 billion cell capsule Take 1 capsule by mouth once daily.      levothyroxine (SYNTHROID) 50 MCG tablet Take 50 mcg by mouth before breakfast.      LINZESS 72 mcg Cap capsule Take 72 mcg by mouth every morning.      multivitamin (THERAGRAN) per tablet Take 1 tablet by mouth every morning.      TURMERIC ORAL Take by mouth every morning.      VTAMA 1 % Crea APPLY 1 APPLICATION ON THE SKIN DAILY       No current facility-administered medications for this visit.        Physical Exam:   There were no  vitals taken for this visit.     ECOG Performance status: 1            Physical Exam  Constitutional:       General: He is not in acute distress.     Appearance: Normal appearance.   HENT:      Head: Normocephalic.   Eyes:      General: No scleral icterus.     Extraocular Movements: Extraocular movements intact.      Conjunctiva/sclera: Conjunctivae normal.   Cardiovascular:      Rate and Rhythm: Normal rate and regular rhythm.      Heart sounds: No murmur heard.     No friction rub. No gallop.   Pulmonary:      Effort: Pulmonary effort is normal. No respiratory distress.      Breath sounds: Normal breath sounds. No stridor. No wheezing, rhonchi or rales.   Abdominal:      General: There is no distension.   Skin:     General: Skin is warm and dry.   Neurological:      Mental Status: He is alert and oriented to person, place, and time.      Motor: No weakness.   Psychiatric:         Mood and Affect: Mood normal.         Behavior: Behavior normal.         Thought Content: Thought content normal.         Labs:                 Lab Results   Component Value Date     (L) 12/09/2023    K 4.2 12/09/2023    CREATININE 2.4 (H) 12/09/2023    WBC 8.33 12/09/2023    HGB 10.1 (L) 12/09/2023     12/09/2023    AST 13 12/09/2023    ALT 5 (L) 12/09/2023    ALKPHOS 76 12/09/2023    BILITOT 0.5 12/09/2023          Imaging:         I have personally reviewed the above imaging including recent MRI and PET CT scan    Path:   Reviewed pathology as documented in oncology history      Assessment and Plan:      Metastatic Bladder Cancer:    Pt recently signed consent for NTX trial, combo novel agent with pembro.  Here for next dose.  Unfortunately despite recent nephrostomy tubes, etc., his CrCl is still to low to proceed with treatment today.      At this point, we will have to remove him for the trial.  We will let the sponsor know and set him back up with Dr. Abad to discuss options.  I will also reach out to Dr. Abad to  discuss.      Follow-up with Dr. Abad.     The patient agrees with the plan, and all questions have been answered to their satisfaction.      More than 40 mins total time were spent during this encounter.    Chan Leos M.D., M.S., F.A.C.P.  Hematology and Oncology Attending  [facility]

## 2023-12-13 ENCOUNTER — LAB VISIT (OUTPATIENT)
Dept: LAB | Facility: HOSPITAL | Age: 58
End: 2023-12-13
Attending: HOSPITALIST
Payer: COMMERCIAL

## 2023-12-13 ENCOUNTER — PATIENT MESSAGE (OUTPATIENT)
Dept: RESEARCH | Facility: HOSPITAL | Age: 58
End: 2023-12-13
Payer: COMMERCIAL

## 2023-12-13 ENCOUNTER — RESEARCH ENCOUNTER (OUTPATIENT)
Dept: RESEARCH | Facility: HOSPITAL | Age: 58
End: 2023-12-13
Payer: COMMERCIAL

## 2023-12-13 ENCOUNTER — OFFICE VISIT (OUTPATIENT)
Dept: HEMATOLOGY/ONCOLOGY | Facility: CLINIC | Age: 58
End: 2023-12-13
Payer: COMMERCIAL

## 2023-12-13 VITALS
SYSTOLIC BLOOD PRESSURE: 116 MMHG | WEIGHT: 158.75 LBS | OXYGEN SATURATION: 99 % | BODY MASS INDEX: 22.73 KG/M2 | HEART RATE: 78 BPM | TEMPERATURE: 98 F | RESPIRATION RATE: 16 BRPM | DIASTOLIC BLOOD PRESSURE: 79 MMHG | HEIGHT: 70 IN

## 2023-12-13 DIAGNOSIS — Z00.6 RESEARCH STUDY PATIENT: ICD-10-CM

## 2023-12-13 DIAGNOSIS — C67.9 MALIGNANT NEOPLASM OF URINARY BLADDER, UNSPECIFIED SITE: ICD-10-CM

## 2023-12-13 LAB
ALBUMIN SERPL BCP-MCNC: 3.1 G/DL (ref 3.5–5.2)
ALP SERPL-CCNC: 79 U/L (ref 55–135)
ALT SERPL W/O P-5'-P-CCNC: 8 U/L (ref 10–44)
ANION GAP SERPL CALC-SCNC: 5 MMOL/L (ref 8–16)
APTT PPP: 25 SEC (ref 21–32)
AST SERPL-CCNC: 14 U/L (ref 10–40)
BASOPHILS # BLD AUTO: 0.06 K/UL (ref 0–0.2)
BASOPHILS NFR BLD: 0.9 % (ref 0–1.9)
BILIRUB SERPL-MCNC: 0.5 MG/DL (ref 0.1–1)
BUN SERPL-MCNC: 35 MG/DL (ref 6–20)
CALCIUM SERPL-MCNC: 9.7 MG/DL (ref 8.7–10.5)
CHLORIDE SERPL-SCNC: 105 MMOL/L (ref 95–110)
CK SERPL-CCNC: 92 U/L (ref 20–200)
CO2 SERPL-SCNC: 26 MMOL/L (ref 23–29)
CREAT SERPL-MCNC: 2.8 MG/DL (ref 0.5–1.4)
CRP SERPL-MCNC: 46.5 MG/L (ref 0–8.2)
DIFFERENTIAL METHOD: ABNORMAL
DRUG STUDY SPECIMEN TYPE: NORMAL
DRUG STUDY TEST NAME: NORMAL
DRUG STUDY TEST RESULT: NORMAL
EOSINOPHIL # BLD AUTO: 0.2 K/UL (ref 0–0.5)
EOSINOPHIL NFR BLD: 3.6 % (ref 0–8)
ERYTHROCYTE [DISTWIDTH] IN BLOOD BY AUTOMATED COUNT: 12.1 % (ref 11.5–14.5)
EST. GFR  (NO RACE VARIABLE): 25.4 ML/MIN/1.73 M^2
FERRITIN SERPL-MCNC: 487 NG/ML (ref 20–300)
GLUCOSE SERPL-MCNC: 86 MG/DL (ref 70–110)
HCT VFR BLD AUTO: 32.8 % (ref 40–54)
HGB BLD-MCNC: 11 G/DL (ref 14–18)
IMM GRANULOCYTES # BLD AUTO: 0.02 K/UL (ref 0–0.04)
IMM GRANULOCYTES NFR BLD AUTO: 0.3 % (ref 0–0.5)
INR PPP: 1 (ref 0.8–1.2)
LYMPHOCYTES # BLD AUTO: 1.1 K/UL (ref 1–4.8)
LYMPHOCYTES NFR BLD: 17.1 % (ref 18–48)
MCH RBC QN AUTO: 30.7 PG (ref 27–31)
MCHC RBC AUTO-ENTMCNC: 33.5 G/DL (ref 32–36)
MCV RBC AUTO: 92 FL (ref 82–98)
MONOCYTES # BLD AUTO: 0.5 K/UL (ref 0.3–1)
MONOCYTES NFR BLD: 7.9 % (ref 4–15)
NEUTROPHILS # BLD AUTO: 4.5 K/UL (ref 1.8–7.7)
NEUTROPHILS NFR BLD: 70.2 % (ref 38–73)
NRBC BLD-RTO: 0 /100 WBC
PHOSPHATE SERPL-MCNC: 3.9 MG/DL (ref 2.7–4.5)
PLATELET # BLD AUTO: 272 K/UL (ref 150–450)
PMV BLD AUTO: 9.6 FL (ref 9.2–12.9)
POTASSIUM SERPL-SCNC: 4.3 MMOL/L (ref 3.5–5.1)
PROT SERPL-MCNC: 7.5 G/DL (ref 6–8.4)
PROTHROMBIN TIME: 10.7 SEC (ref 9–12.5)
RBC # BLD AUTO: 3.58 M/UL (ref 4.6–6.2)
SODIUM SERPL-SCNC: 136 MMOL/L (ref 136–145)
T3FREE SERPL-MCNC: 2.3 PG/ML (ref 2.3–4.2)
T4 FREE SERPL-MCNC: 0.99 NG/DL (ref 0.71–1.51)
TRIGL SERPL-MCNC: 67 MG/DL (ref 30–150)
TSH SERPL DL<=0.005 MIU/L-ACNC: 7.98 UIU/ML (ref 0.4–4)
WBC # BLD AUTO: 6.44 K/UL (ref 3.9–12.7)

## 2023-12-13 PROCEDURE — 93010 EKG 12-LEAD: ICD-10-PCS | Mod: S$GLB,,, | Performed by: INTERNAL MEDICINE

## 2023-12-13 PROCEDURE — 84484 ASSAY OF TROPONIN QUANT: CPT | Performed by: INTERNAL MEDICINE

## 2023-12-13 PROCEDURE — 84100 ASSAY OF PHOSPHORUS: CPT | Performed by: INTERNAL MEDICINE

## 2023-12-13 PROCEDURE — 3008F PR BODY MASS INDEX (BMI) DOCUMENTED: ICD-10-PCS | Mod: CPTII,S$GLB,, | Performed by: INTERNAL MEDICINE

## 2023-12-13 PROCEDURE — 84443 ASSAY THYROID STIM HORMONE: CPT | Performed by: INTERNAL MEDICINE

## 2023-12-13 PROCEDURE — 82552 ASSAY OF CPK IN BLOOD: CPT | Performed by: INTERNAL MEDICINE

## 2023-12-13 PROCEDURE — 80053 COMPREHEN METABOLIC PANEL: CPT | Performed by: INTERNAL MEDICINE

## 2023-12-13 PROCEDURE — 3008F BODY MASS INDEX DOCD: CPT | Mod: CPTII,S$GLB,, | Performed by: INTERNAL MEDICINE

## 2023-12-13 PROCEDURE — 3078F PR MOST RECENT DIASTOLIC BLOOD PRESSURE < 80 MM HG: ICD-10-PCS | Mod: CPTII,S$GLB,, | Performed by: INTERNAL MEDICINE

## 2023-12-13 PROCEDURE — 93005 EKG 12-LEAD: ICD-10-PCS | Mod: 76,S$GLB,, | Performed by: INTERNAL MEDICINE

## 2023-12-13 PROCEDURE — 93010 ELECTROCARDIOGRAM REPORT: CPT | Mod: S$GLB,,, | Performed by: INTERNAL MEDICINE

## 2023-12-13 PROCEDURE — 86140 C-REACTIVE PROTEIN: CPT | Performed by: INTERNAL MEDICINE

## 2023-12-13 PROCEDURE — 84481 FREE ASSAY (FT-3): CPT | Performed by: INTERNAL MEDICINE

## 2023-12-13 PROCEDURE — 3078F DIAST BP <80 MM HG: CPT | Mod: CPTII,S$GLB,, | Performed by: INTERNAL MEDICINE

## 2023-12-13 PROCEDURE — 99999 PR PBB SHADOW E&M-EST. PATIENT-LVL IV: ICD-10-PCS | Mod: PBBFAC,,, | Performed by: INTERNAL MEDICINE

## 2023-12-13 PROCEDURE — 99000 SPECIMEN HANDLING OFFICE-LAB: CPT | Performed by: INTERNAL MEDICINE

## 2023-12-13 PROCEDURE — 93005 ELECTROCARDIOGRAM TRACING: CPT | Mod: 76,S$GLB,, | Performed by: INTERNAL MEDICINE

## 2023-12-13 PROCEDURE — 99999 PR PBB SHADOW E&M-EST. PATIENT-LVL IV: CPT | Mod: PBBFAC,,, | Performed by: INTERNAL MEDICINE

## 2023-12-13 PROCEDURE — 82550 ASSAY OF CK (CPK): CPT | Mod: 91 | Performed by: INTERNAL MEDICINE

## 2023-12-13 PROCEDURE — 84478 ASSAY OF TRIGLYCERIDES: CPT | Performed by: INTERNAL MEDICINE

## 2023-12-13 PROCEDURE — 85610 PROTHROMBIN TIME: CPT | Performed by: INTERNAL MEDICINE

## 2023-12-13 PROCEDURE — 99215 OFFICE O/P EST HI 40 MIN: CPT | Mod: S$GLB,,, | Performed by: INTERNAL MEDICINE

## 2023-12-13 PROCEDURE — 85025 COMPLETE CBC W/AUTO DIFF WBC: CPT | Performed by: INTERNAL MEDICINE

## 2023-12-13 PROCEDURE — 3074F PR MOST RECENT SYSTOLIC BLOOD PRESSURE < 130 MM HG: ICD-10-PCS | Mod: CPTII,S$GLB,, | Performed by: INTERNAL MEDICINE

## 2023-12-13 PROCEDURE — 3074F SYST BP LT 130 MM HG: CPT | Mod: CPTII,S$GLB,, | Performed by: INTERNAL MEDICINE

## 2023-12-13 PROCEDURE — 84439 ASSAY OF FREE THYROXINE: CPT | Performed by: INTERNAL MEDICINE

## 2023-12-13 PROCEDURE — 36415 COLL VENOUS BLD VENIPUNCTURE: CPT | Performed by: INTERNAL MEDICINE

## 2023-12-13 PROCEDURE — 1159F PR MEDICATION LIST DOCUMENTED IN MEDICAL RECORD: ICD-10-PCS | Mod: CPTII,S$GLB,, | Performed by: INTERNAL MEDICINE

## 2023-12-13 PROCEDURE — 85730 THROMBOPLASTIN TIME PARTIAL: CPT | Performed by: INTERNAL MEDICINE

## 2023-12-13 PROCEDURE — 1159F MED LIST DOCD IN RCRD: CPT | Mod: CPTII,S$GLB,, | Performed by: INTERNAL MEDICINE

## 2023-12-13 PROCEDURE — 99215 PR OFFICE/OUTPT VISIT, EST, LEVL V, 40-54 MIN: ICD-10-PCS | Mod: S$GLB,,, | Performed by: INTERNAL MEDICINE

## 2023-12-13 PROCEDURE — 82728 ASSAY OF FERRITIN: CPT | Performed by: INTERNAL MEDICINE

## 2023-12-13 NOTE — PROGRESS NOTES
Protocol: A Phase 1, First-in-Human Study of NTX-1088, a Monoclonal Antibody Targeting the Poliovirus Receptor (PVR), as Monotherapy and Combined with Pembrolizumab, in Patients with Advanced Solid Malignancies  Protocol #: NTX 1088-01  Protocol Version: V2; [id number]  Sponsor: [employer]  IRB #: [id number]  P.I.: Chan Leos MD     Subject ID: [id number]     Significant Events:  23OCT2023: signed ICF  02NOV2023: Begin Treatment C1D1  20NOV2023: C2D1 dose held  [id number]: EOT    NTX 1088-01 EOT Visit: 13DEC2023     Pt presented to the clinic today with his mother for an EOT visit of the above mentioned protocol. Alert and oriented x 3. Mood and affect are appropriate to the situation. Pt had cystoscopy with urethral dilation on 05DEC2023. Pt had nephrostomy placed on 08DEC2023. Pt reported to ED on 09DEC2023 for decreased nephrostomy output, pt d/c'd from ED after CT, labs, and nephrostomy flush. Denies fever, vomiting, chest pain, shortness of breath, headaches, and dizziness.      After reviewing AM labs, MD Leos made decision with pt to discontinue from trial treatment due to decreased CrCl of 29 and Creatinine of 2.8. Pt agrees to continue today with EOT visit and procedures. Pt does not agree to continue follow up phone calls. Pt referred back to MD Abad for SOC treatment.     EOT Central labs drawn 0756    ECGs collected after pt rested in reclined position for 10 minutes: 0920, 0921, 0923    Vitals collected after pt rested in seated position for 5 minutes: 0814  /79, T 36.6C, HR 78, RR 16, O2 99%    Central and local labs collected at 0756     Reviewed and confirmed which concomitant medications the patient is currently taking. See concomitant therapy worksheet.      Pt verbalized understanding of above information and is in agreement of this plan.      Pt given opportunity to ask questions, all concerns addressed, and pt verbalized understanding.      Most up to date Baseline log and  Adverse event log in shadow, paper, research chart.     Active Adverse Events:   Proteinuria Grade 2  Intermittent Hypertension Grade 2   Chronic Kidney Disease Grade 3

## 2023-12-14 ENCOUNTER — OFFICE VISIT (OUTPATIENT)
Dept: HEMATOLOGY/ONCOLOGY | Facility: CLINIC | Age: 58
End: 2023-12-14
Payer: COMMERCIAL

## 2023-12-14 VITALS
WEIGHT: 163 LBS | BODY MASS INDEX: 23.34 KG/M2 | HEIGHT: 70 IN | HEART RATE: 74 BPM | TEMPERATURE: 96 F | SYSTOLIC BLOOD PRESSURE: 169 MMHG | RESPIRATION RATE: 18 BRPM | OXYGEN SATURATION: 100 % | DIASTOLIC BLOOD PRESSURE: 99 MMHG

## 2023-12-14 DIAGNOSIS — N18.32 STAGE 3B CHRONIC KIDNEY DISEASE: ICD-10-CM

## 2023-12-14 DIAGNOSIS — C67.9 MALIGNANT NEOPLASM OF URINARY BLADDER, UNSPECIFIED SITE: Primary | ICD-10-CM

## 2023-12-14 DIAGNOSIS — R03.0 ELEVATED BLOOD PRESSURE READING: ICD-10-CM

## 2023-12-14 DIAGNOSIS — I25.10 CORONARY ARTERY DISEASE, UNSPECIFIED VESSEL OR LESION TYPE, UNSPECIFIED WHETHER ANGINA PRESENT, UNSPECIFIED WHETHER NATIVE OR TRANSPLANTED HEART: ICD-10-CM

## 2023-12-14 DIAGNOSIS — E03.9 HYPOTHYROIDISM, UNSPECIFIED TYPE: ICD-10-CM

## 2023-12-14 LAB — TROPONIN T SERPL-MCNC: 21 NG/L

## 2023-12-14 PROCEDURE — 3077F SYST BP >= 140 MM HG: CPT | Mod: CPTII,S$GLB,, | Performed by: HOSPITALIST

## 2023-12-14 PROCEDURE — 3080F DIAST BP >= 90 MM HG: CPT | Mod: CPTII,S$GLB,, | Performed by: HOSPITALIST

## 2023-12-14 PROCEDURE — 99215 PR OFFICE/OUTPT VISIT, EST, LEVL V, 40-54 MIN: ICD-10-PCS | Mod: S$GLB,,, | Performed by: HOSPITALIST

## 2023-12-14 PROCEDURE — 3008F PR BODY MASS INDEX (BMI) DOCUMENTED: ICD-10-PCS | Mod: CPTII,S$GLB,, | Performed by: HOSPITALIST

## 2023-12-14 PROCEDURE — 3077F PR MOST RECENT SYSTOLIC BLOOD PRESSURE >= 140 MM HG: ICD-10-PCS | Mod: CPTII,S$GLB,, | Performed by: HOSPITALIST

## 2023-12-14 PROCEDURE — 99999 PR PBB SHADOW E&M-EST. PATIENT-LVL V: ICD-10-PCS | Mod: PBBFAC,,, | Performed by: HOSPITALIST

## 2023-12-14 PROCEDURE — 3008F BODY MASS INDEX DOCD: CPT | Mod: CPTII,S$GLB,, | Performed by: HOSPITALIST

## 2023-12-14 PROCEDURE — 1159F PR MEDICATION LIST DOCUMENTED IN MEDICAL RECORD: ICD-10-PCS | Mod: CPTII,S$GLB,, | Performed by: HOSPITALIST

## 2023-12-14 PROCEDURE — 3080F PR MOST RECENT DIASTOLIC BLOOD PRESSURE >= 90 MM HG: ICD-10-PCS | Mod: CPTII,S$GLB,, | Performed by: HOSPITALIST

## 2023-12-14 PROCEDURE — 99215 OFFICE O/P EST HI 40 MIN: CPT | Mod: S$GLB,,, | Performed by: HOSPITALIST

## 2023-12-14 PROCEDURE — 99999 PR PBB SHADOW E&M-EST. PATIENT-LVL V: CPT | Mod: PBBFAC,,, | Performed by: HOSPITALIST

## 2023-12-14 PROCEDURE — 1159F MED LIST DOCD IN RCRD: CPT | Mod: CPTII,S$GLB,, | Performed by: HOSPITALIST

## 2023-12-14 RX ORDER — LEVOFLOXACIN 500 MG/1
500 TABLET, FILM COATED ORAL
COMMUNITY
Start: 2023-11-18 | End: 2024-02-29

## 2023-12-14 RX ORDER — OLANZAPINE 5 MG/1
5 TABLET ORAL NIGHTLY
Qty: 30 TABLET | Refills: 0 | Status: SHIPPED | OUTPATIENT
Start: 2023-12-14 | End: 2024-01-11

## 2023-12-14 RX ORDER — DEXAMETHASONE 4 MG/1
8 TABLET ORAL DAILY
Qty: 24 TABLET | Refills: 3 | Status: SHIPPED | OUTPATIENT
Start: 2023-12-15

## 2023-12-14 RX ORDER — ONDANSETRON 8 MG/1
8 TABLET, ORALLY DISINTEGRATING ORAL EVERY 8 HOURS PRN
Qty: 60 TABLET | Refills: 5 | Status: SHIPPED | OUTPATIENT
Start: 2023-12-14

## 2023-12-14 NOTE — PATIENT INSTRUCTIONS
Given unavailability for clinic trial, will plan to proceed with standard of care sacituzumab govitecan. Fortunately, kidney function is not parituclarly important for dosing SG.     Will schedule to start in next 2-3 weeks.

## 2023-12-14 NOTE — PROGRESS NOTES
MEDICAL ONCOLOGY - FOLLOW UP VISIT    Cancer/Stage/TNM:    Cancer Staging   Malignant neoplasm of urinary bladder  Staging form: Urinary Bladder, AJCC 8th Edition  - Clinical: Stage IVB (cT4b, cNX, pM1b) - Signed by Bridger Abad MD on 9/28/2023       Reason for visit: Julio Rodríguez is a 58 y.o. male with metastatic bladder cancer previously treated with neoadjuvant ddMVAC, radical cystectomy, and then carboplatin/gemcitabine and EV+ Pembro following local and metastatic recurrence. Subsequetnly received a single dose of pembrolizumab + NTX-1088 11/2/23 as part of a phase I trial through the [facility], but was taken off trial for elevated creatinine despite PCN placement.  He presents to medical oncology clinic for further follow up..      History has been obtained by chart review and discussion with the patient.    Interval Events:    Right PCN tube placed 12/8/23 along with smith catheter. Catheter removed over the weekend. Creatinine had worsened a bit over the last few days. Plan to have smith catheter replaced to see if creatnine would improve. Plan to keep smith catheter over the weekend and into Monday with repeat blood work. Otherwise patient feels generally well. No signfiacnt pain aside from some insertion murtaza about the right PCN. Appetite is fair. Remains ambulatory and independent. Recent CT 12/9/23Did show ongoing large pelvic mass surrounding the neobladder with increased RP LAD and possible liver mets.      Oncology History   Malignant neoplasm of urinary bladder   2016 Initial Diagnosis    Bladder CIS. Managed with BCG     2017 Notable Event    Developed recurrent muscle invasive disease.     2017 - 2017 Chemotherapy    ddMVAC      Surgery    Radical cystectomy with lymph node dissection and creation of neobladder. [id number]     7/2022 Notable Event    New onset hematuria. MRI scan of the pelvis in August showed a suspected blood clot adherent to the distal Smith catheter. There was abnormal  signal and enhancement of the bilateral pubic bones adjacent to the neobladder suspicious for neoplastic infiltration. There was no pelvic lymphadenopathy.      7/2022 Imaging Significant Findings    Pet scan at the same time showed hypermetabolic retroperitoneal lymphadenopathy. There was marked activity felt to involve the bladder wall suspicious for tumor.     7/2022 Biopsy    Biopsy of the bladder neck showed recurrent high-grade urothelial carcinoma.     9/28/2022 - 5/16/2023 Chemotherapy    Mr. Rodríguez was started on chemotherapy with gemcitabine and carboplatin in September 2022.      1/25/2023 Imaging Significant Findings    Pet scan on 25 January showed changes related to prior cystoprostatectomy with neobladder creation and minimal fullness to the right renal collecting system. There was no evidence of a discrete hypermetabolic mass or lymphadenopathy. There was diffusely increased activity throughout the osseous structures, suggestive of chemotherapy with reactive marrow.     4/11/2023 Imaging Significant Findings    CT a/p  1. Pathologic fracture of the left parasymphysis pubis secondary to lytic process. Given location adjacent to neobladder, osteomyelitis is a possibility. Metastatic disease not excluded.   2. Prior cystoprostatectomy with chronic right ureteral obstruction and hydroureteronephrosis, minimally changed from the prior. All etiologies considered including malignant obstruction. Urology consultation recommended.   3. Incidental findings including cholelithiasis, bilateral multifocal renal cortical scarring, and small hiatal hernia.       5/24/2023 -  Chemotherapy    Enfortumab vedotin + Pembrolizumab     8/10/2023 Imaging Significant Findings    PET CT  Interval development of hypermetabolic pulmonary nodules within the lingula and right lower lobe likely reflecting pulmonary metastases. Some additional tiny pulmonary nodularity is new or more conspicuous from prior but too small to  accurately characterize by PET/CT. Attention on follow-up recommended.     Worsening obstructive uropathy which is relatively moderate to severe the right kidney.     Similar appearance of the urinary neobladder. There is somewhat diminished due to physiologic activity to evaluate for local neoplasm but the appearance overall appears similar to prior. Assessment of local disease could be followed with CT abdomen and pelvis with contrast.     Interval fracture of the left superior and inferior pubic rami appearing subacute in nature. Please correlate for trauma and tenderness. There is no significant FDG activity within the area to favor a pathologic fracture.     Previously seen diffuse marrow activity may have been due to previous marrow rebound or drug effect.       8/25/2023 Imaging Significant Findings    MR Pelvis  History of cystoprostatectomy and neobladder creation.     Interval increase in multilobular mass in the pelvis infiltrating the rectum, possibly due to distal sigmoid colon, anterior left pelvic bones as well as a inferior aspect of the neobladder suspicious for progression of neoplastic process as discussed above.      9/28/2023 Cancer Staged    Staging form: Urinary Bladder, AJCC 8th Edition  - Clinical: Stage IVB (cT4b, cNX, pM1b)     10/16/2023 Imaging Significant Findings    CT chest   Impression:  - Multiple solid bilateral pulmonary nodules up to 9mm showing interval increase in size concerning for metastatic disease in this patient with history of prior bladder malignancy.  - Multiple hepatic hypodensities, which may represent cystic lesions however some which are too small to characterize.  - Partially imaged right kidney showing parenchymal atrophy and suspected hydronephrosis..    MR Pelvis   Impression:     Interval increased size of multilobular mass in the cystoprostatectomy surgical bed that invades the neobladder, rectum, sigmoid colon, and left pelvis.  Multiple pelvic lymph nodes  are more conspicuous from prior exam and concerning for additional metastatic disease.     10/24/2023 - 10/24/2023 Chemotherapy    Treatment Summary   Plan Name: OP SACITUZUMAB GOVITECAN-HZIY Q3W  Treatment Goal: Palliative  Status: Inactive  Start Date:   End Date:   Provider: Bridger Abad MD  Chemotherapy: sacituzumab govitecan-hziy (TRODELVY) 543 mg in sodium chloride 0.9% 500 mL chemo infusion, 7.5 mg/kg = 543 mg (original dose ), Intravenous, Clinic/HOD 1 time, 0 of 17 cycles  Dose modification: 7.5 mg/kg (Cycle 1, Reason: MD Discretion, Comment: UGT1A1 PM )     11/2/2023 - 11/20/2023 Chemotherapy    Treatment Summary   Plan Name: [facility] NTX-1088-01 Dose Escalation INV-NTX + INV pembrolizumab  Treatment Goal: Control  Status: Inactive  Start Date: 11/2/2023  End Date: 11/20/2023  Provider: Chan Leos MD  Chemotherapy: INV [id number] (pembrolizumab) 200 mg in INV sodium chloride 0.9 % 100 mL chemo infusion, 200 mg, Intravenous, Clinic/HOD 1 time, 1 of 35 cycles  Administration: 200 mg (11/2/2023)     11/29/2023 Imaging Significant Findings    Impression:     Postoperative change including cysto proctectomy with creation of neobladder.  Persistent multilobular soft tissue mass within the surgical bed involving the neobladder and abutting the rectum and sigmoid colon, noting limited evaluation on this noncontrast examination.  Within these confines, appearance is similar from comparison CT 10/30/2023, noting increased distension of the neobladder from comparison imaging.     Persistent right-sided hydronephrosis and hydroureter with soft tissue attenuation at the mid to distal right ureter.  Prominent lymph nodes surrounding the right ureter, similar from comparison imaging.     Few stable mildly prominent pelvic and retroperitoneal lymph nodes.     Slight interval increase in left upper lobe nodule measuring approximately 1.2 x 1.0 cm, previously measuring 0.9 x 0.9 cm.  Additional previously described  nodules are similar in size and appearance.     Remote fracture of the left pubic symphysis with associated lytic lesion.     12/9/2023 Imaging Significant Findings    CT 12/9/23    Impression:     1. Right nephrostomy in appropriate position.  2. Postoperative change of cystoproctectomy with ileal neobladder.  Similar appearance of large pelvic mass surrounding the neobladder.  3. Ill-defined hypoattenuating hepatic lesions, concerning for metastases in light of history.  4. Increased retroperitoneal and pelvic adenopathy.  5. Stable pulmonary nodules.  6. Additional findings as above.     12/28/2023 -  Chemotherapy    Treatment Summary   Plan Name: OP SACITUZUMAB GOVITECAN-HZIY Q3W  Treatment Goal: Palliative  Status: Active  Start Date: 12/28/2023 (Planned)  End Date: 12/5/2024 (Planned)  Provider: Bridger Abad MD  Chemotherapy: sacituzumab govitecan-hziy (TRODELVY) 555 mg in sodium chloride 0.9% 500 mL chemo infusion, 7.5 mg/kg = 555 mg (original dose ), Intravenous, Clinic/HOD 1 time, 0 of 17 cycles  Dose modification: 7.5 mg/kg (Cycle 1, Reason: MD Discretion)     Melanoma        Past Medical History:   Diagnosis Date    Bladder cancer     CAD (coronary artery disease) 9/12/2023    Prior CABG. Not on antiplatelets. Home medicatiosn include atorvastatin    Carcinoma     forehead    Encounter for blood transfusion     Hypertension     Hypothyroidism 9/12/2023    Home medications include levothyroxine    Malignant melanoma of skin, unspecified     right arm    Malignant neoplasm of urinary bladder 9/12/2023    Melanoma 9/12/2023    History of T1a melanoma; 0.5mm depth without ulceartion. SP wide local excision 02/2022          Past Surgical History:   Procedure Laterality Date    CORONARY ARTERY BYPASS GRAFT  10/2015    CYSTECTOMY, ROBOT-ASSISTED, LAPAROSCOPIC, USING DA MARY XI, WITH ILEAL CONDUIT CREATION  12/2017    CYSTOGRAM N/A 12/5/2023    Procedure: CYSTOGRAM;  Surgeon: Eder Oconnor MD;  Location:  [facility] OR 1ST FLR;  Service: Urology;  Laterality: N/A;    CYSTOSCOPY N/A 12/5/2023    Procedure: CYSTOSCOPY;  Surgeon: Eder Oconnor MD;  Location: [facility] OR [facility] FLR;  Service: Urology;  Laterality: N/A;    DILATION OF BLADDER      dialation of bladder neck- due to claudia bladder- multiple procedures    DILATION OF URETHRA N/A 12/5/2023    Procedure: DILATION, URETHRA;  Surgeon: Eder Oconnor MD;  Location: [facility] OR [facility] FLR;  Service: Urology;  Laterality: N/A;    LYMPHADENECTOMY      PERCUTANEOUS NEPHROSTOMY Right 12/8/2023    Procedure: Creation, Nephrostomy, Percutaneous;  Surgeon: Jens Nunez MD;  Location: [facility] CATH LAB;  Service: Radiology;  Laterality: Right;    PLACEMENT N/A 12/5/2023    Procedure: PLACEMENT;  Surgeon: Eder Oconnor MD;  [facility];  Service: Urology;  Laterality: N/A;  placement of valiente over a wire by MD MOHR ROBOTIC PROSTECTOMY, SUPRAPUBIC  12/2017        Family History   Problem Relation Age of Onset    Heart disease Father     Heart attack Paternal Uncle     Breast cancer Maternal Cousin     Colon cancer Neg Hx     Bladder Cancer Neg Hx         Social History     Tobacco Use    Smoking status: Former     Types: Cigarettes    Smokeless tobacco: Not on file   Substance Use Topics    Alcohol use: Not Currently        I have reviewed and updated the patient's past medical, surgical, family and social histories.    Review of patient's allergies indicates:  No Known Allergies     Current Outpatient Medications   Medication Sig Dispense Refill    levoFLOXacin (LEVAQUIN) 500 MG tablet Take 500 mg by mouth.      atorvastatin (LIPITOR) 20 MG tablet Take 20 mg by mouth every evening.      [START ON 12/15/2023] dexAMETHasone (DECADRON) 4 MG Tab Take 2 tablets (8 mg total) by mouth once daily. Take 2 tablets (8 mg total) by mouth once daily. Take a directed on days 2, 3 and 4 of your chemotherapy cycle. 24 tablet 3    docusate sodium (COLACE) 100 MG capsule Take 220 capsules by mouth  "every evening.      Lactobacillus acidophilus 10 billion cell Cap Take by mouth once daily.      Lactobacillus rhamnosus GG (CULTURELLE) 10 billion cell capsule Take 1 capsule by mouth once daily.      levothyroxine (SYNTHROID) 50 MCG tablet Take 50 mcg by mouth before breakfast.      LINZESS 72 mcg Cap capsule Take 72 mcg by mouth every morning.      multivitamin (THERAGRAN) per tablet Take 1 tablet by mouth every morning.      OLANZapine (ZYPREXA) 5 MG tablet Take 1 tablet (5 mg total) by mouth every evening. days 1-4 of each chemotherapy cycle. 30 tablet 0    ondansetron (ZOFRAN-ODT) 8 MG TbDL Take 1 tablet (8 mg total) by mouth every 8 (eight) hours as needed (nausea/vomiting). 60 tablet 5    TURMERIC ORAL Take by mouth every morning.      VTAMA 1 % Crea APPLY 1 APPLICATION ON THE SKIN DAILY       No current facility-administered medications for this visit.        Physical Exam:   BP (!) 169/99 (BP Location: Left arm, Patient Position: Sitting, BP Method: Medium (Automatic))   Pulse 74   Temp 96.4 °F (35.8 °C) (Oral)   Resp 18   Ht 5' 10" (1.778 m)   Wt 73.9 kg (163 lb 0.5 oz)   SpO2 100%   BMI 23.39 kg/m²      ECOG Performance status: 1            Physical Exam  Constitutional:       General: He is not in acute distress.     Appearance: Normal appearance.   HENT:      Head: Normocephalic.   Eyes:      General: No scleral icterus.     Extraocular Movements: Extraocular movements intact.      Conjunctiva/sclera: Conjunctivae normal.   Cardiovascular:      Rate and Rhythm: Normal rate and regular rhythm.      Heart sounds: No murmur heard.     No friction rub. No gallop.   Pulmonary:      Effort: Pulmonary effort is normal. No respiratory distress.      Breath sounds: Normal breath sounds. No stridor. No wheezing, rhonchi or rales.   Abdominal:      General: There is no distension.   Skin:     General: Skin is warm and dry.   Neurological:      Mental Status: He is alert and oriented to person, place, and " time.      Motor: No weakness.   Psychiatric:         Mood and Affect: Mood normal.         Behavior: Behavior normal.         Thought Content: Thought content normal.           Labs:                 Lab Results   Component Value Date     12/13/2023    K 4.3 12/13/2023    CREATININE 2.8 (H) 12/13/2023    WBC 6.44 12/13/2023    HGB 11.0 (L) 12/13/2023     12/13/2023    AST 14 12/13/2023    ALT 8 (L) 12/13/2023    ALKPHOS 79 12/13/2023    BILITOT 0.5 12/13/2023          Imaging:       Interventional Radiology  Procedure performed in the Invasive Lab    - See Procedure Log link below for nursing documentation    - See OpNote on Surgeries Tab for physician findings    - See Imaging Tab for radiologist dictation      I have personally reviewed the above imaging including recent MRI and PET CT scan    Path:   Reviewed pathology as documented in oncology history      Assessment and Plan:        1. Malignant neoplasm of urinary bladder, unspecified site  Overview:  Metastatic bladder cancer previously treated with neoadjuvant ddMVAC, radical cystectomy, and then carboplatin/gemcitabine and EV+ Pembro following local and metastatic recurrence. Subsequetnly received a single dose of pembrolizumab + NTX-1088 11/2/23 as part of a phase I trial through the [facility], but was taken off trial for elevated creatinine despite PCN placement.      Assessment & Plan:  - Plan to proceed with sacituzumab govitecan per SOC  - Empiric 25% DR for SG given UGT1A1 poor metabolism  - FU in 2-3 weeks to start SG; needs formal consent. Information given today    Orders:  -     Physician communication order; Standing  -     dexAMETHasone (DECADRON) 4 MG Tab; Take 2 tablets (8 mg total) by mouth once daily. Take 2 tablets (8 mg total) by mouth once daily. Take a directed on days 2, 3 and 4 of your chemotherapy cycle.  Dispense: 24 tablet; Refill: 3  -     ondansetron (ZOFRAN-ODT) 8 MG TbDL; Take 1 tablet (8 mg total) by mouth every 8  (eight) hours as needed (nausea/vomiting).  Dispense: 60 tablet; Refill: 5  -     OLANZapine (ZYPREXA) 5 MG tablet; Take 1 tablet (5 mg total) by mouth every evening. days 1-4 of each chemotherapy cycle.  Dispense: 30 tablet; Refill: 0    2. Hypothyroidism, unspecified type  Overview:  Home medications include levothyroxine      3. Stage 3b chronic kidney disease  Assessment & Plan:  Chronic obstructive uropathy given recurrent bladder tumor and assocaited hydronephrosis.   - PCN in place  - Follow up with Dr. Oconnor next week to evaluate need for ongoing valiente catheterization      4. Coronary artery disease, unspecified vessel or lesion type, unspecified whether angina present, unspecified whether native or transplanted heart  Overview:  Prior CABG. Not on antiplatelets. Home medicatiosn include atorvastatin      5. Elevated blood pressure reading  Assessment & Plan:  - Pressures remain elevated  - May consider addition of hypertensive medications          Route Chart for Scheduling    Med Onc Chart Routing      Follow up with physician 2 weeks.   Follow up with LYNDSEY    Infusion scheduling note New or changed treatment   sacituzumab govitecan D1 and D8 - schedule in next 2-3 weeks   Injection scheduling note    Labs CBC and CMP   Scheduling:  Preferred lab:  Lab interval:     Imaging    Pharmacy appointment    Other referrals                  Treatment Plan Information   OP SACITUZUMAB GOVITECAN-HZIY Q3W   Bridger Abad MD   Upcoming Treatment Dates - OP SACITUZUMAB GOVITECAN-HZIY Q3W    12/28/2023       Pre-Medications       acetaminophen tablet 650 mg       diphenhydrAMINE (BENADRYL) 25 mg in sodium chloride 0.9% 50 mL IVPB       famotidine (PF) injection 20 mg       Chemotherapy       sacituzumab govitecan-hziy (TRODELVY) 555 mg in sodium chloride 0.9% 500 mL chemo infusion       Supportive Care       atropine injection 0.4 mg       Antiemetics       aprepitant (CINVANTI) injection 130 mg       palonosetron  (ALOXI) 0.25 mg with Dexamethasone (DECADRON) 12 mg in NS 50 mL IVPB  1/4/2024       Pre-Medications       ACETAMINOPHEN  325 MG ORAL TAB       DIPHENHYDRAMINE IV ORDERABLE       FAMOTIDINE (PF) 20 MG/2 ML IV SOLN       Chemotherapy       sacituzumab govitecan-hziy (TRODELVY) 555 mg in sodium chloride 0.9% 500 mL chemo infusion       Supportive Care       ATROPINE 0.4 MG/ML INJ SOLN       Antiemetics       APREPITANT 7.2 MG/ML IV EMUL       PALONOSETRON 0.25MG/DEXAMETHASONE 12MG ORDERABLE  1/18/2024       Pre-Medications       ACETAMINOPHEN  325 MG ORAL TAB       DIPHENHYDRAMINE IV ORDERABLE       FAMOTIDINE (PF) 20 MG/2 ML IV SOLN       Chemotherapy       sacituzumab govitecan-hziy (TRODELVY) 555 mg in sodium chloride 0.9% 500 mL chemo infusion       Supportive Care       ATROPINE 0.4 MG/ML INJ SOLN       Antiemetics       APREPITANT 7.2 MG/ML IV EMUL       PALONOSETRON 0.25MG/DEXAMETHASONE 12MG ORDERABLE  1/25/2024       Pre-Medications       ACETAMINOPHEN  325 MG ORAL TAB       DIPHENHYDRAMINE IV ORDERABLE       FAMOTIDINE (PF) 20 MG/2 ML IV SOLN       Chemotherapy       sacituzumab govitecan-hziy (TRODELVY) 555 mg in sodium chloride 0.9% 500 mL chemo infusion       Supportive Care       ATROPINE 0.4 MG/ML INJ SOLN       Antiemetics       APREPITANT 7.2 MG/ML IV EMUL       PALONOSETRON 0.25MG/DEXAMETHASONE 12MG ORDERABLE          Bridger Abad

## 2023-12-14 NOTE — PROGRESS NOTES
[facility]  Urologic Oncology Clinic Note        Date of Service: 12/14/2023       Chief Complaint/Reason for Consultation: Metastatic Bladder Cancer, Right Hydronephrosis.    Requesting Provider:   No referring provider defined for this encounter.      History of Present Illness:   Patient 58 y.o. 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 He underwent radical cystectomy w/ ileal neobladder in 2017. He then subsequently developed local and systemic recurrence. He is being followed by Dr Leos and part of the Phase 1 program. He did also receive palliative radiation to the pelvis after pathologic pelvic fracture.     He currently catheterizes his pouch as needed, but reports he has had multiple dilations in the past and continues to struggle to catheterize his bladder. He is currently down to 12F.. He says this is negatively impacting his quality of life.    He presents today due to a decline in his renal function since the start of the Phase 1 program. GFR at enrollment was 30, currently at 22-24 with a creatinine at 2.7. He was hospitalized in February with right hydronephrosis and [facility] and images obtained at that time showed a soft tissue recurrence in the left distal ureter proximal to his anastomosis. He is not aware of any functional studies for his hydronephrosis and has not received a stent/nephrostomy tube as his hydronephrosis appeared chronic with thin parenchyma.    Blood thinners:  None    No Hx of TIA, MI, and CVA   Abdominal surgeries:  Radical cystectomy with creation of ileal neobladder      Today he is s/p trip to the OR 12/5/2023 for urethral dilation and doing much better with catheterization of his neobladder. Able to now use 16F catheter. He also has right nephrostomy tube now placed by IR on 12/8/2023. He is getting about 50-70cc out per day.      Patient Active Problem List    Diagnosis Date Noted    CKD (chronic kidney disease) 10/23/2023    Elevated  blood pressure reading 09/28/2023    Malignant neoplasm of urinary bladder 09/12/2023    Melanoma 09/12/2023    CAD (coronary artery disease) 09/12/2023    Hypothyroidism 09/12/2023    Psoriasis 09/12/2023          Review of patient's allergies indicates:  No Known Allergies     Past Medical History:   Diagnosis Date    Bladder cancer     CAD (coronary artery disease) 9/12/2023    Prior CABG. Not on antiplatelets. Home medicatiosn include atorvastatin    Carcinoma     forehead    Encounter for blood transfusion     Hypertension     Hypothyroidism 9/12/2023    Home medications include levothyroxine    Malignant melanoma of skin, unspecified     right arm    Malignant neoplasm of urinary bladder 9/12/2023    Melanoma 9/12/2023    History of T1a melanoma; 0.5mm depth without ulceartion. SP wide local excision 02/2022      Past Surgical History:   Procedure Laterality Date    CORONARY ARTERY BYPASS GRAFT  10/2015    CYSTECTOMY, ROBOT-ASSISTED, LAPAROSCOPIC, USING DA MARY XI, WITH ILEAL CONDUIT CREATION  12/2017    CYSTOGRAM N/A 12/5/2023    Procedure: CYSTOGRAM;  Surgeon: Eder Oconnor MD;  Location: [facility] OR [address] [place] [address];  Service: Urology;  Laterality: N/A;    CYSTOSCOPY N/A 12/5/2023    Procedure: CYSTOSCOPY;  Surgeon: Eder Oconnor MD;  Location: [facility] OR [address] [place] [address];  Service: Urology;  Laterality: N/A;    DILATION OF BLADDER      dialation of bladder neck- due to claudia bladder- multiple procedures    DILATION OF URETHRA N/A 12/5/2023    Procedure: DILATION, URETHRA;  Surgeon: Eder Oconnor MD;  Location: [address];  Service: Urology;  Laterality: N/A;    LYMPHADENECTOMY      PERCUTANEOUS NEPHROSTOMY Right 12/8/2023    Procedure: Creation, Nephrostomy, Percutaneous;  Surgeon: Jens Nunez MD;  Location: [facility] CATH LAB;  Service: Radiology;  Laterality: Right;    PLACEMENT N/A 12/5/2023    Procedure: PLACEMENT;  Surgeon: Eder Oconnor MD;  Location: [facility] OR [address] [place] [address];  Service: Urology;  Laterality: N/A;  placement of  "valiente over a wire by MD MOHR ROBOTIC PROSTECTOMY, SUPRAPUBIC  12/2017      Family History   Problem Relation Age of Onset    Heart disease Father     Heart attack Paternal Uncle     Breast cancer Maternal Cousin     Colon cancer Neg Hx     Bladder Cancer Neg Hx       Social History     Tobacco Use    Smoking status: Former     Types: Cigarettes    Smokeless tobacco: Not on file   Substance Use Topics    Alcohol use: Not Currently        Review of Systems   Genitourinary:  Negative for difficulty urinating, flank pain, hematuria and urgency.             OBJECTIVE:     Vitals:    12/14/23 0932   BP: 138/84   Pulse: 85   Weight: 71.6 kg (157 lb 13.6 oz)   Height: 5' 10" (1.778 m)          General Appearance: Alert, cooperative, no distress  Head: Normocephalic  Eyes: Clear conjunctiva  Neck: No obvious LND or JVD  Lungs: Normal chest excursion, no accessory muscle use  Chest: Regular rate rhythm by palpation, no pedal edema  Abdomen: Soft, non-tender, non-distended, surgical scars lower midline well-healed  Extremities: Atraumatic   Lymph Nodes: No appreciable lymph adenopathy  Neurologic: No gross gait, motor or sensory deficits      LAB:      CMP  Sodium   Date Value Ref Range Status   12/13/2023 136 136 - 145 mmol/L Final     Potassium   Date Value Ref Range Status   12/13/2023 4.3 3.5 - 5.1 mmol/L Final     Chloride   Date Value Ref Range Status   12/13/2023 105 95 - 110 mmol/L Final     CO2   Date Value Ref Range Status   12/13/2023 26 23 - 29 mmol/L Final     Glucose   Date Value Ref Range Status   12/13/2023 86 70 - 110 mg/dL Final     BUN   Date Value Ref Range Status   12/13/2023 35 (H) 6 - 20 mg/dL Final     Creatinine   Date Value Ref Range Status   12/13/2023 2.8 (H) 0.5 - 1.4 mg/dL Final     Calcium   Date Value Ref Range Status   12/13/2023 9.7 8.7 - 10.5 mg/dL Final     Total Protein   Date Value Ref Range Status   12/13/2023 7.5 6.0 - 8.4 g/dL Final     Albumin   Date Value Ref Range Status "   2023 3.1 (L) 3.5 - 5.2 g/dL Final     Total Bilirubin   Date Value Ref Range Status   2023 0.5 0.1 - 1.0 mg/dL Final     Comment:     For infants and newborns, interpretation of results should be based  on gestational age, weight and in agreement with clinical  observations.    Premature Infant recommended reference ranges:  Up to 24 hours.............<8.0 mg/dL  Up to 48 hours............<12.0 mg/dL  3-5 days..................<15.0 mg/dL  6-29 days.................<15.0 mg/dL       Alkaline Phosphatase   Date Value Ref Range Status   2023 79 55 - 135 U/L Final     AST   Date Value Ref Range Status   2023 14 10 - 40 U/L Final     ALT   Date Value Ref Range Status   2023 8 (L) 10 - 44 U/L Final     Anion Gap   Date Value Ref Range Status   2023 5 (L) 8 - 16 mmol/L Final     eGFR   Date Value Ref Range Status   2023 25.4 (A) >60 mL/min/1.73 m^2 Final     Lab Results   Component Value Date    WBC 6.44 2023    HGB 11.0 (L) 2023    HCT 32.8 (L) 2023    MCV 92 2023     2023             IMAGIN/22/2023  US KIDNEY     CLINICAL HISTORY:  NTX clinical trial; Encounter for examination for normal comparison and control in clinical research program     TECHNIQUE:  Ultrasound of the kidneys was performed including color flow and Doppler evaluation of the kidneys.     COMPARISON:  CT chest abdomen pelvis 10/30/2023     FINDINGS:  Right kidney: The right kidney measures 12.1 cm. Cortical thinning and loss of corticomedullary distinction.  Resistive index measures 0.74.  No mass. No renal stone. Severe hydronephrosis with dilatation of partially imaged proximal ureter.  There is associated cortical thinning.     Left kidney: The left kidney measures 10.5 cm. No cortical thinning. No loss of corticomedullary distinction. Resistive index measures 0.57.  No mass. No renal stone. No hydronephrosis.     Splenic resistive index measures 0.48.      Impression:     Severe right-sided hydronephrosis with cortical thinning suggesting that this is chronic.  Findings are similar to recent 10/30/2023 CT.     Electronically signed by resident: Chan Cedillo  Date:                                            11/22/2023  Time:                                           07:50     Electronically signed by: Ermias Matthew MD  Date:                                            11/22/2023  Time:                                           08:06      10/30/2023  CT CHEST ABDOMEN PELVIS WITHOUT CONTRAST(XPD)     CLINICAL HISTORY:  Bladder cancer, invasive, assess treatment response;Research Patient Evaluation... Please include Recist Measurements in report.;Malignant neoplasm of bladder, unspecified     TECHNIQUE:  Low dose axial images, sagittal and coronal reformations were obtained from the thoracic inlet to the pubic synthesis without contrast.     COMPARISON:  CT chest 10/16/2023, MRI pelvis 10/15/2023, PET-CT 08/10/2023     FINDINGS:  Thoracic soft tissues: Right chest port with transvenous catheter tip terminating in the SVC.     Aorta: Normal caliber.  Mild calcific atherosclerosis.     Heart: Normal size.  No pericardial effusion.  Postoperative changes of CABG.  Coronary artery calcific atherosclerosis.     Ashley/Mediastinum: No pathologic lymphadenopathy.     Lungs: Stable scattered bilateral solid pulmonary nodules.  Largest nodule within the left lung measures 0.9 x 0.9 cm within the upper lobe (series 6, image 230), previously 0.9 x 0.9 cm.  Largest nodule within the right lung measures 0.9 x 0.8 cm within the lower lobe (series 6, image 340), previously 0.9 x 0.8 cm.  No definite new nodule.  No consolidation or pleural effusion.     Liver: Few scattered hepatic cysts.  Few additional subcentimeter hypodensities too small to characterize, similar to prior exam.     Gallbladder: Calcified gallstones.  No wall thickening.     Bile Ducts: No evidence of dilated ducts.      Pancreas: No mass or peripancreatic fat stranding.     Spleen: Unremarkable.     Adrenals: Unremarkable.     Kidneys/ Ureters: Similar bilateral renal cortical thinning and cortical scarring.  Stable severe right hydronephrosis and ureteral dilatation.  Abrupt caliber change of the distal right ureter with soft tissue attenuation (series 3, image 111).     Bladder: Postoperative changes of cystoproctectomy with creation of neobladder.  Redemonstration of a multilobular soft tissue mass within the surgical bed involving the neobladder.  Mass abuts the rectum and sigmoid colon.  Findings appear similar compared to prior MRI allowing for differences in modality.     Reproductive organs: As above.     GI Tract/Mesentery: Postsurgical changes of the bowel.  No evidence of bowel obstruction or inflammation.     Peritoneal Space: No ascites. No free air.     Lymph nodes: Few mildly prominent pelvic and retroperitoneal lymph nodes, similar to prior exam.  Mildly prominent left para-aortic lymph node measuring 1 cm (series 3, image 65), previously 1 cm.  Right external iliac chain lymph node measuring 0.9 cm (series 3, image 132), previously 1.0 cm.     Abdominal wall: Small fat containing supraumbilical hernia.     Vasculature: Mild aortoiliac calcific atherosclerosis.  No aneurysm.     Bones: Remote fracture of the left pubic symphysis involving the superior and inferior pubic rami with associated lytic lesion, similar to prior exam.  Prior median sternotomy.  No new aggressive lesion or acute fracture.     Impression:     1. Postoperative changes of cystoproctectomy with creation of neobladder.  Persistent multilobular soft tissue mass within the surgical bed involving the neobladder and abutting the rectum and sigmoid colon, difficult to evaluate on noncontrast CT.  Overall findings appear similar to prior MRI 10/15/2023.  2. Few stable mildly prominent pelvic and retroperitoneal lymph nodes.  3. Stable bilateral  subcentimeter pulmonary nodules.  4. Stable severe right hydronephrosis and ureteral dilatation concerning for obstruction.  Abrupt caliber change of the distal right ureter with soft tissue attenuation which may represent stricture or neoplasm.  5. Stable appearing fracture of the left pubic symphysis with associated lytic lesion.  6. Additional findings as above.  Pelvic mass is difficult to measure on noncontrast CT.  Remain described lymph nodes and pulmonary nodules do not meet RECIST inclusion criteria.        Electronically signed by: Marcelino Miguel  Date:                                            10/30/2023  Time:                                           10:22        ASSESSMENT/PLAN:     57 yo M w hx of MIBC s/p radical cystectomy w/ ileal neobladder in 2017 now with local and systemic recurrence undergoing phase 1 clinical trial     Plan:  Right Hydronephrosis  Images reviewed: he has severe right hydronephrosis with a thin parenchyma. There is hydroureter to the level of a soft tissue recurrence. We discussed management options for him in order to maximize his renal function for the phase 1 program. I do not believe that a stent is feasible given the soft tissue recurrence. We decided that we would refer him to IR for nephrostomy tube placement which he has completed.     Unfortunately GFR not responding to nephrostomy tube placement.     We discussed that right kidney may have already been failing for a while secondary to obstruction    Muscle Invasive Bladder Cancer, metastatic   - Progressed through chemo, EV + Pembro, and Sacituzumab.  - Was on Phase 1 trial but could not keep his his GFR   - GFR seems to be secondary to hydration alone   - Will have him continue to follow with heme / onc       Bladder Neck Contracture  - S/p 12/5 bladder neck dilation -- doing well since      Plan to keep nephrostomy tube in for 1 more week. If GFR improves will keep in place otherwise will remove.      The total  time for the established patient visit was at least 40 minutes in both face-to-face and non-face-to-face activities, which included chart review, interpretation of results, counseling, education, ordering meds/tests/procedures, and/or coordination of care.       Eder Oconnor MD  Urologic Oncology  P: [phone]

## 2023-12-15 LAB
CK BB CFR SERPL ELPH: 0 %
CK MB CFR SERPL ELPH: 0 % (ref 0–3.3)
CK MM CFR SERPL ELPH: 100 % (ref 96.7–100)
CK SERPL-CCNC: 65 U/L (ref 30–223)

## 2023-12-15 NOTE — ASSESSMENT & PLAN NOTE
Chronic obstructive uropathy given recurrent bladder tumor and assocaited hydronephrosis.   - PCN in place  - Follow up with Dr. Oconnor next week to evaluate need for ongoing valiente catheterization

## 2023-12-15 NOTE — ASSESSMENT & PLAN NOTE
- Plan to proceed with sacituzumab govitecan per SOC  - Empiric 25% DR for SG given UGT1A1 poor metabolism  - FU in 2-3 weeks to start SG; needs formal consent. Information given today

## 2023-12-16 ENCOUNTER — PATIENT MESSAGE (OUTPATIENT)
Dept: UROLOGY | Facility: CLINIC | Age: 58
End: 2023-12-16
Payer: COMMERCIAL

## 2023-12-18 ENCOUNTER — TELEPHONE (OUTPATIENT)
Dept: HEMATOLOGY/ONCOLOGY | Facility: CLINIC | Age: 58
End: 2023-12-18
Payer: COMMERCIAL

## 2023-12-18 ENCOUNTER — PATIENT MESSAGE (OUTPATIENT)
Dept: HEMATOLOGY/ONCOLOGY | Facility: CLINIC | Age: 58
End: 2023-12-18
Payer: COMMERCIAL

## 2023-12-18 ENCOUNTER — LAB VISIT (OUTPATIENT)
Dept: LAB | Facility: HOSPITAL | Age: 58
End: 2023-12-18
Attending: STUDENT IN AN ORGANIZED HEALTH CARE EDUCATION/TRAINING PROGRAM
Payer: COMMERCIAL

## 2023-12-18 DIAGNOSIS — N18.4 CKD (CHRONIC KIDNEY DISEASE) STAGE 4, GFR 15-29 ML/MIN: ICD-10-CM

## 2023-12-18 DIAGNOSIS — N13.30 HYDRONEPHROSIS OF RIGHT KIDNEY: Primary | ICD-10-CM

## 2023-12-18 LAB
ANION GAP SERPL CALC-SCNC: 5 MMOL/L (ref 8–16)
BUN SERPL-MCNC: 31 MG/DL (ref 6–20)
CALCIUM SERPL-MCNC: 9.4 MG/DL (ref 8.7–10.5)
CHLORIDE SERPL-SCNC: 102 MMOL/L (ref 95–110)
CO2 SERPL-SCNC: 26 MMOL/L (ref 23–29)
CREAT SERPL-MCNC: 2.1 MG/DL (ref 0.5–1.4)
EST. GFR  (NO RACE VARIABLE): 35.8 ML/MIN/1.73 M^2
GLUCOSE SERPL-MCNC: 86 MG/DL (ref 70–110)
POTASSIUM SERPL-SCNC: 4.3 MMOL/L (ref 3.5–5.1)
SODIUM SERPL-SCNC: 133 MMOL/L (ref 136–145)

## 2023-12-18 PROCEDURE — 36415 COLL VENOUS BLD VENIPUNCTURE: CPT | Performed by: STUDENT IN AN ORGANIZED HEALTH CARE EDUCATION/TRAINING PROGRAM

## 2023-12-18 PROCEDURE — 80048 BASIC METABOLIC PNL TOTAL CA: CPT | Performed by: STUDENT IN AN ORGANIZED HEALTH CARE EDUCATION/TRAINING PROGRAM

## 2023-12-18 NOTE — TELEPHONE ENCOUNTER
I spoke to patient. He wanted to know if there was anything he could do to help get his chemo authorized. I told him it wouldn't hurt for him to call his insurance and ask them to authorize it as quickly as possible. I told him I would also send a message to our department and ask them to expedite the referral. He thanked me for calling.

## 2023-12-18 NOTE — TELEPHONE ENCOUNTER
"----- Message from Rach Larios sent at 12/18/2023 11:52 AM CST -----  Regarding: Pt advice  Contact: Pt     Pt requesting call back in regards to approval from insurance company. Pt would like to speak with nurse.  Please call and adv @       Confirmed contact below:   Contact Name: Julio Rodríguez  Phone Number: [phone]               Additional Notes:  "Thank you for all that you do for our patients"                                           "

## 2023-12-19 ENCOUNTER — PATIENT MESSAGE (OUTPATIENT)
Dept: HEMATOLOGY/ONCOLOGY | Facility: CLINIC | Age: 58
End: 2023-12-19
Payer: COMMERCIAL

## 2023-12-21 ENCOUNTER — LAB VISIT (OUTPATIENT)
Dept: LAB | Facility: HOSPITAL | Age: 58
End: 2023-12-21
Attending: STUDENT IN AN ORGANIZED HEALTH CARE EDUCATION/TRAINING PROGRAM
Payer: COMMERCIAL

## 2023-12-21 DIAGNOSIS — N13.30 HYDRONEPHROSIS OF RIGHT KIDNEY: ICD-10-CM

## 2023-12-21 LAB
ANION GAP SERPL CALC-SCNC: 6 MMOL/L (ref 8–16)
BUN SERPL-MCNC: 32 MG/DL (ref 6–20)
CALCIUM SERPL-MCNC: 9.4 MG/DL (ref 8.7–10.5)
CHLORIDE SERPL-SCNC: 103 MMOL/L (ref 95–110)
CO2 SERPL-SCNC: 25 MMOL/L (ref 23–29)
CREAT SERPL-MCNC: 2.2 MG/DL (ref 0.5–1.4)
EST. GFR  (NO RACE VARIABLE): 33.9 ML/MIN/1.73 M^2
GLUCOSE SERPL-MCNC: 77 MG/DL (ref 70–110)
POTASSIUM SERPL-SCNC: 4.7 MMOL/L (ref 3.5–5.1)
SODIUM SERPL-SCNC: 134 MMOL/L (ref 136–145)

## 2023-12-21 PROCEDURE — 80048 BASIC METABOLIC PNL TOTAL CA: CPT | Performed by: STUDENT IN AN ORGANIZED HEALTH CARE EDUCATION/TRAINING PROGRAM

## 2023-12-21 PROCEDURE — 36415 COLL VENOUS BLD VENIPUNCTURE: CPT | Performed by: STUDENT IN AN ORGANIZED HEALTH CARE EDUCATION/TRAINING PROGRAM

## 2023-12-26 ENCOUNTER — TELEPHONE (OUTPATIENT)
Dept: HEMATOLOGY/ONCOLOGY | Facility: CLINIC | Age: 58
End: 2023-12-26
Payer: COMMERCIAL

## 2023-12-26 ENCOUNTER — PATIENT MESSAGE (OUTPATIENT)
Dept: HEMATOLOGY/ONCOLOGY | Facility: CLINIC | Age: 58
End: 2023-12-26
Payer: COMMERCIAL

## 2023-12-26 NOTE — TELEPHONE ENCOUNTER
"----- Message from Rach Larios sent at 12/26/2023  1:27 PM CST -----  Regarding: Pt advice  Contact: Pt     Pt requesting call back in regards to upcoming appts.    Please call and adv @       Confirmed contact below:   Contact Name: Julio Rodríguez  Phone Number: [phone]               Additional Notes:  "Thank you for all that you do for our patients"                                           "

## 2023-12-27 ENCOUNTER — OFFICE VISIT (OUTPATIENT)
Dept: HEMATOLOGY/ONCOLOGY | Facility: CLINIC | Age: 58
End: 2023-12-27
Payer: COMMERCIAL

## 2023-12-27 ENCOUNTER — LAB VISIT (OUTPATIENT)
Dept: LAB | Facility: HOSPITAL | Age: 58
End: 2023-12-27
Attending: HOSPITALIST
Payer: COMMERCIAL

## 2023-12-27 VITALS
DIASTOLIC BLOOD PRESSURE: 81 MMHG | WEIGHT: 160.19 LBS | SYSTOLIC BLOOD PRESSURE: 153 MMHG | RESPIRATION RATE: 18 BRPM | HEART RATE: 75 BPM | BODY MASS INDEX: 22.93 KG/M2 | HEIGHT: 70 IN | OXYGEN SATURATION: 99 %

## 2023-12-27 DIAGNOSIS — C67.9 MALIGNANT NEOPLASM OF URINARY BLADDER, UNSPECIFIED SITE: Primary | ICD-10-CM

## 2023-12-27 DIAGNOSIS — E03.9 HYPOTHYROIDISM, UNSPECIFIED TYPE: ICD-10-CM

## 2023-12-27 DIAGNOSIS — C67.9 MALIGNANT NEOPLASM OF URINARY BLADDER, UNSPECIFIED SITE: ICD-10-CM

## 2023-12-27 DIAGNOSIS — N18.32 STAGE 3B CHRONIC KIDNEY DISEASE: ICD-10-CM

## 2023-12-27 DIAGNOSIS — I25.10 CORONARY ARTERY DISEASE, UNSPECIFIED VESSEL OR LESION TYPE, UNSPECIFIED WHETHER ANGINA PRESENT, UNSPECIFIED WHETHER NATIVE OR TRANSPLANTED HEART: ICD-10-CM

## 2023-12-27 LAB
ALBUMIN SERPL BCP-MCNC: 2.8 G/DL (ref 3.5–5.2)
ALP SERPL-CCNC: 78 U/L (ref 55–135)
ALT SERPL W/O P-5'-P-CCNC: 9 U/L (ref 10–44)
ANION GAP SERPL CALC-SCNC: 9 MMOL/L (ref 8–16)
AST SERPL-CCNC: 12 U/L (ref 10–40)
BILIRUB SERPL-MCNC: 0.4 MG/DL (ref 0.1–1)
BUN SERPL-MCNC: 34 MG/DL (ref 6–20)
CALCIUM SERPL-MCNC: 9.4 MG/DL (ref 8.7–10.5)
CHLORIDE SERPL-SCNC: 101 MMOL/L (ref 95–110)
CO2 SERPL-SCNC: 25 MMOL/L (ref 23–29)
CREAT SERPL-MCNC: 2.5 MG/DL (ref 0.5–1.4)
ERYTHROCYTE [DISTWIDTH] IN BLOOD BY AUTOMATED COUNT: 12.2 % (ref 11.5–14.5)
EST. GFR  (NO RACE VARIABLE): 29.1 ML/MIN/1.73 M^2
GLUCOSE SERPL-MCNC: 96 MG/DL (ref 70–110)
HCT VFR BLD AUTO: 31.3 % (ref 40–54)
HGB BLD-MCNC: 10.1 G/DL (ref 14–18)
IMM GRANULOCYTES # BLD AUTO: 0.03 K/UL (ref 0–0.04)
MCH RBC QN AUTO: 28.9 PG (ref 27–31)
MCHC RBC AUTO-ENTMCNC: 32.3 G/DL (ref 32–36)
MCV RBC AUTO: 89 FL (ref 82–98)
NEUTROPHILS # BLD AUTO: 7.4 K/UL (ref 1.8–7.7)
PLATELET # BLD AUTO: 297 K/UL (ref 150–450)
PMV BLD AUTO: 9.4 FL (ref 9.2–12.9)
POTASSIUM SERPL-SCNC: 4.3 MMOL/L (ref 3.5–5.1)
PROT SERPL-MCNC: 6.8 G/DL (ref 6–8.4)
RBC # BLD AUTO: 3.5 M/UL (ref 4.6–6.2)
SODIUM SERPL-SCNC: 135 MMOL/L (ref 136–145)
WBC # BLD AUTO: 9.48 K/UL (ref 3.9–12.7)

## 2023-12-27 PROCEDURE — 99999 PR PBB SHADOW E&M-EST. PATIENT-LVL III: ICD-10-PCS | Mod: PBBFAC,,, | Performed by: HOSPITALIST

## 2023-12-27 PROCEDURE — 99215 OFFICE O/P EST HI 40 MIN: CPT | Mod: S$GLB,,, | Performed by: HOSPITALIST

## 2023-12-27 PROCEDURE — 3079F DIAST BP 80-89 MM HG: CPT | Mod: CPTII,S$GLB,, | Performed by: HOSPITALIST

## 2023-12-27 PROCEDURE — 85027 COMPLETE CBC AUTOMATED: CPT | Performed by: HOSPITALIST

## 2023-12-27 PROCEDURE — 3079F PR MOST RECENT DIASTOLIC BLOOD PRESSURE 80-89 MM HG: ICD-10-PCS | Mod: CPTII,S$GLB,, | Performed by: HOSPITALIST

## 2023-12-27 PROCEDURE — 80053 COMPREHEN METABOLIC PANEL: CPT | Performed by: HOSPITALIST

## 2023-12-27 PROCEDURE — 3008F PR BODY MASS INDEX (BMI) DOCUMENTED: ICD-10-PCS | Mod: CPTII,S$GLB,, | Performed by: HOSPITALIST

## 2023-12-27 PROCEDURE — 3077F SYST BP >= 140 MM HG: CPT | Mod: CPTII,S$GLB,, | Performed by: HOSPITALIST

## 2023-12-27 PROCEDURE — 3077F PR MOST RECENT SYSTOLIC BLOOD PRESSURE >= 140 MM HG: ICD-10-PCS | Mod: CPTII,S$GLB,, | Performed by: HOSPITALIST

## 2023-12-27 PROCEDURE — 99215 PR OFFICE/OUTPT VISIT, EST, LEVL V, 40-54 MIN: ICD-10-PCS | Mod: S$GLB,,, | Performed by: HOSPITALIST

## 2023-12-27 PROCEDURE — 99999 PR PBB SHADOW E&M-EST. PATIENT-LVL III: CPT | Mod: PBBFAC,,, | Performed by: HOSPITALIST

## 2023-12-27 PROCEDURE — 36415 COLL VENOUS BLD VENIPUNCTURE: CPT | Performed by: HOSPITALIST

## 2023-12-27 PROCEDURE — 3008F BODY MASS INDEX DOCD: CPT | Mod: CPTII,S$GLB,, | Performed by: HOSPITALIST

## 2023-12-27 RX ORDER — DIPHENHYDRAMINE HYDROCHLORIDE 50 MG/ML
50 INJECTION INTRAMUSCULAR; INTRAVENOUS ONCE AS NEEDED
Status: CANCELLED | OUTPATIENT
Start: 2023-12-27

## 2023-12-27 RX ORDER — PROCHLORPERAZINE EDISYLATE 5 MG/ML
10 INJECTION INTRAMUSCULAR; INTRAVENOUS ONCE AS NEEDED
Status: CANCELLED
Start: 2024-01-03

## 2023-12-27 RX ORDER — FAMOTIDINE 10 MG/ML
20 INJECTION INTRAVENOUS
Status: CANCELLED | OUTPATIENT
Start: 2023-12-27

## 2023-12-27 RX ORDER — DIPHENHYDRAMINE HYDROCHLORIDE 50 MG/ML
50 INJECTION INTRAMUSCULAR; INTRAVENOUS ONCE AS NEEDED
Status: CANCELLED | OUTPATIENT
Start: 2024-01-03

## 2023-12-27 RX ORDER — PROCHLORPERAZINE EDISYLATE 5 MG/ML
10 INJECTION INTRAMUSCULAR; INTRAVENOUS ONCE AS NEEDED
Status: CANCELLED
Start: 2023-12-27

## 2023-12-27 RX ORDER — SODIUM CHLORIDE 0.9 % (FLUSH) 0.9 %
10 SYRINGE (ML) INJECTION
Status: CANCELLED | OUTPATIENT
Start: 2023-12-27

## 2023-12-27 RX ORDER — SODIUM CHLORIDE 0.9 % (FLUSH) 0.9 %
10 SYRINGE (ML) INJECTION
Status: CANCELLED | OUTPATIENT
Start: 2024-01-03

## 2023-12-27 RX ORDER — ATROPINE SULFATE 0.4 MG/ML
0.4 INJECTION, SOLUTION ENDOTRACHEAL; INTRAMEDULLARY; INTRAMUSCULAR; INTRAVENOUS; SUBCUTANEOUS ONCE AS NEEDED
Status: CANCELLED | OUTPATIENT
Start: 2024-01-03

## 2023-12-27 RX ORDER — ACETAMINOPHEN 325 MG/1
650 TABLET ORAL
Status: CANCELLED | OUTPATIENT
Start: 2024-01-03

## 2023-12-27 RX ORDER — EPINEPHRINE 0.3 MG/.3ML
0.3 INJECTION SUBCUTANEOUS ONCE AS NEEDED
Status: CANCELLED | OUTPATIENT
Start: 2024-01-03

## 2023-12-27 RX ORDER — LOPERAMIDE HYDROCHLORIDE 2 MG/1
2 CAPSULE ORAL 4 TIMES DAILY PRN
Qty: 60 CAPSULE | Refills: 0 | Status: SHIPPED | OUTPATIENT
Start: 2023-12-27

## 2023-12-27 RX ORDER — HEPARIN 100 UNIT/ML
500 SYRINGE INTRAVENOUS
Status: CANCELLED | OUTPATIENT
Start: 2024-01-03

## 2023-12-27 RX ORDER — ACETAMINOPHEN 325 MG/1
650 TABLET ORAL
Status: CANCELLED | OUTPATIENT
Start: 2023-12-27

## 2023-12-27 RX ORDER — ATROPINE SULFATE 0.4 MG/ML
0.4 INJECTION, SOLUTION ENDOTRACHEAL; INTRAMEDULLARY; INTRAMUSCULAR; INTRAVENOUS; SUBCUTANEOUS ONCE AS NEEDED
Status: CANCELLED | OUTPATIENT
Start: 2023-12-27

## 2023-12-27 RX ORDER — HEPARIN 100 UNIT/ML
500 SYRINGE INTRAVENOUS
Status: CANCELLED | OUTPATIENT
Start: 2023-12-27

## 2023-12-27 RX ORDER — FAMOTIDINE 10 MG/ML
20 INJECTION INTRAVENOUS
Status: CANCELLED | OUTPATIENT
Start: 2024-01-03

## 2023-12-27 RX ORDER — EPINEPHRINE 0.3 MG/.3ML
0.3 INJECTION SUBCUTANEOUS ONCE AS NEEDED
Status: CANCELLED | OUTPATIENT
Start: 2023-12-27

## 2023-12-27 NOTE — PATIENT INSTRUCTIONS
- Take olanzapine for 4 nights following chemotherapy; take dexamethasone for 3 moringing after each chemotherapy; ondansetron as needed for additional nausea control  - Immodium can be used as needed for diarrhea.  - Will monitor blood counts closely; repeat labs weekly during first cycle

## 2023-12-27 NOTE — ASSESSMENT & PLAN NOTE
Discussed risks of SG including his UGT1A1 metabolism deficiency. Empirically DR to 25% and will monitor counts weekly. RX for olanzapine and dexamethsone for chemo induced nausea. Also given immodium for diarrhea.   - C1D1 Sacituzumab Govitecan DR to 7.5mg/kg tomorrow  - FU next week for C1D8  - Weekly labs  - FU in 3 weeks for C2  - Repeat imaging after C4

## 2023-12-27 NOTE — PROGRESS NOTES
MEDICAL ONCOLOGY - FOLLOW UP VISIT    Cancer/Stage/TNM:    Cancer Staging   Malignant neoplasm of urinary bladder  Staging form: Urinary Bladder, AJCC 8th Edition  - Clinical: Stage IVB (cT4b, cNX, pM1b) - Signed by Bridger Abad MD on 9/28/2023       Reason for visit: Metastatic bladder cancer    HPI: Julio Rodríguez is a 58 y.o. male with metastatic bladder cancer previously treated with neoadjuvant ddMVAC, radical cystectomy, and then carboplatin/gemcitabine and EV+ Pembro following local and metastatic recurrence. Subsequetnly received a single dose of pembrolizumab + NTX-1088 11/2/23 as part of a phase I trial through the [facility], but was taken off trial for elevated creatinine despite PCN placement.  He presents to medical oncology clinic for further follow up..      History has been obtained by chart review and discussion with the patient.    Interval Events:    Has had removal of PCN. Patient removed smith this morning; plan to self catheterize during the day with smith catheter overnight. Urine output seems normal.  Creatine is currently 2.5. Appetite is good. No N/V. Has some soreness from the prior right PCN site, up to 1/10. No pain with ambulation. No fevers or chills. Normal bowel movements about 1x per day but can be up to 3x per day and no diarrhea.       Oncology History   Malignant neoplasm of urinary bladder   2016 Initial Diagnosis    Bladder CIS. Managed with BCG     2017 Notable Event    Developed recurrent muscle invasive disease.     2017 - 2017 Chemotherapy    ddMVAC      Surgery    Radical cystectomy with lymph node dissection and creation of neobladder. [id number]     7/2022 Notable Event    New onset hematuria. MRI scan of the pelvis in August showed a suspected blood clot adherent to the distal Smith catheter. There was abnormal signal and enhancement of the bilateral pubic bones adjacent to the neobladder suspicious for neoplastic infiltration. There was no pelvic lymphadenopathy.       7/2022 Imaging Significant Findings    Pet scan at the same time showed hypermetabolic retroperitoneal lymphadenopathy. There was marked activity felt to involve the bladder wall suspicious for tumor.     7/2022 Biopsy    Biopsy of the bladder neck showed recurrent high-grade urothelial carcinoma.     9/28/2022 - 5/16/2023 Chemotherapy    Mr. Rodríguez was started on chemotherapy with gemcitabine and carboplatin in September 2022.      1/25/2023 Imaging Significant Findings    Pet scan on 25 January showed changes related to prior cystoprostatectomy with neobladder creation and minimal fullness to the right renal collecting system. There was no evidence of a discrete hypermetabolic mass or lymphadenopathy. There was diffusely increased activity throughout the osseous structures, suggestive of chemotherapy with reactive marrow.     4/11/2023 Imaging Significant Findings    CT a/p  1. Pathologic fracture of the left parasymphysis pubis secondary to lytic process. Given location adjacent to neobladder, osteomyelitis is a possibility. Metastatic disease not excluded.   2. Prior cystoprostatectomy with chronic right ureteral obstruction and hydroureteronephrosis, minimally changed from the prior. All etiologies considered including malignant obstruction. Urology consultation recommended.   3. Incidental findings including cholelithiasis, bilateral multifocal renal cortical scarring, and small hiatal hernia.       5/24/2023 -  Chemotherapy    Enfortumab vedotin + Pembrolizumab     8/10/2023 Imaging Significant Findings    PET CT  Interval development of hypermetabolic pulmonary nodules within the lingula and right lower lobe likely reflecting pulmonary metastases. Some additional tiny pulmonary nodularity is new or more conspicuous from prior but too small to accurately characterize by PET/CT. Attention on follow-up recommended.     Worsening obstructive uropathy which is relatively moderate to severe the right kidney.      Similar appearance of the urinary neobladder. There is somewhat diminished due to physiologic activity to evaluate for local neoplasm but the appearance overall appears similar to prior. Assessment of local disease could be followed with CT abdomen and pelvis with contrast.     Interval fracture of the left superior and inferior pubic rami appearing subacute in nature. Please correlate for trauma and tenderness. There is no significant FDG activity within the area to favor a pathologic fracture.     Previously seen diffuse marrow activity may have been due to previous marrow rebound or drug effect.       8/25/2023 Imaging Significant Findings    MR Pelvis  History of cystoprostatectomy and neobladder creation.     Interval increase in multilobular mass in the pelvis infiltrating the rectum, possibly due to distal sigmoid colon, anterior left pelvic bones as well as a inferior aspect of the neobladder suspicious for progression of neoplastic process as discussed above.      9/28/2023 Cancer Staged    Staging form: Urinary Bladder, AJCC 8th Edition  - Clinical: Stage IVB (cT4b, cNX, pM1b)     10/16/2023 Imaging Significant Findings    CT chest   Impression:  - Multiple solid bilateral pulmonary nodules up to 9mm showing interval increase in size concerning for metastatic disease in this patient with history of prior bladder malignancy.  - Multiple hepatic hypodensities, which may represent cystic lesions however some which are too small to characterize.  - Partially imaged right kidney showing parenchymal atrophy and suspected hydronephrosis..    MR Pelvis   Impression:     Interval increased size of multilobular mass in the cystoprostatectomy surgical bed that invades the neobladder, rectum, sigmoid colon, and left pelvis.  Multiple pelvic lymph nodes are more conspicuous from prior exam and concerning for additional metastatic disease.     10/24/2023 - 10/24/2023 Chemotherapy    Treatment Summary   Plan Name: OP  SACITUZUMAB GOVITECAN-HZIY Q3W  Treatment Goal: Palliative  Status: Inactive  Start Date:   End Date:   Provider: Bridger Abad MD  Chemotherapy: sacituzumab govitecan-hziy (TRODELVY) 543 mg in sodium chloride 0.9% 500 mL chemo infusion, 7.5 mg/kg = 543 mg (original dose ), Intravenous, Clinic/HOD 1 time, 0 of 17 cycles  Dose modification: 7.5 mg/kg (Cycle 1, Reason: MD Discretion, Comment: UGT1A1 PM )     11/2/2023 - 11/20/2023 Chemotherapy    Treatment Summary   Plan Name: [facility] NTX-1088-01 Dose Escalation INV-NTX + INV pembrolizumab  Treatment Goal: Control  Status: Inactive  Start Date: 11/2/2023  End Date: 11/20/2023  Provider: Chan Leos MD  Chemotherapy: INV MK-3475 (pembrolizumab) 200 mg in INV sodium chloride 0.9 % 100 mL chemo infusion, 200 mg, Intravenous, Clinic/HOD 1 time, 1 of 35 cycles  Administration: 200 mg (11/2/2023)     11/29/2023 Imaging Significant Findings    Impression:     Postoperative change including cysto proctectomy with creation of neobladder.  Persistent multilobular soft tissue mass within the surgical bed involving the neobladder and abutting the rectum and sigmoid colon, noting limited evaluation on this noncontrast examination.  Within these confines, appearance is similar from comparison CT 10/30/2023, noting increased distension of the neobladder from comparison imaging.     Persistent right-sided hydronephrosis and hydroureter with soft tissue attenuation at the mid to distal right ureter.  Prominent lymph nodes surrounding the right ureter, similar from comparison imaging.     Few stable mildly prominent pelvic and retroperitoneal lymph nodes.     Slight interval increase in left upper lobe nodule measuring approximately 1.2 x 1.0 cm, previously measuring 0.9 x 0.9 cm.  Additional previously described nodules are similar in size and appearance.     Remote fracture of the left pubic symphysis with associated lytic lesion.     12/9/2023 Imaging Significant Findings    CT  12/9/23    Impression:     1. Right nephrostomy in appropriate position.  2. Postoperative change of cystoproctectomy with ileal neobladder.  Similar appearance of large pelvic mass surrounding the neobladder.  3. Ill-defined hypoattenuating hepatic lesions, concerning for metastases in light of history.  4. Increased retroperitoneal and pelvic adenopathy.  5. Stable pulmonary nodules.  6. Additional findings as above.     12/20/2023 -  Chemotherapy    Treatment Summary   Plan Name: OP SACITUZUMAB GOVITECAN-HZIY Q3W  Treatment Goal: Palliative  Status: Active  Start Date: 12/20/2023 (Planned)  End Date: 11/27/2024 (Planned)  Provider: Bridegr Abad MD  Chemotherapy: sacituzumab govitecan-hziy (TRODELVY) 555 mg in sodium chloride 0.9% 500 mL chemo infusion, 7.5 mg/kg = 555 mg (original dose ), Intravenous, Clinic/HOD 1 time, 0 of 17 cycles  Dose modification: 7.5 mg/kg (Cycle 1, Reason: MD Discretion)     Melanoma        Past Medical History:   Diagnosis Date    Bladder cancer     CAD (coronary artery disease) 9/12/2023    Prior CABG. Not on antiplatelets. Home medicatiosn include atorvastatin    Carcinoma     forehead    Encounter for blood transfusion     Hypertension     Hypothyroidism 9/12/2023    Home medications include levothyroxine    Malignant melanoma of skin, unspecified     right arm    Malignant neoplasm of urinary bladder 9/12/2023    Melanoma 9/12/2023    History of T1a melanoma; 0.5mm depth without ulceartion. SP wide local excision 02/2022          Past Surgical History:   Procedure Laterality Date    CORONARY ARTERY BYPASS GRAFT  10/2015    CYSTECTOMY, ROBOT-ASSISTED, LAPAROSCOPIC, USING DA MARY XI, WITH ILEAL CONDUIT CREATION  12/2017    CYSTOGRAM N/A 12/5/2023    Procedure: CYSTOGRAM;  Surgeon: Eder Oconnor MD;  Location: [facility] OR [address] [place] [address];  Service: Urology;  Laterality: N/A;    CYSTOSCOPY N/A 12/5/2023    Procedure: CYSTOSCOPY;  Surgeon: Eder Oconnor MD;  [facility];   Service: Urology;  Laterality: N/A;    DILATION OF BLADDER      dialation of bladder neck- due to claudia bladder- multiple procedures    DILATION OF URETHRA N/A 12/5/2023    Procedure: DILATION, URETHRA;  Surgeon: Eder Oconnor MD;  [facility];  Service: Urology;  Laterality: N/A;    LYMPHADENECTOMY      PERCUTANEOUS NEPHROSTOMY Right 12/8/2023    Procedure: Creation, Nephrostomy, Percutaneous;  Surgeon: Jens Nunez MD;  Location: [facility] CATH LAB;  Service: Radiology;  Laterality: Right;    PLACEMENT N/A 12/5/2023    Procedure: PLACEMENT;  Surgeon: Eder Oconnor MD;  [facility];  Service: Urology;  Laterality: N/A;  placement of valiente over a wire by MD MOHR ROBOTIC PROSTECTOMY, SUPRAPUBIC  12/2017        Family History   Problem Relation Age of Onset    Heart disease Father     Heart attack Paternal Uncle     Breast cancer Maternal Cousin     Colon cancer Neg Hx     Bladder Cancer Neg Hx         Social History     Tobacco Use    Smoking status: Former     Types: Cigarettes    Smokeless tobacco: Not on file   Substance Use Topics    Alcohol use: Not Currently        I have reviewed and updated the patient's past medical, surgical, family and social histories.    Review of patient's allergies indicates:  No Known Allergies     Current Outpatient Medications   Medication Sig Dispense Refill    atorvastatin (LIPITOR) 20 MG tablet Take 20 mg by mouth every evening.      dexAMETHasone (DECADRON) 4 MG Tab Take 2 tablets (8 mg total) by mouth once daily. Take 2 tablets (8 mg total) by mouth once daily. Take a directed on days 2, 3 and 4 of your chemotherapy cycle. 24 tablet 3    docusate sodium (COLACE) 100 MG capsule Take 220 capsules by mouth every evening.      Lactobacillus acidophilus 10 billion cell Cap Take by mouth once daily.      Lactobacillus rhamnosus GG (CULTURELLE) 10 billion cell capsule Take 1 capsule by mouth once daily.      levoFLOXacin (LEVAQUIN) 500 MG tablet Take 500 mg by  "mouth.      levothyroxine (SYNTHROID) 50 MCG tablet Take 50 mcg by mouth before breakfast.      LINZESS 72 mcg Cap capsule Take 72 mcg by mouth every morning.      loperamide (IMODIUM) 2 mg capsule Take 1 capsule (2 mg total) by mouth 4 (four) times daily as needed for Diarrhea. 60 capsule 0    multivitamin (THERAGRAN) per tablet Take 1 tablet by mouth every morning.      OLANZapine (ZYPREXA) 5 MG tablet Take 1 tablet (5 mg total) by mouth every evening. days 1-4 of each chemotherapy cycle. 30 tablet 0    ondansetron (ZOFRAN-ODT) 8 MG TbDL Take 1 tablet (8 mg total) by mouth every 8 (eight) hours as needed (nausea/vomiting). 60 tablet 5    TURMERIC ORAL Take by mouth every morning.      VTAMA 1 % Crea APPLY 1 APPLICATION ON THE SKIN DAILY       No current facility-administered medications for this visit.        Physical Exam:   BP (!) 153/81 (BP Location: Left arm, Patient Position: Sitting, BP Method: Medium (Automatic))   Pulse 75   Resp 18   Ht 5' 10" (1.778 m)   Wt 72.7 kg (160 lb 2.6 oz)   SpO2 99%   BMI 22.98 kg/m²      ECOG Performance status: 1            Physical Exam  Constitutional:       General: He is not in acute distress.     Appearance: Normal appearance.   HENT:      Head: Normocephalic.   Eyes:      General: No scleral icterus.     Extraocular Movements: Extraocular movements intact.      Conjunctiva/sclera: Conjunctivae normal.   Cardiovascular:      Rate and Rhythm: Normal rate and regular rhythm.      Heart sounds: No murmur heard.     No friction rub. No gallop.   Pulmonary:      Effort: Pulmonary effort is normal. No respiratory distress.      Breath sounds: Normal breath sounds. No stridor. No wheezing, rhonchi or rales.   Abdominal:      General: There is no distension.   Skin:     General: Skin is warm and dry.   Neurological:      Mental Status: He is alert and oriented to person, place, and time.      Motor: No weakness.   Psychiatric:         Mood and Affect: Mood normal.         " Behavior: Behavior normal.         Thought Content: Thought content normal.           Labs:                 Lab Results   Component Value Date     (L) 12/27/2023    K 4.3 12/27/2023    CREATININE 2.5 (H) 12/27/2023    WBC 9.48 12/27/2023    HGB 10.1 (L) 12/27/2023     12/27/2023    AST 12 12/27/2023    ALT 9 (L) 12/27/2023    ALKPHOS 78 12/27/2023    BILITOT 0.4 12/27/2023          Imaging:       Interventional Radiology  Procedure performed in the Invasive Lab    - See Procedure Log link below for nursing documentation    - See OpNote on Surgeries Tab for physician findings    - See Imaging Tab for radiologist dictation      I have personally reviewed the above imaging including recent MRI and PET CT scan    Path:   Reviewed pathology as documented in oncology history      Assessment and Plan:        1. Malignant neoplasm of urinary bladder, unspecified site  Overview:  Metastatic bladder cancer previously treated with neoadjuvant ddMVAC, radical cystectomy, and then carboplatin/gemcitabine and EV+ Pembro following local and metastatic recurrence. Subsequetnly received a single dose of pembrolizumab + NTX-1088 11/2/23 as part of a phase I trial through the [facility], but was taken off trial for elevated creatinine despite PCN placement. Starting sacituzumab govitecan 12/28/23     Assessment & Plan:  Discussed risks of SG including his UGT1A1 metabolism deficiency. Empirically DR to 25% and will monitor counts weekly. RX for olanzapine and dexamethsone for chemo induced nausea. Also given immodium for diarrhea.   - C1D1 Sacituzumab Govitecan DR to 7.5mg/kg tomorrow  - FU next week for C1D8  - Weekly labs  - FU in 3 weeks for C2  - Repeat imaging after C4    Orders:  -     loperamide (IMODIUM) 2 mg capsule; Take 1 capsule (2 mg total) by mouth 4 (four) times daily as needed for Diarrhea.  Dispense: 60 capsule; Refill: 0    2. Hypothyroidism, unspecified type  Overview:  Home medications include  levothyroxine    Assessment & Plan:  TSH modestly elevated with normal FT4. Cont current meds.      3. Stage 3b chronic kidney disease  Assessment & Plan:  Stable. Patient utilizing intermittent catheterization for drainage      4. Coronary artery disease, unspecified vessel or lesion type, unspecified whether angina present, unspecified whether native or transplanted heart  Overview:  Prior CABG. Not on antiplatelets. Home medicatiosn include atorvastatin            Route Chart for Scheduling    Med Onc Chart Routing      Follow up with physician 3 weeks. Jenniffer 3 weeks   Follow up with LYNDSEY    Infusion scheduling note   C2 Sacituzumab 1/18/24 and 1/25/24   Injection scheduling note    Labs CMP and CBC   Scheduling:  Preferred lab:  Lab interval: once a week  Weekly labs   Imaging    Pharmacy appointment    Other referrals                  Treatment Plan Information   OP SACITUZUMAB GOVITECAN-HZIY Q3W   Bridger Abad MD   Upcoming Treatment Dates - OP SACITUZUMAB GOVITECAN-HZIY Q3W    12/20/2023       Pre-Medications       acetaminophen tablet 650 mg       diphenhydrAMINE (BENADRYL) 25 mg in sodium chloride 0.9% 50 mL IVPB       famotidine (PF) injection 20 mg       Chemotherapy       sacituzumab govitecan-hziy (TRODELVY) 555 mg in sodium chloride 0.9% 500 mL chemo infusion       Supportive Care       atropine injection 0.4 mg       prochlorperazine injection Soln 10 mg       Antiemetics       aprepitant (CINVANTI) injection 130 mg       palonosetron (ALOXI) 0.25 mg with Dexamethasone (DECADRON) 12 mg in NS 50 mL IVPB  12/27/2023       Pre-Medications       acetaminophen tablet 650 mg       diphenhydrAMINE (BENADRYL) 25 mg in sodium chloride 0.9% 50 mL IVPB       famotidine (PF) injection 20 mg       Chemotherapy       sacituzumab govitecan-hziy (TRODELVY) 555 mg in sodium chloride 0.9% 500 mL chemo infusion       Supportive Care       atropine injection 0.4 mg       prochlorperazine injection Soln 10 mg        Antiemetics       aprepitant (CINVANTI) injection 130 mg       palonosetron (ALOXI) 0.25 mg with Dexamethasone (DECADRON) 12 mg in NS 50 mL IVPB  1/10/2024       Pre-Medications       acetaminophen tablet 650 mg       diphenhydrAMINE (BENADRYL) 25 mg in sodium chloride 0.9% 50 mL IVPB       famotidine (PF) injection 20 mg       Chemotherapy       sacituzumab govitecan-hziy (TRODELVY) 555 mg in sodium chloride 0.9% 500 mL chemo infusion       Supportive Care       atropine injection 0.4 mg       prochlorperazine injection Soln 10 mg       Antiemetics       aprepitant (CINVANTI) injection 130 mg       palonosetron (ALOXI) 0.25 mg with Dexamethasone (DECADRON) 12 mg in NS 50 mL IVPB  1/17/2024       Pre-Medications       acetaminophen tablet 650 mg       diphenhydrAMINE (BENADRYL) 25 mg in sodium chloride 0.9% 50 mL IVPB       famotidine (PF) injection 20 mg       Chemotherapy       sacituzumab govitecan-hziy (TRODELVY) 555 mg in sodium chloride 0.9% 500 mL chemo infusion       Supportive Care       atropine injection 0.4 mg       prochlorperazine injection Soln 10 mg       Antiemetics       aprepitant (CINVANTI) injection 130 mg       palonosetron (ALOXI) 0.25 mg with Dexamethasone (DECADRON) 12 mg in NS 50 mL IVPB          Bridger Abad

## 2023-12-28 ENCOUNTER — INFUSION (OUTPATIENT)
Dept: INFUSION THERAPY | Facility: HOSPITAL | Age: 58
End: 2023-12-28
Payer: COMMERCIAL

## 2023-12-28 VITALS
WEIGHT: 160.19 LBS | DIASTOLIC BLOOD PRESSURE: 80 MMHG | BODY MASS INDEX: 22.93 KG/M2 | HEIGHT: 70 IN | RESPIRATION RATE: 18 BRPM | SYSTOLIC BLOOD PRESSURE: 134 MMHG | HEART RATE: 88 BPM | TEMPERATURE: 98 F | OXYGEN SATURATION: 98 %

## 2023-12-28 DIAGNOSIS — C67.9 MALIGNANT NEOPLASM OF URINARY BLADDER, UNSPECIFIED SITE: Primary | ICD-10-CM

## 2023-12-28 PROCEDURE — 96413 CHEMO IV INFUSION 1 HR: CPT

## 2023-12-28 PROCEDURE — 25000003 PHARM REV CODE 250: Performed by: HOSPITALIST

## 2023-12-28 PROCEDURE — 96367 TX/PROPH/DG ADDL SEQ IV INF: CPT

## 2023-12-28 PROCEDURE — 63600175 PHARM REV CODE 636 W HCPCS: Mod: JZ,JG | Performed by: HOSPITALIST

## 2023-12-28 PROCEDURE — 96415 CHEMO IV INFUSION ADDL HR: CPT

## 2023-12-28 PROCEDURE — 96375 TX/PRO/DX INJ NEW DRUG ADDON: CPT

## 2023-12-28 RX ORDER — DIPHENHYDRAMINE HYDROCHLORIDE 50 MG/ML
50 INJECTION INTRAMUSCULAR; INTRAVENOUS ONCE AS NEEDED
Status: DISCONTINUED | OUTPATIENT
Start: 2023-12-28 | End: 2023-12-28 | Stop reason: HOSPADM

## 2023-12-28 RX ORDER — SODIUM CHLORIDE 0.9 % (FLUSH) 0.9 %
10 SYRINGE (ML) INJECTION
Status: DISCONTINUED | OUTPATIENT
Start: 2023-12-28 | End: 2023-12-28 | Stop reason: HOSPADM

## 2023-12-28 RX ORDER — FAMOTIDINE 10 MG/ML
20 INJECTION INTRAVENOUS
Status: COMPLETED | OUTPATIENT
Start: 2023-12-28 | End: 2023-12-28

## 2023-12-28 RX ORDER — HEPARIN 100 UNIT/ML
500 SYRINGE INTRAVENOUS
Status: DISCONTINUED | OUTPATIENT
Start: 2023-12-28 | End: 2023-12-28 | Stop reason: HOSPADM

## 2023-12-28 RX ORDER — ACETAMINOPHEN 325 MG/1
650 TABLET ORAL
Status: COMPLETED | OUTPATIENT
Start: 2023-12-28 | End: 2023-12-28

## 2023-12-28 RX ORDER — EPINEPHRINE 0.3 MG/.3ML
0.3 INJECTION SUBCUTANEOUS ONCE AS NEEDED
Status: DISCONTINUED | OUTPATIENT
Start: 2023-12-28 | End: 2023-12-28 | Stop reason: HOSPADM

## 2023-12-28 RX ORDER — ATROPINE SULFATE 0.4 MG/ML
0.4 INJECTION, SOLUTION ENDOTRACHEAL; INTRAMEDULLARY; INTRAMUSCULAR; INTRAVENOUS; SUBCUTANEOUS ONCE AS NEEDED
Status: DISCONTINUED | OUTPATIENT
Start: 2023-12-28 | End: 2023-12-28 | Stop reason: HOSPADM

## 2023-12-28 RX ORDER — PROCHLORPERAZINE EDISYLATE 5 MG/ML
10 INJECTION INTRAMUSCULAR; INTRAVENOUS ONCE AS NEEDED
Status: DISCONTINUED | OUTPATIENT
Start: 2023-12-28 | End: 2023-12-28 | Stop reason: HOSPADM

## 2023-12-28 RX ADMIN — SACITUZUMAB GOVITECAN 540 MG: 180 POWDER, FOR SOLUTION INTRAVENOUS at 09:12

## 2023-12-28 RX ADMIN — FAMOTIDINE 20 MG: 10 INJECTION INTRAVENOUS at 08:12

## 2023-12-28 RX ADMIN — APREPITANT 130 MG: 130 INJECTION, EMULSION INTRAVENOUS at 08:12

## 2023-12-28 RX ADMIN — DIPHENHYDRAMINE HYDROCHLORIDE 25 MG: 50 INJECTION, SOLUTION INTRAMUSCULAR; INTRAVENOUS at 08:12

## 2023-12-28 RX ADMIN — DEXAMETHASONE SODIUM PHOSPHATE 0.25 MG: 4 INJECTION, SOLUTION INTRA-ARTICULAR; INTRALESIONAL; INTRAMUSCULAR; INTRAVENOUS; SOFT TISSUE at 08:12

## 2023-12-28 RX ADMIN — SODIUM CHLORIDE: 9 INJECTION, SOLUTION INTRAVENOUS at 07:12

## 2023-12-28 RX ADMIN — ACETAMINOPHEN 650 MG: 325 TABLET ORAL at 08:12

## 2023-12-28 RX ADMIN — HEPARIN 500 UNITS: 100 SYRINGE at 12:12

## 2023-12-28 NOTE — PLAN OF CARE
1258-Pt tolerated C1D1 Trodelvy well with no complaints or complications, VSS throughout treatment. Infusion line flushed w/20ml of NS per pump/per protocol, 30 minute observation completed post infusion. Educated patient on medications administered including side effects, possible reactions, and chemotherapy precautions, handout given, Pt verbalized understanding. Pt aware to call provider with any questions or concerns, aware of upcoming appts, ambulatory from clinic with steady gait, no distress noted.

## 2023-12-28 NOTE — PLAN OF CARE
0700-Labs, hx, and medications reviewed, pt meets parameters for treatment today. Assessment completed and plan of care reviewed. Pt verbalized understanding. PAC accessed with no complications. Pt voices no new complaints or concerns, will continue to monitor for safety.

## 2024-01-01 ENCOUNTER — INFUSION (OUTPATIENT)
Dept: INFUSION THERAPY | Facility: HOSPITAL | Age: 59
End: 2024-01-01
Payer: COMMERCIAL

## 2024-01-01 ENCOUNTER — OFFICE VISIT (OUTPATIENT)
Dept: HEMATOLOGY/ONCOLOGY | Facility: CLINIC | Age: 59
End: 2024-01-01
Payer: COMMERCIAL

## 2024-01-01 ENCOUNTER — TELEPHONE (OUTPATIENT)
Dept: UROLOGY | Facility: CLINIC | Age: 59
End: 2024-01-01
Payer: COMMERCIAL

## 2024-01-01 ENCOUNTER — TELEPHONE (OUTPATIENT)
Dept: HEMATOLOGY/ONCOLOGY | Facility: CLINIC | Age: 59
End: 2024-01-01
Payer: COMMERCIAL

## 2024-01-01 ENCOUNTER — PATIENT MESSAGE (OUTPATIENT)
Dept: HEMATOLOGY/ONCOLOGY | Facility: CLINIC | Age: 59
End: 2024-01-01
Payer: COMMERCIAL

## 2024-01-01 ENCOUNTER — OFFICE VISIT (OUTPATIENT)
Dept: UROLOGY | Facility: CLINIC | Age: 59
End: 2024-01-01
Payer: COMMERCIAL

## 2024-01-01 ENCOUNTER — OFFICE VISIT (OUTPATIENT)
Dept: URGENT CARE | Facility: CLINIC | Age: 59
End: 2024-01-01
Payer: COMMERCIAL

## 2024-01-01 ENCOUNTER — PATIENT MESSAGE (OUTPATIENT)
Dept: UROLOGY | Facility: CLINIC | Age: 59
End: 2024-01-01
Payer: COMMERCIAL

## 2024-01-01 ENCOUNTER — LAB VISIT (OUTPATIENT)
Dept: LAB | Facility: HOSPITAL | Age: 59
End: 2024-01-01
Attending: STUDENT IN AN ORGANIZED HEALTH CARE EDUCATION/TRAINING PROGRAM
Payer: COMMERCIAL

## 2024-01-01 ENCOUNTER — LAB VISIT (OUTPATIENT)
Dept: LAB | Facility: HOSPITAL | Age: 59
End: 2024-01-01
Attending: HOSPITALIST
Payer: COMMERCIAL

## 2024-01-01 ENCOUNTER — PATIENT MESSAGE (OUTPATIENT)
Dept: PALLIATIVE MEDICINE | Facility: CLINIC | Age: 59
End: 2024-01-01
Payer: COMMERCIAL

## 2024-01-01 ENCOUNTER — CLINICAL SUPPORT (OUTPATIENT)
Dept: REHABILITATION | Facility: HOSPITAL | Age: 59
End: 2024-01-01
Payer: COMMERCIAL

## 2024-01-01 ENCOUNTER — PATIENT MESSAGE (OUTPATIENT)
Dept: REHABILITATION | Facility: HOSPITAL | Age: 59
End: 2024-01-01
Payer: COMMERCIAL

## 2024-01-01 ENCOUNTER — TELEPHONE (OUTPATIENT)
Dept: HEMATOLOGY/ONCOLOGY | Facility: CLINIC | Age: 59
End: 2024-01-01

## 2024-01-01 ENCOUNTER — PATIENT OUTREACH (OUTPATIENT)
Dept: ADMINISTRATIVE | Facility: CLINIC | Age: 59
End: 2024-01-01
Payer: COMMERCIAL

## 2024-01-01 ENCOUNTER — PATIENT MESSAGE (OUTPATIENT)
Dept: CARDIOLOGY | Facility: CLINIC | Age: 59
End: 2024-01-01
Payer: COMMERCIAL

## 2024-01-01 ENCOUNTER — HOSPITAL ENCOUNTER (OUTPATIENT)
Dept: RADIOLOGY | Facility: HOSPITAL | Age: 59
Discharge: HOME | DRG: 683 | End: 2024-05-06
Attending: HOSPITALIST
Payer: COMMERCIAL

## 2024-01-01 ENCOUNTER — TELEPHONE (OUTPATIENT)
Dept: URGENT CARE | Facility: CLINIC | Age: 59
End: 2024-01-01
Payer: COMMERCIAL

## 2024-01-01 ENCOUNTER — OFFICE VISIT (OUTPATIENT)
Dept: PALLIATIVE MEDICINE | Facility: CLINIC | Age: 59
End: 2024-01-01
Payer: COMMERCIAL

## 2024-01-01 ENCOUNTER — HOSPITAL ENCOUNTER (OUTPATIENT)
Dept: RADIOLOGY | Facility: HOSPITAL | Age: 59
Discharge: HOME | End: 2024-04-24
Attending: INTERNAL MEDICINE
Payer: COMMERCIAL

## 2024-01-01 ENCOUNTER — TELEPHONE (OUTPATIENT)
Dept: PALLIATIVE MEDICINE | Facility: CLINIC | Age: 59
End: 2024-01-01
Payer: COMMERCIAL

## 2024-01-01 ENCOUNTER — OFFICE VISIT (OUTPATIENT)
Dept: CARDIOLOGY | Facility: CLINIC | Age: 59
End: 2024-01-01
Payer: COMMERCIAL

## 2024-01-01 ENCOUNTER — HOSPITAL ENCOUNTER (OUTPATIENT)
Dept: RADIOLOGY | Facility: HOSPITAL | Age: 59
Discharge: HOME | End: 2024-02-27
Attending: HOSPITALIST
Payer: COMMERCIAL

## 2024-01-01 ENCOUNTER — PATIENT MESSAGE (OUTPATIENT)
Dept: CARDIOLOGY | Facility: CLINIC | Age: 59
End: 2024-01-01

## 2024-01-01 ENCOUNTER — HOSPITAL ENCOUNTER (INPATIENT)
Facility: HOSPITAL | Age: 59
LOS: 1 days | Discharge: HOSPICE | DRG: 683 | End: 2024-05-07
Attending: EMERGENCY MEDICINE | Admitting: STUDENT IN AN ORGANIZED HEALTH CARE EDUCATION/TRAINING PROGRAM
Payer: COMMERCIAL

## 2024-01-01 ENCOUNTER — HOSPITAL ENCOUNTER (EMERGENCY)
Facility: HOSPITAL | Age: 59
Discharge: HOME | End: 2024-04-05
Attending: EMERGENCY MEDICINE
Payer: COMMERCIAL

## 2024-01-01 ENCOUNTER — PATIENT MESSAGE (OUTPATIENT)
Dept: UROLOGY | Facility: CLINIC | Age: 59
End: 2024-01-01

## 2024-01-01 ENCOUNTER — HOSPITAL ENCOUNTER (OUTPATIENT)
Facility: HOSPITAL | Age: 59
Discharge: HOME | End: 2024-04-23
Attending: EMERGENCY MEDICINE | Admitting: EMERGENCY MEDICINE
Payer: COMMERCIAL

## 2024-01-01 ENCOUNTER — HOSPITAL ENCOUNTER (OUTPATIENT)
Dept: RADIOLOGY | Facility: HOSPITAL | Age: 59
Discharge: HOME | End: 2024-02-23
Attending: FAMILY MEDICINE
Payer: COMMERCIAL

## 2024-01-01 VITALS
DIASTOLIC BLOOD PRESSURE: 87 MMHG | SYSTOLIC BLOOD PRESSURE: 149 MMHG | TEMPERATURE: 99 F | DIASTOLIC BLOOD PRESSURE: 83 MMHG | BODY MASS INDEX: 23.68 KG/M2 | SYSTOLIC BLOOD PRESSURE: 128 MMHG | OXYGEN SATURATION: 100 % | RESPIRATION RATE: 16 BRPM | HEART RATE: 77 BPM | TEMPERATURE: 99 F | WEIGHT: 165 LBS | RESPIRATION RATE: 16 BRPM | HEIGHT: 70 IN | BODY MASS INDEX: 23.64 KG/M2 | HEART RATE: 109 BPM | OXYGEN SATURATION: 99 % | WEIGHT: 165.13 LBS

## 2024-01-01 VITALS
HEART RATE: 104 BPM | RESPIRATION RATE: 18 BRPM | OXYGEN SATURATION: 100 % | HEIGHT: 70 IN | WEIGHT: 166.56 LBS | HEIGHT: 70 IN | RESPIRATION RATE: 18 BRPM | WEIGHT: 166.25 LBS | BODY MASS INDEX: 23.84 KG/M2 | WEIGHT: 166 LBS | SYSTOLIC BLOOD PRESSURE: 129 MMHG | RESPIRATION RATE: 18 BRPM | HEART RATE: 86 BPM | DIASTOLIC BLOOD PRESSURE: 84 MMHG | OXYGEN SATURATION: 100 % | HEART RATE: 78 BPM | SYSTOLIC BLOOD PRESSURE: 140 MMHG | DIASTOLIC BLOOD PRESSURE: 76 MMHG | BODY MASS INDEX: 23.77 KG/M2 | HEIGHT: 70 IN | DIASTOLIC BLOOD PRESSURE: 75 MMHG | SYSTOLIC BLOOD PRESSURE: 124 MMHG | OXYGEN SATURATION: 96 % | TEMPERATURE: 98 F | TEMPERATURE: 98 F | BODY MASS INDEX: 23.8 KG/M2

## 2024-01-01 VITALS
OXYGEN SATURATION: 98 % | SYSTOLIC BLOOD PRESSURE: 149 MMHG | HEART RATE: 101 BPM | HEART RATE: 98 BPM | WEIGHT: 170.19 LBS | BODY MASS INDEX: 23.91 KG/M2 | SYSTOLIC BLOOD PRESSURE: 121 MMHG | DIASTOLIC BLOOD PRESSURE: 73 MMHG | RESPIRATION RATE: 18 BRPM | WEIGHT: 167 LBS | HEIGHT: 70 IN | HEIGHT: 70 IN | TEMPERATURE: 98 F | DIASTOLIC BLOOD PRESSURE: 93 MMHG | BODY MASS INDEX: 24.37 KG/M2

## 2024-01-01 VITALS
HEIGHT: 70 IN | DIASTOLIC BLOOD PRESSURE: 77 MMHG | BODY MASS INDEX: 23.8 KG/M2 | HEART RATE: 82 BPM | TEMPERATURE: 98 F | WEIGHT: 166.25 LBS | RESPIRATION RATE: 18 BRPM | SYSTOLIC BLOOD PRESSURE: 124 MMHG

## 2024-01-01 VITALS
HEIGHT: 70 IN | HEART RATE: 82 BPM | SYSTOLIC BLOOD PRESSURE: 138 MMHG | WEIGHT: 160.5 LBS | BODY MASS INDEX: 22.98 KG/M2 | DIASTOLIC BLOOD PRESSURE: 82 MMHG

## 2024-01-01 VITALS
RESPIRATION RATE: 18 BRPM | DIASTOLIC BLOOD PRESSURE: 75 MMHG | BODY MASS INDEX: 23.93 KG/M2 | WEIGHT: 167.13 LBS | HEIGHT: 70 IN | TEMPERATURE: 98 F | HEART RATE: 74 BPM | SYSTOLIC BLOOD PRESSURE: 131 MMHG | OXYGEN SATURATION: 99 %

## 2024-01-01 VITALS
SYSTOLIC BLOOD PRESSURE: 141 MMHG | HEIGHT: 70 IN | BODY MASS INDEX: 22.98 KG/M2 | WEIGHT: 160.5 LBS | OXYGEN SATURATION: 100 % | TEMPERATURE: 98 F | HEART RATE: 90 BPM | DIASTOLIC BLOOD PRESSURE: 87 MMHG | RESPIRATION RATE: 18 BRPM

## 2024-01-01 VITALS
HEIGHT: 70 IN | SYSTOLIC BLOOD PRESSURE: 125 MMHG | TEMPERATURE: 98 F | BODY MASS INDEX: 22.98 KG/M2 | WEIGHT: 160.5 LBS | RESPIRATION RATE: 18 BRPM | DIASTOLIC BLOOD PRESSURE: 72 MMHG | HEART RATE: 75 BPM

## 2024-01-01 VITALS
DIASTOLIC BLOOD PRESSURE: 71 MMHG | BODY MASS INDEX: 23.8 KG/M2 | HEIGHT: 70 IN | WEIGHT: 166.25 LBS | RESPIRATION RATE: 18 BRPM | HEART RATE: 75 BPM | SYSTOLIC BLOOD PRESSURE: 134 MMHG | TEMPERATURE: 98 F

## 2024-01-01 VITALS
HEART RATE: 75 BPM | TEMPERATURE: 98 F | OXYGEN SATURATION: 99 % | DIASTOLIC BLOOD PRESSURE: 79 MMHG | SYSTOLIC BLOOD PRESSURE: 131 MMHG | RESPIRATION RATE: 18 BRPM

## 2024-01-01 VITALS
HEART RATE: 83 BPM | OXYGEN SATURATION: 100 % | TEMPERATURE: 98 F | SYSTOLIC BLOOD PRESSURE: 140 MMHG | WEIGHT: 165.38 LBS | BODY MASS INDEX: 23.72 KG/M2 | RESPIRATION RATE: 17 BRPM | DIASTOLIC BLOOD PRESSURE: 89 MMHG

## 2024-01-01 VITALS
SYSTOLIC BLOOD PRESSURE: 138 MMHG | DIASTOLIC BLOOD PRESSURE: 71 MMHG | TEMPERATURE: 98 F | RESPIRATION RATE: 18 BRPM | HEIGHT: 70 IN | HEIGHT: 70 IN | BODY MASS INDEX: 22.98 KG/M2 | WEIGHT: 160.5 LBS | OXYGEN SATURATION: 99 % | DIASTOLIC BLOOD PRESSURE: 79 MMHG | BODY MASS INDEX: 23.39 KG/M2 | WEIGHT: 163 LBS | SYSTOLIC BLOOD PRESSURE: 135 MMHG | HEART RATE: 86 BPM | HEART RATE: 94 BPM | BODY MASS INDEX: 24.2 KG/M2 | WEIGHT: 169.06 LBS

## 2024-01-01 VITALS
HEART RATE: 91 BPM | RESPIRATION RATE: 16 BRPM | OXYGEN SATURATION: 100 % | SYSTOLIC BLOOD PRESSURE: 120 MMHG | WEIGHT: 162 LBS | DIASTOLIC BLOOD PRESSURE: 73 MMHG | HEIGHT: 70 IN | SYSTOLIC BLOOD PRESSURE: 151 MMHG | TEMPERATURE: 98 F | HEART RATE: 81 BPM | HEIGHT: 70 IN | DIASTOLIC BLOOD PRESSURE: 87 MMHG | OXYGEN SATURATION: 96 % | BODY MASS INDEX: 23.19 KG/M2 | BODY MASS INDEX: 23.23 KG/M2 | WEIGHT: 162.25 LBS

## 2024-01-01 VITALS
HEART RATE: 95 BPM | SYSTOLIC BLOOD PRESSURE: 139 MMHG | TEMPERATURE: 98 F | HEART RATE: 97 BPM | WEIGHT: 166.44 LBS | DIASTOLIC BLOOD PRESSURE: 81 MMHG | SYSTOLIC BLOOD PRESSURE: 139 MMHG | RESPIRATION RATE: 18 BRPM | BODY MASS INDEX: 24.18 KG/M2 | RESPIRATION RATE: 18 BRPM | DIASTOLIC BLOOD PRESSURE: 83 MMHG | HEIGHT: 70 IN | BODY MASS INDEX: 23.83 KG/M2 | WEIGHT: 168.88 LBS | HEIGHT: 70 IN

## 2024-01-01 VITALS
HEIGHT: 70 IN | RESPIRATION RATE: 14 BRPM | DIASTOLIC BLOOD PRESSURE: 70 MMHG | BODY MASS INDEX: 24.2 KG/M2 | OXYGEN SATURATION: 100 % | HEART RATE: 62 BPM | SYSTOLIC BLOOD PRESSURE: 132 MMHG | TEMPERATURE: 98 F | WEIGHT: 169 LBS

## 2024-01-01 VITALS
TEMPERATURE: 97 F | HEART RATE: 75 BPM | OXYGEN SATURATION: 98 % | WEIGHT: 167 LBS | RESPIRATION RATE: 17 BRPM | SYSTOLIC BLOOD PRESSURE: 133 MMHG | DIASTOLIC BLOOD PRESSURE: 79 MMHG | HEIGHT: 70 IN | BODY MASS INDEX: 23.91 KG/M2

## 2024-01-01 VITALS
HEIGHT: 70 IN | HEART RATE: 90 BPM | DIASTOLIC BLOOD PRESSURE: 64 MMHG | WEIGHT: 164.38 LBS | BODY MASS INDEX: 23.53 KG/M2 | TEMPERATURE: 98 F | SYSTOLIC BLOOD PRESSURE: 123 MMHG

## 2024-01-01 VITALS
SYSTOLIC BLOOD PRESSURE: 132 MMHG | DIASTOLIC BLOOD PRESSURE: 79 MMHG | BODY MASS INDEX: 23.62 KG/M2 | HEIGHT: 70 IN | OXYGEN SATURATION: 98 % | TEMPERATURE: 98 F | WEIGHT: 165 LBS | HEART RATE: 100 BPM | RESPIRATION RATE: 18 BRPM

## 2024-01-01 VITALS
HEIGHT: 70 IN | HEART RATE: 93 BPM | OXYGEN SATURATION: 100 % | BODY MASS INDEX: 23.99 KG/M2 | WEIGHT: 167.56 LBS | TEMPERATURE: 99 F | SYSTOLIC BLOOD PRESSURE: 129 MMHG | DIASTOLIC BLOOD PRESSURE: 80 MMHG | RESPIRATION RATE: 18 BRPM

## 2024-01-01 VITALS
DIASTOLIC BLOOD PRESSURE: 81 MMHG | DIASTOLIC BLOOD PRESSURE: 74 MMHG | WEIGHT: 168.88 LBS | HEART RATE: 86 BPM | SYSTOLIC BLOOD PRESSURE: 139 MMHG | OXYGEN SATURATION: 99 % | HEART RATE: 97 BPM | SYSTOLIC BLOOD PRESSURE: 130 MMHG | BODY MASS INDEX: 24.23 KG/M2

## 2024-01-01 VITALS
DIASTOLIC BLOOD PRESSURE: 87 MMHG | HEART RATE: 74 BPM | TEMPERATURE: 99 F | SYSTOLIC BLOOD PRESSURE: 142 MMHG | OXYGEN SATURATION: 97 % | RESPIRATION RATE: 18 BRPM

## 2024-01-01 VITALS
DIASTOLIC BLOOD PRESSURE: 78 MMHG | HEART RATE: 76 BPM | RESPIRATION RATE: 18 BRPM | SYSTOLIC BLOOD PRESSURE: 137 MMHG | OXYGEN SATURATION: 100 %

## 2024-01-01 VITALS
OXYGEN SATURATION: 96 % | SYSTOLIC BLOOD PRESSURE: 150 MMHG | TEMPERATURE: 99 F | RESPIRATION RATE: 16 BRPM | DIASTOLIC BLOOD PRESSURE: 89 MMHG | HEART RATE: 93 BPM | BODY MASS INDEX: 23.82 KG/M2 | WEIGHT: 166 LBS

## 2024-01-01 VITALS
HEIGHT: 70 IN | TEMPERATURE: 99 F | BODY MASS INDEX: 23.53 KG/M2 | HEART RATE: 94 BPM | WEIGHT: 164.38 LBS | OXYGEN SATURATION: 99 % | DIASTOLIC BLOOD PRESSURE: 70 MMHG | SYSTOLIC BLOOD PRESSURE: 118 MMHG

## 2024-01-01 DIAGNOSIS — R31.9 URINARY TRACT INFECTION WITH HEMATURIA, SITE UNSPECIFIED: ICD-10-CM

## 2024-01-01 DIAGNOSIS — R19.7 DIARRHEA, UNSPECIFIED TYPE: Primary | ICD-10-CM

## 2024-01-01 DIAGNOSIS — G89.3 CANCER RELATED PAIN: ICD-10-CM

## 2024-01-01 DIAGNOSIS — J18.1 LOBAR PNEUMONIA, UNSPECIFIED ORGANISM: Primary | ICD-10-CM

## 2024-01-01 DIAGNOSIS — N13.30 HYDRONEPHROSIS OF LEFT KIDNEY: ICD-10-CM

## 2024-01-01 DIAGNOSIS — R55 NEAR SYNCOPE: ICD-10-CM

## 2024-01-01 DIAGNOSIS — R07.9 CHEST PAIN: ICD-10-CM

## 2024-01-01 DIAGNOSIS — Z79.899 LONG TERM CURRENT USE OF THERAPEUTIC DRUG: ICD-10-CM

## 2024-01-01 DIAGNOSIS — T45.1X5A CHEMOTHERAPY-INDUCED NEUTROPENIA: ICD-10-CM

## 2024-01-01 DIAGNOSIS — C79.51 SECONDARY MALIGNANT NEOPLASM OF BONE AND BONE MARROW: ICD-10-CM

## 2024-01-01 DIAGNOSIS — D63.0 ANEMIA IN NEOPLASTIC DISEASE: ICD-10-CM

## 2024-01-01 DIAGNOSIS — M54.50 CHRONIC BILATERAL LOW BACK PAIN WITHOUT SCIATICA: ICD-10-CM

## 2024-01-01 DIAGNOSIS — C67.9 MALIGNANT NEOPLASM OF URINARY BLADDER, UNSPECIFIED SITE: Primary | ICD-10-CM

## 2024-01-01 DIAGNOSIS — R30.0 DYSURIA: ICD-10-CM

## 2024-01-01 DIAGNOSIS — F43.23 ADJUSTMENT DISORDER WITH MIXED ANXIETY AND DEPRESSED MOOD: ICD-10-CM

## 2024-01-01 DIAGNOSIS — Z00.6 RESEARCH STUDY PATIENT: ICD-10-CM

## 2024-01-01 DIAGNOSIS — B34.9 ACUTE VIRAL SYNDROME: Primary | ICD-10-CM

## 2024-01-01 DIAGNOSIS — C67.9 MALIGNANT NEOPLASM OF URINARY BLADDER, UNSPECIFIED SITE: ICD-10-CM

## 2024-01-01 DIAGNOSIS — J30.2 SEASONAL ALLERGIES: ICD-10-CM

## 2024-01-01 DIAGNOSIS — N18.32 STAGE 3B CHRONIC KIDNEY DISEASE: ICD-10-CM

## 2024-01-01 DIAGNOSIS — N39.0 URINARY TRACT INFECTION WITH HEMATURIA, SITE UNSPECIFIED: Primary | ICD-10-CM

## 2024-01-01 DIAGNOSIS — R09.89 SUSPECTED DVT (DEEP VEIN THROMBOSIS): ICD-10-CM

## 2024-01-01 DIAGNOSIS — Z51.5 ENCOUNTER FOR PALLIATIVE CARE: ICD-10-CM

## 2024-01-01 DIAGNOSIS — E03.9 HYPOTHYROIDISM, UNSPECIFIED TYPE: ICD-10-CM

## 2024-01-01 DIAGNOSIS — D70.1 CHEMOTHERAPY-INDUCED NEUTROPENIA: ICD-10-CM

## 2024-01-01 DIAGNOSIS — R53.83 FATIGUE, UNSPECIFIED TYPE: ICD-10-CM

## 2024-01-01 DIAGNOSIS — R07.9 ACUTE CHEST PAIN: ICD-10-CM

## 2024-01-01 DIAGNOSIS — D63.0 ANEMIA IN NEOPLASTIC DISEASE: Primary | ICD-10-CM

## 2024-01-01 DIAGNOSIS — Z78.9 SELF-CATHETERIZES URINARY BLADDER: ICD-10-CM

## 2024-01-01 DIAGNOSIS — C79.52 SECONDARY MALIGNANT NEOPLASM OF BONE AND BONE MARROW: ICD-10-CM

## 2024-01-01 DIAGNOSIS — N18.4 CKD (CHRONIC KIDNEY DISEASE) STAGE 4, GFR 15-29 ML/MIN: ICD-10-CM

## 2024-01-01 DIAGNOSIS — I82.412 ACUTE DEEP VEIN THROMBOSIS (DVT) OF LEFT FEMORAL VEIN: ICD-10-CM

## 2024-01-01 DIAGNOSIS — I82.4Y2 ACUTE DEEP VEIN THROMBOSIS (DVT) OF PROXIMAL VEIN OF LEFT LOWER EXTREMITY: ICD-10-CM

## 2024-01-01 DIAGNOSIS — N32.0 BLADDER NECK CONTRACTURE: ICD-10-CM

## 2024-01-01 DIAGNOSIS — Z71.89 ADVANCED CARE PLANNING/COUNSELING DISCUSSION: ICD-10-CM

## 2024-01-01 DIAGNOSIS — M79.89 LEFT LEG SWELLING: Primary | ICD-10-CM

## 2024-01-01 DIAGNOSIS — N17.9 ACUTE RENAL FAILURE, UNSPECIFIED ACUTE RENAL FAILURE TYPE: ICD-10-CM

## 2024-01-01 DIAGNOSIS — E87.5 HYPERKALEMIA: ICD-10-CM

## 2024-01-01 DIAGNOSIS — G89.3 CANCER ASSOCIATED PAIN: ICD-10-CM

## 2024-01-01 DIAGNOSIS — D70.1 CHEMOTHERAPY INDUCED NEUTROPENIA: Primary | ICD-10-CM

## 2024-01-01 DIAGNOSIS — G89.29 CHRONIC BILATERAL LOW BACK PAIN WITHOUT SCIATICA: Primary | ICD-10-CM

## 2024-01-01 DIAGNOSIS — N39.0 COMPLICATED UTI (URINARY TRACT INFECTION): ICD-10-CM

## 2024-01-01 DIAGNOSIS — R31.9 URINARY TRACT INFECTION WITH HEMATURIA, SITE UNSPECIFIED: Primary | ICD-10-CM

## 2024-01-01 DIAGNOSIS — G89.29 CHRONIC BILATERAL LOW BACK PAIN WITHOUT SCIATICA: ICD-10-CM

## 2024-01-01 DIAGNOSIS — D84.9 IMMUNOCOMPROMISED STATE: ICD-10-CM

## 2024-01-01 DIAGNOSIS — D64.9 ANEMIA REQUIRING TRANSFUSIONS: ICD-10-CM

## 2024-01-01 DIAGNOSIS — R53.0 NEOPLASTIC (MALIGNANT) RELATED FATIGUE: ICD-10-CM

## 2024-01-01 DIAGNOSIS — R30.0 DYSURIA: Primary | ICD-10-CM

## 2024-01-01 DIAGNOSIS — N39.0 RECURRENT UTI: ICD-10-CM

## 2024-01-01 DIAGNOSIS — C67.9 BLADDER CARCINOMA: ICD-10-CM

## 2024-01-01 DIAGNOSIS — N30.00 ACUTE CYSTITIS WITHOUT HEMATURIA: ICD-10-CM

## 2024-01-01 DIAGNOSIS — N13.30 HYDRONEPHROSIS OF RIGHT KIDNEY: Primary | ICD-10-CM

## 2024-01-01 DIAGNOSIS — D50.0 ANEMIA DUE TO CHRONIC BLOOD LOSS: ICD-10-CM

## 2024-01-01 DIAGNOSIS — N19 RENAL FAILURE, UNSPECIFIED CHRONICITY: ICD-10-CM

## 2024-01-01 DIAGNOSIS — M54.6 ACUTE MIDLINE THORACIC BACK PAIN: Primary | ICD-10-CM

## 2024-01-01 DIAGNOSIS — M54.9 RIGHT-SIDED BACK PAIN, UNSPECIFIED BACK LOCATION, UNSPECIFIED CHRONICITY: Primary | ICD-10-CM

## 2024-01-01 DIAGNOSIS — N39.0 URINARY TRACT INFECTION WITH HEMATURIA, SITE UNSPECIFIED: ICD-10-CM

## 2024-01-01 DIAGNOSIS — I25.10 CORONARY ARTERY DISEASE, UNSPECIFIED VESSEL OR LESION TYPE, UNSPECIFIED WHETHER ANGINA PRESENT, UNSPECIFIED WHETHER NATIVE OR TRANSPLANTED HEART: ICD-10-CM

## 2024-01-01 DIAGNOSIS — D64.9 ANEMIA REQUIRING TRANSFUSIONS: Primary | ICD-10-CM

## 2024-01-01 DIAGNOSIS — I82.412 ACUTE DEEP VEIN THROMBOSIS (DVT) OF LEFT FEMORAL VEIN: Primary | ICD-10-CM

## 2024-01-01 DIAGNOSIS — N30.00 ACUTE CYSTITIS WITHOUT HEMATURIA: Primary | ICD-10-CM

## 2024-01-01 DIAGNOSIS — N17.9 ACUTE RENAL FAILURE, UNSPECIFIED ACUTE RENAL FAILURE TYPE: Primary | ICD-10-CM

## 2024-01-01 DIAGNOSIS — I82.4Y2 ACUTE DEEP VEIN THROMBOSIS (DVT) OF PROXIMAL VEIN OF LEFT LOWER EXTREMITY: Primary | ICD-10-CM

## 2024-01-01 DIAGNOSIS — L53.9 LEG ERYTHEMA: ICD-10-CM

## 2024-01-01 DIAGNOSIS — T45.1X5A CHEMOTHERAPY INDUCED NEUTROPENIA: Primary | ICD-10-CM

## 2024-01-01 DIAGNOSIS — M79.89 LEFT LEG SWELLING: ICD-10-CM

## 2024-01-01 DIAGNOSIS — M54.6 ACUTE MIDLINE THORACIC BACK PAIN: ICD-10-CM

## 2024-01-01 DIAGNOSIS — M54.50 CHRONIC BILATERAL LOW BACK PAIN WITHOUT SCIATICA: Primary | ICD-10-CM

## 2024-01-01 DIAGNOSIS — I25.10 CORONARY ARTERY DISEASE INVOLVING NATIVE CORONARY ARTERY OF NATIVE HEART WITHOUT ANGINA PECTORIS: ICD-10-CM

## 2024-01-01 LAB
25(OH)D3+25(OH)D2 SERPL-MCNC: 36 NG/ML (ref 30–96)
ABO + RH BLD: NORMAL
ALBUMIN SERPL BCP-MCNC: 1.7 G/DL (ref 3.5–5.2)
ALBUMIN SERPL BCP-MCNC: 2.1 G/DL (ref 3.5–5.2)
ALBUMIN SERPL BCP-MCNC: 2.1 G/DL (ref 3.5–5.2)
ALBUMIN SERPL BCP-MCNC: 2.5 G/DL (ref 3.5–5.2)
ALBUMIN SERPL BCP-MCNC: 2.5 G/DL (ref 3.5–5.2)
ALBUMIN SERPL BCP-MCNC: 2.6 G/DL (ref 3.5–5.2)
ALBUMIN SERPL BCP-MCNC: 2.6 G/DL (ref 3.5–5.2)
ALBUMIN SERPL BCP-MCNC: 2.7 G/DL (ref 3.5–5.2)
ALBUMIN SERPL BCP-MCNC: 2.8 G/DL (ref 3.5–5.2)
ALLENS TEST: NORMAL
ALP SERPL-CCNC: 71 U/L (ref 55–135)
ALP SERPL-CCNC: 73 U/L (ref 55–135)
ALP SERPL-CCNC: 73 U/L (ref 55–135)
ALP SERPL-CCNC: 78 U/L (ref 55–135)
ALP SERPL-CCNC: 79 U/L (ref 55–135)
ALP SERPL-CCNC: 83 U/L (ref 55–135)
ALP SERPL-CCNC: 83 U/L (ref 55–135)
ALP SERPL-CCNC: 86 U/L (ref 55–135)
ALP SERPL-CCNC: 87 U/L (ref 55–135)
ALP SERPL-CCNC: 94 U/L (ref 55–135)
ALT SERPL W/O P-5'-P-CCNC: 14 U/L (ref 10–44)
ALT SERPL W/O P-5'-P-CCNC: 15 U/L (ref 10–44)
ALT SERPL W/O P-5'-P-CCNC: 16 U/L (ref 10–44)
ALT SERPL W/O P-5'-P-CCNC: 17 U/L (ref 10–44)
ALT SERPL W/O P-5'-P-CCNC: 17 U/L (ref 10–44)
ALT SERPL W/O P-5'-P-CCNC: 20 U/L (ref 10–44)
ALT SERPL W/O P-5'-P-CCNC: 22 U/L (ref 10–44)
ALT SERPL W/O P-5'-P-CCNC: 31 U/L (ref 10–44)
ALT SERPL W/O P-5'-P-CCNC: 38 U/L (ref 10–44)
ALT SERPL W/O P-5'-P-CCNC: 61 U/L (ref 10–44)
ANION GAP SERPL CALC-SCNC: 10 MMOL/L (ref 8–16)
ANION GAP SERPL CALC-SCNC: 11 MMOL/L (ref 8–16)
ANION GAP SERPL CALC-SCNC: 15 MMOL/L (ref 8–16)
ANION GAP SERPL CALC-SCNC: 6 MMOL/L (ref 8–16)
ANION GAP SERPL CALC-SCNC: 8 MMOL/L (ref 8–16)
ANION GAP SERPL CALC-SCNC: 9 MMOL/L (ref 8–16)
APTT PPP: 22.2 SEC (ref 21–32)
AST SERPL-CCNC: 11 U/L (ref 10–40)
AST SERPL-CCNC: 12 U/L (ref 10–40)
AST SERPL-CCNC: 13 U/L (ref 10–40)
AST SERPL-CCNC: 16 U/L (ref 10–40)
AST SERPL-CCNC: 21 U/L (ref 10–40)
AST SERPL-CCNC: 27 U/L (ref 10–40)
AST SERPL-CCNC: 9 U/L (ref 10–40)
BACTERIA #/AREA URNS AUTO: ABNORMAL /HPF
BACTERIA UR CULT: ABNORMAL
BACTERIA UR CULT: ABNORMAL
BACTERIA UR CULT: NORMAL
BASOPHILS # BLD AUTO: 0.02 K/UL (ref 0–0.2)
BASOPHILS # BLD AUTO: 0.03 K/UL (ref 0–0.2)
BASOPHILS # BLD AUTO: 0.03 K/UL (ref 0–0.2)
BASOPHILS # BLD AUTO: 0.04 K/UL (ref 0–0.2)
BASOPHILS NFR BLD: 0.3 % (ref 0–1.9)
BASOPHILS NFR BLD: 0.3 % (ref 0–1.9)
BASOPHILS NFR BLD: 0.4 % (ref 0–1.9)
BASOPHILS NFR BLD: 0.4 % (ref 0–1.9)
BASOPHILS NFR BLD: 0.5 % (ref 0–1.9)
BASOPHILS NFR BLD: 0.9 % (ref 0–1.9)
BILIRUB SERPL-MCNC: 0.2 MG/DL (ref 0.1–1)
BILIRUB SERPL-MCNC: 0.3 MG/DL (ref 0.1–1)
BILIRUB SERPL-MCNC: 0.4 MG/DL (ref 0.1–1)
BILIRUB UR QL STRIP: NEGATIVE
BLD GP AB SCN CELLS X3 SERPL QL: NORMAL
BLD PROD TYP BPU: NORMAL
BLOOD UNIT EXPIRATION DATE: NORMAL
BLOOD UNIT TYPE CODE: 600
BLOOD UNIT TYPE: NORMAL
BUN SERPL-MCNC: 25 MG/DL (ref 6–20)
BUN SERPL-MCNC: 30 MG/DL (ref 6–20)
BUN SERPL-MCNC: 31 MG/DL (ref 6–20)
BUN SERPL-MCNC: 31 MG/DL (ref 6–20)
BUN SERPL-MCNC: 32 MG/DL (ref 6–20)
BUN SERPL-MCNC: 33 MG/DL (ref 6–20)
BUN SERPL-MCNC: 34 MG/DL (ref 6–20)
BUN SERPL-MCNC: 34 MG/DL (ref 6–20)
BUN SERPL-MCNC: 35 MG/DL (ref 6–20)
BUN SERPL-MCNC: 44 MG/DL (ref 6–20)
BUN SERPL-MCNC: 47 MG/DL (ref 6–20)
BUN SERPL-MCNC: 54 MG/DL (ref 6–20)
BUN SERPL-MCNC: 74 MG/DL (ref 6–20)
BUN SERPL-MCNC: 74 MG/DL (ref 6–20)
BUN SERPL-MCNC: 75 MG/DL (ref 6–20)
BUN SERPL-MCNC: 76 MG/DL (ref 6–20)
CALCIUM SERPL-MCNC: 8.2 MG/DL (ref 8.7–10.5)
CALCIUM SERPL-MCNC: 8.3 MG/DL (ref 8.7–10.5)
CALCIUM SERPL-MCNC: 8.3 MG/DL (ref 8.7–10.5)
CALCIUM SERPL-MCNC: 8.4 MG/DL (ref 8.7–10.5)
CALCIUM SERPL-MCNC: 8.6 MG/DL (ref 8.7–10.5)
CALCIUM SERPL-MCNC: 8.9 MG/DL (ref 8.7–10.5)
CALCIUM SERPL-MCNC: 9 MG/DL (ref 8.7–10.5)
CALCIUM SERPL-MCNC: 9.1 MG/DL (ref 8.7–10.5)
CALCIUM SERPL-MCNC: 9.4 MG/DL (ref 8.7–10.5)
CALCIUM SERPL-MCNC: 9.5 MG/DL (ref 8.7–10.5)
CALCIUM SERPL-MCNC: 9.6 MG/DL (ref 8.7–10.5)
CALCIUM SERPL-MCNC: 9.7 MG/DL (ref 8.7–10.5)
CALCIUM SERPL-MCNC: 9.8 MG/DL (ref 8.7–10.5)
CALCIUM SERPL-MCNC: 9.9 MG/DL (ref 8.7–10.5)
CALCIUM SERPL-MCNC: 9.9 MG/DL (ref 8.7–10.5)
CHLORIDE SERPL-SCNC: 100 MMOL/L (ref 95–110)
CHLORIDE SERPL-SCNC: 101 MMOL/L (ref 95–110)
CHLORIDE SERPL-SCNC: 102 MMOL/L (ref 95–110)
CHLORIDE SERPL-SCNC: 102 MMOL/L (ref 95–110)
CHLORIDE SERPL-SCNC: 103 MMOL/L (ref 95–110)
CHLORIDE SERPL-SCNC: 105 MMOL/L (ref 95–110)
CHLORIDE SERPL-SCNC: 106 MMOL/L (ref 95–110)
CHLORIDE SERPL-SCNC: 96 MMOL/L (ref 95–110)
CHLORIDE SERPL-SCNC: 98 MMOL/L (ref 95–110)
CHLORIDE SERPL-SCNC: 99 MMOL/L (ref 95–110)
CLARITY UR REFRACT.AUTO: ABNORMAL
CO2 SERPL-SCNC: 13 MMOL/L (ref 23–29)
CO2 SERPL-SCNC: 13 MMOL/L (ref 23–29)
CO2 SERPL-SCNC: 14 MMOL/L (ref 23–29)
CO2 SERPL-SCNC: 15 MMOL/L (ref 23–29)
CO2 SERPL-SCNC: 15 MMOL/L (ref 23–29)
CO2 SERPL-SCNC: 16 MMOL/L (ref 23–29)
CO2 SERPL-SCNC: 19 MMOL/L (ref 23–29)
CO2 SERPL-SCNC: 21 MMOL/L (ref 23–29)
CO2 SERPL-SCNC: 22 MMOL/L (ref 23–29)
CO2 SERPL-SCNC: 23 MMOL/L (ref 23–29)
CO2 SERPL-SCNC: 23 MMOL/L (ref 23–29)
CO2 SERPL-SCNC: 24 MMOL/L (ref 23–29)
CO2 SERPL-SCNC: 25 MMOL/L (ref 23–29)
CO2 SERPL-SCNC: 26 MMOL/L (ref 23–29)
CODING SYSTEM: NORMAL
COLOR UR AUTO: YELLOW
CREAT SERPL-MCNC: 2.1 MG/DL (ref 0.5–1.4)
CREAT SERPL-MCNC: 2.2 MG/DL (ref 0.5–1.4)
CREAT SERPL-MCNC: 2.3 MG/DL (ref 0.5–1.4)
CREAT SERPL-MCNC: 2.4 MG/DL (ref 0.5–1.4)
CREAT SERPL-MCNC: 2.4 MG/DL (ref 0.5–1.4)
CREAT SERPL-MCNC: 2.5 MG/DL (ref 0.5–1.4)
CREAT SERPL-MCNC: 2.6 MG/DL (ref 0.5–1.4)
CREAT SERPL-MCNC: 2.6 MG/DL (ref 0.5–1.4)
CREAT SERPL-MCNC: 5 MG/DL (ref 0.5–1.4)
CREAT SERPL-MCNC: 8 MG/DL (ref 0.5–1.4)
CREAT SERPL-MCNC: 8.1 MG/DL (ref 0.5–1.4)
CREAT SERPL-MCNC: 8.2 MG/DL (ref 0.5–1.4)
CREAT SERPL-MCNC: 8.2 MG/DL (ref 0.5–1.4)
CREAT SERPL-MCNC: 8.3 MG/DL (ref 0.5–1.4)
CREAT SERPL-MCNC: 8.3 MG/DL (ref 0.5–1.4)
CREAT SERPL-MCNC: 8.4 MG/DL (ref 0.5–1.4)
CROSSMATCH INTERPRETATION: NORMAL
CTP QC/QA: YES
CTP QC/QA: YES
DIFFERENTIAL METHOD BLD: ABNORMAL
DISPENSE STATUS: NORMAL
EOSINOPHIL # BLD AUTO: 0.1 K/UL (ref 0–0.5)
EOSINOPHIL NFR BLD: 0.7 % (ref 0–8)
EOSINOPHIL NFR BLD: 0.8 % (ref 0–8)
EOSINOPHIL NFR BLD: 1.1 % (ref 0–8)
EOSINOPHIL NFR BLD: 1.2 % (ref 0–8)
EOSINOPHIL NFR BLD: 1.8 % (ref 0–8)
EOSINOPHIL NFR BLD: 3.1 % (ref 0–8)
ERYTHROCYTE [DISTWIDTH] IN BLOOD BY AUTOMATED COUNT: 12.3 % (ref 11.5–14.5)
ERYTHROCYTE [DISTWIDTH] IN BLOOD BY AUTOMATED COUNT: 12.9 % (ref 11.5–14.5)
ERYTHROCYTE [DISTWIDTH] IN BLOOD BY AUTOMATED COUNT: 13.1 % (ref 11.5–14.5)
ERYTHROCYTE [DISTWIDTH] IN BLOOD BY AUTOMATED COUNT: 13.9 % (ref 11.5–14.5)
ERYTHROCYTE [DISTWIDTH] IN BLOOD BY AUTOMATED COUNT: 15.1 % (ref 11.5–14.5)
ERYTHROCYTE [DISTWIDTH] IN BLOOD BY AUTOMATED COUNT: 15.1 % (ref 11.5–14.5)
ERYTHROCYTE [DISTWIDTH] IN BLOOD BY AUTOMATED COUNT: 15.3 % (ref 11.5–14.5)
ERYTHROCYTE [DISTWIDTH] IN BLOOD BY AUTOMATED COUNT: 15.4 % (ref 11.5–14.5)
ERYTHROCYTE [DISTWIDTH] IN BLOOD BY AUTOMATED COUNT: 15.4 % (ref 11.5–14.5)
ERYTHROCYTE [DISTWIDTH] IN BLOOD BY AUTOMATED COUNT: 15.5 % (ref 11.5–14.5)
ERYTHROCYTE [DISTWIDTH] IN BLOOD BY AUTOMATED COUNT: 15.7 % (ref 11.5–14.5)
ERYTHROCYTE [DISTWIDTH] IN BLOOD BY AUTOMATED COUNT: 15.8 % (ref 11.5–14.5)
ERYTHROCYTE [DISTWIDTH] IN BLOOD BY AUTOMATED COUNT: 15.8 % (ref 11.5–14.5)
ERYTHROCYTE [DISTWIDTH] IN BLOOD BY AUTOMATED COUNT: 15.9 % (ref 11.5–14.5)
EST. GFR  (NO RACE VARIABLE): 12.6 ML/MIN/1.73 M^2
EST. GFR  (NO RACE VARIABLE): 27.7 ML/MIN/1.73 M^2
EST. GFR  (NO RACE VARIABLE): 27.7 ML/MIN/1.73 M^2
EST. GFR  (NO RACE VARIABLE): 29.1 ML/MIN/1.73 M^2
EST. GFR  (NO RACE VARIABLE): 30.5 ML/MIN/1.73 M^2
EST. GFR  (NO RACE VARIABLE): 30.5 ML/MIN/1.73 M^2
EST. GFR  (NO RACE VARIABLE): 32.1 ML/MIN/1.73 M^2
EST. GFR  (NO RACE VARIABLE): 33.9 ML/MIN/1.73 M^2
EST. GFR  (NO RACE VARIABLE): 35.8 ML/MIN/1.73 M^2
EST. GFR  (NO RACE VARIABLE): 6.8 ML/MIN/1.73 M^2
EST. GFR  (NO RACE VARIABLE): 6.9 ML/MIN/1.73 M^2
EST. GFR  (NO RACE VARIABLE): 6.9 ML/MIN/1.73 M^2
EST. GFR  (NO RACE VARIABLE): 7 ML/MIN/1.73 M^2
EST. GFR  (NO RACE VARIABLE): 7 ML/MIN/1.73 M^2
EST. GFR  (NO RACE VARIABLE): 7.1 ML/MIN/1.73 M^2
EST. GFR  (NO RACE VARIABLE): 7.2 ML/MIN/1.73 M^2
FERRITIN SERPL-MCNC: 1414 NG/ML (ref 20–300)
FERRITIN SERPL-MCNC: 961 NG/ML (ref 20–300)
FOLATE SERPL-MCNC: 13 NG/ML (ref 4–24)
GLUCOSE SERPL-MCNC: 102 MG/DL (ref 70–110)
GLUCOSE SERPL-MCNC: 108 MG/DL (ref 70–110)
GLUCOSE SERPL-MCNC: 110 MG/DL (ref 70–110)
GLUCOSE SERPL-MCNC: 111 MG/DL (ref 70–110)
GLUCOSE SERPL-MCNC: 114 MG/DL (ref 70–110)
GLUCOSE SERPL-MCNC: 125 MG/DL (ref 70–110)
GLUCOSE SERPL-MCNC: 126 MG/DL (ref 70–110)
GLUCOSE SERPL-MCNC: 128 MG/DL (ref 70–110)
GLUCOSE SERPL-MCNC: 129 MG/DL (ref 70–110)
GLUCOSE SERPL-MCNC: 129 MG/DL (ref 70–110)
GLUCOSE SERPL-MCNC: 177 MG/DL (ref 70–110)
GLUCOSE SERPL-MCNC: 83 MG/DL (ref 70–110)
GLUCOSE SERPL-MCNC: 89 MG/DL (ref 70–110)
GLUCOSE SERPL-MCNC: 89 MG/DL (ref 70–110)
GLUCOSE SERPL-MCNC: 90 MG/DL (ref 70–110)
GLUCOSE SERPL-MCNC: 93 MG/DL (ref 70–110)
GLUCOSE SERPL-MCNC: 96 MG/DL (ref 70–110)
GLUCOSE UR QL STRIP: NEGATIVE
HCT VFR BLD AUTO: 20.7 % (ref 40–54)
HCT VFR BLD AUTO: 21.2 % (ref 40–54)
HCT VFR BLD AUTO: 22 % (ref 40–54)
HCT VFR BLD AUTO: 22.7 % (ref 40–54)
HCT VFR BLD AUTO: 24.1 % (ref 40–54)
HCT VFR BLD AUTO: 25.5 % (ref 40–54)
HCT VFR BLD AUTO: 25.8 % (ref 40–54)
HCT VFR BLD AUTO: 26.4 % (ref 40–54)
HCT VFR BLD AUTO: 27.1 % (ref 40–54)
HCT VFR BLD AUTO: 27.2 % (ref 40–54)
HCT VFR BLD AUTO: 27.5 % (ref 40–54)
HCT VFR BLD AUTO: 27.9 % (ref 40–54)
HCT VFR BLD AUTO: 28 % (ref 40–54)
HCT VFR BLD AUTO: 28.1 % (ref 40–54)
HCT VFR BLD AUTO: 28.2 % (ref 40–54)
HCT VFR BLD AUTO: 31 % (ref 40–54)
HCV AB SERPL QL IA: NORMAL
HGB BLD-MCNC: 10 G/DL (ref 14–18)
HGB BLD-MCNC: 6.6 G/DL (ref 14–18)
HGB BLD-MCNC: 6.7 G/DL (ref 14–18)
HGB BLD-MCNC: 6.7 G/DL (ref 14–18)
HGB BLD-MCNC: 7 G/DL (ref 14–18)
HGB BLD-MCNC: 7.4 G/DL (ref 14–18)
HGB BLD-MCNC: 7.9 G/DL (ref 14–18)
HGB BLD-MCNC: 8 G/DL (ref 14–18)
HGB BLD-MCNC: 8.3 G/DL (ref 14–18)
HGB BLD-MCNC: 8.3 G/DL (ref 14–18)
HGB BLD-MCNC: 8.6 G/DL (ref 14–18)
HGB BLD-MCNC: 8.7 G/DL (ref 14–18)
HGB BLD-MCNC: 8.8 G/DL (ref 14–18)
HGB BLD-MCNC: 8.8 G/DL (ref 14–18)
HGB BLD-MCNC: 8.9 G/DL (ref 14–18)
HGB BLD-MCNC: 9 G/DL (ref 14–18)
HGB UR QL STRIP: ABNORMAL
HIV 1+2 AB+HIV1 P24 AG SERPL QL IA: NORMAL
HYALINE CASTS UR QL AUTO: 0 /LPF
IMM GRANULOCYTES # BLD AUTO: 0.02 K/UL (ref 0–0.04)
IMM GRANULOCYTES # BLD AUTO: 0.03 K/UL (ref 0–0.04)
IMM GRANULOCYTES # BLD AUTO: 0.04 K/UL (ref 0–0.04)
IMM GRANULOCYTES # BLD AUTO: 0.04 K/UL (ref 0–0.04)
IMM GRANULOCYTES # BLD AUTO: 0.05 K/UL (ref 0–0.04)
IMM GRANULOCYTES # BLD AUTO: 0.05 K/UL (ref 0–0.04)
IMM GRANULOCYTES # BLD AUTO: 0.06 K/UL (ref 0–0.04)
IMM GRANULOCYTES # BLD AUTO: 0.08 K/UL (ref 0–0.04)
IMM GRANULOCYTES NFR BLD AUTO: 0.5 % (ref 0–0.5)
IMM GRANULOCYTES NFR BLD AUTO: 0.7 % (ref 0–0.5)
IMM GRANULOCYTES NFR BLD AUTO: 0.8 % (ref 0–0.5)
IMM GRANULOCYTES NFR BLD AUTO: 0.8 % (ref 0–0.5)
IMM GRANULOCYTES NFR BLD AUTO: 0.9 % (ref 0–0.5)
IMM GRANULOCYTES NFR BLD AUTO: 1.3 % (ref 0–0.5)
INR PPP: 1 (ref 0.8–1.2)
IRON SERPL-MCNC: 19 UG/DL (ref 45–160)
IRON SERPL-MCNC: 27 UG/DL (ref 45–160)
KETONES UR QL STRIP: NEGATIVE
LDH SERPL L TO P-CCNC: 0.96 MMOL/L (ref 0.5–2.2)
LEUKOCYTE ESTERASE UR QL STRIP: ABNORMAL
LEUKOCYTE ESTERASE UR QL STRIP: POSITIVE
LEUKOCYTE ESTERASE UR QL STRIP: POSITIVE
LYMPHOCYTES # BLD AUTO: 0.4 K/UL (ref 1–4.8)
LYMPHOCYTES # BLD AUTO: 0.4 K/UL (ref 1–4.8)
LYMPHOCYTES # BLD AUTO: 0.5 K/UL (ref 1–4.8)
LYMPHOCYTES # BLD AUTO: 0.5 K/UL (ref 1–4.8)
LYMPHOCYTES # BLD AUTO: 0.7 K/UL (ref 1–4.8)
LYMPHOCYTES # BLD AUTO: 0.7 K/UL (ref 1–4.8)
LYMPHOCYTES NFR BLD: 10 % (ref 18–48)
LYMPHOCYTES NFR BLD: 22.4 % (ref 18–48)
LYMPHOCYTES NFR BLD: 5.8 % (ref 18–48)
LYMPHOCYTES NFR BLD: 6.5 % (ref 18–48)
LYMPHOCYTES NFR BLD: 6.7 % (ref 18–48)
LYMPHOCYTES NFR BLD: 8.9 % (ref 18–48)
MAGNESIUM SERPL-MCNC: 1.2 MG/DL (ref 1.6–2.6)
MAGNESIUM SERPL-MCNC: 1.4 MG/DL (ref 1.6–2.6)
MAGNESIUM SERPL-MCNC: 1.6 MG/DL (ref 1.6–2.6)
MCH RBC QN AUTO: 27.8 PG (ref 27–31)
MCH RBC QN AUTO: 27.9 PG (ref 27–31)
MCH RBC QN AUTO: 28.2 PG (ref 27–31)
MCH RBC QN AUTO: 28.3 PG (ref 27–31)
MCH RBC QN AUTO: 28.3 PG (ref 27–31)
MCH RBC QN AUTO: 28.4 PG (ref 27–31)
MCH RBC QN AUTO: 28.5 PG (ref 27–31)
MCH RBC QN AUTO: 28.5 PG (ref 27–31)
MCH RBC QN AUTO: 28.6 PG (ref 27–31)
MCH RBC QN AUTO: 28.7 PG (ref 27–31)
MCH RBC QN AUTO: 28.8 PG (ref 27–31)
MCH RBC QN AUTO: 28.9 PG (ref 27–31)
MCH RBC QN AUTO: 28.9 PG (ref 27–31)
MCH RBC QN AUTO: 29 PG (ref 27–31)
MCHC RBC AUTO-ENTMCNC: 30.5 G/DL (ref 32–36)
MCHC RBC AUTO-ENTMCNC: 30.5 G/DL (ref 32–36)
MCHC RBC AUTO-ENTMCNC: 30.6 G/DL (ref 32–36)
MCHC RBC AUTO-ENTMCNC: 30.7 G/DL (ref 32–36)
MCHC RBC AUTO-ENTMCNC: 30.8 G/DL (ref 32–36)
MCHC RBC AUTO-ENTMCNC: 31.1 G/DL (ref 32–36)
MCHC RBC AUTO-ENTMCNC: 31.4 G/DL (ref 32–36)
MCHC RBC AUTO-ENTMCNC: 31.4 G/DL (ref 32–36)
MCHC RBC AUTO-ENTMCNC: 31.5 G/DL (ref 32–36)
MCHC RBC AUTO-ENTMCNC: 31.6 G/DL (ref 32–36)
MCHC RBC AUTO-ENTMCNC: 31.7 G/DL (ref 32–36)
MCHC RBC AUTO-ENTMCNC: 31.7 G/DL (ref 32–36)
MCHC RBC AUTO-ENTMCNC: 31.9 G/DL (ref 32–36)
MCHC RBC AUTO-ENTMCNC: 31.9 G/DL (ref 32–36)
MCHC RBC AUTO-ENTMCNC: 32 G/DL (ref 32–36)
MCHC RBC AUTO-ENTMCNC: 32.3 G/DL (ref 32–36)
MCV RBC AUTO: 89 FL (ref 82–98)
MCV RBC AUTO: 90 FL (ref 82–98)
MCV RBC AUTO: 91 FL (ref 82–98)
MCV RBC AUTO: 91 FL (ref 82–98)
MCV RBC AUTO: 92 FL (ref 82–98)
MCV RBC AUTO: 93 FL (ref 82–98)
MICROSCOPIC COMMENT: ABNORMAL
MONOCYTES # BLD AUTO: 0.1 K/UL (ref 0.3–1)
MONOCYTES # BLD AUTO: 0.6 K/UL (ref 0.3–1)
MONOCYTES # BLD AUTO: 0.8 K/UL (ref 0.3–1)
MONOCYTES # BLD AUTO: 0.9 K/UL (ref 0.3–1)
MONOCYTES # BLD AUTO: 1 K/UL (ref 0.3–1)
MONOCYTES # BLD AUTO: 1 K/UL (ref 0.3–1)
MONOCYTES NFR BLD: 1.3 % (ref 4–15)
MONOCYTES NFR BLD: 10.2 % (ref 4–15)
MONOCYTES NFR BLD: 11.2 % (ref 4–15)
MONOCYTES NFR BLD: 12.1 % (ref 4–15)
MONOCYTES NFR BLD: 14.2 % (ref 4–15)
MONOCYTES NFR BLD: 26.9 % (ref 4–15)
NEUTROPHILS # BLD AUTO: 1 K/UL (ref 1.8–7.7)
NEUTROPHILS # BLD AUTO: 1.4 K/UL (ref 1.8–7.7)
NEUTROPHILS # BLD AUTO: 1.9 K/UL (ref 1.8–7.7)
NEUTROPHILS # BLD AUTO: 2.5 K/UL (ref 1.8–7.7)
NEUTROPHILS # BLD AUTO: 3.2 K/UL (ref 1.8–7.7)
NEUTROPHILS # BLD AUTO: 3.3 K/UL (ref 1.8–7.7)
NEUTROPHILS # BLD AUTO: 3.7 K/UL (ref 1.8–7.7)
NEUTROPHILS # BLD AUTO: 3.9 K/UL (ref 1.8–7.7)
NEUTROPHILS # BLD AUTO: 4 K/UL (ref 1.8–7.7)
NEUTROPHILS # BLD AUTO: 4.2 K/UL (ref 1.8–7.7)
NEUTROPHILS # BLD AUTO: 5.2 K/UL (ref 1.8–7.7)
NEUTROPHILS # BLD AUTO: 5.6 K/UL (ref 1.8–7.7)
NEUTROPHILS # BLD AUTO: 5.7 K/UL (ref 1.8–7.7)
NEUTROPHILS # BLD AUTO: 5.8 K/UL (ref 1.8–7.7)
NEUTROPHILS # BLD AUTO: 6 K/UL (ref 1.8–7.7)
NEUTROPHILS # BLD AUTO: 7.8 K/UL (ref 1.8–7.7)
NEUTROPHILS NFR BLD: 45.8 % (ref 38–73)
NEUTROPHILS NFR BLD: 76.7 % (ref 38–73)
NEUTROPHILS NFR BLD: 77.5 % (ref 38–73)
NEUTROPHILS NFR BLD: 80.2 % (ref 38–73)
NEUTROPHILS NFR BLD: 80.8 % (ref 38–73)
NEUTROPHILS NFR BLD: 86.3 % (ref 38–73)
NITRITE UR QL STRIP: NEGATIVE
NRBC BLD-RTO: 0 /100 WBC
NUM UNITS TRANS PACKED RBC: NORMAL
OHS QRS DURATION: 78 MS
OHS QRS DURATION: 88 MS
OHS QRS DURATION: 92 MS
OHS QTC CALCULATION: 375 MS
OHS QTC CALCULATION: 396 MS
OHS QTC CALCULATION: 405 MS
PH UR STRIP: 7 [PH] (ref 5–8)
PH, POC UA: 7 (ref 5–8)
PH, POC UA: 8 (ref 5–8)
PHOSPHATE SERPL-MCNC: 3.9 MG/DL (ref 2.7–4.5)
PHOSPHATE SERPL-MCNC: 4.1 MG/DL (ref 2.7–4.5)
PLATELET # BLD AUTO: 279 K/UL (ref 150–450)
PLATELET # BLD AUTO: 281 K/UL (ref 150–450)
PLATELET # BLD AUTO: 302 K/UL (ref 150–450)
PLATELET # BLD AUTO: 311 K/UL (ref 150–450)
PLATELET # BLD AUTO: 318 K/UL (ref 150–450)
PLATELET # BLD AUTO: 320 K/UL (ref 150–450)
PLATELET # BLD AUTO: 329 K/UL (ref 150–450)
PLATELET # BLD AUTO: 361 K/UL (ref 150–450)
PLATELET # BLD AUTO: 362 K/UL (ref 150–450)
PLATELET # BLD AUTO: 366 K/UL (ref 150–450)
PLATELET # BLD AUTO: 369 K/UL (ref 150–450)
PLATELET # BLD AUTO: 373 K/UL (ref 150–450)
PLATELET # BLD AUTO: 387 K/UL (ref 150–450)
PLATELET # BLD AUTO: 403 K/UL (ref 150–450)
PLATELET # BLD AUTO: 406 K/UL (ref 150–450)
PLATELET # BLD AUTO: 432 K/UL (ref 150–450)
PMV BLD AUTO: 10 FL (ref 9.2–12.9)
PMV BLD AUTO: 10 FL (ref 9.2–12.9)
PMV BLD AUTO: 10.1 FL (ref 9.2–12.9)
PMV BLD AUTO: 10.2 FL (ref 9.2–12.9)
PMV BLD AUTO: 10.3 FL (ref 9.2–12.9)
PMV BLD AUTO: 10.3 FL (ref 9.2–12.9)
PMV BLD AUTO: 10.4 FL (ref 9.2–12.9)
PMV BLD AUTO: 10.4 FL (ref 9.2–12.9)
PMV BLD AUTO: 10.6 FL (ref 9.2–12.9)
PMV BLD AUTO: 11 FL (ref 9.2–12.9)
PMV BLD AUTO: 9.2 FL (ref 9.2–12.9)
PMV BLD AUTO: 9.2 FL (ref 9.2–12.9)
PMV BLD AUTO: 9.4 FL (ref 9.2–12.9)
PMV BLD AUTO: 9.6 FL (ref 9.2–12.9)
PMV BLD AUTO: 9.6 FL (ref 9.2–12.9)
PMV BLD AUTO: 9.9 FL (ref 9.2–12.9)
POC BLOOD, URINE: POSITIVE
POC BLOOD, URINE: POSITIVE
POC MOLECULAR INFLUENZA A AGN: NEGATIVE
POC MOLECULAR INFLUENZA B AGN: NEGATIVE
POC NITRATES, URINE: NEGATIVE
POC NITRATES, URINE: NEGATIVE
POCT GLUCOSE: 110 MG/DL (ref 70–110)
POCT GLUCOSE: 119 MG/DL (ref 70–110)
POCT GLUCOSE: 134 MG/DL (ref 70–110)
POCT GLUCOSE: 137 MG/DL (ref 70–110)
POCT GLUCOSE: 190 MG/DL (ref 70–110)
POCT GLUCOSE: 238 MG/DL (ref 70–110)
POCT GLUCOSE: 94 MG/DL (ref 70–110)
POTASSIUM SERPL-SCNC: 3.9 MMOL/L (ref 3.5–5.1)
POTASSIUM SERPL-SCNC: 4.1 MMOL/L (ref 3.5–5.1)
POTASSIUM SERPL-SCNC: 4.1 MMOL/L (ref 3.5–5.1)
POTASSIUM SERPL-SCNC: 4.2 MMOL/L (ref 3.5–5.1)
POTASSIUM SERPL-SCNC: 4.3 MMOL/L (ref 3.5–5.1)
POTASSIUM SERPL-SCNC: 4.4 MMOL/L (ref 3.5–5.1)
POTASSIUM SERPL-SCNC: 4.6 MMOL/L (ref 3.5–5.1)
POTASSIUM SERPL-SCNC: 4.7 MMOL/L (ref 3.5–5.1)
POTASSIUM SERPL-SCNC: 5 MMOL/L (ref 3.5–5.1)
POTASSIUM SERPL-SCNC: 5 MMOL/L (ref 3.5–5.1)
POTASSIUM SERPL-SCNC: 6.3 MMOL/L (ref 3.5–5.1)
POTASSIUM SERPL-SCNC: 6.4 MMOL/L (ref 3.5–5.1)
POTASSIUM SERPL-SCNC: 6.6 MMOL/L (ref 3.5–5.1)
PROT SERPL-MCNC: 5.7 G/DL (ref 6–8.4)
PROT SERPL-MCNC: 5.9 G/DL (ref 6–8.4)
PROT SERPL-MCNC: 6.2 G/DL (ref 6–8.4)
PROT SERPL-MCNC: 6.5 G/DL (ref 6–8.4)
PROT SERPL-MCNC: 6.7 G/DL (ref 6–8.4)
PROT SERPL-MCNC: 6.7 G/DL (ref 6–8.4)
PROT SERPL-MCNC: 6.8 G/DL (ref 6–8.4)
PROT SERPL-MCNC: 6.9 G/DL (ref 6–8.4)
PROT SERPL-MCNC: 6.9 G/DL (ref 6–8.4)
PROT SERPL-MCNC: 7 G/DL (ref 6–8.4)
PROT UR QL STRIP: ABNORMAL
PROT UR QL STRIP: POSITIVE
PROT UR QL STRIP: POSITIVE
PROTHROMBIN TIME: 11.4 SEC (ref 9–12.5)
RBC # BLD AUTO: 2.29 M/UL (ref 4.6–6.2)
RBC # BLD AUTO: 2.35 M/UL (ref 4.6–6.2)
RBC # BLD AUTO: 2.4 M/UL (ref 4.6–6.2)
RBC # BLD AUTO: 2.52 M/UL (ref 4.6–6.2)
RBC # BLD AUTO: 2.62 M/UL (ref 4.6–6.2)
RBC # BLD AUTO: 2.77 M/UL (ref 4.6–6.2)
RBC # BLD AUTO: 2.83 M/UL (ref 4.6–6.2)
RBC # BLD AUTO: 2.89 M/UL (ref 4.6–6.2)
RBC # BLD AUTO: 2.93 M/UL (ref 4.6–6.2)
RBC # BLD AUTO: 2.99 M/UL (ref 4.6–6.2)
RBC # BLD AUTO: 3.02 M/UL (ref 4.6–6.2)
RBC # BLD AUTO: 3.04 M/UL (ref 4.6–6.2)
RBC # BLD AUTO: 3.05 M/UL (ref 4.6–6.2)
RBC # BLD AUTO: 3.11 M/UL (ref 4.6–6.2)
RBC # BLD AUTO: 3.17 M/UL (ref 4.6–6.2)
RBC # BLD AUTO: 3.45 M/UL (ref 4.6–6.2)
RBC #/AREA URNS AUTO: 27 /HPF (ref 0–4)
RETICS/RBC NFR AUTO: 1.7 % (ref 0.4–2)
SAMPLE: NORMAL
SARS-COV-2 AG RESP QL IA.RAPID: NEGATIVE
SARS-COV-2 RDRP RESP QL NAA+PROBE: NEGATIVE
SATURATED IRON: 10 % (ref 20–50)
SATURATED IRON: 12 % (ref 20–50)
SITE: NORMAL
SODIUM SERPL-SCNC: 122 MMOL/L (ref 136–145)
SODIUM SERPL-SCNC: 123 MMOL/L (ref 136–145)
SODIUM SERPL-SCNC: 124 MMOL/L (ref 136–145)
SODIUM SERPL-SCNC: 126 MMOL/L (ref 136–145)
SODIUM SERPL-SCNC: 130 MMOL/L (ref 136–145)
SODIUM SERPL-SCNC: 131 MMOL/L (ref 136–145)
SODIUM SERPL-SCNC: 131 MMOL/L (ref 136–145)
SODIUM SERPL-SCNC: 132 MMOL/L (ref 136–145)
SODIUM SERPL-SCNC: 133 MMOL/L (ref 136–145)
SODIUM SERPL-SCNC: 134 MMOL/L (ref 136–145)
SODIUM SERPL-SCNC: 135 MMOL/L (ref 136–145)
SODIUM SERPL-SCNC: 136 MMOL/L (ref 136–145)
SODIUM SERPL-SCNC: 136 MMOL/L (ref 136–145)
SODIUM SERPL-SCNC: 137 MMOL/L (ref 136–145)
SP GR UR STRIP: 1 (ref 1–1.03)
SP GR UR STRIP: 1.01 (ref 1–1.03)
SP GR UR STRIP: 1.01 (ref 1–1.03)
SPECIMEN OUTDATE: NORMAL
TOTAL IRON BINDING CAPACITY: 194 UG/DL (ref 250–450)
TOTAL IRON BINDING CAPACITY: 232 UG/DL (ref 250–450)
TRANSFERRIN SERPL-MCNC: 131 MG/DL (ref 200–375)
TRANSFERRIN SERPL-MCNC: 157 MG/DL (ref 200–375)
TSH SERPL DL<=0.005 MIU/L-ACNC: 1.94 UIU/ML (ref 0.4–4)
TSH SERPL DL<=0.005 MIU/L-ACNC: 3.14 UIU/ML (ref 0.4–4)
URN SPEC COLLECT METH UR: ABNORMAL
UROBILINOGEN UR STRIP-ACNC: NORMAL (ref 0.3–2.2)
UROBILINOGEN UR STRIP-ACNC: NORMAL (ref 0.3–2.2)
VIT B12 SERPL-MCNC: 827 PG/ML (ref 210–950)
WBC # BLD AUTO: 2.23 K/UL (ref 3.9–12.7)
WBC # BLD AUTO: 3.2 K/UL (ref 3.9–12.7)
WBC # BLD AUTO: 3.62 K/UL (ref 3.9–12.7)
WBC # BLD AUTO: 4.16 K/UL (ref 3.9–12.7)
WBC # BLD AUTO: 4.47 K/UL (ref 3.9–12.7)
WBC # BLD AUTO: 4.57 K/UL (ref 3.9–12.7)
WBC # BLD AUTO: 4.64 K/UL (ref 3.9–12.7)
WBC # BLD AUTO: 5.07 K/UL (ref 3.9–12.7)
WBC # BLD AUTO: 5.7 K/UL (ref 3.9–12.7)
WBC # BLD AUTO: 6.12 K/UL (ref 3.9–12.7)
WBC # BLD AUTO: 7.25 K/UL (ref 3.9–12.7)
WBC # BLD AUTO: 7.31 K/UL (ref 3.9–12.7)
WBC # BLD AUTO: 7.38 K/UL (ref 3.9–12.7)
WBC # BLD AUTO: 7.42 K/UL (ref 3.9–12.7)
WBC # BLD AUTO: 7.71 K/UL (ref 3.9–12.7)
WBC # BLD AUTO: 9.64 K/UL (ref 3.9–12.7)
WBC #/AREA URNS AUTO: >100 /HPF (ref 0–5)
WBC CLUMPS UR QL AUTO: ABNORMAL

## 2024-01-01 PROCEDURE — 3077F SYST BP >= 140 MM HG: CPT | Mod: CPTII,S$GLB,, | Performed by: HOSPITALIST

## 2024-01-01 PROCEDURE — P9040 RBC LEUKOREDUCED IRRADIATED: HCPCS | Performed by: HOSPITALIST

## 2024-01-01 PROCEDURE — 78815 PET IMAGE W/CT SKULL-THIGH: CPT | Mod: 26,PS,, | Performed by: STUDENT IN AN ORGANIZED HEALTH CARE EDUCATION/TRAINING PROGRAM

## 2024-01-01 PROCEDURE — 97811 ACUP 1/> W/O ESTIM EA ADD 15: CPT | Performed by: ACUPUNCTURIST

## 2024-01-01 PROCEDURE — 96413 CHEMO IV INFUSION 1 HR: CPT

## 2024-01-01 PROCEDURE — 87186 SC STD MICRODIL/AGAR DIL: CPT | Performed by: PHYSICIAN ASSISTANT

## 2024-01-01 PROCEDURE — 63600175 PHARM REV CODE 636 W HCPCS: Performed by: STUDENT IN AN ORGANIZED HEALTH CARE EDUCATION/TRAINING PROGRAM

## 2024-01-01 PROCEDURE — 85025 COMPLETE CBC W/AUTO DIFF WBC: CPT | Performed by: EMERGENCY MEDICINE

## 2024-01-01 PROCEDURE — 25000003 PHARM REV CODE 250: Performed by: HOSPITALIST

## 2024-01-01 PROCEDURE — 99497 ADVNCD CARE PLAN 30 MIN: CPT | Mod: 25,,, | Performed by: STUDENT IN AN ORGANIZED HEALTH CARE EDUCATION/TRAINING PROGRAM

## 2024-01-01 PROCEDURE — 85027 COMPLETE CBC AUTOMATED: CPT | Performed by: HOSPITALIST

## 2024-01-01 PROCEDURE — 36430 TRANSFUSION BLD/BLD COMPNT: CPT

## 2024-01-01 PROCEDURE — 96367 TX/PROPH/DG ADDL SEQ IV INF: CPT

## 2024-01-01 PROCEDURE — 80053 COMPREHEN METABOLIC PANEL: CPT | Performed by: PHYSICIAN ASSISTANT

## 2024-01-01 PROCEDURE — 87389 HIV-1 AG W/HIV-1&-2 AB AG IA: CPT | Performed by: PHYSICIAN ASSISTANT

## 2024-01-01 PROCEDURE — 1160F RVW MEDS BY RX/DR IN RCRD: CPT | Mod: CPTII,S$GLB,, | Performed by: STUDENT IN AN ORGANIZED HEALTH CARE EDUCATION/TRAINING PROGRAM

## 2024-01-01 PROCEDURE — 99215 OFFICE O/P EST HI 40 MIN: CPT | Mod: S$GLB,,, | Performed by: HOSPITALIST

## 2024-01-01 PROCEDURE — 63600175 PHARM REV CODE 636 W HCPCS: Performed by: HOSPITALIST

## 2024-01-01 PROCEDURE — 87086 URINE CULTURE/COLONY COUNT: CPT | Performed by: FAMILY MEDICINE

## 2024-01-01 PROCEDURE — 25000003 PHARM REV CODE 250

## 2024-01-01 PROCEDURE — 80053 COMPREHEN METABOLIC PANEL: CPT | Performed by: INTERNAL MEDICINE

## 2024-01-01 PROCEDURE — 85610 PROTHROMBIN TIME: CPT | Performed by: PHYSICIAN ASSISTANT

## 2024-01-01 PROCEDURE — 81003 URINALYSIS AUTO W/O SCOPE: CPT | Mod: QW,S$GLB,, | Performed by: PHYSICIAN ASSISTANT

## 2024-01-01 PROCEDURE — 94640 AIRWAY INHALATION TREATMENT: CPT

## 2024-01-01 PROCEDURE — 71250 CT THORAX DX C-: CPT | Mod: TC

## 2024-01-01 PROCEDURE — P9040 RBC LEUKOREDUCED IRRADIATED: HCPCS

## 2024-01-01 PROCEDURE — 80053 COMPREHEN METABOLIC PANEL: CPT | Performed by: HOSPITALIST

## 2024-01-01 PROCEDURE — 78815 PET IMAGE W/CT SKULL-THIGH: CPT | Mod: TC

## 2024-01-01 PROCEDURE — 83540 ASSAY OF IRON: CPT | Performed by: HOSPITALIST

## 2024-01-01 PROCEDURE — 25000242 PHARM REV CODE 250 ALT 637 W/ HCPCS: Performed by: EMERGENCY MEDICINE

## 2024-01-01 PROCEDURE — 84100 ASSAY OF PHOSPHORUS: CPT | Performed by: STUDENT IN AN ORGANIZED HEALTH CARE EDUCATION/TRAINING PROGRAM

## 2024-01-01 PROCEDURE — 3008F BODY MASS INDEX DOCD: CPT | Mod: CPTII,S$GLB,, | Performed by: HOSPITALIST

## 2024-01-01 PROCEDURE — 3078F DIAST BP <80 MM HG: CPT | Mod: CPTII,S$GLB,, | Performed by: HOSPITALIST

## 2024-01-01 PROCEDURE — 96375 TX/PRO/DX INJ NEW DRUG ADDON: CPT

## 2024-01-01 PROCEDURE — 25000242 PHARM REV CODE 250 ALT 637 W/ HCPCS: Performed by: STUDENT IN AN ORGANIZED HEALTH CARE EDUCATION/TRAINING PROGRAM

## 2024-01-01 PROCEDURE — 82746 ASSAY OF FOLIC ACID SERUM: CPT | Performed by: HOSPITALIST

## 2024-01-01 PROCEDURE — 71250 CT THORAX DX C-: CPT | Mod: 26,,, | Performed by: RADIOLOGY

## 2024-01-01 PROCEDURE — 82728 ASSAY OF FERRITIN: CPT | Performed by: HOSPITALIST

## 2024-01-01 PROCEDURE — 99999 PR PBB SHADOW E&M-EST. PATIENT-LVL V: CPT | Mod: PBBFAC,,, | Performed by: HOSPITALIST

## 2024-01-01 PROCEDURE — 3008F BODY MASS INDEX DOCD: CPT | Mod: CPTII,S$GLB,, | Performed by: STUDENT IN AN ORGANIZED HEALTH CARE EDUCATION/TRAINING PROGRAM

## 2024-01-01 PROCEDURE — 99214 OFFICE O/P EST MOD 30 MIN: CPT | Mod: S$GLB,,,

## 2024-01-01 PROCEDURE — A4216 STERILE WATER/SALINE, 10 ML: HCPCS | Performed by: HOSPITALIST

## 2024-01-01 PROCEDURE — 36415 COLL VENOUS BLD VENIPUNCTURE: CPT | Performed by: HOSPITALIST

## 2024-01-01 PROCEDURE — 63600175 PHARM REV CODE 636 W HCPCS

## 2024-01-01 PROCEDURE — 1159F MED LIST DOCD IN RCRD: CPT | Mod: CPTII,S$GLB,, | Performed by: PHYSICIAN ASSISTANT

## 2024-01-01 PROCEDURE — 1160F RVW MEDS BY RX/DR IN RCRD: CPT | Mod: CPTII,95,, | Performed by: STUDENT IN AN ORGANIZED HEALTH CARE EDUCATION/TRAINING PROGRAM

## 2024-01-01 PROCEDURE — 99999 PR PBB SHADOW E&M-EST. PATIENT-LVL III: CPT | Mod: PBBFAC,,, | Performed by: STUDENT IN AN ORGANIZED HEALTH CARE EDUCATION/TRAINING PROGRAM

## 2024-01-01 PROCEDURE — 3074F SYST BP LT 130 MM HG: CPT | Mod: CPTII,S$GLB,, | Performed by: HOSPITALIST

## 2024-01-01 PROCEDURE — 99285 EMERGENCY DEPT VISIT HI MDM: CPT | Mod: 25

## 2024-01-01 PROCEDURE — 99223 1ST HOSP IP/OBS HIGH 75: CPT | Mod: ,,,

## 2024-01-01 PROCEDURE — 87811 SARS-COV-2 COVID19 W/OPTIC: CPT | Mod: QW,S$GLB,, | Performed by: FAMILY MEDICINE

## 2024-01-01 PROCEDURE — 87088 URINE BACTERIA CULTURE: CPT | Performed by: STUDENT IN AN ORGANIZED HEALTH CARE EDUCATION/TRAINING PROGRAM

## 2024-01-01 PROCEDURE — 3078F DIAST BP <80 MM HG: CPT | Mod: CPTII,S$GLB,, | Performed by: INTERNAL MEDICINE

## 2024-01-01 PROCEDURE — 99999 PR PBB SHADOW E&M-EST. PATIENT-LVL III: CPT | Mod: PBBFAC,,, | Performed by: HOSPITALIST

## 2024-01-01 PROCEDURE — 86920 COMPATIBILITY TEST SPIN: CPT

## 2024-01-01 PROCEDURE — 93005 ELECTROCARDIOGRAM TRACING: CPT

## 2024-01-01 PROCEDURE — 96361 HYDRATE IV INFUSION ADD-ON: CPT

## 2024-01-01 PROCEDURE — 99223 1ST HOSP IP/OBS HIGH 75: CPT | Mod: ,,, | Performed by: STUDENT IN AN ORGANIZED HEALTH CARE EDUCATION/TRAINING PROGRAM

## 2024-01-01 PROCEDURE — 1159F MED LIST DOCD IN RCRD: CPT | Mod: CPTII,S$GLB,, | Performed by: STUDENT IN AN ORGANIZED HEALTH CARE EDUCATION/TRAINING PROGRAM

## 2024-01-01 PROCEDURE — 25000003 PHARM REV CODE 250: Performed by: STUDENT IN AN ORGANIZED HEALTH CARE EDUCATION/TRAINING PROGRAM

## 2024-01-01 PROCEDURE — 63600175 PHARM REV CODE 636 W HCPCS: Performed by: EMERGENCY MEDICINE

## 2024-01-01 PROCEDURE — 87086 URINE CULTURE/COLONY COUNT: CPT | Performed by: NURSE PRACTITIONER

## 2024-01-01 PROCEDURE — 3080F DIAST BP >= 90 MM HG: CPT | Mod: CPTII,S$GLB,, | Performed by: STUDENT IN AN ORGANIZED HEALTH CARE EDUCATION/TRAINING PROGRAM

## 2024-01-01 PROCEDURE — 99999 PR PBB SHADOW E&M-EST. PATIENT-LVL IV: CPT | Mod: PBBFAC,,, | Performed by: STUDENT IN AN ORGANIZED HEALTH CARE EDUCATION/TRAINING PROGRAM

## 2024-01-01 PROCEDURE — 36415 COLL VENOUS BLD VENIPUNCTURE: CPT | Mod: XB

## 2024-01-01 PROCEDURE — 81001 URINALYSIS AUTO W/SCOPE: CPT | Performed by: PHYSICIAN ASSISTANT

## 2024-01-01 PROCEDURE — 85025 COMPLETE CBC W/AUTO DIFF WBC: CPT | Performed by: STUDENT IN AN ORGANIZED HEALTH CARE EDUCATION/TRAINING PROGRAM

## 2024-01-01 PROCEDURE — 71100 X-RAY EXAM RIBS UNI 2 VIEWS: CPT | Mod: FY,LT,S$GLB, | Performed by: RADIOLOGY

## 2024-01-01 PROCEDURE — G0378 HOSPITAL OBSERVATION PER HR: HCPCS

## 2024-01-01 PROCEDURE — 86901 BLOOD TYPING SEROLOGIC RH(D): CPT | Performed by: HOSPITALIST

## 2024-01-01 PROCEDURE — 85025 COMPLETE CBC W/AUTO DIFF WBC: CPT | Mod: 91

## 2024-01-01 PROCEDURE — 1159F MED LIST DOCD IN RCRD: CPT | Mod: CPTII,S$GLB,, | Performed by: INTERNAL MEDICINE

## 2024-01-01 PROCEDURE — 30233N1 TRANSFUSION OF NONAUTOLOGOUS RED BLOOD CELLS INTO PERIPHERAL VEIN, PERCUTANEOUS APPROACH: ICD-10-PCS | Performed by: INTERNAL MEDICINE

## 2024-01-01 PROCEDURE — 83735 ASSAY OF MAGNESIUM: CPT | Performed by: STUDENT IN AN ORGANIZED HEALTH CARE EDUCATION/TRAINING PROGRAM

## 2024-01-01 PROCEDURE — 99999 PR PBB SHADOW E&M-EST. PATIENT-LVL IV: CPT | Mod: PBBFAC,,, | Performed by: HOSPITALIST

## 2024-01-01 PROCEDURE — 87086 URINE CULTURE/COLONY COUNT: CPT | Performed by: PHYSICIAN ASSISTANT

## 2024-01-01 PROCEDURE — 3008F BODY MASS INDEX DOCD: CPT | Mod: CPTII,S$GLB,, | Performed by: INTERNAL MEDICINE

## 2024-01-01 PROCEDURE — 3079F DIAST BP 80-89 MM HG: CPT | Mod: CPTII,S$GLB,, | Performed by: HOSPITALIST

## 2024-01-01 PROCEDURE — 3008F BODY MASS INDEX DOCD: CPT | Mod: CPTII,95,, | Performed by: STUDENT IN AN ORGANIZED HEALTH CARE EDUCATION/TRAINING PROGRAM

## 2024-01-01 PROCEDURE — 80048 BASIC METABOLIC PNL TOTAL CA: CPT | Mod: XB | Performed by: STUDENT IN AN ORGANIZED HEALTH CARE EDUCATION/TRAINING PROGRAM

## 2024-01-01 PROCEDURE — 87077 CULTURE AEROBIC IDENTIFY: CPT | Performed by: PHYSICIAN ASSISTANT

## 2024-01-01 PROCEDURE — 3078F DIAST BP <80 MM HG: CPT | Mod: CPTII,S$GLB,, | Performed by: PHYSICIAN ASSISTANT

## 2024-01-01 PROCEDURE — 80053 COMPREHEN METABOLIC PANEL: CPT | Performed by: STUDENT IN AN ORGANIZED HEALTH CARE EDUCATION/TRAINING PROGRAM

## 2024-01-01 PROCEDURE — 1160F RVW MEDS BY RX/DR IN RCRD: CPT | Mod: CPTII,S$GLB,, | Performed by: PHYSICIAN ASSISTANT

## 2024-01-01 PROCEDURE — 85045 AUTOMATED RETICULOCYTE COUNT: CPT | Performed by: HOSPITALIST

## 2024-01-01 PROCEDURE — 84443 ASSAY THYROID STIM HORMONE: CPT | Performed by: HOSPITALIST

## 2024-01-01 PROCEDURE — 93010 ELECTROCARDIOGRAM REPORT: CPT | Mod: ,,, | Performed by: INTERNAL MEDICINE

## 2024-01-01 PROCEDURE — 83735 ASSAY OF MAGNESIUM: CPT | Performed by: PHYSICIAN ASSISTANT

## 2024-01-01 PROCEDURE — P9040 RBC LEUKOREDUCED IRRADIATED: HCPCS | Performed by: PHYSICIAN ASSISTANT

## 2024-01-01 PROCEDURE — 99215 OFFICE O/P EST HI 40 MIN: CPT | Mod: S$GLB,,, | Performed by: STUDENT IN AN ORGANIZED HEALTH CARE EDUCATION/TRAINING PROGRAM

## 2024-01-01 PROCEDURE — U0002 COVID-19 LAB TEST NON-CDC: HCPCS | Performed by: INTERNAL MEDICINE

## 2024-01-01 PROCEDURE — 99215 OFFICE O/P EST HI 40 MIN: CPT | Mod: S$GLB,,, | Performed by: NURSE PRACTITIONER

## 2024-01-01 PROCEDURE — 25000003 PHARM REV CODE 250: Performed by: PHYSICIAN ASSISTANT

## 2024-01-01 PROCEDURE — 1159F MED LIST DOCD IN RCRD: CPT | Mod: CPTII,S$GLB,, | Performed by: HOSPITALIST

## 2024-01-01 PROCEDURE — 99999 PR PBB SHADOW E&M-EST. PATIENT-LVL V: CPT | Mod: PBBFAC,,, | Performed by: INTERNAL MEDICINE

## 2024-01-01 PROCEDURE — 94761 N-INVAS EAR/PLS OXIMETRY MLT: CPT

## 2024-01-01 PROCEDURE — 3075F SYST BP GE 130 - 139MM HG: CPT | Mod: CPTII,S$GLB,, | Performed by: STUDENT IN AN ORGANIZED HEALTH CARE EDUCATION/TRAINING PROGRAM

## 2024-01-01 PROCEDURE — 99204 OFFICE O/P NEW MOD 45 MIN: CPT | Mod: S$GLB,,, | Performed by: PHYSICIAN ASSISTANT

## 2024-01-01 PROCEDURE — 99999 PR PBB SHADOW E&M-EST. PATIENT-LVL IV: CPT | Mod: PBBFAC,,, | Performed by: PHYSICIAN ASSISTANT

## 2024-01-01 PROCEDURE — 3074F SYST BP LT 130 MM HG: CPT | Mod: CPTII,S$GLB,, | Performed by: PHYSICIAN ASSISTANT

## 2024-01-01 PROCEDURE — 81003 URINALYSIS AUTO W/O SCOPE: CPT | Mod: QW,S$GLB,, | Performed by: NURSE PRACTITIONER

## 2024-01-01 PROCEDURE — 80048 BASIC METABOLIC PNL TOTAL CA: CPT | Performed by: EMERGENCY MEDICINE

## 2024-01-01 PROCEDURE — 86920 COMPATIBILITY TEST SPIN: CPT | Performed by: HOSPITALIST

## 2024-01-01 PROCEDURE — 99900035 HC TECH TIME PER 15 MIN (STAT)

## 2024-01-01 PROCEDURE — 87502 INFLUENZA DNA AMP PROBE: CPT | Mod: QW,S$GLB,, | Performed by: FAMILY MEDICINE

## 2024-01-01 PROCEDURE — 83605 ASSAY OF LACTIC ACID: CPT

## 2024-01-01 PROCEDURE — 85025 COMPLETE CBC W/AUTO DIFF WBC: CPT | Performed by: INTERNAL MEDICINE

## 2024-01-01 PROCEDURE — 99497 ADVNCD CARE PLAN 30 MIN: CPT | Mod: S$GLB,,, | Performed by: STUDENT IN AN ORGANIZED HEALTH CARE EDUCATION/TRAINING PROGRAM

## 2024-01-01 PROCEDURE — 3077F SYST BP >= 140 MM HG: CPT | Mod: CPTII,S$GLB,, | Performed by: STUDENT IN AN ORGANIZED HEALTH CARE EDUCATION/TRAINING PROGRAM

## 2024-01-01 PROCEDURE — 99284 EMERGENCY DEPT VISIT MOD MDM: CPT | Mod: 25

## 2024-01-01 PROCEDURE — 99213 OFFICE O/P EST LOW 20 MIN: CPT | Mod: S$GLB,,, | Performed by: FAMILY MEDICINE

## 2024-01-01 PROCEDURE — 96360 HYDRATION IV INFUSION INIT: CPT

## 2024-01-01 PROCEDURE — 86850 RBC ANTIBODY SCREEN: CPT | Performed by: PHYSICIAN ASSISTANT

## 2024-01-01 PROCEDURE — 97810 ACUP 1/> WO ESTIM 1ST 15 MIN: CPT | Performed by: ACUPUNCTURIST

## 2024-01-01 PROCEDURE — 99213 OFFICE O/P EST LOW 20 MIN: CPT | Mod: S$GLB,,, | Performed by: STUDENT IN AN ORGANIZED HEALTH CARE EDUCATION/TRAINING PROGRAM

## 2024-01-01 PROCEDURE — 80048 BASIC METABOLIC PNL TOTAL CA: CPT | Mod: 91,XB | Performed by: STUDENT IN AN ORGANIZED HEALTH CARE EDUCATION/TRAINING PROGRAM

## 2024-01-01 PROCEDURE — 85730 THROMBOPLASTIN TIME PARTIAL: CPT | Performed by: PHYSICIAN ASSISTANT

## 2024-01-01 PROCEDURE — 82306 VITAMIN D 25 HYDROXY: CPT | Performed by: HOSPITALIST

## 2024-01-01 PROCEDURE — 87077 CULTURE AEROBIC IDENTIFY: CPT | Performed by: STUDENT IN AN ORGANIZED HEALTH CARE EDUCATION/TRAINING PROGRAM

## 2024-01-01 PROCEDURE — 12000002 HC ACUTE/MED SURGE SEMI-PRIVATE ROOM

## 2024-01-01 PROCEDURE — 99213 OFFICE O/P EST LOW 20 MIN: CPT | Mod: S$GLB,,, | Performed by: NURSE PRACTITIONER

## 2024-01-01 PROCEDURE — 86803 HEPATITIS C AB TEST: CPT | Performed by: PHYSICIAN ASSISTANT

## 2024-01-01 PROCEDURE — 87088 URINE BACTERIA CULTURE: CPT | Performed by: PHYSICIAN ASSISTANT

## 2024-01-01 PROCEDURE — 99214 OFFICE O/P EST MOD 30 MIN: CPT | Mod: S$GLB,,, | Performed by: STUDENT IN AN ORGANIZED HEALTH CARE EDUCATION/TRAINING PROGRAM

## 2024-01-01 PROCEDURE — 3079F DIAST BP 80-89 MM HG: CPT | Mod: CPTII,S$GLB,, | Performed by: STUDENT IN AN ORGANIZED HEALTH CARE EDUCATION/TRAINING PROGRAM

## 2024-01-01 PROCEDURE — 99205 OFFICE O/P NEW HI 60 MIN: CPT | Mod: S$GLB,,, | Performed by: STUDENT IN AN ORGANIZED HEALTH CARE EDUCATION/TRAINING PROGRAM

## 2024-01-01 PROCEDURE — 99215 OFFICE O/P EST HI 40 MIN: CPT | Mod: 95,,, | Performed by: STUDENT IN AN ORGANIZED HEALTH CARE EDUCATION/TRAINING PROGRAM

## 2024-01-01 PROCEDURE — 36415 COLL VENOUS BLD VENIPUNCTURE: CPT | Mod: XB | Performed by: STUDENT IN AN ORGANIZED HEALTH CARE EDUCATION/TRAINING PROGRAM

## 2024-01-01 PROCEDURE — A9552 F18 FDG: HCPCS | Performed by: HOSPITALIST

## 2024-01-01 PROCEDURE — 25000003 PHARM REV CODE 250: Performed by: EMERGENCY MEDICINE

## 2024-01-01 PROCEDURE — 63600175 PHARM REV CODE 636 W HCPCS: Performed by: PHYSICIAN ASSISTANT

## 2024-01-01 PROCEDURE — 3078F DIAST BP <80 MM HG: CPT | Mod: CPTII,S$GLB,, | Performed by: STUDENT IN AN ORGANIZED HEALTH CARE EDUCATION/TRAINING PROGRAM

## 2024-01-01 PROCEDURE — 87186 SC STD MICRODIL/AGAR DIL: CPT | Performed by: STUDENT IN AN ORGANIZED HEALTH CARE EDUCATION/TRAINING PROGRAM

## 2024-01-01 PROCEDURE — 87086 URINE CULTURE/COLONY COUNT: CPT | Performed by: STUDENT IN AN ORGANIZED HEALTH CARE EDUCATION/TRAINING PROGRAM

## 2024-01-01 PROCEDURE — 99205 OFFICE O/P NEW HI 60 MIN: CPT | Mod: S$GLB,,, | Performed by: INTERNAL MEDICINE

## 2024-01-01 PROCEDURE — 3075F SYST BP GE 130 - 139MM HG: CPT | Mod: CPTII,S$GLB,, | Performed by: INTERNAL MEDICINE

## 2024-01-01 PROCEDURE — 3008F BODY MASS INDEX DOCD: CPT | Mod: CPTII,S$GLB,, | Performed by: PHYSICIAN ASSISTANT

## 2024-01-01 PROCEDURE — 85025 COMPLETE CBC W/AUTO DIFF WBC: CPT | Performed by: PHYSICIAN ASSISTANT

## 2024-01-01 PROCEDURE — 99233 SBSQ HOSP IP/OBS HIGH 50: CPT | Mod: ,,, | Performed by: INTERNAL MEDICINE

## 2024-01-01 PROCEDURE — 86920 COMPATIBILITY TEST SPIN: CPT | Performed by: PHYSICIAN ASSISTANT

## 2024-01-01 PROCEDURE — 99215 OFFICE O/P EST HI 40 MIN: CPT | Mod: S$GLB,,, | Performed by: PHYSICIAN ASSISTANT

## 2024-01-01 PROCEDURE — 82607 VITAMIN B-12: CPT | Performed by: HOSPITALIST

## 2024-01-01 PROCEDURE — 86850 RBC ANTIBODY SCREEN: CPT

## 2024-01-01 PROCEDURE — 63600175 PHARM REV CODE 636 W HCPCS: Mod: JZ,JG | Performed by: HOSPITALIST

## 2024-01-01 PROCEDURE — 86850 RBC ANTIBODY SCREEN: CPT | Performed by: HOSPITALIST

## 2024-01-01 PROCEDURE — 1159F MED LIST DOCD IN RCRD: CPT | Mod: CPTII,95,, | Performed by: STUDENT IN AN ORGANIZED HEALTH CARE EDUCATION/TRAINING PROGRAM

## 2024-01-01 PROCEDURE — 86901 BLOOD TYPING SEROLOGIC RH(D): CPT

## 2024-01-01 PROCEDURE — 99223 1ST HOSP IP/OBS HIGH 75: CPT | Mod: ,,, | Performed by: HOSPITALIST

## 2024-01-01 RX ORDER — AMPICILLIN 500 MG/1
500 CAPSULE ORAL EVERY 6 HOURS
Qty: 20 CAPSULE | Refills: 0 | Status: SHIPPED | OUTPATIENT
Start: 2024-01-01 | End: 2024-01-01

## 2024-01-01 RX ORDER — EPINEPHRINE 0.3 MG/.3ML
0.3 INJECTION SUBCUTANEOUS ONCE AS NEEDED
Status: DISCONTINUED | OUTPATIENT
Start: 2024-01-01 | End: 2024-01-01 | Stop reason: HOSPADM

## 2024-01-01 RX ORDER — AMOXICILLIN AND CLAVULANATE POTASSIUM 875; 125 MG/1; MG/1
1 TABLET, FILM COATED ORAL EVERY 12 HOURS
Qty: 14 TABLET | Refills: 0 | Status: SHIPPED | OUTPATIENT
Start: 2024-01-01 | End: 2024-01-01

## 2024-01-01 RX ORDER — FAMOTIDINE 10 MG/ML
20 INJECTION INTRAVENOUS
Status: CANCELLED | OUTPATIENT
Start: 2024-01-01

## 2024-01-01 RX ORDER — ATROPINE SULFATE 0.4 MG/ML
0.4 INJECTION, SOLUTION ENDOTRACHEAL; INTRAMEDULLARY; INTRAMUSCULAR; INTRAVENOUS; SUBCUTANEOUS ONCE AS NEEDED
Status: CANCELLED | OUTPATIENT
Start: 2024-01-01

## 2024-01-01 RX ORDER — ACETAMINOPHEN 325 MG/1
650 TABLET ORAL
Status: COMPLETED | OUTPATIENT
Start: 2024-01-01 | End: 2024-01-01

## 2024-01-01 RX ORDER — ACETAMINOPHEN 325 MG/1
650 TABLET ORAL
Status: CANCELLED | OUTPATIENT
Start: 2024-01-01

## 2024-01-01 RX ORDER — FAMOTIDINE 10 MG/ML
20 INJECTION INTRAVENOUS
Status: COMPLETED | OUTPATIENT
Start: 2024-01-01 | End: 2024-01-01

## 2024-01-01 RX ORDER — HYDROCODONE BITARTRATE AND ACETAMINOPHEN 500; 5 MG/1; MG/1
TABLET ORAL ONCE
Status: COMPLETED | OUTPATIENT
Start: 2024-01-01 | End: 2024-01-01

## 2024-01-01 RX ORDER — PROCHLORPERAZINE EDISYLATE 5 MG/ML
10 INJECTION INTRAMUSCULAR; INTRAVENOUS ONCE AS NEEDED
Status: CANCELLED
Start: 2024-01-01

## 2024-01-01 RX ORDER — IBUPROFEN 200 MG
24 TABLET ORAL
Status: DISCONTINUED | OUTPATIENT
Start: 2024-01-01 | End: 2024-01-01 | Stop reason: HOSPADM

## 2024-01-01 RX ORDER — CEFDINIR 300 MG/1
300 CAPSULE ORAL 2 TIMES DAILY
Qty: 14 CAPSULE | Refills: 0 | Status: SHIPPED | OUTPATIENT
Start: 2024-01-01 | End: 2024-01-01

## 2024-01-01 RX ORDER — HYDROCODONE BITARTRATE AND ACETAMINOPHEN 500; 5 MG/1; MG/1
TABLET ORAL ONCE
Status: CANCELLED | OUTPATIENT
Start: 2024-01-01 | End: 2024-01-01

## 2024-01-01 RX ORDER — SODIUM CHLORIDE 0.9 % (FLUSH) 0.9 %
10 SYRINGE (ML) INJECTION
Status: CANCELLED | OUTPATIENT
Start: 2024-01-01

## 2024-01-01 RX ORDER — FLUTICASONE PROPIONATE 50 MCG
1 SPRAY, SUSPENSION (ML) NASAL DAILY
Qty: 9.9 ML | Refills: 0 | Status: ON HOLD | OUTPATIENT
Start: 2024-01-01 | End: 2024-01-01 | Stop reason: HOSPADM

## 2024-01-01 RX ORDER — SODIUM CHLORIDE 0.9 % (FLUSH) 0.9 %
10 SYRINGE (ML) INJECTION
Status: DISCONTINUED | OUTPATIENT
Start: 2024-01-01 | End: 2024-01-01 | Stop reason: HOSPADM

## 2024-01-01 RX ORDER — DIPHENHYDRAMINE HYDROCHLORIDE 50 MG/ML
50 INJECTION INTRAMUSCULAR; INTRAVENOUS ONCE AS NEEDED
Status: DISCONTINUED | OUTPATIENT
Start: 2024-01-01 | End: 2024-01-01 | Stop reason: HOSPADM

## 2024-01-01 RX ORDER — HEPARIN 100 UNIT/ML
500 SYRINGE INTRAVENOUS
Status: DISCONTINUED | OUTPATIENT
Start: 2024-01-01 | End: 2024-01-01 | Stop reason: HOSPADM

## 2024-01-01 RX ORDER — SODIUM CHLORIDE 9 MG/ML
INJECTION, SOLUTION INTRAVENOUS ONCE
Status: DISCONTINUED | OUTPATIENT
Start: 2024-01-01 | End: 2024-01-01 | Stop reason: HOSPADM

## 2024-01-01 RX ORDER — HEPARIN 100 UNIT/ML
500 SYRINGE INTRAVENOUS
Status: CANCELLED | OUTPATIENT
Start: 2024-01-01

## 2024-01-01 RX ORDER — EPINEPHRINE 0.3 MG/.3ML
0.3 INJECTION SUBCUTANEOUS ONCE AS NEEDED
Status: CANCELLED | OUTPATIENT
Start: 2024-01-01

## 2024-01-01 RX ORDER — FILGRASTIM 300 UG/.5ML
300 INJECTION, SOLUTION INTRAVENOUS; SUBCUTANEOUS DAILY
Qty: 0.5 ML | Refills: 2 | Status: ACTIVE | OUTPATIENT
Start: 2024-01-01 | End: 2024-01-01 | Stop reason: SDUPTHER

## 2024-01-01 RX ORDER — GLUCAGON 1 MG
1 KIT INJECTION
Status: DISCONTINUED | OUTPATIENT
Start: 2024-01-01 | End: 2024-01-01 | Stop reason: HOSPADM

## 2024-01-01 RX ORDER — ATROPINE SULFATE 0.4 MG/ML
0.4 INJECTION, SOLUTION ENDOTRACHEAL; INTRAMEDULLARY; INTRAMUSCULAR; INTRAVENOUS; SUBCUTANEOUS ONCE AS NEEDED
Status: DISCONTINUED | OUTPATIENT
Start: 2024-01-01 | End: 2024-01-01 | Stop reason: HOSPADM

## 2024-01-01 RX ORDER — BENZONATATE 100 MG/1
100 CAPSULE ORAL EVERY 4 HOURS PRN
Status: DISCONTINUED | OUTPATIENT
Start: 2024-01-01 | End: 2024-01-01 | Stop reason: HOSPADM

## 2024-01-01 RX ORDER — PROCHLORPERAZINE EDISYLATE 5 MG/ML
10 INJECTION INTRAMUSCULAR; INTRAVENOUS ONCE AS NEEDED
Status: DISCONTINUED | OUTPATIENT
Start: 2024-01-01 | End: 2024-01-01 | Stop reason: HOSPADM

## 2024-01-01 RX ORDER — HEPARIN SODIUM 1000 [USP'U]/ML
1000 INJECTION, SOLUTION INTRAVENOUS; SUBCUTANEOUS
Status: DISCONTINUED | OUTPATIENT
Start: 2024-01-01 | End: 2024-01-01 | Stop reason: HOSPADM

## 2024-01-01 RX ORDER — LIDOCAINE 50 MG/G
1 PATCH TOPICAL DAILY
Status: DISCONTINUED | OUTPATIENT
Start: 2024-01-01 | End: 2024-01-01 | Stop reason: HOSPADM

## 2024-01-01 RX ORDER — OXYCODONE HYDROCHLORIDE 5 MG/1
5 TABLET ORAL EVERY 4 HOURS PRN
Qty: 42 TABLET | Refills: 0 | Status: SHIPPED | OUTPATIENT
Start: 2024-01-01

## 2024-01-01 RX ORDER — AZITHROMYCIN 250 MG/1
TABLET, FILM COATED ORAL
Qty: 6 TABLET | Refills: 0 | Status: SHIPPED | OUTPATIENT
Start: 2024-01-01 | End: 2024-01-01

## 2024-01-01 RX ORDER — DIPHENOXYLATE HYDROCHLORIDE AND ATROPINE SULFATE 2.5; .025 MG/1; MG/1
1 TABLET ORAL 4 TIMES DAILY PRN
Qty: 20 TABLET | Refills: 0 | Status: SHIPPED | OUTPATIENT
Start: 2024-01-01 | End: 2024-01-01

## 2024-01-01 RX ORDER — HYDROCODONE BITARTRATE AND ACETAMINOPHEN 500; 5 MG/1; MG/1
TABLET ORAL
Status: DISCONTINUED | OUTPATIENT
Start: 2024-01-01 | End: 2024-01-01 | Stop reason: HOSPADM

## 2024-01-01 RX ORDER — LIDOCAINE 50 MG/G
3 PATCH TOPICAL DAILY
Qty: 9 PATCH | Refills: 0 | Status: SHIPPED | OUTPATIENT
Start: 2024-01-01 | End: 2024-01-01

## 2024-01-01 RX ORDER — MUPIROCIN 20 MG/G
OINTMENT TOPICAL 2 TIMES DAILY
Status: DISCONTINUED | OUTPATIENT
Start: 2024-01-01 | End: 2024-01-01 | Stop reason: HOSPADM

## 2024-01-01 RX ORDER — ATORVASTATIN CALCIUM 10 MG/1
20 TABLET, FILM COATED ORAL NIGHTLY
Status: DISCONTINUED | OUTPATIENT
Start: 2024-01-01 | End: 2024-01-01 | Stop reason: HOSPADM

## 2024-01-01 RX ORDER — OLANZAPINE 5 MG/1
TABLET ORAL
Qty: 30 TABLET | Refills: 0 | Status: SHIPPED | OUTPATIENT
Start: 2024-01-01

## 2024-01-01 RX ORDER — OXYCODONE HYDROCHLORIDE 5 MG/1
5 TABLET ORAL EVERY 4 HOURS PRN
Status: DISCONTINUED | OUTPATIENT
Start: 2024-01-01 | End: 2024-01-01 | Stop reason: HOSPADM

## 2024-01-01 RX ORDER — DIPHENHYDRAMINE HYDROCHLORIDE 50 MG/ML
50 INJECTION INTRAMUSCULAR; INTRAVENOUS ONCE AS NEEDED
Status: CANCELLED | OUTPATIENT
Start: 2024-01-01

## 2024-01-01 RX ORDER — LEVOTHYROXINE SODIUM 50 UG/1
50 TABLET ORAL
Status: DISCONTINUED | OUTPATIENT
Start: 2024-01-01 | End: 2024-01-01 | Stop reason: HOSPADM

## 2024-01-01 RX ORDER — FUROSEMIDE 10 MG/ML
80 INJECTION INTRAMUSCULAR; INTRAVENOUS EVERY 12 HOURS
Status: DISCONTINUED | OUTPATIENT
Start: 2024-01-01 | End: 2024-01-01 | Stop reason: HOSPADM

## 2024-01-01 RX ORDER — DIPHENHYDRAMINE HYDROCHLORIDE 50 MG/ML
50 INJECTION, SOLUTION INTRAMUSCULAR; INTRAVENOUS ONCE AS NEEDED
Status: DISCONTINUED | OUTPATIENT
Start: 2024-01-01 | End: 2024-01-01 | Stop reason: HOSPADM

## 2024-01-01 RX ORDER — ALBUTEROL SULFATE 2.5 MG/.5ML
10 SOLUTION RESPIRATORY (INHALATION)
Status: COMPLETED | OUTPATIENT
Start: 2024-01-01 | End: 2024-01-01

## 2024-01-01 RX ORDER — AMOXICILLIN AND CLAVULANATE POTASSIUM 875; 125 MG/1; MG/1
1 TABLET, FILM COATED ORAL 2 TIMES DAILY
Qty: 20 TABLET | Refills: 0 | Status: SHIPPED | OUTPATIENT
Start: 2024-01-01 | End: 2024-01-01

## 2024-01-01 RX ORDER — CIPROFLOXACIN 500 MG/1
500 TABLET ORAL 2 TIMES DAILY
Qty: 14 TABLET | Refills: 0 | Status: SHIPPED | OUTPATIENT
Start: 2024-01-01 | End: 2024-01-01

## 2024-01-01 RX ORDER — FILGRASTIM 300 UG/.5ML
300 INJECTION, SOLUTION INTRAVENOUS; SUBCUTANEOUS DAILY
Qty: 0.5 ML | Refills: 2 | Status: SHIPPED | OUTPATIENT
Start: 2024-01-01 | End: 2024-01-01 | Stop reason: SDUPTHER

## 2024-01-01 RX ORDER — OXYCODONE HYDROCHLORIDE 10 MG/1
10 TABLET ORAL EVERY 4 HOURS PRN
Status: DISCONTINUED | OUTPATIENT
Start: 2024-01-01 | End: 2024-01-01 | Stop reason: HOSPADM

## 2024-01-01 RX ORDER — ONDANSETRON HYDROCHLORIDE 2 MG/ML
8 INJECTION, SOLUTION INTRAVENOUS EVERY 6 HOURS PRN
Status: DISCONTINUED | OUTPATIENT
Start: 2024-01-01 | End: 2024-01-01 | Stop reason: HOSPADM

## 2024-01-01 RX ORDER — TALC
6 POWDER (GRAM) TOPICAL NIGHTLY PRN
Status: DISCONTINUED | OUTPATIENT
Start: 2024-01-01 | End: 2024-01-01 | Stop reason: HOSPADM

## 2024-01-01 RX ORDER — FUROSEMIDE 10 MG/ML
80 INJECTION INTRAMUSCULAR; INTRAVENOUS
Status: COMPLETED | OUTPATIENT
Start: 2024-01-01 | End: 2024-01-01

## 2024-01-01 RX ORDER — ALBUTEROL SULFATE 2.5 MG/.5ML
2.5 SOLUTION RESPIRATORY (INHALATION) EVERY 4 HOURS
Status: COMPLETED | OUTPATIENT
Start: 2024-01-01 | End: 2024-01-01

## 2024-01-01 RX ORDER — AMPICILLIN 500 MG/1
500 CAPSULE ORAL
Status: COMPLETED | OUTPATIENT
Start: 2024-01-01 | End: 2024-01-01

## 2024-01-01 RX ORDER — BENZONATATE 100 MG/1
100 CAPSULE ORAL EVERY 6 HOURS PRN
Qty: 30 CAPSULE | Refills: 1 | Status: SHIPPED | OUTPATIENT
Start: 2024-01-01 | End: 2025-02-23

## 2024-01-01 RX ORDER — ONDANSETRON HYDROCHLORIDE 2 MG/ML
4 INJECTION, SOLUTION INTRAVENOUS EVERY 8 HOURS PRN
Status: DISCONTINUED | OUTPATIENT
Start: 2024-01-01 | End: 2024-01-01 | Stop reason: HOSPADM

## 2024-01-01 RX ORDER — NALOXONE HCL 0.4 MG/ML
0.02 VIAL (ML) INJECTION
Status: DISCONTINUED | OUTPATIENT
Start: 2024-01-01 | End: 2024-01-01 | Stop reason: HOSPADM

## 2024-01-01 RX ORDER — PROCHLORPERAZINE EDISYLATE 5 MG/ML
5 INJECTION INTRAMUSCULAR; INTRAVENOUS
Status: COMPLETED | OUTPATIENT
Start: 2024-01-01 | End: 2024-01-01

## 2024-01-01 RX ORDER — CHOLECALCIFEROL (VITAMIN D3) 25 MCG
1000 TABLET ORAL DAILY
Qty: 30 TABLET | Refills: 5 | Status: SHIPPED | OUTPATIENT
Start: 2024-01-01

## 2024-01-01 RX ORDER — ATORVASTATIN CALCIUM 20 MG/1
20 TABLET, FILM COATED ORAL NIGHTLY
Status: DISCONTINUED | OUTPATIENT
Start: 2024-01-01 | End: 2024-01-01 | Stop reason: HOSPADM

## 2024-01-01 RX ORDER — HEPARIN SODIUM 5000 [USP'U]/ML
5000 INJECTION, SOLUTION INTRAVENOUS; SUBCUTANEOUS EVERY 8 HOURS
Status: DISCONTINUED | OUTPATIENT
Start: 2024-01-01 | End: 2024-01-01 | Stop reason: HOSPADM

## 2024-01-01 RX ORDER — PROMETHAZINE HYDROCHLORIDE AND DEXTROMETHORPHAN HYDROBROMIDE 6.25; 15 MG/5ML; MG/5ML
5 SYRUP ORAL NIGHTLY PRN
Qty: 118 ML | Refills: 0 | Status: SHIPPED | OUTPATIENT
Start: 2024-01-01 | End: 2024-01-01

## 2024-01-01 RX ORDER — FLUDEOXYGLUCOSE F18 500 MCI/ML
12 INJECTION INTRAVENOUS
Status: COMPLETED | OUTPATIENT
Start: 2024-01-01 | End: 2024-01-01

## 2024-01-01 RX ORDER — HYDROCODONE BITARTRATE AND ACETAMINOPHEN 500; 5 MG/1; MG/1
TABLET ORAL ONCE
Status: DISCONTINUED | OUTPATIENT
Start: 2024-01-01 | End: 2024-01-01 | Stop reason: HOSPADM

## 2024-01-01 RX ORDER — IBUPROFEN 200 MG
16 TABLET ORAL
Status: DISCONTINUED | OUTPATIENT
Start: 2024-01-01 | End: 2024-01-01 | Stop reason: HOSPADM

## 2024-01-01 RX ORDER — AMOXICILLIN AND CLAVULANATE POTASSIUM 875; 125 MG/1; MG/1
1 TABLET, FILM COATED ORAL EVERY 12 HOURS
Qty: 20 TABLET | Refills: 0 | Status: SHIPPED | OUTPATIENT
Start: 2024-01-01 | End: 2024-01-01

## 2024-01-01 RX ADMIN — FAMOTIDINE 20 MG: 10 INJECTION INTRAVENOUS at 02:02

## 2024-01-01 RX ADMIN — FAMOTIDINE 20 MG: 10 INJECTION INTRAVENOUS at 10:03

## 2024-01-01 RX ADMIN — SACITUZUMAB GOVITECAN 540 MG: 180 POWDER, FOR SOLUTION INTRAVENOUS at 09:01

## 2024-01-01 RX ADMIN — Medication 10 ML: at 11:04

## 2024-01-01 RX ADMIN — ACETAMINOPHEN 650 MG: 325 TABLET ORAL at 01:02

## 2024-01-01 RX ADMIN — HEPARIN 500 UNITS: 100 SYRINGE at 10:01

## 2024-01-01 RX ADMIN — SACITUZUMAB GOVITECAN 720 MG: 180 POWDER, FOR SOLUTION INTRAVENOUS at 12:04

## 2024-01-01 RX ADMIN — Medication 10 ML: at 01:03

## 2024-01-01 RX ADMIN — SODIUM CHLORIDE: 9 INJECTION, SOLUTION INTRAVENOUS at 03:03

## 2024-01-01 RX ADMIN — APREPITANT 130 MG: 130 INJECTION, EMULSION INTRAVENOUS at 09:03

## 2024-01-01 RX ADMIN — DIPHENHYDRAMINE HYDROCHLORIDE 25 MG: 50 INJECTION, SOLUTION INTRAMUSCULAR; INTRAVENOUS at 09:04

## 2024-01-01 RX ADMIN — SACITUZUMAB GOVITECAN 540 MG: 180 POWDER, FOR SOLUTION INTRAVENOUS at 10:01

## 2024-01-01 RX ADMIN — ATORVASTATIN CALCIUM 20 MG: 10 TABLET, FILM COATED ORAL at 11:05

## 2024-01-01 RX ADMIN — DEXTROSE 500 ML: 10 SOLUTION INTRAVENOUS at 02:05

## 2024-01-01 RX ADMIN — APREPITANT 130 MG: 130 INJECTION, EMULSION INTRAVENOUS at 08:01

## 2024-01-01 RX ADMIN — Medication 10 ML: at 01:04

## 2024-01-01 RX ADMIN — ACETAMINOPHEN 650 MG: 325 TABLET ORAL at 09:01

## 2024-01-01 RX ADMIN — DEXAMETHASONE SODIUM PHOSPHATE 0.25 MG: 4 INJECTION, SOLUTION INTRA-ARTICULAR; INTRALESIONAL; INTRAMUSCULAR; INTRAVENOUS; SOFT TISSUE at 10:03

## 2024-01-01 RX ADMIN — ACETAMINOPHEN 650 MG: 325 TABLET ORAL at 09:03

## 2024-01-01 RX ADMIN — ACETAMINOPHEN 650 MG: 325 TABLET ORAL at 10:03

## 2024-01-01 RX ADMIN — DEXAMETHASONE SODIUM PHOSPHATE 0.25 MG: 4 INJECTION, SOLUTION INTRA-ARTICULAR; INTRALESIONAL; INTRAMUSCULAR; INTRAVENOUS; SOFT TISSUE at 08:04

## 2024-01-01 RX ADMIN — ACETAMINOPHEN 650 MG: 325 TABLET ORAL at 08:04

## 2024-01-01 RX ADMIN — HEPARIN 500 UNITS: 100 SYRINGE at 01:03

## 2024-01-01 RX ADMIN — HEPARIN 500 UNITS: 100 SYRINGE at 04:02

## 2024-01-01 RX ADMIN — SACITUZUMAB GOVITECAN 720 MG: 180 POWDER, FOR SOLUTION INTRAVENOUS at 09:04

## 2024-01-01 RX ADMIN — DIPHENHYDRAMINE HYDROCHLORIDE 25 MG: 50 INJECTION, SOLUTION INTRAMUSCULAR; INTRAVENOUS at 01:02

## 2024-01-01 RX ADMIN — SACITUZUMAB GOVITECAN 720 MG: 180 POWDER, FOR SOLUTION INTRAVENOUS at 11:03

## 2024-01-01 RX ADMIN — SODIUM CHLORIDE: 9 INJECTION, SOLUTION INTRAVENOUS at 09:03

## 2024-01-01 RX ADMIN — ALBUTEROL SULFATE 2.5 MG: 2.5 SOLUTION RESPIRATORY (INHALATION) at 11:05

## 2024-01-01 RX ADMIN — APREPITANT 130 MG: 130 INJECTION, EMULSION INTRAVENOUS at 10:03

## 2024-01-01 RX ADMIN — DIPHENHYDRAMINE HYDROCHLORIDE 25 MG: 50 INJECTION, SOLUTION INTRAMUSCULAR; INTRAVENOUS at 11:04

## 2024-01-01 RX ADMIN — FAMOTIDINE 20 MG: 10 INJECTION INTRAVENOUS at 10:01

## 2024-01-01 RX ADMIN — SODIUM CHLORIDE 1000 ML: 9 INJECTION, SOLUTION INTRAVENOUS at 08:05

## 2024-01-01 RX ADMIN — FUROSEMIDE 80 MG: 10 INJECTION, SOLUTION INTRAMUSCULAR; INTRAVENOUS at 09:05

## 2024-01-01 RX ADMIN — FAMOTIDINE 20 MG: 10 INJECTION INTRAVENOUS at 01:02

## 2024-01-01 RX ADMIN — HEPARIN 500 UNITS: 100 SYRINGE at 01:04

## 2024-01-01 RX ADMIN — HEPARIN 500 UNITS: 100 SYRINGE at 11:03

## 2024-01-01 RX ADMIN — ACETAMINOPHEN 650 MG: 325 TABLET ORAL at 02:02

## 2024-01-01 RX ADMIN — BENZONATATE 100 MG: 100 CAPSULE ORAL at 05:05

## 2024-01-01 RX ADMIN — SODIUM CHLORIDE, POTASSIUM CHLORIDE, SODIUM LACTATE AND CALCIUM CHLORIDE 1000 ML: 600; 310; 30; 20 INJECTION, SOLUTION INTRAVENOUS at 02:04

## 2024-01-01 RX ADMIN — APREPITANT 130 MG: 130 INJECTION, EMULSION INTRAVENOUS at 02:02

## 2024-01-01 RX ADMIN — SACITUZUMAB GOVITECAN 540 MG: 180 POWDER, FOR SOLUTION INTRAVENOUS at 02:02

## 2024-01-01 RX ADMIN — HEPARIN 500 UNITS: 100 SYRINGE at 12:01

## 2024-01-01 RX ADMIN — DEXAMETHASONE SODIUM PHOSPHATE 0.25 MG: 4 INJECTION, SOLUTION INTRA-ARTICULAR; INTRALESIONAL; INTRAMUSCULAR; INTRAVENOUS; SOFT TISSUE at 10:01

## 2024-01-01 RX ADMIN — INSULIN HUMAN 7.67 UNITS: 100 INJECTION, SOLUTION PARENTERAL at 01:05

## 2024-01-01 RX ADMIN — Medication 1 CAPSULE: at 09:05

## 2024-01-01 RX ADMIN — ALBUTEROL SULFATE 10 MG: 2.5 SOLUTION RESPIRATORY (INHALATION) at 08:05

## 2024-01-01 RX ADMIN — SODIUM CHLORIDE: 9 INJECTION, SOLUTION INTRAVENOUS at 09:01

## 2024-01-01 RX ADMIN — HEPARIN 500 UNITS: 100 SYRINGE at 03:02

## 2024-01-01 RX ADMIN — FUROSEMIDE 80 MG: 10 INJECTION, SOLUTION INTRAVENOUS at 08:05

## 2024-01-01 RX ADMIN — SACITUZUMAB GOVITECAN 540 MG: 180 POWDER, FOR SOLUTION INTRAVENOUS at 03:02

## 2024-01-01 RX ADMIN — APREPITANT 130 MG: 130 INJECTION, EMULSION INTRAVENOUS at 01:02

## 2024-01-01 RX ADMIN — DIPHENHYDRAMINE HYDROCHLORIDE 25 MG: 50 INJECTION, SOLUTION INTRAMUSCULAR; INTRAVENOUS at 10:01

## 2024-01-01 RX ADMIN — AMPICILLIN 500 MG: 500 CAPSULE ORAL at 07:04

## 2024-01-01 RX ADMIN — LEVOTHYROXINE SODIUM 50 MCG: 50 TABLET ORAL at 05:05

## 2024-01-01 RX ADMIN — HEPARIN 500 UNITS: 100 SYRINGE at 12:03

## 2024-01-01 RX ADMIN — DEXAMETHASONE SODIUM PHOSPHATE 0.25 MG: 4 INJECTION, SOLUTION INTRA-ARTICULAR; INTRALESIONAL; INTRAMUSCULAR; INTRAVENOUS; SOFT TISSUE at 08:01

## 2024-01-01 RX ADMIN — SACITUZUMAB GOVITECAN 540 MG: 180 POWDER, FOR SOLUTION INTRAVENOUS at 11:01

## 2024-01-01 RX ADMIN — ACETAMINOPHEN 650 MG: 325 TABLET ORAL at 08:01

## 2024-01-01 RX ADMIN — DIPHENHYDRAMINE HYDROCHLORIDE 25 MG: 50 INJECTION, SOLUTION INTRAMUSCULAR; INTRAVENOUS at 09:01

## 2024-01-01 RX ADMIN — FAMOTIDINE 20 MG: 10 INJECTION, SOLUTION INTRAVENOUS at 10:01

## 2024-01-01 RX ADMIN — ALBUTEROL SULFATE 2.5 MG: 2.5 SOLUTION RESPIRATORY (INHALATION) at 07:05

## 2024-01-01 RX ADMIN — Medication 10 ML: at 12:01

## 2024-01-01 RX ADMIN — SODIUM CHLORIDE: 0.9 INJECTION, SOLUTION INTRAVENOUS at 01:02

## 2024-01-01 RX ADMIN — ALBUTEROL SULFATE 2.5 MG: 2.5 SOLUTION RESPIRATORY (INHALATION) at 03:05

## 2024-01-01 RX ADMIN — SODIUM ZIRCONIUM CYCLOSILICATE 10 G: 5 POWDER, FOR SUSPENSION ORAL at 08:05

## 2024-01-01 RX ADMIN — DIPHENHYDRAMINE HYDROCHLORIDE 25 MG: 50 INJECTION, SOLUTION INTRAMUSCULAR; INTRAVENOUS at 11:03

## 2024-01-01 RX ADMIN — DEXAMETHASONE SODIUM PHOSPHATE 0.25 MG: 4 INJECTION, SOLUTION INTRA-ARTICULAR; INTRALESIONAL; INTRAMUSCULAR; INTRAVENOUS; SOFT TISSUE at 01:02

## 2024-01-01 RX ADMIN — HEPARIN SODIUM 5000 UNITS: 5000 INJECTION INTRAVENOUS; SUBCUTANEOUS at 01:05

## 2024-01-01 RX ADMIN — SODIUM CHLORIDE: 9 INJECTION, SOLUTION INTRAVENOUS at 11:04

## 2024-01-01 RX ADMIN — FAMOTIDINE 20 MG: 10 INJECTION INTRAVENOUS at 08:04

## 2024-01-01 RX ADMIN — DEXTROSE MONOHYDRATE 500 ML: 100 INJECTION, SOLUTION INTRAVENOUS at 04:05

## 2024-01-01 RX ADMIN — SODIUM BICARBONATE: 84 INJECTION, SOLUTION INTRAVENOUS at 11:05

## 2024-01-01 RX ADMIN — SODIUM CHLORIDE: 9 INJECTION, SOLUTION INTRAVENOUS at 01:02

## 2024-01-01 RX ADMIN — SODIUM ZIRCONIUM CYCLOSILICATE 10 G: 10 POWDER, FOR SUSPENSION ORAL at 08:05

## 2024-01-01 RX ADMIN — SODIUM ZIRCONIUM CYCLOSILICATE 10 G: 5 POWDER, FOR SUSPENSION ORAL at 01:05

## 2024-01-01 RX ADMIN — APREPITANT 130 MG: 130 INJECTION, EMULSION INTRAVENOUS at 10:01

## 2024-01-01 RX ADMIN — SODIUM CHLORIDE: 9 INJECTION, SOLUTION INTRAVENOUS at 08:01

## 2024-01-01 RX ADMIN — LIDOCAINE 1 PATCH: 50 PATCH CUTANEOUS at 09:05

## 2024-01-01 RX ADMIN — DEXAMETHASONE SODIUM PHOSPHATE 0.25 MG: 4 INJECTION, SOLUTION INTRA-ARTICULAR; INTRALESIONAL; INTRAMUSCULAR; INTRAVENOUS; SOFT TISSUE at 09:03

## 2024-01-01 RX ADMIN — Medication 10 ML: at 04:02

## 2024-01-01 RX ADMIN — DIPHENHYDRAMINE HYDROCHLORIDE 25 MG: 50 INJECTION, SOLUTION INTRAMUSCULAR; INTRAVENOUS at 10:03

## 2024-01-01 RX ADMIN — FAMOTIDINE 20 MG: 10 INJECTION INTRAVENOUS at 08:01

## 2024-01-01 RX ADMIN — FAMOTIDINE 20 MG: 10 INJECTION INTRAVENOUS at 12:04

## 2024-01-01 RX ADMIN — SACITUZUMAB GOVITECAN 720 MG: 180 POWDER, FOR SOLUTION INTRAVENOUS at 10:03

## 2024-01-01 RX ADMIN — FLUDEOXYGLUCOSE F-18 12.48 MILLICURIE: 500 INJECTION INTRAVENOUS at 12:02

## 2024-01-01 RX ADMIN — APREPITANT 130 MG: 130 INJECTION, EMULSION INTRAVENOUS at 09:01

## 2024-01-01 RX ADMIN — DIPHENHYDRAMINE HYDROCHLORIDE 25 MG: 50 INJECTION, SOLUTION INTRAMUSCULAR; INTRAVENOUS at 02:02

## 2024-01-01 RX ADMIN — HEPARIN SODIUM 5000 UNITS: 5000 INJECTION INTRAVENOUS; SUBCUTANEOUS at 11:05

## 2024-01-01 RX ADMIN — ACETAMINOPHEN 650 MG: 325 TABLET ORAL at 10:01

## 2024-01-01 RX ADMIN — Medication 10 ML: at 11:03

## 2024-01-01 RX ADMIN — SODIUM CHLORIDE: 9 INJECTION, SOLUTION INTRAVENOUS at 08:04

## 2024-01-01 RX ADMIN — APREPITANT 130 MG: 130 INJECTION, EMULSION INTRAVENOUS at 08:04

## 2024-01-01 RX ADMIN — SODIUM BICARBONATE: 84 INJECTION, SOLUTION INTRAVENOUS at 03:05

## 2024-01-01 RX ADMIN — FAMOTIDINE 20 MG: 10 INJECTION INTRAVENOUS at 09:03

## 2024-01-01 RX ADMIN — APREPITANT 130 MG: 130 INJECTION, EMULSION INTRAVENOUS at 12:04

## 2024-01-01 RX ADMIN — INSULIN HUMAN 7.67 UNITS: 100 INJECTION, SOLUTION PARENTERAL at 05:05

## 2024-01-01 RX ADMIN — DEXAMETHASONE SODIUM PHOSPHATE 0.25 MG: 4 INJECTION, SOLUTION INTRA-ARTICULAR; INTRALESIONAL; INTRAMUSCULAR; INTRAVENOUS; SOFT TISSUE at 02:02

## 2024-01-01 RX ADMIN — PROCHLORPERAZINE EDISYLATE 5 MG: 5 INJECTION INTRAMUSCULAR; INTRAVENOUS at 08:05

## 2024-01-01 RX ADMIN — HEPARIN 500 UNITS: 100 SYRINGE at 11:04

## 2024-01-01 RX ADMIN — Medication 10 ML: at 12:03

## 2024-01-01 RX ADMIN — DEXAMETHASONE SODIUM PHOSPHATE 0.25 MG: 4 INJECTION, SOLUTION INTRA-ARTICULAR; INTRALESIONAL; INTRAMUSCULAR; INTRAVENOUS; SOFT TISSUE at 11:04

## 2024-01-01 RX ADMIN — ACETAMINOPHEN 650 MG: 325 TABLET ORAL at 11:04

## 2024-01-04 NOTE — PLAN OF CARE
Pt ambulatory to clinic with mother for Trodelvy infusion. Denies any sig complaints. Port accessed without difficulty. Good blood return. Pt tolerated Trodelvy well. OBS for 30 minutes after. No adverse reaction noted. Port deaccessed after flushing. Ambulatory from clinic in [facility].

## 2024-01-15 NOTE — PROGRESS NOTES
"Subjective:      Patient ID: Julio Rodríguez is a 58 y.o. male.    Vitals:  height is 5' 10" (1.778 m) and weight is 75.8 kg (167 lb). His tympanic temperature is 97.1 °F (36.2 °C). His blood pressure is 133/79 and his pulse is 75. His respiration is 17 and oxygen saturation is 98%.     Chief Complaint: Dysuria    This is a 58 y.o male with history of bladder cancer who presents today with complaint of dysuria for the past week.  Patient has undergone multiple procedures-he had cystectomy with ileal conduit creation as part of treatment for bladder cancer.  Since this, he has had to self cath in and out a few times a day, he can still urinate voluntarily.  He however does also have incontinence and wears depends.  Patient states that multiple times he has had to have bladder neck dilation procedure to dilate junction of urethra and bladder.  Patient states that he had this again a few weeks ago, and kept Smith in for about a week afterward.  Patient states that when he removed this he had some urethral irritation and burning when there was urine passing through the urethra, states that initially he thought that it was urethral irritation from having Smith in, but as the days have gone by now it has not improved.  Denies other new associated symptoms including frequency, urgency, fever, vomiting, flank pain, abdominal pain.  Patient states that he feels the burning pain when he urinates and also when he has incontinence and urine goes out the urethra.  He denies any rash, swelling, or visible or palpable abnormality to penis otherwise.    Dysuria   This is a new problem. The current episode started in the past 7 days. The quality of the pain is described as aching and burning. There has been no fever. Pertinent negatives include no behavior changes, chills, discharge, flank pain, frequency, hematuria, hesitancy, nausea, possible pregnancy, sweats, urgency, vomiting, weight loss, bubble bath use, constipation, rash or " withholding.     Patient Active Problem List   Diagnosis    Malignant neoplasm of urinary bladder    Melanoma    CAD (coronary artery disease)    Hypothyroidism    Psoriasis    Elevated blood pressure reading    CKD (chronic kidney disease)     Past Medical History:   Diagnosis Date    Bladder cancer     CAD (coronary artery disease) 9/12/2023    Prior CABG. Not on antiplatelets. Home medicatiosn include atorvastatin    Carcinoma     forehead    Encounter for blood transfusion     Hypertension     Hypothyroidism 9/12/2023    Home medications include levothyroxine    Malignant melanoma of skin, unspecified     right arm    Malignant neoplasm of urinary bladder 9/12/2023    Melanoma 9/12/2023    History of T1a melanoma; 0.5mm depth without ulceartion. SP wide local excision 02/2022         Constitution: Negative for chills, sweating, fatigue and fever.   HENT:  Negative for ear pain, congestion and sore throat.    Neck: Negative for neck pain and neck stiffness.   Cardiovascular:  Negative for chest pain, leg swelling, palpitations and sob on exertion.   Eyes:  Negative for eye itching, eye pain and eye redness.   Respiratory:  Negative for cough, sputum production and shortness of breath.    Gastrointestinal:  Negative for abdominal pain, nausea, vomiting, constipation and diarrhea.   Genitourinary:  Positive for dysuria and bladder incontinence (chronic). Negative for frequency, urgency, flank pain, hematuria, penile discharge, penile swelling and testicular pain.   Musculoskeletal:  Negative for pain and joint pain.   Skin:  Negative for color change and rash.   Neurological:  Negative for dizziness, passing out, headaches, disorientation, numbness, tingling and seizures.   Psychiatric/Behavioral:  Negative for disorientation.       Objective:     Physical Exam   Constitutional: He is oriented to person, place, and time. He appears well-developed.  Non-toxic appearance. He does not appear ill. No distress.   HENT:    Head: Normocephalic and atraumatic.   Ears:   Right Ear: External ear normal.   Left Ear: External ear normal.   Eyes: Conjunctivae are normal. Right eye exhibits no discharge. Left eye exhibits no discharge. No scleral icterus.   Pulmonary/Chest: Effort normal. No stridor. No respiratory distress. He has no wheezes.   Abdominal: Bowel sounds are normal. Soft. There is no abdominal tenderness. There is no guarding, no left CVA tenderness and no right CVA tenderness.      Comments: Soft, no TTP.  No rigidity or guarding.   Genitourinary:         Comments: Exam deferred     Neurological: He is alert and oriented to person, place, and time.   Skin: Skin is not diaphoretic.   Psychiatric: His behavior is normal. Judgment and thought content normal.   Nursing note and vitals reviewed.        Results for orders placed or performed in visit on 01/15/24   POCT Urinalysis, Dipstick, Manual, w/o Scope   Result Value Ref Range    POC Blood, Urine Positive (A) Negative, Positive Slide, Positive Tube    POC Bilirubin, Urine Negative Negative, Positive Slide, Positive Tube    POC Urobilinogen, Urine Normal 0.3 - 2.2    POC Ketones, Urine Negative Negative, Positive Slide, Positive Tube    POC Protein, Urine Positive (A) Negative, Positive Slide, Positive Tube    POC Nitrates, Urine Negative Negative, Positive Slide, Positive Tube    POC Glucose, Urine Negative Negative, Positive Slide, Positive Tube    pH, UA 8.0 5 - 8    POC Specific Gravity, Urine 1.005 1.003 - 1.029    POC Leukocytes, Urine Positive (A) Negative, Positive Slide, Positive Tube       Assessment:     1. Urinary tract infection with hematuria, site unspecified    2. Dysuria    3. Complicated UTI (urinary tract infection)    4. Self-catheterizes urinary bladder    5. Immunocompromised state    6. Malignant neoplasm of urinary bladder, unspecified site        Plan:     Patient with dysuria, patient is high-risk for UTI with history of cystectomy with ileal conduit  creation, self catheterizes, and had procedure than indwelling Smith for 7 days prior to onset of symptoms.  Urinalysis today shows microscopic hematuria and positive leukocytes.  Proteinuria likely chronic due to CKD.  Due to complicated UTI, will send urine culture.  Will begin antibiotic.  I reviewed recent CMP for kidney function, up-to-date dose adjustments considered, no dose adjustment needed at his creatinine clearance.  Patient is following up with his urologist  Urinary tract infection with hematuria, site unspecified  -     ciprofloxacin HCl (CIPRO) 500 MG tablet; Take 1 tablet (500 mg total) by mouth 2 (two) times daily. for 7 days  Dispense: 14 tablet; Refill: 0    Dysuria  -     POCT Urinalysis, Dipstick, Manual, w/o Scope    Complicated UTI (urinary tract infection)  -     Urine culture    Self-catheterizes urinary bladder    Immunocompromised state    Malignant neoplasm of urinary bladder, unspecified site      Patient Instructions   - Rest.    - Drink plenty of fluids.    - Acetaminophen (tylenol) or Ibuprofen (advil,motrin) as directed as needed for fever/pain. Avoid tylenol if you have a history of liver disease. Do not take ibuprofen if you have a history of GI bleeding, kidney disease, or if you take blood thinners.     - You have been given an antibiotic to treat your condition today.    Avoid rigorous physical activity / exercise while on this medication as there is chance for tendinitis/tendon rupture.  - Please complete the antibiotic as directed on the bottle.   - If you are female and on oral birth control pills, use additional methods to prevent pregnancy while on antibiotics and for one cycle after.   - you can take otc probiotic to limit upset stomach    - Follow up with your PCP or specialty clinic as directed in the next 1-2 weeks if not improved or as needed.  You can call [phone] to schedule an appointment with the appropriate provider.    - Go to the ER or seek medical  attention immediately if you develop new or worsening symptoms.     - You must understand that you have received an Urgent Care treatment only and that you may be released before all of your medical problems are known or treated.   - You, the patient, will arrange for follow up care as instructed.   - If your condition worsens or fails to improve we recommend that you receive another evaluation at the ER immediately or contact your PCP to discuss your concerns or return here.

## 2024-01-15 NOTE — PATIENT INSTRUCTIONS
- Rest.    - Drink plenty of fluids.    - Acetaminophen (tylenol) or Ibuprofen (advil,motrin) as directed as needed for fever/pain. Avoid tylenol if you have a history of liver disease. Do not take ibuprofen if you have a history of GI bleeding, kidney disease, or if you take blood thinners.     - You have been given an antibiotic to treat your condition today.    Avoid rigorous physical activity / exercise while on this medication as there is chance for tendinitis/tendon rupture.  - Please complete the antibiotic as directed on the bottle.   - If you are female and on oral birth control pills, use additional methods to prevent pregnancy while on antibiotics and for one cycle after.   - you can take otc probiotic to limit upset stomach    - Follow up with your PCP or specialty clinic as directed in the next 1-2 weeks if not improved or as needed.  You can call [phone] to schedule an appointment with the appropriate provider.    - Go to the ER or seek medical attention immediately if you develop new or worsening symptoms.     - You must understand that you have received an Urgent Care treatment only and that you may be released before all of your medical problems are known or treated.   - You, the patient, will arrange for follow up care as instructed.   - If your condition worsens or fails to improve we recommend that you receive another evaluation at the ER immediately or contact your PCP to discuss your concerns or return here.

## 2024-01-16 NOTE — PROGRESS NOTES
[facility]  Urologic Oncology Clinic Note        Date of Service: 1/16/2024      Chief Complaint/Reason for Consultation: Metastatic Bladder Cancer, Right Hydronephrosis, Urethral stricture     Requesting Provider:   No referring provider defined for this encounter.      History of Present Illness:   Patient 58 y.o. 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 He underwent radical cystectomy w/ ileal neobladder in 2017. He then subsequently developed local and systemic recurrence. He is being followed by Dr Leos and part of the Phase 1 program. He did also receive palliative radiation to the pelvis after pathologic pelvic fracture.     He currently catheterizes his pouch as needed, but reports he has had multiple dilations in the past and continues to struggle to catheterize his bladder. Last dilation 12/5/2023 and since then able to catheterize successfully.     Had right nephrostomy tube placed in the past to try to improve overall GFR, but this was unsuccessful. Nephrostomy tube was removed on 12/21/2023. Since then GFR has been stable if not improved.     Following with Dr. Abad for chemo (sacituzumab) -- reports doing well on chemotherapy.    Blood thinners:  None    No Hx of TIA, MI, and CVA   Abdominal surgeries:  Radical cystectomy with creation of ileal neobladder      Today he returns to clinic for evaluation and management of LUTs. Saw urgent care over the weekend and had cultures and given cipro.        Urological History:    12/5/2023 -- Cystoscopy, urethral dilation of neobladder  12/8/2023 -- IR right nephrostomy tube placement  12/9/2023 -- CT AP w/o contrast - stable lung disease, progressive RP LND, possible metastatic diease to liver   12/21/2023 -- Right nephrostomy tube removal in clinic                  Patient Active Problem List    Diagnosis Date Noted    CKD (chronic kidney disease) 10/23/2023    Elevated blood pressure reading 09/28/2023     Malignant neoplasm of urinary bladder 09/12/2023    Melanoma 09/12/2023    CAD (coronary artery disease) 09/12/2023    Hypothyroidism 09/12/2023    Psoriasis 09/12/2023          Review of patient's allergies indicates:  No Known Allergies     Past Medical History:   Diagnosis Date    Bladder cancer     CAD (coronary artery disease) 9/12/2023    Prior CABG. Not on antiplatelets. Home medicatiosn include atorvastatin    Carcinoma     forehead    Encounter for blood transfusion     Hypertension     Hypothyroidism 9/12/2023    Home medications include levothyroxine    Malignant melanoma of skin, unspecified     right arm    Malignant neoplasm of urinary bladder 9/12/2023    Melanoma 9/12/2023    History of T1a melanoma; 0.5mm depth without ulceartion. SP wide local excision 02/2022      Past Surgical History:   Procedure Laterality Date    CORONARY ARTERY BYPASS GRAFT  10/2015    CYSTECTOMY, ROBOT-ASSISTED, LAPAROSCOPIC, USING DA MARY XI, WITH ILEAL CONDUIT CREATION  12/2017    CYSTOGRAM N/A 12/5/2023    Procedure: CYSTOGRAM;  Surgeon: Eder Oconnor MD;  Location: [facility] OR [address] [place] [address];  Service: Urology;  Laterality: N/A;    CYSTOSCOPY N/A 12/5/2023    Procedure: CYSTOSCOPY;  Surgeon: Eder Oconnor MD;  [facility];  Service: Urology;  Laterality: N/A;    DILATION OF BLADDER      dialation of bladder neck- due to claudia bladder- multiple procedures    DILATION OF URETHRA N/A 12/5/2023    Procedure: DILATION, URETHRA;  Surgeon: Eder Oconnor MD;  [facility];  Service: Urology;  Laterality: N/A;    LYMPHADENECTOMY      PERCUTANEOUS NEPHROSTOMY Right 12/8/2023    Procedure: Creation, Nephrostomy, Percutaneous;  Surgeon: Jens Nunez MD;  Location: [facility] CATH LAB;  Service: Radiology;  Laterality: Right;    PLACEMENT N/A 12/5/2023    Procedure: PLACEMENT;  Surgeon: Eder Oconnor MD;  [facility];  Service: Urology;  Laterality: N/A;  placement of valiente over a wire by MD FANI MOREIRA  PROSTECTOMY, SUPRAPUBIC  12/2017      Family History   Problem Relation Age of Onset    Heart disease Father     Heart attack Paternal Uncle     Breast cancer Maternal Cousin     Colon cancer Neg Hx     Bladder Cancer Neg Hx       Social History     Tobacco Use    Smoking status: Former     Types: Cigarettes    Smokeless tobacco: Not on file   Substance Use Topics    Alcohol use: Not Currently        Review of Systems   Genitourinary:  Positive for dysuria and frequency. Negative for difficulty urinating, flank pain and hematuria.             OBJECTIVE:     Vitals:    01/16/24 0845   BP: (!) (P) 140/83   BP Location: (P) Left arm   Patient Position: (P) Sitting   BP Method: (P) Medium (Automatic)   Pulse: (P) 90   Resp: (P) 20   Weight: (P) 73.8 kg (162 lb 11.2 oz)            General Appearance: Alert, cooperative, no distress  Head: Normocephalic  Eyes: Clear conjunctiva  Neck: No obvious LND or JVD  Lungs: Normal chest excursion, no accessory muscle use  Chest: Regular rate rhythm by palpation, no pedal edema  Abdomen: Soft, non-tender, non-distended, surgical scars lower midline well-healed  Extremities: Atraumatic   Lymph Nodes: No appreciable lymph adenopathy  Neurologic: No gross gait, motor or sensory deficits      LAB:      CMP  Sodium   Date Value Ref Range Status   01/04/2024 136 136 - 145 mmol/L Final     Potassium   Date Value Ref Range Status   01/04/2024 4.1 3.5 - 5.1 mmol/L Final     Chloride   Date Value Ref Range Status   01/04/2024 101 95 - 110 mmol/L Final     CO2   Date Value Ref Range Status   01/04/2024 26 23 - 29 mmol/L Final     Glucose   Date Value Ref Range Status   01/04/2024 125 (H) 70 - 110 mg/dL Final     BUN   Date Value Ref Range Status   01/04/2024 44 (H) 6 - 20 mg/dL Final     Creatinine   Date Value Ref Range Status   01/04/2024 2.1 (H) 0.5 - 1.4 mg/dL Final     Calcium   Date Value Ref Range Status   01/04/2024 9.4 8.7 - 10.5 mg/dL Final     Total Protein   Date Value Ref Range  Status   2024 6.9 6.0 - 8.4 g/dL Final     Albumin   Date Value Ref Range Status   2024 2.6 (L) 3.5 - 5.2 g/dL Final     Total Bilirubin   Date Value Ref Range Status   2024 0.3 0.1 - 1.0 mg/dL Final     Comment:     For infants and newborns, interpretation of results should be based  on gestational age, weight and in agreement with clinical  observations.    Premature Infant recommended reference ranges:  Up to 24 hours.............<8.0 mg/dL  Up to 48 hours............<12.0 mg/dL  3-5 days..................<15.0 mg/dL  6-29 days.................<15.0 mg/dL       Alkaline Phosphatase   Date Value Ref Range Status   2024 83 55 - 135 U/L Final     AST   Date Value Ref Range Status   2024 16 10 - 40 U/L Final     ALT   Date Value Ref Range Status   2024 22 10 - 44 U/L Final     Anion Gap   Date Value Ref Range Status   2024 9 8 - 16 mmol/L Final     eGFR   Date Value Ref Range Status   2024 35.8 (A) >60 mL/min/1.73 m^2 Final     Lab Results   Component Value Date    WBC 3.20 (L) 2024    HGB 10.0 (L) 2024    HCT 31.0 (L) 2024    MCV 90 2024     2024             IMAGIN/9/2023   CT ABDOMEN PELVIS WITHOUT CONTRAST     CLINICAL HISTORY:  Abdominal pain, post-op;     TECHNIQUE:  Axial CT images of the abdomen and pelvis were obtained via helical, multi detector CT technique.  No intravenous contrast material was administered.     COMPARISON:  CT chest abdomen pelvis 2023.  IR nephrostomy placement 2023.     FINDINGS:  Lack of intravenous contrast limits sensitivity for parenchymal organ disease.     Heart: No cardiomegaly or pericardial effusion.     Lung Bases: Stable bilateral pulmonary nodules (axial images 1, 8, 11, 19), largest measuring up to 9 mm.     Liver: Upper normal in size.  Normal parenchymal attenuation.  Ill-defined hypodense lesions of the right hepatic lobe (axial images 37, 51), suspicious for  metastatic disease.  Limited visualization on this noncontrast study.  Additional stable rounded hypodensities, most too small to characterize, though the largest is compatible with cyst (axial image 51).     Gallbladder: Cholelithiasis.     Bile Ducts: No intra or extrahepatic biliary ductal dilation.     Pancreas: No pancreatic mass lesion or duct dilatation.     Spleen: Normal.     Adrenals: Normal.     Genitourinary: Right nephrostomy in appropriate position.  Bilateral renal cortical thinning and scarring, right greater than left.  No nephrolithiasis or hydroureteronephrosis.     Postoperative change of cystoproctectomy with ileal neobladder.  Large soft tissue mass about the neobladder, adjacent to the sigmoid colon.  Smith catheter within the neobladder.     Reproductive organs: Normal.     GI tract/Mesentery: Stomach is normal in appearance.  No evidence for bowel obstruction or inflammation.  Moderate retained feces in the colon.     Peritoneal Space: Free air in the puja- and perirenal space, likely related to recent procedure.  No abdominopelvic ascites.     Lymph nodes: Retroperitoneal herman conglomerate measures 2.7 x 1.6 cm (axial image 87), increased in size compared to priors.  Additional prominent periureteric (axial image 111), and retroperitoneal nodes (axial image 62).     Abdominal wall: Fat-containing paraumbilical ventral abdominal wall hernias, similar.     Vasculature: Abdominal aorta is normal in caliber.  Minimal aortoiliac significant calcific atherosclerosis.     Bones: Degenerative change without acute displaced fracture.  Stable pathologic fractures of the left inferior and superior pubic rami with sclerotic change.  No new suspicious lytic or sclerotic lesion.  Stable small osteochondroma off the right iliac wing.     Impression:     1. Right nephrostomy in appropriate position.  2. Postoperative change of cystoproctectomy with ileal neobladder.  Similar appearance of large pelvic  mass surrounding the neobladder.  3. Ill-defined hypoattenuating hepatic lesions, concerning for metastases in light of history.  4. Increased retroperitoneal and pelvic adenopathy.  5. Stable pulmonary nodules.  6. Additional findings as above.  This report was flagged in Epic as abnormal.     Electronically signed by resident: Lazaro Butler  Date:                                            12/09/2023  Time:                                           17:38     Electronically signed by: Barron Francois  Date:                                            12/09/2023  Time:                                           18:48    ASSESSMENT/PLAN:     59 yo M w hx of MIBC s/p radical cystectomy w/ ileal neobladder in 2017 with urethral stricture, and right hydronephrosis likely 2/2 to persistent urothelial carcinoma, now with local and systemic recurrence undergoing chemotherapy with Dr. Abad -- here for UTI symptoms    Plan:    Right Hydronephrosis  Most recent imaging reviewed: he has severe right hydronephrosis with a thin parenchyma. There is hydroureter to the level of a soft tissue recurrence. We discussed management options for him in order to maximize his renal function for the phase 1 program. I do not believe that a stent is feasible given the soft tissue recurrence. Had IR nephrostomy tube placed, GFR did not improve with tube in place, as a result tube was removed on 12/21/2023.     GFR remains improved with hydration and nephrostomy out.   No further intervention planned.    Muscle Invasive Bladder Cancer, metastatic   - Progressed through chemo, EV + Pembro, and Sacituzumab.  - Most recent imaging reviewed  - Was on Phase 1 trial but could not keep him on trial due to GFR  - Currently following with Dr. Abad for palliative chemotherapy      Bladder Neck Contracture  - Bladder neck dilation 12/5/2023 -- doing well since  - Currently using 16F    UTI  - Likely UTI cause of symptoms. Agree with continuing  ciprofloxacin. Will follow up urine cultures.         Follow up in clinic in three months.               Krishan Herron MD  Department of Urology, PGY-1         Attending Note:   I have personally seen and examined the patient. I agree with the finds and plan as described by the resident above.        The total time for the established patient visit was at least 40 minutes in both face-to-face and non-face-to-face activities, which included chart review, interpretation of results, counseling, education, ordering meds/tests/procedures, and/or coordination of care.       Eder Oconnor MD  Urologic Oncology  P: [phone]

## 2024-01-18 NOTE — TELEPHONE ENCOUNTER
Urine culture positive. Will change antibiotic at this time based on results.       Results for orders placed or performed in visit on 01/15/24   Urine culture    Specimen: Urine, Clean Catch   Result Value Ref Range    Urine Culture, Routine ENTEROCOCCUS FAECALIS  > 100,000 cfu/ml   (A)        Susceptibility    Enterococcus faecalis - CULTURE, URINE     Ampicillin <=2 Sensitive mcg/mL     Nitrofurantoin <=32 Sensitive mcg/mL     Vancomycin 2 Sensitive mcg/mL   POCT Urinalysis, Dipstick, Manual, w/o Scope   Result Value Ref Range    POC Blood, Urine Positive (A) Negative, Positive Slide, Positive Tube    POC Bilirubin, Urine Negative Negative, Positive Slide, Positive Tube    POC Urobilinogen, Urine Normal 0.3 - 2.2    POC Ketones, Urine Negative Negative, Positive Slide, Positive Tube    POC Protein, Urine Positive (A) Negative, Positive Slide, Positive Tube    POC Nitrates, Urine Negative Negative, Positive Slide, Positive Tube    POC Glucose, Urine Negative Negative, Positive Slide, Positive Tube    pH, UA 8.0 5 - 8    POC Specific Gravity, Urine 1.005 1.003 - 1.029    POC Leukocytes, Urine Positive (A) Negative, Positive Slide, Positive Tube

## 2024-01-18 NOTE — ASSESSMENT & PLAN NOTE
Discussed risks of SG including his UGT1A1 metabolism deficiency. Empirically DR to 25% and will monitor counts weekly. RX for olanzapine and dexamethsone for chemo induced nausea. Also given immodium for diarrhea.   - C2D1 Sacituzumab Govitecan DR to 7.5mg/kg tomorrow. C2D8 labs prior to D8.  - FU 3 weeks for C3  - Repeat imaging after C4  - Ok to hold post treatment dex if patient desires

## 2024-01-18 NOTE — PROGRESS NOTES
MEDICAL ONCOLOGY - FOLLOW UP VISIT    Cancer/Stage/TNM:    Cancer Staging   Malignant neoplasm of urinary bladder  Staging form: Urinary Bladder, AJCC 8th Edition  - Clinical: Stage IVB (cT4b, cNX, pM1b) - Signed by Bridger Abad MD on 9/28/2023       Reason for visit: Metastatic bladder cancer    HPI: Julio Rodríguez is a 58 y.o. male with metastatic bladder cancer previously treated with neoadjuvant ddMVAC, radical cystectomy, and then carboplatin/gemcitabine and EV+ Pembro following local and metastatic recurrence. Subsequetnly received a single dose of pembrolizumab + NTX-1088 11/2/23 as part of a phase I trial through the [facility], but was taken off trial for elevated creatinine despite PCN placement.  He presents to medical oncology clinic for further follow up..      History has been obtained by chart review and discussion with the patient.    Interval Events:    Tolerated reasonably well. Increased fatigue during C1 which attributes to waking up earlier. Also with some hair loss. No diarrhea.  No N/V. Appetite is good. No fevers or chills. Did develop new dysuria. Saw urgent care and found to have amp sensitive enterococcus. Starting amoxicillin. OK to proceed with C2D1 SG tomorrow.    Repeat labs for C2D8. Follow up in clinic in 3 weeks for C3D1. Repeat imaging after C4. OK to hold post treatment dex if he desires.      **    Has had removal of PCN. Patient removed valiente this morning; plan to self catheterize during the day with valiente catheter overnight. Urine output seems normal.  Creatine is currently 2.5. Appetite is good. No N/V. Has some soreness from the prior right PCN site, up to 1/10. No pain with ambulation. No fevers or chills. Normal bowel movements about 1x per day but can be up to 3x per day and no diarrhea.       Oncology History   Malignant neoplasm of urinary bladder   2016 Initial Diagnosis    Bladder CIS. Managed with BCG     2017 Notable Event    Developed recurrent muscle invasive  disease.     2017 - 2017 Chemotherapy    ddMVAC      Surgery    Radical cystectomy with lymph node dissection and creation of neobladder. [id number]     7/2022 Notable Event    New onset hematuria. MRI scan of the pelvis in August showed a suspected blood clot adherent to the distal Smith catheter. There was abnormal signal and enhancement of the bilateral pubic bones adjacent to the neobladder suspicious for neoplastic infiltration. There was no pelvic lymphadenopathy.      7/2022 Imaging Significant Findings    Pet scan at the same time showed hypermetabolic retroperitoneal lymphadenopathy. There was marked activity felt to involve the bladder wall suspicious for tumor.     7/2022 Biopsy    Biopsy of the bladder neck showed recurrent high-grade urothelial carcinoma.     9/28/2022 - 5/16/2023 Chemotherapy    Mr. Rodríguez was started on chemotherapy with gemcitabine and carboplatin in September 2022.      1/25/2023 Imaging Significant Findings    Pet scan on 25 January showed changes related to prior cystoprostatectomy with neobladder creation and minimal fullness to the right renal collecting system. There was no evidence of a discrete hypermetabolic mass or lymphadenopathy. There was diffusely increased activity throughout the osseous structures, suggestive of chemotherapy with reactive marrow.     4/11/2023 Imaging Significant Findings    CT a/p  1. Pathologic fracture of the left parasymphysis pubis secondary to lytic process. Given location adjacent to neobladder, osteomyelitis is a possibility. Metastatic disease not excluded.   2. Prior cystoprostatectomy with chronic right ureteral obstruction and hydroureteronephrosis, minimally changed from the prior. All etiologies considered including malignant obstruction. Urology consultation recommended.   3. Incidental findings including cholelithiasis, bilateral multifocal renal cortical scarring, and small hiatal hernia.       5/24/2023 -  Chemotherapy    Enfortumab  vedotin + Pembrolizumab     8/10/2023 Imaging Significant Findings    PET CT  Interval development of hypermetabolic pulmonary nodules within the lingula and right lower lobe likely reflecting pulmonary metastases. Some additional tiny pulmonary nodularity is new or more conspicuous from prior but too small to accurately characterize by PET/CT. Attention on follow-up recommended.     Worsening obstructive uropathy which is relatively moderate to severe the right kidney.     Similar appearance of the urinary neobladder. There is somewhat diminished due to physiologic activity to evaluate for local neoplasm but the appearance overall appears similar to prior. Assessment of local disease could be followed with CT abdomen and pelvis with contrast.     Interval fracture of the left superior and inferior pubic rami appearing subacute in nature. Please correlate for trauma and tenderness. There is no significant FDG activity within the area to favor a pathologic fracture.     Previously seen diffuse marrow activity may have been due to previous marrow rebound or drug effect.       8/25/2023 Imaging Significant Findings    MR Pelvis  History of cystoprostatectomy and neobladder creation.     Interval increase in multilobular mass in the pelvis infiltrating the rectum, possibly due to distal sigmoid colon, anterior left pelvic bones as well as a inferior aspect of the neobladder suspicious for progression of neoplastic process as discussed above.      9/28/2023 Cancer Staged    Staging form: Urinary Bladder, AJCC 8th Edition  - Clinical: Stage IVB (cT4b, cNX, pM1b)     10/16/2023 Imaging Significant Findings    CT chest   Impression:  - Multiple solid bilateral pulmonary nodules up to 9mm showing interval increase in size concerning for metastatic disease in this patient with history of prior bladder malignancy.  - Multiple hepatic hypodensities, which may represent cystic lesions however some which are too small to  characterize.  - Partially imaged right kidney showing parenchymal atrophy and suspected hydronephrosis..    MR Pelvis   Impression:     Interval increased size of multilobular mass in the cystoprostatectomy surgical bed that invades the neobladder, rectum, sigmoid colon, and left pelvis.  Multiple pelvic lymph nodes are more conspicuous from prior exam and concerning for additional metastatic disease.     10/24/2023 - 10/24/2023 Chemotherapy    Treatment Summary   Plan Name: OP SACITUZUMAB GOVITECAN-HZIY Q3W  Treatment Goal: Palliative  Status: Inactive  Start Date:   End Date:   Provider: Bridger Abad MD  Chemotherapy: sacituzumab govitecan-hziy (TRODELVY) 543 mg in sodium chloride 0.9% 500 mL chemo infusion, 7.5 mg/kg = 543 mg (original dose ), Intravenous, Clinic/HOD 1 time, 0 of 17 cycles  Dose modification: 7.5 mg/kg (Cycle 1, Reason: MD Discretion, Comment: UGT1A1 PM )     11/2/2023 - 11/20/2023 Chemotherapy    Treatment Summary   Plan Name: [facility] [id number] Dose Escalation INV-NTX + INV pembrolizumab  Treatment Goal: Control  Status: Inactive  Start Date: 11/2/2023  End Date: 11/20/2023  Provider: Chan Leos MD  Chemotherapy: INV [id number] (pembrolizumab) 200 mg in INV sodium chloride 0.9 % 100 mL chemo infusion, 200 mg, Intravenous, Clinic/HOD 1 time, 1 of 35 cycles  Administration: 200 mg (11/2/2023)     11/29/2023 Imaging Significant Findings    Impression:     Postoperative change including cysto proctectomy with creation of neobladder.  Persistent multilobular soft tissue mass within the surgical bed involving the neobladder and abutting the rectum and sigmoid colon, noting limited evaluation on this noncontrast examination.  Within these confines, appearance is similar from comparison CT 10/30/2023, noting increased distension of the neobladder from comparison imaging.     Persistent right-sided hydronephrosis and hydroureter with soft tissue attenuation at the mid to distal right ureter.   Prominent lymph nodes surrounding the right ureter, similar from comparison imaging.     Few stable mildly prominent pelvic and retroperitoneal lymph nodes.     Slight interval increase in left upper lobe nodule measuring approximately 1.2 x 1.0 cm, previously measuring 0.9 x 0.9 cm.  Additional previously described nodules are similar in size and appearance.     Remote fracture of the left pubic symphysis with associated lytic lesion.     12/9/2023 Imaging Significant Findings    CT 12/9/23    Impression:     1. Right nephrostomy in appropriate position.  2. Postoperative change of cystoproctectomy with ileal neobladder.  Similar appearance of large pelvic mass surrounding the neobladder.  3. Ill-defined hypoattenuating hepatic lesions, concerning for metastases in light of history.  4. Increased retroperitoneal and pelvic adenopathy.  5. Stable pulmonary nodules.  6. Additional findings as above.     12/28/2023 -  Chemotherapy    Treatment Summary   Plan Name: OP SACITUZUMAB GOVITECAN-HZIY Q3W  Treatment Goal: Palliative  Status: Active  Start Date: 12/28/2023  End Date: 12/4/2024 (Planned)  Provider: Bridger Abad MD  Chemotherapy: sacituzumab govitecan-hziy (TRODELVY) 540 mg in sodium chloride 0.9% 500 mL chemo infusion, 555 mg (100 % of original dose 7.5 mg/kg), Intravenous, Clinic/HOD 1 time, 1 of 17 cycles  Dose modification: 7.5 mg/kg (original dose 7.5 mg/kg, Cycle 1, Reason: MD Discretion)  Administration: 540 mg (12/28/2023), 540 mg (1/4/2024)     Melanoma        Past Medical History:   Diagnosis Date    Bladder cancer     CAD (coronary artery disease) 9/12/2023    Prior CABG. Not on antiplatelets. Home medicatiosn include atorvastatin    Carcinoma     forehead    Encounter for blood transfusion     Hypertension     Hypothyroidism 9/12/2023    Home medications include levothyroxine    Malignant melanoma of skin, unspecified     right arm    Malignant neoplasm of urinary bladder 9/12/2023    Melanoma  9/12/2023    History of T1a melanoma; 0.5mm depth without ulceartion. SP wide local excision 02/2022          Past Surgical History:   Procedure Laterality Date    CORONARY ARTERY BYPASS GRAFT  10/2015    CYSTECTOMY, ROBOT-ASSISTED, LAPAROSCOPIC, USING DA MARY XI, WITH ILEAL CONDUIT CREATION  12/2017    CYSTOGRAM N/A 12/5/2023    Procedure: CYSTOGRAM;  Surgeon: Eder Oconnor MD;  Location: [facility];  Service: Urology;  Laterality: N/A;    CYSTOSCOPY N/A 12/5/2023    Procedure: CYSTOSCOPY;  Surgeon: Eder Oconnor MD;  Location: [facility];  Service: Urology;  Laterality: N/A;    DILATION OF BLADDER      dialation of bladder neck- due to claudia bladder- multiple procedures    DILATION OF URETHRA N/A 12/5/2023    Procedure: DILATION, URETHRA;  Surgeon: Eder Oconnor MD;  Location: [facility];  Service: Urology;  Laterality: N/A;    LYMPHADENECTOMY      PERCUTANEOUS NEPHROSTOMY Right 12/8/2023    Procedure: Creation, Nephrostomy, Percutaneous;  Surgeon: Jens Nunez MD;  Location: [facility] CATH LAB;  Service: Radiology;  Laterality: Right;    PLACEMENT N/A 12/5/2023    Procedure: PLACEMENT;  Surgeon: Eder Oconnor MD;  Location: [facility] OR [address] [place] [address];  Service: Urology;  Laterality: N/A;  placement of valiente over a wire by MD MOHR ROBOTIC PROSTECTOMY, SUPRAPUBIC  12/2017        Family History   Problem Relation Age of Onset    Heart disease Father     Heart attack Paternal Uncle     Breast cancer Maternal Cousin     Colon cancer Neg Hx     Bladder Cancer Neg Hx         Social History     Tobacco Use    Smoking status: Former     Types: Cigarettes    Smokeless tobacco: Not on file   Substance Use Topics    Alcohol use: Not Currently        I have reviewed and updated the patient's past medical, surgical, family and social histories.    Review of patient's allergies indicates:  No Known Allergies     Current Outpatient Medications   Medication Sig Dispense Refill    atorvastatin (LIPITOR) 20 MG tablet Take 20 mg  by mouth every evening.      ciprofloxacin HCl (CIPRO) 500 MG tablet Take 1 tablet (500 mg total) by mouth 2 (two) times daily. for 7 days 14 tablet 0    dexAMETHasone (DECADRON) 4 MG Tab Take 2 tablets (8 mg total) by mouth once daily. Take 2 tablets (8 mg total) by mouth once daily. Take a directed on days 2, 3 and 4 of your chemotherapy cycle. 24 tablet 3    docusate sodium (COLACE) 100 MG capsule Take 220 capsules by mouth every evening.      Lactobacillus acidophilus 10 billion cell Cap Take by mouth once daily.      Lactobacillus rhamnosus GG (CULTURELLE) 10 billion cell capsule Take 1 capsule by mouth once daily.      levoFLOXacin (LEVAQUIN) 500 MG tablet Take 500 mg by mouth.      levothyroxine (SYNTHROID) 50 MCG tablet Take 50 mcg by mouth before breakfast.      LINZESS 72 mcg Cap capsule Take 72 mcg by mouth every morning.      loperamide (IMODIUM) 2 mg capsule Take 1 capsule (2 mg total) by mouth 4 (four) times daily as needed for Diarrhea. 60 capsule 0    multivitamin (THERAGRAN) per tablet Take 1 tablet by mouth every morning.      OLANZapine (ZYPREXA) 5 MG tablet TAKE 1 TABLET BY MOUTH EVERY EVENING ON DAYS 1 THROUGH 4 OF EACH CHEMOTHERAPY CYCLE 30 tablet 0    ondansetron (ZOFRAN-ODT) 8 MG TbDL Take 1 tablet (8 mg total) by mouth every 8 (eight) hours as needed (nausea/vomiting). 60 tablet 5    TURMERIC ORAL Take by mouth every morning.      VTAMA 1 % Crea APPLY 1 APPLICATION ON THE SKIN DAILY       No current facility-administered medications for this visit.        Physical Exam:   There were no vitals taken for this visit.     ECOG Performance status: 1            Physical Exam  Constitutional:       General: He is not in acute distress.     Appearance: Normal appearance.   HENT:      Head: Normocephalic.   Eyes:      General: No scleral icterus.     Extraocular Movements: Extraocular movements intact.      Conjunctiva/sclera: Conjunctivae normal.   Cardiovascular:      Rate and Rhythm: Normal rate  and regular rhythm.      Heart sounds: No murmur heard.     No friction rub. No gallop.   Pulmonary:      Effort: Pulmonary effort is normal. No respiratory distress.      Breath sounds: Normal breath sounds. No stridor. No wheezing, rhonchi or rales.   Abdominal:      General: There is no distension.   Skin:     General: Skin is warm and dry.   Neurological:      Mental Status: He is alert and oriented to person, place, and time.      Motor: No weakness.   Psychiatric:         Mood and Affect: Mood normal.         Behavior: Behavior normal.         Thought Content: Thought content normal.           Labs:                 Lab Results   Component Value Date     01/04/2024    K 4.1 01/04/2024    CREATININE 2.1 (H) 01/04/2024    WBC 3.20 (L) 01/04/2024    HGB 10.0 (L) 01/04/2024     01/04/2024    AST 16 01/04/2024    ALT 22 01/04/2024    ALKPHOS 83 01/04/2024    BILITOT 0.3 01/04/2024          Imaging:       Interventional Radiology  Procedure performed in the Invasive Lab    - See Procedure Log link below for nursing documentation    - See OpNote on Surgeries Tab for physician findings    - See Imaging Tab for radiologist dictation      I have personally reviewed the above imaging including recent MRI and PET CT scan    Path:   Reviewed pathology as documented in oncology history      Assessment and Plan:        {There are no diagnoses linked to this encounter. (Refresh or delete this SmartLink)}        Route Chart for Scheduling  Med Onc Route Chart for Scheduling    Treatment Plan Information   OP SACITUZUMAB GOVITECAN-HZIY Q3W   Bridger Abad MD   Upcoming Treatment Dates - OP SACITUZUMAB GOVITECAN-HZIY Q3W    1/17/2024       Pre-Medications       acetaminophen tablet 650 mg       diphenhydrAMINE (BENADRYL) 25 mg in sodium chloride 0.9% 50 mL IVPB       famotidine (PF) injection 20 mg       Chemotherapy       sacituzumab govitecan-hziy (TRODELVY) 555 mg in sodium chloride 0.9% 500 mL chemo  infusion       Supportive Care       atropine injection 0.4 mg       prochlorperazine injection Soln 10 mg       Antiemetics       aprepitant (CINVANTI) injection 130 mg       palonosetron (ALOXI) 0.25 mg with Dexamethasone (DECADRON) 12 mg in NS 50 mL IVPB  1/24/2024       Pre-Medications       acetaminophen tablet 650 mg       diphenhydrAMINE (BENADRYL) 25 mg in sodium chloride 0.9% 50 mL IVPB       famotidine (PF) injection 20 mg       Chemotherapy       sacituzumab govitecan-hziy (TRODELVY) 555 mg in sodium chloride 0.9% 500 mL chemo infusion       Supportive Care       atropine injection 0.4 mg       prochlorperazine injection Soln 10 mg       Antiemetics       aprepitant (CINVANTI) injection 130 mg       palonosetron (ALOXI) 0.25 mg with Dexamethasone (DECADRON) 12 mg in NS 50 mL IVPB  2/7/2024       Pre-Medications       acetaminophen tablet 650 mg       diphenhydrAMINE (BENADRYL) 25 mg in sodium chloride 0.9% 50 mL IVPB       famotidine (PF) injection 20 mg       Chemotherapy       sacituzumab govitecan-hziy (TRODELVY) 555 mg in sodium chloride 0.9% 500 mL chemo infusion       Supportive Care       atropine injection 0.4 mg       prochlorperazine injection Soln 10 mg       Antiemetics       aprepitant (CINVANTI) injection 130 mg       palonosetron (ALOXI) 0.25 mg with Dexamethasone (DECADRON) 12 mg in NS 50 mL IVPB  2/14/2024       Pre-Medications       acetaminophen tablet 650 mg       diphenhydrAMINE (BENADRYL) 25 mg in sodium chloride 0.9% 50 mL IVPB       famotidine (PF) injection 20 mg       Chemotherapy       sacituzumab govitecan-hziy (TRODELVY) 555 mg in sodium chloride 0.9% 500 mL chemo infusion       Supportive Care       atropine injection 0.4 mg       prochlorperazine injection Soln 10 mg       Antiemetics       aprepitant (CINVANTI) injection 130 mg       palonosetron (ALOXI) 0.25 mg with Dexamethasone (DECADRON) 12 mg in NS 50 mL IVPB          Bridger Abad                 wine/liquor

## 2024-01-18 NOTE — PROGRESS NOTES
MEDICAL ONCOLOGY - FOLLOW UP VISIT    Cancer/Stage/TNM:    Cancer Staging   Malignant neoplasm of urinary bladder  Staging form: Urinary Bladder, AJCC 8th Edition  - Clinical: Stage IVB (cT4b, cNX, pM1b) - Signed by Bridger Abad MD on 9/28/2023       Reason for visit: Metastatic bladder cancer    HPI: Julio Rodríguez is a 58 y.o. male with metastatic bladder cancer previously treated with neoadjuvant ddMVAC, radical cystectomy, and then carboplatin/gemcitabine and EV+ Pembro following local and metastatic recurrence. Subsequetnly received a single dose of pembrolizumab + NTX-1088 11/2/23 as part of a phase I trial through the [facility], but was taken off trial for elevated creatinine despite PCN placement.  He presents to medical oncology clinic for further follow up..      History has been obtained by chart review and discussion with the patient.    Interval Events:    Tolerated C1 reasonably well. Increased fatigue during C1 which attributes to waking up earlier. Also with some hair loss. No diarrhea. No N/V. Appetite is good. No fevers or chills. Did develop new dysuria. Saw urgent care and found to have amp sensitive enterococcus. Starting amoxicillin. OK to proceed with C2D1 SG tomorrow.    Oncology History   Malignant neoplasm of urinary bladder   2016 Initial Diagnosis    Bladder CIS. Managed with BCG     2017 Notable Event    Developed recurrent muscle invasive disease.     2017 - 2017 Chemotherapy    ddMVAC      Surgery    Radical cystectomy with lymph node dissection and creation of neobladder. [id number]     7/2022 Notable Event    New onset hematuria. MRI scan of the pelvis in August showed a suspected blood clot adherent to the distal Smith catheter. There was abnormal signal and enhancement of the bilateral pubic bones adjacent to the neobladder suspicious for neoplastic infiltration. There was no pelvic lymphadenopathy.      7/2022 Imaging Significant Findings    Pet scan at the same time showed  hypermetabolic retroperitoneal lymphadenopathy. There was marked activity felt to involve the bladder wall suspicious for tumor.     7/2022 Biopsy    Biopsy of the bladder neck showed recurrent high-grade urothelial carcinoma.     9/28/2022 - 5/16/2023 Chemotherapy    Mr. Rodríguez was started on chemotherapy with gemcitabine and carboplatin in September 2022.      1/25/2023 Imaging Significant Findings    Pet scan on 25 January showed changes related to prior cystoprostatectomy with neobladder creation and minimal fullness to the right renal collecting system. There was no evidence of a discrete hypermetabolic mass or lymphadenopathy. There was diffusely increased activity throughout the osseous structures, suggestive of chemotherapy with reactive marrow.     4/11/2023 Imaging Significant Findings    CT a/p  1. Pathologic fracture of the left parasymphysis pubis secondary to lytic process. Given location adjacent to neobladder, osteomyelitis is a possibility. Metastatic disease not excluded.   2. Prior cystoprostatectomy with chronic right ureteral obstruction and hydroureteronephrosis, minimally changed from the prior. All etiologies considered including malignant obstruction. Urology consultation recommended.   3. Incidental findings including cholelithiasis, bilateral multifocal renal cortical scarring, and small hiatal hernia.       5/24/2023 -  Chemotherapy    Enfortumab vedotin + Pembrolizumab     8/10/2023 Imaging Significant Findings    PET CT  Interval development of hypermetabolic pulmonary nodules within the lingula and right lower lobe likely reflecting pulmonary metastases. Some additional tiny pulmonary nodularity is new or more conspicuous from prior but too small to accurately characterize by PET/CT. Attention on follow-up recommended.     Worsening obstructive uropathy which is relatively moderate to severe the right kidney.     Similar appearance of the urinary neobladder. There is somewhat  diminished due to physiologic activity to evaluate for local neoplasm but the appearance overall appears similar to prior. Assessment of local disease could be followed with CT abdomen and pelvis with contrast.     Interval fracture of the left superior and inferior pubic rami appearing subacute in nature. Please correlate for trauma and tenderness. There is no significant FDG activity within the area to favor a pathologic fracture.     Previously seen diffuse marrow activity may have been due to previous marrow rebound or drug effect.       8/25/2023 Imaging Significant Findings    MR Pelvis  History of cystoprostatectomy and neobladder creation.     Interval increase in multilobular mass in the pelvis infiltrating the rectum, possibly due to distal sigmoid colon, anterior left pelvic bones as well as a inferior aspect of the neobladder suspicious for progression of neoplastic process as discussed above.      9/28/2023 Cancer Staged    Staging form: Urinary Bladder, AJCC 8th Edition  - Clinical: Stage IVB (cT4b, cNX, pM1b)     10/16/2023 Imaging Significant Findings    CT chest   Impression:  - Multiple solid bilateral pulmonary nodules up to 9mm showing interval increase in size concerning for metastatic disease in this patient with history of prior bladder malignancy.  - Multiple hepatic hypodensities, which may represent cystic lesions however some which are too small to characterize.  - Partially imaged right kidney showing parenchymal atrophy and suspected hydronephrosis..    MR Pelvis   Impression:     Interval increased size of multilobular mass in the cystoprostatectomy surgical bed that invades the neobladder, rectum, sigmoid colon, and left pelvis.  Multiple pelvic lymph nodes are more conspicuous from prior exam and concerning for additional metastatic disease.     10/24/2023 - 10/24/2023 Chemotherapy    Treatment Summary   Plan Name: OP SACITUZUMAB GOVITECAN-HZIY Q3W  Treatment Goal: Palliative  Status:  Inactive  Start Date:   End Date:   Provider: Bridger Aabd MD  Chemotherapy: sacituzumab govitecan-hziy (TRODELVY) 543 mg in sodium chloride 0.9% 500 mL chemo infusion, 7.5 mg/kg = 543 mg (original dose ), Intravenous, Clinic/HOD 1 time, 0 of 17 cycles  Dose modification: 7.5 mg/kg (Cycle 1, Reason: MD Discretion, Comment: UGT1A1 PM )     11/2/2023 - 11/20/2023 Chemotherapy    Treatment Summary   Plan Name: [facility] NTX-1088-01 Dose Escalation INV-NTX + INV pembrolizumab  Treatment Goal: Control  Status: Inactive  Start Date: 11/2/2023  End Date: 11/20/2023  Provider: Chan Leos MD  Chemotherapy: INV [id number] (pembrolizumab) 200 mg in INV sodium chloride 0.9 % 100 mL chemo infusion, 200 mg, Intravenous, Clinic/HOD 1 time, 1 of 35 cycles  Administration: 200 mg (11/2/2023)     11/29/2023 Imaging Significant Findings    Impression:     Postoperative change including cysto proctectomy with creation of neobladder.  Persistent multilobular soft tissue mass within the surgical bed involving the neobladder and abutting the rectum and sigmoid colon, noting limited evaluation on this noncontrast examination.  Within these confines, appearance is similar from comparison CT 10/30/2023, noting increased distension of the neobladder from comparison imaging.     Persistent right-sided hydronephrosis and hydroureter with soft tissue attenuation at the mid to distal right ureter.  Prominent lymph nodes surrounding the right ureter, similar from comparison imaging.     Few stable mildly prominent pelvic and retroperitoneal lymph nodes.     Slight interval increase in left upper lobe nodule measuring approximately 1.2 x 1.0 cm, previously measuring 0.9 x 0.9 cm.  Additional previously described nodules are similar in size and appearance.     Remote fracture of the left pubic symphysis with associated lytic lesion.     12/9/2023 Imaging Significant Findings    CT 12/9/23    Impression:     1. Right nephrostomy in appropriate  position.  2. Postoperative change of cystoproctectomy with ileal neobladder.  Similar appearance of large pelvic mass surrounding the neobladder.  3. Ill-defined hypoattenuating hepatic lesions, concerning for metastases in light of history.  4. Increased retroperitoneal and pelvic adenopathy.  5. Stable pulmonary nodules.  6. Additional findings as above.     12/28/2023 -  Chemotherapy    Treatment Summary   Plan Name: OP SACITUZUMAB GOVITECAN-HZIY Q3W  Treatment Goal: Palliative  Status: Active  Start Date: 12/28/2023  End Date: 12/4/2024 (Planned)  Provider: Bridger Abad MD  Chemotherapy: sacituzumab govitecan-hziy (TRODELVY) 540 mg in sodium chloride 0.9% 500 mL chemo infusion, 555 mg (100 % of original dose 7.5 mg/kg), Intravenous, Clinic/HOD 1 time, 1 of 17 cycles  Dose modification: 7.5 mg/kg (original dose 7.5 mg/kg, Cycle 1, Reason: MD Discretion)  Administration: 540 mg (12/28/2023), 540 mg (1/4/2024)     Melanoma        Past Medical History:   Diagnosis Date    Bladder cancer     CAD (coronary artery disease) 9/12/2023    Prior CABG. Not on antiplatelets. Home medicatiosn include atorvastatin    Carcinoma     forehead    Encounter for blood transfusion     Hypertension     Hypothyroidism 9/12/2023    Home medications include levothyroxine    Malignant melanoma of skin, unspecified     right arm    Malignant neoplasm of urinary bladder 9/12/2023    Melanoma 9/12/2023    History of T1a melanoma; 0.5mm depth without ulceartion. SP wide local excision 02/2022          Past Surgical History:   Procedure Laterality Date    CORONARY ARTERY BYPASS GRAFT  10/2015    CYSTECTOMY, ROBOT-ASSISTED, LAPAROSCOPIC, USING DA MARY XI, WITH ILEAL CONDUIT CREATION  12/2017    CYSTOGRAM N/A 12/5/2023    Procedure: CYSTOGRAM;  Surgeon: Eder Oconnor MD;  Location: [facility] OR [address] [place] [address];  Service: Urology;  Laterality: N/A;    CYSTOSCOPY N/A 12/5/2023    Procedure: CYSTOSCOPY;  Surgeon: Eder Oconnor MD;  Location: [facility] OR  1ST FLR;  Service: Urology;  Laterality: N/A;    DILATION OF BLADDER      dialation of bladder neck- due to claudia bladder- multiple procedures    DILATION OF URETHRA N/A 12/5/2023    Procedure: DILATION, URETHRA;  Surgeon: Eder Oconnor MD;  Location: [facility] OR 1ST FLR;  Service: Urology;  Laterality: N/A;    LYMPHADENECTOMY      PERCUTANEOUS NEPHROSTOMY Right 12/8/2023    Procedure: Creation, Nephrostomy, Percutaneous;  Surgeon: Jens Nunez MD;  Location: [facility] CATH LAB;  Service: Radiology;  Laterality: Right;    PLACEMENT N/A 12/5/2023    Procedure: PLACEMENT;  Surgeon: Eder Oconnor MD;  Location: [facility] OR [facility] FLR;  Service: Urology;  Laterality: N/A;  placement of valiente over a wire by MD MOHR ROBOTIC PROSTECTOMY, SUPRAPUBIC  12/2017        Family History   Problem Relation Age of Onset    Heart disease Father     Heart attack Paternal Uncle     Breast cancer Maternal Cousin     Colon cancer Neg Hx     Bladder Cancer Neg Hx         Social History     Tobacco Use    Smoking status: Former     Types: Cigarettes    Smokeless tobacco: Not on file   Substance Use Topics    Alcohol use: Not Currently        I have reviewed and updated the patient's past medical, surgical, family and social histories.    Review of patient's allergies indicates:  No Known Allergies     Current Outpatient Medications   Medication Sig Dispense Refill    amoxicillin-clavulanate 875-125mg (AUGMENTIN) 875-125 mg per tablet Take 1 tablet by mouth every 12 (twelve) hours. for 7 days 14 tablet 0    atorvastatin (LIPITOR) 20 MG tablet Take 20 mg by mouth every evening.      ciprofloxacin HCl (CIPRO) 500 MG tablet Take 1 tablet (500 mg total) by mouth 2 (two) times daily. for 7 days 14 tablet 0    dexAMETHasone (DECADRON) 4 MG Tab Take 2 tablets (8 mg total) by mouth once daily. Take 2 tablets (8 mg total) by mouth once daily. Take a directed on days 2, 3 and 4 of your chemotherapy cycle. 24 tablet 3    docusate sodium (COLACE) 100 MG capsule Take  "220 capsules by mouth every evening.      Lactobacillus acidophilus 10 billion cell Cap Take by mouth once daily.      Lactobacillus rhamnosus GG (CULTURELLE) 10 billion cell capsule Take 1 capsule by mouth once daily.      levoFLOXacin (LEVAQUIN) 500 MG tablet Take 500 mg by mouth.      levothyroxine (SYNTHROID) 50 MCG tablet Take 50 mcg by mouth before breakfast.      LINZESS 72 mcg Cap capsule Take 72 mcg by mouth every morning.      loperamide (IMODIUM) 2 mg capsule Take 1 capsule (2 mg total) by mouth 4 (four) times daily as needed for Diarrhea. 60 capsule 0    multivitamin (THERAGRAN) per tablet Take 1 tablet by mouth every morning.      OLANZapine (ZYPREXA) 5 MG tablet TAKE 1 TABLET BY MOUTH EVERY EVENING ON DAYS 1 THROUGH 4 OF EACH CHEMOTHERAPY CYCLE 30 tablet 0    ondansetron (ZOFRAN-ODT) 8 MG TbDL Take 1 tablet (8 mg total) by mouth every 8 (eight) hours as needed (nausea/vomiting). 60 tablet 5    TURMERIC ORAL Take by mouth every morning.      VTAMA 1 % Crea APPLY 1 APPLICATION ON THE SKIN DAILY       No current facility-administered medications for this visit.        Physical Exam:   BP (!) 141/87 (BP Location: Left arm, Patient Position: Sitting, BP Method: Medium (Automatic))   Pulse 90   Temp 97.5 °F (36.4 °C) (Oral)   Resp 18   Ht 5' 10" (1.778 m)   Wt 72.8 kg (160 lb 7.9 oz)   SpO2 100%   BMI 23.03 kg/m²      ECOG Performance status: 1          Physical Exam  Constitutional:       General: He is not in acute distress.     Appearance: Normal appearance.   HENT:      Head: Normocephalic.   Eyes:      General: No scleral icterus.     Extraocular Movements: Extraocular movements intact.      Conjunctiva/sclera: Conjunctivae normal.   Cardiovascular:      Rate and Rhythm: Normal rate and regular rhythm.      Heart sounds: No murmur heard.     No friction rub. No gallop.   Pulmonary:      Effort: Pulmonary effort is normal. No respiratory distress.      Breath sounds: Normal breath sounds. No " stridor. No wheezing, rhonchi or rales.   Abdominal:      General: There is no distension.   Skin:     General: Skin is warm and dry.   Neurological:      Mental Status: He is alert and oriented to person, place, and time.      Motor: No weakness.   Psychiatric:         Mood and Affect: Mood normal.         Behavior: Behavior normal.         Thought Content: Thought content normal.           Labs:                 Lab Results   Component Value Date     (L) 01/18/2024    K 3.9 01/18/2024    CREATININE 2.6 (H) 01/18/2024    WBC 4.57 01/18/2024    HGB 8.9 (L) 01/18/2024     01/18/2024    AST 13 01/18/2024    ALT 14 01/18/2024    ALKPHOS 86 01/18/2024    BILITOT 0.3 01/18/2024          Imaging:       Interventional Radiology  Procedure performed in the Invasive Lab    - See Procedure Log link below for nursing documentation    - See OpNote on Surgeries Tab for physician findings    - See Imaging Tab for radiologist dictation      I have personally reviewed the above imaging including recent MRI and PET CT scan    Path:   Reviewed pathology as documented in oncology history    Assessment and Plan:      1. Malignant neoplasm of urinary bladder, unspecified site  Overview:  Metastatic bladder cancer previously treated with neoadjuvant ddMVAC, radical cystectomy, and then carboplatin/gemcitabine and EV+ Pembro following local and metastatic recurrence. Subsequetnly received a single dose of pembrolizumab + NTX-1088 11/2/23 as part of a phase I trial through the [facility], but was taken off trial for elevated creatinine despite PCN placement. Starting sacituzumab govitecan 12/28/23     Assessment & Plan:  Discussed risks of SG including his UGT1A1 metabolism deficiency. Empirically DR to 25% and will monitor counts weekly. RX for olanzapine and dexamethsone for chemo induced nausea. Also given immodium for diarrhea.   - C2D1 Sacituzumab Govitecan DR to 7.5mg/kg tomorrow. C2D8 labs prior to D8.  - FU 3 weeks for C3  -  Repeat imaging after C4  - Ok to hold post treatment dex if patient desires      2. Hypothyroidism, unspecified type  Overview:  Home medications include levothyroxine      3. Stage 3b chronic kidney disease    4. Coronary artery disease, unspecified vessel or lesion type, unspecified whether angina present, unspecified whether native or transplanted heart  Overview:  Prior CABG. Not on antiplatelets. Home medicatiosn include atorvastatin      Other orders  -     acetaminophen tablet 650 mg  -     diphenhydrAMINE (BENADRYL) 25 mg in sodium chloride 0.9% 50 mL IVPB  -     famotidine (PF) injection 20 mg  -     aprepitant (CINVANTI) injection 130 mg  -     palonosetron (ALOXI) 0.25 mg with Dexamethasone (DECADRON) 12 mg in NS 50 mL IVPB  -     sacituzumab govitecan-hziy (TRODELVY) 555 mg in sodium chloride 0.9% 500 mL chemo infusion  -     atropine injection 0.4 mg  -     prochlorperazine injection Soln 10 mg  -     EPINEPHrine (EPIPEN) 0.3 mg/0.3 mL pen injection 0.3 mg  -     diphenhydrAMINE injection 50 mg  -     hydrocortisone sodium succinate injection 100 mg  -     sodium chloride 0.9% 100 mL flush bag  -     sodium chloride 0.9% flush 10 mL  -     heparin, porcine (PF) 100 unit/mL injection flush 500 Units  -     alteplase injection 2 mg  -     acetaminophen tablet 650 mg  -     diphenhydrAMINE (BENADRYL) 25 mg in sodium chloride 0.9% 50 mL IVPB  -     famotidine (PF) injection 20 mg  -     aprepitant (CINVANTI) injection 130 mg  -     palonosetron (ALOXI) 0.25 mg with Dexamethasone (DECADRON) 12 mg in NS 50 mL IVPB  -     sacituzumab govitecan-hziy (TRODELVY) 555 mg in sodium chloride 0.9% 500 mL chemo infusion  -     atropine injection 0.4 mg  -     prochlorperazine injection Soln 10 mg  -     EPINEPHrine (EPIPEN) 0.3 mg/0.3 mL pen injection 0.3 mg  -     diphenhydrAMINE injection 50 mg  -     hydrocortisone sodium succinate injection 100 mg  -     sodium chloride 0.9% 250 mL flush bag  -     sodium chloride  0.9% flush 10 mL  -     heparin, porcine (PF) 100 unit/mL injection flush 500 Units  -     alteplase injection 2 mg      Route Chart for Scheduling    Med Onc Chart Routing      Follow up with physician 3 weeks. Follow up with Dr. Abad in 3 weeks prior to C3   Follow up with LYNDSEY    Infusion scheduling note   C3 Trodelvy in 3 weeks with D8 a week later   Injection scheduling note    Labs CMP, CBC and TSH   Scheduling:  Preferred lab:  Lab interval:  CBC, CMP, TSH in 3 weeks prior to C3   Imaging    Pharmacy appointment    Other referrals                  Treatment Plan Information   OP SACITUZUMAB GOVITECAN-HZIY Q3W   Bridger Abad MD   Upcoming Treatment Dates - OP SACITUZUMAB GOVITECAN-HZIY Q3W    1/17/2024       Pre-Medications       acetaminophen tablet 650 mg       diphenhydrAMINE (BENADRYL) 25 mg in sodium chloride 0.9% 50 mL IVPB       famotidine (PF) injection 20 mg       Chemotherapy       sacituzumab govitecan-hziy (TRODELVY) 555 mg in sodium chloride 0.9% 500 mL chemo infusion       Supportive Care       atropine injection 0.4 mg       prochlorperazine injection Soln 10 mg       Antiemetics       aprepitant (CINVANTI) injection 130 mg       palonosetron (ALOXI) 0.25 mg with Dexamethasone (DECADRON) 12 mg in NS 50 mL IVPB  1/24/2024       Pre-Medications       acetaminophen tablet 650 mg       diphenhydrAMINE (BENADRYL) 25 mg in sodium chloride 0.9% 50 mL IVPB       famotidine (PF) injection 20 mg       Chemotherapy       sacituzumab govitecan-hziy (TRODELVY) 555 mg in sodium chloride 0.9% 500 mL chemo infusion       Supportive Care       atropine injection 0.4 mg       prochlorperazine injection Soln 10 mg       Antiemetics       aprepitant (CINVANTI) injection 130 mg       palonosetron (ALOXI) 0.25 mg with Dexamethasone (DECADRON) 12 mg in NS 50 mL IVPB  2/7/2024       Pre-Medications       acetaminophen tablet 650 mg       diphenhydrAMINE (BENADRYL) 25 mg in sodium chloride 0.9% 50 mL IVPB        famotidine (PF) injection 20 mg       Chemotherapy       sacituzumab govitecan-hziy (TRODELVY) 555 mg in sodium chloride 0.9% 500 mL chemo infusion       Supportive Care       atropine injection 0.4 mg       prochlorperazine injection Soln 10 mg       Antiemetics       aprepitant (CINVANTI) injection 130 mg       palonosetron (ALOXI) 0.25 mg with Dexamethasone (DECADRON) 12 mg in NS 50 mL IVPB  2/14/2024       Pre-Medications       acetaminophen tablet 650 mg       diphenhydrAMINE (BENADRYL) 25 mg in sodium chloride 0.9% 50 mL IVPB       famotidine (PF) injection 20 mg       Chemotherapy       sacituzumab govitecan-hziy (TRODELVY) 555 mg in sodium chloride 0.9% 500 mL chemo infusion       Supportive Care       atropine injection 0.4 mg       prochlorperazine injection Soln 10 mg       Antiemetics       aprepitant (CINVANTI) injection 130 mg       palonosetron (ALOXI) 0.25 mg with Dexamethasone (DECADRON) 12 mg in NS 50 mL IVPB    Discussed with Dr. Abad.    Kimberley Baxter MD   Hematology and Oncology Fellow, PGY V

## 2024-01-19 NOTE — PLAN OF CARE
1220  Patient completed Trodelvy infusion, tolerated well. Port deaccessed, flushed, blood return noted, heparin locked. RTC 1/26/24 Patient ambulated off floor accompanied by mother

## 2024-01-19 NOTE — PLAN OF CARE
0900  Patient seated in chair accompanied by mother.  VSS, assessment done.  Port accessed, flushed, blood return noted, started NS @ 25 cc/hr KVO while waiting for Trodelvy from pharmacy.  [facility] for safety

## 2024-01-30 NOTE — PROGRESS NOTES
[facility]  Urologic Oncology Clinic Note        Date of Service: 12/21/2023       Chief Complaint/Reason for Consultation: Metastatic Bladder Cancer, Right Hydronephrosis.    Requesting Provider:   No referring provider defined for this encounter.      History of Present Illness:   Patient 58 y.o. 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 He underwent radical cystectomy w/ ileal neobladder in 2017. He then subsequently developed local and systemic recurrence. He is being followed by Dr Leos and part of the Phase 1 program. He did also receive palliative radiation to the pelvis after pathologic pelvic fracture.     He currently catheterizes his pouch as needed, but reports he has had multiple dilations in the past and continues to struggle to catheterize his bladder. He is currently down to 12F.. He says this is negatively impacting his quality of life.    He presents today due to a decline in his renal function since the start of the Phase 1 program. GFR at enrollment was 30, currently at 22-24 with a creatinine at 2.7. He was hospitalized in February with right hydronephrosis and [facility] and images obtained at that time showed a soft tissue recurrence in the left distal ureter proximal to his anastomosis. He is not aware of any functional studies for his hydronephrosis and has not received a stent/nephrostomy tube as his hydronephrosis appeared chronic with thin parenchyma.    Blood thinners:  None    No Hx of TIA, MI, and CVA   Abdominal surgeries:  Radical cystectomy with creation of ileal neobladder      Today he is s/p trip to the OR 12/5/2023 for urethral dilation and doing much better with catheterization of his neobladder. Able to now use 16F catheter. He also has right nephrostomy tube now placed by IR on 12/8/2023. He is getting about 50-70cc out per day.    Today he is here with repeat BMP w/ valiente catheter out and right neph tube in place. Cr not improving with  nephrostomy tube in place and decision has been made to remove nephrostomy tube.      Patient Active Problem List    Diagnosis Date Noted    CKD (chronic kidney disease) 10/23/2023    Elevated blood pressure reading 09/28/2023    Malignant neoplasm of urinary bladder 09/12/2023    Melanoma 09/12/2023    CAD (coronary artery disease) 09/12/2023    Hypothyroidism 09/12/2023    Psoriasis 09/12/2023          Review of patient's allergies indicates:  No Known Allergies     Past Medical History:   Diagnosis Date    Bladder cancer     CAD (coronary artery disease) 9/12/2023    Prior CABG. Not on antiplatelets. Home medicatiosn include atorvastatin    Carcinoma     forehead    Encounter for blood transfusion     Hypertension     Hypothyroidism 9/12/2023    Home medications include levothyroxine    Malignant melanoma of skin, unspecified     right arm    Malignant neoplasm of urinary bladder 9/12/2023    Melanoma 9/12/2023    History of T1a melanoma; 0.5mm depth without ulceartion. SP wide local excision 02/2022      Past Surgical History:   Procedure Laterality Date    CORONARY ARTERY BYPASS GRAFT  10/2015    CYSTECTOMY, ROBOT-ASSISTED, LAPAROSCOPIC, USING DA MARY XI, WITH ILEAL CONDUIT CREATION  12/2017    CYSTOGRAM N/A 12/5/2023    Procedure: CYSTOGRAM;  Surgeon: Eder Oconnor MD;  Location: [facility] OR [address] [place] [address];  Service: Urology;  Laterality: N/A;    CYSTOSCOPY N/A 12/5/2023    Procedure: CYSTOSCOPY;  Surgeon: Eder Oconnor MD;  Location: [facility] OR [address] [place] [address];  Service: Urology;  Laterality: N/A;    DILATION OF BLADDER      dialation of bladder neck- due to claudia bladder- multiple procedures    DILATION OF URETHRA N/A 12/5/2023    Procedure: DILATION, URETHRA;  Surgeon: Eder Oconnor MD;  Location: [facility] OR [address] [place] [address];  Service: Urology;  Laterality: N/A;    LYMPHADENECTOMY      PERCUTANEOUS NEPHROSTOMY Right 12/8/2023    Procedure: Creation, Nephrostomy, Percutaneous;  Surgeon: Jens Nunez MD;  Location: [facility] CATH LAB;  Service:  "Radiology;  Laterality: Right;    PLACEMENT N/A 12/5/2023    Procedure: PLACEMENT;  Surgeon: Eder Oconnor MD;  Location: [facility] OR [address] [place] [address];  Service: Urology;  Laterality: N/A;  placement of valiente over a wire by MD MOHR ROBOTIC PROSTECTOMY, SUPRAPUBIC  12/2017      Family History   Problem Relation Age of Onset    Heart disease Father     Heart attack Paternal Uncle     Breast cancer Maternal Cousin     Colon cancer Neg Hx     Bladder Cancer Neg Hx       Social History     Tobacco Use    Smoking status: Former     Types: Cigarettes    Smokeless tobacco: Not on file   Substance Use Topics    Alcohol use: Not Currently        Review of Systems   Genitourinary:  Negative for difficulty urinating, flank pain, hematuria and urgency.             OBJECTIVE:     Vitals:    12/21/23 1330   BP: (!) 143/87   Pulse: 85   Weight: 73.1 kg (161 lb 0.7 oz)   Height: 5' 10" (1.778 m)          General Appearance: Alert, cooperative, no distress  Head: Normocephalic  Eyes: Clear conjunctiva  Neck: No obvious LND or JVD  Lungs: Normal chest excursion, no accessory muscle use  Chest: Regular rate rhythm by palpation, no pedal edema  Abdomen: Soft, non-tender, non-distended, surgical scars lower midline well-healed  Extremities: Atraumatic   Lymph Nodes: No appreciable lymph adenopathy  Neurologic: No gross gait, motor or sensory deficits      LAB:      CMP  Sodium   Date Value Ref Range Status   01/26/2024 135 (L) 136 - 145 mmol/L Final     Potassium   Date Value Ref Range Status   01/26/2024 4.4 3.5 - 5.1 mmol/L Final     Chloride   Date Value Ref Range Status   01/26/2024 102 95 - 110 mmol/L Final     CO2   Date Value Ref Range Status   01/26/2024 24 23 - 29 mmol/L Final     Glucose   Date Value Ref Range Status   01/26/2024 126 (H) 70 - 110 mg/dL Final     BUN   Date Value Ref Range Status   01/26/2024 33 (H) 6 - 20 mg/dL Final     Creatinine   Date Value Ref Range Status   01/26/2024 2.5 (H) 0.5 - 1.4 mg/dL Final     Calcium   Date " Value Ref Range Status   2024 9.5 8.7 - 10.5 mg/dL Final     Total Protein   Date Value Ref Range Status   2024 6.8 6.0 - 8.4 g/dL Final     Albumin   Date Value Ref Range Status   2024 2.7 (L) 3.5 - 5.2 g/dL Final     Total Bilirubin   Date Value Ref Range Status   2024 0.3 0.1 - 1.0 mg/dL Final     Comment:     For infants and newborns, interpretation of results should be based  on gestational age, weight and in agreement with clinical  observations.    Premature Infant recommended reference ranges:  Up to 24 hours.............<8.0 mg/dL  Up to 48 hours............<12.0 mg/dL  3-5 days..................<15.0 mg/dL  6-29 days.................<15.0 mg/dL       Alkaline Phosphatase   Date Value Ref Range Status   2024 87 55 - 135 U/L Final     AST   Date Value Ref Range Status   2024 12 10 - 40 U/L Final     ALT   Date Value Ref Range Status   2024 17 10 - 44 U/L Final     Anion Gap   Date Value Ref Range Status   2024 9 8 - 16 mmol/L Final     eGFR   Date Value Ref Range Status   2024 29.1 (A) >60 mL/min/1.73 m^2 Final     Lab Results   Component Value Date    WBC 3.62 (L) 2024    HGB 9.0 (L) 2024    HCT 28.2 (L) 2024    MCV 89 2024     2024             IMAGIN/22/2023  US KIDNEY     CLINICAL HISTORY:  NTX clinical trial; Encounter for examination for normal comparison and control in clinical research program     TECHNIQUE:  Ultrasound of the kidneys was performed including color flow and Doppler evaluation of the kidneys.     COMPARISON:  CT chest abdomen pelvis 10/30/2023     FINDINGS:  Right kidney: The right kidney measures 12.1 cm. Cortical thinning and loss of corticomedullary distinction.  Resistive index measures 0.74.  No mass. No renal stone. Severe hydronephrosis with dilatation of partially imaged proximal ureter.  There is associated cortical thinning.     Left kidney: The left kidney measures 10.5 cm. No  cortical thinning. No loss of corticomedullary distinction. Resistive index measures 0.57.  No mass. No renal stone. No hydronephrosis.     Splenic resistive index measures 0.48.     Impression:     Severe right-sided hydronephrosis with cortical thinning suggesting that this is chronic.  Findings are similar to recent 10/30/2023 CT.     Electronically signed by resident: Chan Cedillo  Date:                                            11/22/2023  Time:                                           07:50     Electronically signed by: Ermias Matthew MD  Date:                                            11/22/2023  Time:                                           08:06      10/30/2023  CT CHEST ABDOMEN PELVIS WITHOUT CONTRAST(XPD)     CLINICAL HISTORY:  Bladder cancer, invasive, assess treatment response;Research Patient Evaluation... Please include Recist Measurements in report.;Malignant neoplasm of bladder, unspecified     TECHNIQUE:  Low dose axial images, sagittal and coronal reformations were obtained from the thoracic inlet to the pubic synthesis without contrast.     COMPARISON:  CT chest 10/16/2023, MRI pelvis 10/15/2023, PET-CT 08/10/2023     FINDINGS:  Thoracic soft tissues: Right chest port with transvenous catheter tip terminating in the SVC.     Aorta: Normal caliber.  Mild calcific atherosclerosis.     Heart: Normal size.  No pericardial effusion.  Postoperative changes of CABG.  Coronary artery calcific atherosclerosis.     Ashley/Mediastinum: No pathologic lymphadenopathy.     Lungs: Stable scattered bilateral solid pulmonary nodules.  Largest nodule within the left lung measures 0.9 x 0.9 cm within the upper lobe (series 6, image 230), previously 0.9 x 0.9 cm.  Largest nodule within the right lung measures 0.9 x 0.8 cm within the lower lobe (series 6, image 340), previously 0.9 x 0.8 cm.  No definite new nodule.  No consolidation or pleural effusion.     Liver: Few scattered hepatic cysts.  Few additional  subcentimeter hypodensities too small to characterize, similar to prior exam.     Gallbladder: Calcified gallstones.  No wall thickening.     Bile Ducts: No evidence of dilated ducts.     Pancreas: No mass or peripancreatic fat stranding.     Spleen: Unremarkable.     Adrenals: Unremarkable.     Kidneys/ Ureters: Similar bilateral renal cortical thinning and cortical scarring.  Stable severe right hydronephrosis and ureteral dilatation.  Abrupt caliber change of the distal right ureter with soft tissue attenuation (series 3, image 111).     Bladder: Postoperative changes of cystoproctectomy with creation of neobladder.  Redemonstration of a multilobular soft tissue mass within the surgical bed involving the neobladder.  Mass abuts the rectum and sigmoid colon.  Findings appear similar compared to prior MRI allowing for differences in modality.     Reproductive organs: As above.     GI Tract/Mesentery: Postsurgical changes of the bowel.  No evidence of bowel obstruction or inflammation.     Peritoneal Space: No ascites. No free air.     Lymph nodes: Few mildly prominent pelvic and retroperitoneal lymph nodes, similar to prior exam.  Mildly prominent left para-aortic lymph node measuring 1 cm (series 3, image 65), previously 1 cm.  Right external iliac chain lymph node measuring 0.9 cm (series 3, image 132), previously 1.0 cm.     Abdominal wall: Small fat containing supraumbilical hernia.     Vasculature: Mild aortoiliac calcific atherosclerosis.  No aneurysm.     Bones: Remote fracture of the left pubic symphysis involving the superior and inferior pubic rami with associated lytic lesion, similar to prior exam.  Prior median sternotomy.  No new aggressive lesion or acute fracture.     Impression:     1. Postoperative changes of cystoproctectomy with creation of neobladder.  Persistent multilobular soft tissue mass within the surgical bed involving the neobladder and abutting the rectum and sigmoid colon, difficult  to evaluate on noncontrast CT.  Overall findings appear similar to prior MRI 10/15/2023.  2. Few stable mildly prominent pelvic and retroperitoneal lymph nodes.  3. Stable bilateral subcentimeter pulmonary nodules.  4. Stable severe right hydronephrosis and ureteral dilatation concerning for obstruction.  Abrupt caliber change of the distal right ureter with soft tissue attenuation which may represent stricture or neoplasm.  5. Stable appearing fracture of the left pubic symphysis with associated lytic lesion.  6. Additional findings as above.  Pelvic mass is difficult to measure on noncontrast CT.  Remain described lymph nodes and pulmonary nodules do not meet RECIST inclusion criteria.        Electronically signed by: Marcelino iMguel  Date:                                            10/30/2023  Time:                                           10:22        ASSESSMENT/PLAN:     57 yo M w hx of MIBC s/p radical cystectomy w/ ileal neobladder in 2017 now with local and systemic recurrence undergoing phase 1 clinical trial     Plan:  Right Hydronephrosis  Images reviewed: he has severe right hydronephrosis with a thin parenchyma. There is hydroureter to the level of a soft tissue recurrence. We discussed management options for him in order to maximize his renal function for the phase 1 program. I do not believe that a stent is feasible given the soft tissue recurrence. We decided that we would refer him to IR for nephrostomy tube placement which he has completed.     Unfortunately GFR not responding to nephrostomy tube placement.     We discussed that right kidney may have already been failing for a while secondary to obstruction    We removed his right nephrostomy tube today in clinic.     Muscle Invasive Bladder Cancer, metastatic   - Progressed through chemo, EV + Pembro, and Sacituzumab.  - Was on Phase 1 trial but could not keep his his GFR   - GFR seems to be secondary to hydration alone   - Will have him continue  to follow with heme / onc       Bladder Neck Contracture  - S/p 12/5 bladder neck dilation -- doing well since           The total time for the established patient visit was at least 30 minutes in both face-to-face and non-face-to-face activities, which included chart review, interpretation of results, counseling, education, ordering meds/tests/procedures, and/or coordination of care.       Eder Oconnor MD  Urologic Oncology  P: [phone]

## 2024-01-31 NOTE — PROGRESS NOTES
Subjective:      Patient ID: Julio Rodríguez is a 58 y.o. male.    Vitals:  weight is 75 kg (165 lb 5.5 oz). His oral temperature is 98.3 °F (36.8 °C). His blood pressure is 140/89 (abnormal) and his pulse is 83. His respiration is 17 and oxygen saturation is 100%.     Chief Complaint: Dysuria    59 y/o male presents to  today with c/o dysuria. Pt has h/o metastatic bladder cancer and receiving chemo. Last chemo five days ago. Also has h/o right hydronephrosis and CKD.  He was seen here on 1/15 and diagnosed with UTI.  He started cipro, which had to be changed to augmentin after urine culture resulted.  He completed the augmentin and might have felt a little relief, but states it is hard to tell due to the cancer and the self-cathing.  He states his urethra feels irritated - likely from catheter (or maybe recurring UTI). He uses sterile catheters each time with lubricant. He wears depends due to urinary incontinence as well. Last urine culture showed enterococcus faecalis.  Denies stool incontinence.  Practices good hygiene.  Denies fever. Has had some right sided flank discomfort.     Dysuria   The current episode started more than 1 month ago. The problem occurs every urination. The problem has been gradually improving. The quality of the pain is described as aching. The pain is at a severity of 4/10. The pain is mild. There has been no fever.       Genitourinary:  Positive for dysuria.      Objective:     Physical Exam   Constitutional: He is oriented to person, place, and time.  Non-toxic appearance. He does not appear ill. No distress.   HENT:   Head: Normocephalic.   Nose: Nose normal.   Mouth/Throat: Mucous membranes are moist.   Cardiovascular: Normal rate.   Pulmonary/Chest: Effort normal.   Abdominal: Normal appearance.   Musculoskeletal: Normal range of motion.         General: Normal range of motion.   Neurological: He is alert and oriented to person, place, and time.   Skin: Skin is not diaphoretic.    Psychiatric: His behavior is normal. Mood normal.   Nursing note and vitals reviewed.      Assessment:     1. Urinary tract infection with hematuria, site unspecified        Plan:   Considered nitrofurantoin, as bacteria appeared susceptible based on last urine culture, however due to CKD-would rather not use at this time. Did not to place on augmentin again, as well. Will use cefdinir pending the culture results.     Urinary tract infection with hematuria, site unspecified  -     POCT Urinalysis, Dipstick, Automated, W/O Scope  -     CULTURE, URINE    Other orders  -     cefdinir (OMNICEF) 300 MG capsule; Take 1 capsule (300 mg total) by mouth 2 (two) times daily. for 7 days  Dispense: 14 capsule; Refill: 0      Patient Instructions   Urine culture pending  Please call [phone] with any questions   Increase water intake   Rest

## 2024-02-01 NOTE — PATIENT INSTRUCTIONS
Urine culture pending  Please call [phone] with any questions   Increase water intake   Rest

## 2024-02-02 NOTE — TELEPHONE ENCOUNTER
Chart reviewed. Hx of bladder cancer. Spoke to patient - reports symptoms are unchanged. Advised to return for nurse visit to submit another urine sample for UA and culture,.

## 2024-02-07 PROBLEM — N39.0 RECURRENT UTI: Status: ACTIVE | Noted: 2024-02-07

## 2024-02-07 NOTE — TELEPHONE ENCOUNTER
Message left for [employer] to request form to order catheter supplies for patient.  Return call contact information included.  Informed patient.

## 2024-02-07 NOTE — PROGRESS NOTES
MEDICAL ONCOLOGY - FOLLOW UP VISIT    Cancer/Stage/TNM:    Cancer Staging   Malignant neoplasm of urinary bladder  Staging form: Urinary Bladder, AJCC 8th Edition  - Clinical: Stage IVB (cT4b, cNX, pM1b) - Signed by Bridger Abad MD on 9/28/2023       Reason for visit: Metastatic bladder cancer    HPI: Julio Rodríguez is a 58 y.o. male with metastatic bladder cancer previously treated with neoadjuvant ddMVAC, radical cystectomy, and then carboplatin/gemcitabine and EV+ Pembro following local and metastatic recurrence. Subsequetnly received a single dose of pembrolizumab + NTX-1088 11/2/23 as part of a phase I trial through the [facility], but was taken off trial for elevated creatinine despite PCN placement.  He presents to medical oncology clinic prior to C3D1 SG.    History has been obtained by chart review and discussion with the patient.    Interval Events:    Seen in urgent care 1/31/24. RX for cefdinir for presumed UTI - . Continues to have ongoing dysuria. Has some associated mixed stress/urge oncontinence which seem to increase his pain. Otherwise continues dependent on intermittent self catheterization. He also notes ongoing pain with self catheterziation he can localize about alf into his urethrea.     Otherwise he is generally tolerating treatment well. Notes some waxing and waning fatigue. Appetite is fair and no signficant nausea or vomiting. No signficant diarrhea. No other concerns.      Oncology History   Malignant neoplasm of urinary bladder   2016 Initial Diagnosis    Bladder CIS. Managed with BCG     2017 Notable Event    Developed recurrent muscle invasive disease.     2017 - 2017 Chemotherapy    ddMVAC      Surgery    Radical cystectomy with lymph node dissection and creation of neobladder. [id number]     7/2022 Notable Event    New onset hematuria. MRI scan of the pelvis in August showed a suspected blood clot adherent to the distal Smith catheter. There was abnormal signal and enhancement of  the bilateral pubic bones adjacent to the neobladder suspicious for neoplastic infiltration. There was no pelvic lymphadenopathy.      7/2022 Imaging Significant Findings    Pet scan at the same time showed hypermetabolic retroperitoneal lymphadenopathy. There was marked activity felt to involve the bladder wall suspicious for tumor.     7/2022 Biopsy    Biopsy of the bladder neck showed recurrent high-grade urothelial carcinoma.     9/28/2022 - 5/16/2023 Chemotherapy    Mr. Rodríguez was started on chemotherapy with gemcitabine and carboplatin in September 2022.      1/25/2023 Imaging Significant Findings    Pet scan on 25 January showed changes related to prior cystoprostatectomy with neobladder creation and minimal fullness to the right renal collecting system. There was no evidence of a discrete hypermetabolic mass or lymphadenopathy. There was diffusely increased activity throughout the osseous structures, suggestive of chemotherapy with reactive marrow.     4/11/2023 Imaging Significant Findings    CT a/p  1. Pathologic fracture of the left parasymphysis pubis secondary to lytic process. Given location adjacent to neobladder, osteomyelitis is a possibility. Metastatic disease not excluded.   2. Prior cystoprostatectomy with chronic right ureteral obstruction and hydroureteronephrosis, minimally changed from the prior. All etiologies considered including malignant obstruction. Urology consultation recommended.   3. Incidental findings including cholelithiasis, bilateral multifocal renal cortical scarring, and small hiatal hernia.       5/24/2023 -  Chemotherapy    Enfortumab vedotin + Pembrolizumab     8/10/2023 Imaging Significant Findings    PET CT  Interval development of hypermetabolic pulmonary nodules within the lingula and right lower lobe likely reflecting pulmonary metastases. Some additional tiny pulmonary nodularity is new or more conspicuous from prior but too small to accurately characterize by  PET/CT. Attention on follow-up recommended.     Worsening obstructive uropathy which is relatively moderate to severe the right kidney.     Similar appearance of the urinary neobladder. There is somewhat diminished due to physiologic activity to evaluate for local neoplasm but the appearance overall appears similar to prior. Assessment of local disease could be followed with CT abdomen and pelvis with contrast.     Interval fracture of the left superior and inferior pubic rami appearing subacute in nature. Please correlate for trauma and tenderness. There is no significant FDG activity within the area to favor a pathologic fracture.     Previously seen diffuse marrow activity may have been due to previous marrow rebound or drug effect.       8/25/2023 Imaging Significant Findings    MR Pelvis  History of cystoprostatectomy and neobladder creation.     Interval increase in multilobular mass in the pelvis infiltrating the rectum, possibly due to distal sigmoid colon, anterior left pelvic bones as well as a inferior aspect of the neobladder suspicious for progression of neoplastic process as discussed above.      9/28/2023 Cancer Staged    Staging form: Urinary Bladder, AJCC 8th Edition  - Clinical: Stage IVB (cT4b, cNX, pM1b)     10/16/2023 Imaging Significant Findings    CT chest   Impression:  - Multiple solid bilateral pulmonary nodules up to 9mm showing interval increase in size concerning for metastatic disease in this patient with history of prior bladder malignancy.  - Multiple hepatic hypodensities, which may represent cystic lesions however some which are too small to characterize.  - Partially imaged right kidney showing parenchymal atrophy and suspected hydronephrosis..    MR Pelvis   Impression:     Interval increased size of multilobular mass in the cystoprostatectomy surgical bed that invades the neobladder, rectum, sigmoid colon, and left pelvis.  Multiple pelvic lymph nodes are more conspicuous from  prior exam and concerning for additional metastatic disease.     10/24/2023 - 10/24/2023 Chemotherapy    Treatment Summary   Plan Name: OP SACITUZUMAB GOVITECAN-HZIY Q3W  Treatment Goal: Palliative  Status: Inactive  Start Date:   End Date:   Provider: Bridger Abad MD  Chemotherapy: sacituzumab govitecan-hziy (TRODELVY) 543 mg in sodium chloride 0.9% 500 mL chemo infusion, 7.5 mg/kg = 543 mg (original dose ), Intravenous, Clinic/HOD 1 time, 0 of 17 cycles  Dose modification: 7.5 mg/kg (Cycle 1, Reason: MD Discretion, Comment: UGT1A1 PM )     11/2/2023 - 11/20/2023 Chemotherapy    Treatment Summary   Plan Name: [facility] NTX-1088-01 Dose Escalation INV-NTX + INV pembrolizumab  Treatment Goal: Control  Status: Inactive  Start Date: 11/2/2023  End Date: 11/20/2023  Provider: Chan Leos MD  Chemotherapy: INV [id number] (pembrolizumab) 200 mg in INV sodium chloride 0.9 % 100 mL chemo infusion, 200 mg, Intravenous, Clinic/HOD 1 time, 1 of 35 cycles  Administration: 200 mg (11/2/2023)     11/29/2023 Imaging Significant Findings    Impression:     Postoperative change including cysto proctectomy with creation of neobladder.  Persistent multilobular soft tissue mass within the surgical bed involving the neobladder and abutting the rectum and sigmoid colon, noting limited evaluation on this noncontrast examination.  Within these confines, appearance is similar from comparison CT 10/30/2023, noting increased distension of the neobladder from comparison imaging.     Persistent right-sided hydronephrosis and hydroureter with soft tissue attenuation at the mid to distal right ureter.  Prominent lymph nodes surrounding the right ureter, similar from comparison imaging.     Few stable mildly prominent pelvic and retroperitoneal lymph nodes.     Slight interval increase in left upper lobe nodule measuring approximately 1.2 x 1.0 cm, previously measuring 0.9 x 0.9 cm.  Additional previously described nodules are similar in size and  appearance.     Remote fracture of the left pubic symphysis with associated lytic lesion.     12/9/2023 Imaging Significant Findings    CT 12/9/23    Impression:     1. Right nephrostomy in appropriate position.  2. Postoperative change of cystoproctectomy with ileal neobladder.  Similar appearance of large pelvic mass surrounding the neobladder.  3. Ill-defined hypoattenuating hepatic lesions, concerning for metastases in light of history.  4. Increased retroperitoneal and pelvic adenopathy.  5. Stable pulmonary nodules.  6. Additional findings as above.     12/28/2023 -  Chemotherapy    Treatment Summary   Plan Name: OP SACITUZUMAB GOVITECAN-HZIY Q3W  Treatment Goal: Palliative  Status: Active  Start Date: 12/28/2023  End Date: 12/5/2024 (Planned)  Provider: Bridger Abad MD  Chemotherapy: sacituzumab govitecan-hziy (TRODELVY) 540 mg in sodium chloride 0.9% 500 mL chemo infusion, 555 mg (100 % of original dose 7.5 mg/kg), Intravenous, Clinic/HOD 1 time, 2 of 17 cycles  Dose modification: 7.5 mg/kg (original dose 7.5 mg/kg, Cycle 1, Reason: MD Discretion)  Administration: 540 mg (12/28/2023), 540 mg (1/4/2024), 540 mg (1/19/2024), 540 mg (1/26/2024)     Melanoma        Past Medical History:   Diagnosis Date    Bladder cancer     CAD (coronary artery disease) 9/12/2023    Prior CABG. Not on antiplatelets. Home medicatiosn include atorvastatin    Carcinoma     forehead    Encounter for blood transfusion     Hypertension     Hypothyroidism 9/12/2023    Home medications include levothyroxine    Malignant melanoma of skin, unspecified     right arm    Malignant neoplasm of urinary bladder 9/12/2023    Melanoma 9/12/2023    History of T1a melanoma; 0.5mm depth without ulceartion. SP wide local excision 02/2022          Past Surgical History:   Procedure Laterality Date    CORONARY ARTERY BYPASS GRAFT  10/2015    CYSTECTOMY, ROBOT-ASSISTED, LAPAROSCOPIC, USING DA MARY XI, WITH ILEAL CONDUIT CREATION  12/2017     CYSTOGRAM N/A 12/5/2023    Procedure: CYSTOGRAM;  Surgeon: Eder Oconnor MD;  Location: [facility] OR [facility] FLR;  Service: Urology;  Laterality: N/A;    CYSTOSCOPY N/A 12/5/2023    Procedure: CYSTOSCOPY;  Surgeon: Eder Oconnor MD;  Location: [facility] OR 1ST FLR;  Service: Urology;  Laterality: N/A;    DILATION OF BLADDER      dialation of bladder neck- due to claudia bladder- multiple procedures    DILATION OF URETHRA N/A 12/5/2023    Procedure: DILATION, URETHRA;  Surgeon: Eder Oconnor MD;  Location: [facility] OR [facility] FLR;  Service: Urology;  Laterality: N/A;    LYMPHADENECTOMY      PERCUTANEOUS NEPHROSTOMY Right 12/8/2023    Procedure: Creation, Nephrostomy, Percutaneous;  Surgeon: Jens Nunez MD;  Location: [facility] CATH LAB;  Service: Radiology;  Laterality: Right;    PLACEMENT N/A 12/5/2023    Procedure: PLACEMENT;  Surgeon: Eder Oconnor MD;  [facility];  Service: Urology;  Laterality: N/A;  placement of valiente over a wire by MD MOHR ROBOTIC PROSTECTOMY, SUPRAPUBIC  12/2017        Family History   Problem Relation Age of Onset    Heart disease Father     Heart attack Paternal Uncle     Breast cancer Maternal Cousin     Colon cancer Neg Hx     Bladder Cancer Neg Hx         Social History     Tobacco Use    Smoking status: Former     Types: Cigarettes     Passive exposure: Never    Smokeless tobacco: Not on file   Substance Use Topics    Alcohol use: Not Currently        I have reviewed and updated the patient's past medical, surgical, family and social histories.    Review of patient's allergies indicates:  No Known Allergies     Current Outpatient Medications   Medication Sig Dispense Refill    dexAMETHasone (DECADRON) 4 MG Tab Take 2 tablets (8 mg total) by mouth once daily. Take 2 tablets (8 mg total) by mouth once daily. Take a directed on days 2, 3 and 4 of your chemotherapy cycle. 24 tablet 3    atorvastatin (LIPITOR) 20 MG tablet Take 20 mg by mouth every evening.      cefdinir (OMNICEF) 300 MG capsule Take  "1 capsule (300 mg total) by mouth 2 (two) times daily. for 7 days 14 capsule 0    docusate sodium (COLACE) 100 MG capsule Take 220 capsules by mouth every evening.      Lactobacillus acidophilus 10 billion cell Cap Take by mouth once daily.      Lactobacillus rhamnosus GG (CULTURELLE) 10 billion cell capsule Take 1 capsule by mouth once daily.      levoFLOXacin (LEVAQUIN) 500 MG tablet Take 500 mg by mouth.      levothyroxine (SYNTHROID) 50 MCG tablet Take 50 mcg by mouth before breakfast.      LINZESS 72 mcg Cap capsule Take 72 mcg by mouth every morning.      loperamide (IMODIUM) 2 mg capsule Take 1 capsule (2 mg total) by mouth 4 (four) times daily as needed for Diarrhea. 60 capsule 0    multivitamin (THERAGRAN) per tablet Take 1 tablet by mouth every morning.      OLANZapine (ZYPREXA) 5 MG tablet TAKE 1 TABLET BY MOUTH EVERY EVENING ON DAYS 1 THROUGH 4 OF EACH CHEMOTHERAPY CYCLE 30 tablet 0    ondansetron (ZOFRAN-ODT) 8 MG TbDL Take 1 tablet (8 mg total) by mouth every 8 (eight) hours as needed (nausea/vomiting). (Patient not taking: Reported on 1/31/2024) 60 tablet 5    TURMERIC ORAL Take by mouth every morning.      VTAMA 1 % Crea APPLY 1 APPLICATION ON THE SKIN DAILY       No current facility-administered medications for this visit.        Physical Exam:   BP (!) 140/76 (BP Location: Left arm, Patient Position: Sitting, BP Method: Medium (Automatic))   Pulse 78   Resp 18   Ht 5' 10" (1.778 m)   Wt 75.4 kg (166 lb 3.6 oz)   SpO2 100%   BMI 23.85 kg/m²      ECOG Performance status: 1            Physical Exam  Constitutional:       General: He is not in acute distress.     Appearance: Normal appearance.   HENT:      Head: Normocephalic.   Eyes:      General: No scleral icterus.     Extraocular Movements: Extraocular movements intact.      Conjunctiva/sclera: Conjunctivae normal.   Cardiovascular:      Rate and Rhythm: Normal rate and regular rhythm.      Heart sounds: No murmur heard.     No friction rub. " No gallop.   Pulmonary:      Effort: Pulmonary effort is normal. No respiratory distress.      Breath sounds: Normal breath sounds. No stridor. No wheezing, rhonchi or rales.   Abdominal:      General: There is no distension.   Skin:     General: Skin is warm and dry.   Neurological:      Mental Status: He is alert and oriented to person, place, and time.      Motor: No weakness.   Psychiatric:         Mood and Affect: Mood normal.         Behavior: Behavior normal.         Thought Content: Thought content normal.           Labs:                 Lab Results   Component Value Date     (L) 02/07/2024    K 4.1 02/07/2024    CREATININE 2.5 (H) 02/07/2024    WBC 7.31 02/07/2024    HGB 8.8 (L) 02/07/2024     02/07/2024    AST 11 02/07/2024    ALT 14 02/07/2024    ALKPHOS 86 02/07/2024    BILITOT 0.4 02/07/2024          Imaging:       Interventional Radiology  Procedure performed in the Invasive Lab    - See Procedure Log link below for nursing documentation    - See OpNote on Surgeries Tab for physician findings    - See Imaging Tab for radiologist dictation      I have personally reviewed the above imaging including recent MRI and PET CT scan    Path:   Reviewed pathology as documented in oncology history      Assessment and Plan:        1. Anemia in neoplastic disease  -     FERRITIN; Future; Expected date: 02/07/2024  -     IRON AND TIBC; Future; Expected date: 02/07/2024  -     Urinalysis, Reflex to Urine Culture Urine, Clean Catch; Future; Expected date: 02/07/2024    2. Fatigue, unspecified type  -     TSH; Future; Expected date: 02/07/2024    3. Malignant neoplasm of urinary bladder, unspecified site  Overview:  Metastatic bladder cancer previously treated with neoadjuvant ddMVAC, radical cystectomy, and then carboplatin/gemcitabine and EV+ Pembro following local and metastatic recurrence. Subsequetnly received a single dose of pembrolizumab + NTX-1088 11/2/23 as part of a phase I trial through the  [facility], but was taken off trial for elevated creatinine despite PCN placement. Starting sacituzumab govitecan 12/28/23     Assessment & Plan:  Tolerating treatment reasonably well. Has some fatigue and modestly progressive anemia. No neutropenia or signficant diarrhea. Will proceed with C3 at ongoing empiric DR of 75% given UGT1A1 deficiency.  - Proceed with C3 SG tomorrow  - Repeat imaging after C4      4. Hypothyroidism, unspecified type  Overview:  Home medications include levothyroxine    Assessment & Plan:  - Will recheck TSH      5. Stage 3b chronic kidney disease  Assessment & Plan:  Stable creatinine  - Continue mo monitor      6. Coronary artery disease, unspecified vessel or lesion type, unspecified whether angina present, unspecified whether native or transplanted heart  Overview:  Prior CABG. Not on antiplatelets. Home medicatiosn include atorvastatin      7. Recurrent UTI  Assessment & Plan:  Currently on cefdinir after recent augmentin course. Unclear if true infection or ongoing leukocyturia in setting of neobladder and chronic IUC. At least some concern, as well, for rectal involvement of his bladder tumor.   - Recheck UA / UCX today  - Proceed with treatment  - FU with urology next week  - May need to consider prophylactic abx      Other orders  -     acetaminophen tablet 650 mg  -     diphenhydrAMINE (BENADRYL) 25 mg in sodium chloride 0.9% 50 mL IVPB  -     famotidine (PF) injection 20 mg  -     aprepitant (CINVANTI) injection 130 mg  -     palonosetron (ALOXI) 0.25 mg with Dexamethasone (DECADRON) 12 mg in NS 50 mL IVPB  -     sacituzumab govitecan-hziy (TRODELVY) 555 mg in sodium chloride 0.9% 500 mL chemo infusion  -     atropine injection 0.4 mg  -     prochlorperazine injection Soln 10 mg  -     EPINEPHrine (EPIPEN) 0.3 mg/0.3 mL pen injection 0.3 mg  -     diphenhydrAMINE injection 50 mg  -     hydrocortisone sodium succinate injection 100 mg  -     sodium chloride 0.9% 100 mL flush bag  -      sodium chloride 0.9% flush 10 mL  -     heparin, porcine (PF) 100 unit/mL injection flush 500 Units  -     alteplase injection 2 mg  -     acetaminophen tablet 650 mg  -     diphenhydrAMINE (BENADRYL) 25 mg in sodium chloride 0.9% 50 mL IVPB  -     famotidine (PF) injection 20 mg  -     aprepitant (CINVANTI) injection 130 mg  -     palonosetron (ALOXI) 0.25 mg with Dexamethasone (DECADRON) 12 mg in NS 50 mL IVPB  -     sacituzumab govitecan-hziy (TRODELVY) 555 mg in sodium chloride 0.9% 500 mL chemo infusion  -     atropine injection 0.4 mg  -     prochlorperazine injection Soln 10 mg  -     EPINEPHrine (EPIPEN) 0.3 mg/0.3 mL pen injection 0.3 mg  -     diphenhydrAMINE injection 50 mg  -     hydrocortisone sodium succinate injection 100 mg  -     sodium chloride 0.9% 250 mL flush bag  -     sodium chloride 0.9% flush 10 mL  -     heparin, porcine (PF) 100 unit/mL injection flush 500 Units  -     alteplase injection 2 mg            Route Chart for Scheduling    Med Onc Chart Routing      Follow up with physician 3 weeks. Jenniffer 3 weeks   Follow up with LYNDSEY    Infusion scheduling note   Sacituzumab in 3 weeks and 4 weeks   Injection scheduling note    Labs CBC, CMP and reticulocytes   Scheduling:  Preferred lab:  Lab interval:     Imaging    Pharmacy appointment    Other referrals                  Treatment Plan Information   OP SACITUZUMAB GOVITECAN-HZIY Q3W   Bridger Abad MD   Upcoming Treatment Dates - OP SACITUZUMAB GOVITECAN-HZIY Q3W    2/8/2024       Pre-Medications       acetaminophen tablet 650 mg       diphenhydrAMINE (BENADRYL) 25 mg in sodium chloride 0.9% 50 mL IVPB       famotidine (PF) injection 20 mg       Chemotherapy       sacituzumab govitecan-hziy (TRODELVY) 555 mg in sodium chloride 0.9% 500 mL chemo infusion       Supportive Care       atropine injection 0.4 mg       prochlorperazine injection Soln 10 mg       Antiemetics       aprepitant (CINVANTI) injection 130 mg       palonosetron  (ALOXI) 0.25 mg with Dexamethasone (DECADRON) 12 mg in NS 50 mL IVPB  2/15/2024       Pre-Medications       acetaminophen tablet 650 mg       diphenhydrAMINE (BENADRYL) 25 mg in sodium chloride 0.9% 50 mL IVPB       famotidine (PF) injection 20 mg       Chemotherapy       sacituzumab govitecan-hziy (TRODELVY) 555 mg in sodium chloride 0.9% 500 mL chemo infusion       Supportive Care       atropine injection 0.4 mg       prochlorperazine injection Soln 10 mg       Antiemetics       aprepitant (CINVANTI) injection 130 mg       palonosetron (ALOXI) 0.25 mg with Dexamethasone (DECADRON) 12 mg in NS 50 mL IVPB  2/29/2024       Pre-Medications       acetaminophen tablet 650 mg       diphenhydrAMINE (BENADRYL) 25 mg in sodium chloride 0.9% 50 mL IVPB       famotidine (PF) injection 20 mg       Chemotherapy       sacituzumab govitecan-hziy (TRODELVY) 555 mg in sodium chloride 0.9% 500 mL chemo infusion       Supportive Care       atropine injection 0.4 mg       prochlorperazine injection Soln 10 mg       Antiemetics       aprepitant (CINVANTI) injection 130 mg       palonosetron (ALOXI) 0.25 mg with Dexamethasone (DECADRON) 12 mg in NS 50 mL IVPB  3/7/2024       Pre-Medications       acetaminophen tablet 650 mg       diphenhydrAMINE (BENADRYL) 25 mg in sodium chloride 0.9% 50 mL IVPB       famotidine (PF) injection 20 mg       Chemotherapy       sacituzumab govitecan-hziy (TRODELVY) 555 mg in sodium chloride 0.9% 500 mL chemo infusion       Supportive Care       atropine injection 0.4 mg       prochlorperazine injection Soln 10 mg       Antiemetics       aprepitant (CINVANTI) injection 130 mg       palonosetron (ALOXI) 0.25 mg with Dexamethasone (DECADRON) 12 mg in NS 50 mL IVPB          Bridger Abad

## 2024-02-07 NOTE — ASSESSMENT & PLAN NOTE
Tolerating treatment reasonably well. Has some fatigue and modestly progressive anemia. No neutropenia or signficant diarrhea. Will proceed with C3 at ongoing empiric DR of 75% given UGT1A1 deficiency.  - Proceed with C3 SG tomorrow  - Repeat imaging after C4

## 2024-02-07 NOTE — ASSESSMENT & PLAN NOTE
Currently on cefdinir after recent augmentin course. Unclear if true infection or ongoing leukocyturia in setting of neobladder and chronic IUC. At least some concern, as well, for rectal involvement of his bladder tumor.   - Recheck UA / UCX today  - Proceed with treatment  - FU with urology next week  - May need to consider prophylactic abx

## 2024-02-08 NOTE — TELEPHONE ENCOUNTER
Spoke with patient who was able to provide acceptable patient identifiers prior to start of conversation.   Updated patient on status of contacting [address] [place] [address] for his supplies.  Patient states that he will provide the rep who handles is account with my fax number so they can send the prescription form for his supplies.

## 2024-02-08 NOTE — PLAN OF CARE
1621-Pt tolerated Trodelvy well today, no complaints or complications. VSS through duration of treatment. Pt declined 30 minute post observation. Pt aware to call provider with any questions or concerns and is aware of upcoming appts. Pt ambulatory from clinic with steady gait, no distress noted.

## 2024-02-12 NOTE — PROGRESS NOTES
[facility]  Urologic Oncology Clinic Note        Date of Service: 2/12/2024      Chief Complaint/Reason for Consultation: Metastatic Bladder Cancer, Right Hydronephrosis, Urethral stricture     Requesting Provider:   No referring provider defined for this encounter.      History of Present Illness:   Patient 58 y.o. 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 He underwent radical cystectomy w/ ileal neobladder in 2017. He then subsequently developed local and systemic recurrence. He is being followed by Dr Leos and part of the Phase 1 program. He did also receive palliative radiation to the pelvis after pathologic pelvic fracture.     He currently catheterizes his pouch as needed, but reports he has had multiple dilations in the past and continues to struggle to catheterize his bladder. Last dilation 12/5/2023 and since then able to catheterize successfully.     Had right nephrostomy tube placed in the past to try to improve overall GFR, but this was unsuccessful. Nephrostomy tube was removed on 12/21/2023. Since then GFR has been stable if not improved.     Following with Dr. Abad for chemo (sacituzumab) -- reports doing well on chemotherapy.    Blood thinners:  None    No Hx of TIA, MI, and CVA   Abdominal surgeries:  Radical cystectomy with creation of ileal neobladder      Today he returns to clinic for evaluation following recent treatment with cipro for UTI. Reports today using more stringent sterile technique and different catheters. Still having some pain with urination. He is concerned about recent urine culture with multiple organisms. No fever chills or other constitutional complaints suggesting urinary tract infection.        Urological History:    12/5/2023 -- Cystoscopy, urethral dilation of neobladder  12/8/2023 -- IR right nephrostomy tube placement  12/9/2023 -- CT AP w/o contrast - stable lung disease, progressive RP LND, possible metastatic diease to  liver   12/21/2023 -- Right nephrostomy tube removal in clinic      Patient Active Problem List    Diagnosis Date Noted    Recurrent UTI 02/07/2024    CKD (chronic kidney disease) 10/23/2023    Elevated blood pressure reading 09/28/2023    Malignant neoplasm of urinary bladder 09/12/2023    Melanoma 09/12/2023    CAD (coronary artery disease) 09/12/2023    Hypothyroidism 09/12/2023    Psoriasis 09/12/2023          Review of patient's allergies indicates:  No Known Allergies     Past Medical History:   Diagnosis Date    Bladder cancer     CAD (coronary artery disease) 9/12/2023    Prior CABG. Not on antiplatelets. Home medicatiosn include atorvastatin    Carcinoma     forehead    Encounter for blood transfusion     Hypertension     Hypothyroidism 9/12/2023    Home medications include levothyroxine    Malignant melanoma of skin, unspecified     right arm    Malignant neoplasm of urinary bladder 9/12/2023    Melanoma 9/12/2023    History of T1a melanoma; 0.5mm depth without ulceartion. SP wide local excision 02/2022      Past Surgical History:   Procedure Laterality Date    CORONARY ARTERY BYPASS GRAFT  10/2015    CYSTECTOMY, ROBOT-ASSISTED, LAPAROSCOPIC, USING DA MARY XI, WITH ILEAL CONDUIT CREATION  12/2017    CYSTOGRAM N/A 12/5/2023    Procedure: CYSTOGRAM;  Surgeon: Eder Oconnor MD;  Location: [facility] OR [address] [place] [address];  Service: Urology;  Laterality: N/A;    CYSTOSCOPY N/A 12/5/2023    Procedure: CYSTOSCOPY;  Surgeon: Eder Oconnor MD;  Location: [facility] OR [address] [place] [address];  Service: Urology;  Laterality: N/A;    DILATION OF BLADDER      dialation of bladder neck- due to claudia bladder- multiple procedures    DILATION OF URETHRA N/A 12/5/2023    Procedure: DILATION, URETHRA;  Surgeon: Eder Oconnor MD;  Location: [facility] OR [address] [place] [address];  Service: Urology;  Laterality: N/A;    LYMPHADENECTOMY      PERCUTANEOUS NEPHROSTOMY Right 12/8/2023    Procedure: Creation, Nephrostomy, Percutaneous;  Surgeon: Jens Nunez MD;  Location: [facility] CATH LAB;   Service: Radiology;  Laterality: Right;    PLACEMENT N/A 12/5/2023    Procedure: PLACEMENT;  Surgeon: Eder Oconnor MD;  Location: [facility] OR [address] [place] [address];  Service: Urology;  Laterality: N/A;  placement of valiente over a wire by MD MOHR ROBOTIC PROSTECTOMY, SUPRAPUBIC  12/2017      Family History   Problem Relation Age of Onset    Heart disease Father     Heart attack Paternal Uncle     Breast cancer Maternal Cousin     Colon cancer Neg Hx     Bladder Cancer Neg Hx       Social History     Tobacco Use    Smoking status: Former     Types: Cigarettes     Passive exposure: Never    Smokeless tobacco: Not on file   Substance Use Topics    Alcohol use: Not Currently        Review of Systems   Genitourinary:  Positive for dysuria and frequency. Negative for difficulty urinating, flank pain and hematuria.             OBJECTIVE:     Vitals:    02/12/24 1557   BP: (P) 122/73   BP Location: (P) Right arm   Patient Position: (P) Sitting   BP Method: (P) Medium (Automatic)   Pulse: (P) 75   Resp: (P) 20            General Appearance: Alert, cooperative, no distress  Head: Normocephalic  Eyes: Clear conjunctiva  Neck: No obvious LND or JVD  Lungs: Normal chest excursion, no accessory muscle use  Chest: Regular rate rhythm by palpation, no pedal edema  Abdomen: Soft, non-tender, non-distended, surgical scars lower midline well-healed  Extremities: Atraumatic   Lymph Nodes: No appreciable lymph adenopathy  Neurologic: No gross gait, motor or sensory deficits      LAB:        CMP  Sodium   Date Value Ref Range Status   02/07/2024 130 (L) 136 - 145 mmol/L Final     Potassium   Date Value Ref Range Status   02/07/2024 4.1 3.5 - 5.1 mmol/L Final     Chloride   Date Value Ref Range Status   02/07/2024 99 95 - 110 mmol/L Final     CO2   Date Value Ref Range Status   02/07/2024 25 23 - 29 mmol/L Final     Glucose   Date Value Ref Range Status   02/07/2024 90 70 - 110 mg/dL Final     BUN   Date Value Ref Range Status   02/07/2024 32 (H) 6 - 20  mg/dL Final     Creatinine   Date Value Ref Range Status   2024 2.5 (H) 0.5 - 1.4 mg/dL Final     Calcium   Date Value Ref Range Status   2024 9.4 8.7 - 10.5 mg/dL Final     Total Protein   Date Value Ref Range Status   2024 6.7 6.0 - 8.4 g/dL Final     Albumin   Date Value Ref Range Status   2024 2.7 (L) 3.5 - 5.2 g/dL Final     Total Bilirubin   Date Value Ref Range Status   2024 0.4 0.1 - 1.0 mg/dL Final     Comment:     For infants and newborns, interpretation of results should be based  on gestational age, weight and in agreement with clinical  observations.    Premature Infant recommended reference ranges:  Up to 24 hours.............<8.0 mg/dL  Up to 48 hours............<12.0 mg/dL  3-5 days..................<15.0 mg/dL  6-29 days.................<15.0 mg/dL       Alkaline Phosphatase   Date Value Ref Range Status   2024 86 55 - 135 U/L Final     AST   Date Value Ref Range Status   2024 11 10 - 40 U/L Final     ALT   Date Value Ref Range Status   2024 14 10 - 44 U/L Final     Anion Gap   Date Value Ref Range Status   2024 6 (L) 8 - 16 mmol/L Final     eGFR   Date Value Ref Range Status   2024 29.1 (A) >60 mL/min/1.73 m^2 Final     Lab Results   Component Value Date    WBC 7.31 2024    HGB 8.8 (L) 2024    HCT 27.5 (L) 2024    MCV 90 2024     2024             IMAGIN/9/2023   CT ABDOMEN PELVIS WITHOUT CONTRAST     CLINICAL HISTORY:  Abdominal pain, post-op;     TECHNIQUE:  Axial CT images of the abdomen and pelvis were obtained via helical, multi detector CT technique.  No intravenous contrast material was administered.     COMPARISON:  CT chest abdomen pelvis 2023.  IR nephrostomy placement 2023.     FINDINGS:  Lack of intravenous contrast limits sensitivity for parenchymal organ disease.     Heart: No cardiomegaly or pericardial effusion.     Lung Bases: Stable bilateral pulmonary nodules  (axial images 1, 8, 11, 19), largest measuring up to 9 mm.     Liver: Upper normal in size.  Normal parenchymal attenuation.  Ill-defined hypodense lesions of the right hepatic lobe (axial images 37, 51), suspicious for metastatic disease.  Limited visualization on this noncontrast study.  Additional stable rounded hypodensities, most too small to characterize, though the largest is compatible with cyst (axial image 51).     Gallbladder: Cholelithiasis.     Bile Ducts: No intra or extrahepatic biliary ductal dilation.     Pancreas: No pancreatic mass lesion or duct dilatation.     Spleen: Normal.     Adrenals: Normal.     Genitourinary: Right nephrostomy in appropriate position.  Bilateral renal cortical thinning and scarring, right greater than left.  No nephrolithiasis or hydroureteronephrosis.     Postoperative change of cystoproctectomy with ileal neobladder.  Large soft tissue mass about the neobladder, adjacent to the sigmoid colon.  Smith catheter within the neobladder.     Reproductive organs: Normal.     GI tract/Mesentery: Stomach is normal in appearance.  No evidence for bowel obstruction or inflammation.  Moderate retained feces in the colon.     Peritoneal Space: Free air in the puja- and perirenal space, likely related to recent procedure.  No abdominopelvic ascites.     Lymph nodes: Retroperitoneal herman conglomerate measures 2.7 x 1.6 cm (axial image 87), increased in size compared to priors.  Additional prominent periureteric (axial image 111), and retroperitoneal nodes (axial image 62).     Abdominal wall: Fat-containing paraumbilical ventral abdominal wall hernias, similar.     Vasculature: Abdominal aorta is normal in caliber.  Minimal aortoiliac significant calcific atherosclerosis.     Bones: Degenerative change without acute displaced fracture.  Stable pathologic fractures of the left inferior and superior pubic rami with sclerotic change.  No new suspicious lytic or sclerotic lesion.  Stable  small osteochondroma off the right iliac wing.     Impression:     1. Right nephrostomy in appropriate position.  2. Postoperative change of cystoproctectomy with ileal neobladder.  Similar appearance of large pelvic mass surrounding the neobladder.  3. Ill-defined hypoattenuating hepatic lesions, concerning for metastases in light of history.  4. Increased retroperitoneal and pelvic adenopathy.  5. Stable pulmonary nodules.  6. Additional findings as above.  This report was flagged in Epic as abnormal.     Electronically signed by resident: Lazaro Butler  Date:                                            12/09/2023  Time:                                           17:38     Electronically signed by: Barron Francois  Date:                                            12/09/2023  Time:                                           18:48    ASSESSMENT/PLAN:     59 yo M w hx of MIBC s/p radical cystectomy w/ ileal neobladder in 2017 with urethral stricture, and right hydronephrosis likely 2/2 to persistent urothelial carcinoma, now with local and systemic recurrence undergoing chemotherapy with Dr. Abad -- here for UTI symptoms    Plan:    Right Hydronephrosis  Most recent imaging reviewed: he has severe right hydronephrosis with a thin parenchyma. There is hydroureter to the level of a soft tissue recurrence. We discussed management options for him in order to maximize his renal function for the phase 1 program. I do not believe that a stent is feasible given the soft tissue recurrence. Had IR nephrostomy tube placed, GFR did not improve with tube in place, as a result tube was removed on 12/21/2023.     GFR remains improved with hydration and nephrostomy out.   No further intervention planned.    Muscle Invasive Bladder Cancer, metastatic   - Progressed through chemo, EV + Pembro, and Sacituzumab.  - Most recent imaging reviewed  - Was on Phase 1 trial but could not keep him on trial due to GFR  - Currently following with  Dr. Abad for palliative chemotherapy      Bladder Neck Contracture  - Bladder neck dilation 12/5/2023 -- doing well since  - Currently using 16F    UTI  - Most recent urine culture without single organism, informed him that this could be a contaminated culture. He plans to allow bladder to fill at home and obtain clean cath specimen and send for culture. Will call with results.      Follow up in clinic in three months.      The total time for the established patient visit was at least 20 minutes in both face-to-face and non-face-to-face activities, which included chart review, interpretation of results, counseling, education, ordering meds/tests/procedures, and/or coordination of care.       Eder Oconnor MD  Urologic Oncology  P: [phone]

## 2024-02-16 NOTE — PLAN OF CARE
Pt tolerated Trodelvy well today, no complaints or complications. VSS through duration of treatment. Pt declined 30 minute post observation. Pt aware to call provider with any questions or concerns and is aware of upcoming appts. Pt ambulatory from clinic with steady gait, no distress noted.

## 2024-02-20 NOTE — TELEPHONE ENCOUNTER
Spoke with Leon at [employer]. [phone].  Verbal authorization provided for needed catheter supplies.  [employer] to ship supplies today and will fax form for provider to sign.  Spoke with patient who was able to provide acceptable patient identifiers prior to start of conversation. Notified patient of above.

## 2024-02-23 NOTE — TELEPHONE ENCOUNTER
Called patient to inform him of his CT referral, patient informed me that he will have an appointment today at 5:15pm. Patient was advised to call us back if he has any questions/concerns.

## 2024-02-23 NOTE — PROGRESS NOTES
Subjective:      Patient ID: Julio Rodríguez is a 58 y.o. male.    Vitals:  weight is 75.3 kg (166 lb). His oral temperature is 98.5 °F (36.9 °C). His blood pressure is 150/89 (abnormal) and his pulse is 93. His respiration is 16 and oxygen saturation is 96%.     Chief Complaint: Back Pain    This is a 58 y.o. male who presents today with a chief complaint of sharp back px (in between shoulder blades) intermittently x 4 days. Pt says he feels the px with quick movements, sneezing, coughing. No SOB, no URI sx, no fever.    Home tx: tylenol    PMH: bladder cancer that has metastasized to lungs; currently in chemo therapy; allergies    Back Pain  This is a new problem. The current episode started in the past 7 days. The problem occurs intermittently. The problem is unchanged. The quality of the pain is described as stabbing. Radiates to: across back only few times. The pain is at a severity of 9/10. The pain is severe. The symptoms are aggravated by coughing, stress and position. Pertinent negatives include no abdominal pain, chest pain, dysuria, fever, headaches, leg pain, numbness or weakness.       Constitution: Negative for fever.   Cardiovascular:  Negative for chest pain.   Gastrointestinal:  Negative for abdominal pain.   Genitourinary:  Negative for dysuria.   Musculoskeletal:  Positive for back pain.   Neurological:  Negative for headaches and numbness.      Objective:     Physical Exam   Constitutional: He does not appear ill. No distress. normal  HENT:   Head: Normocephalic and atraumatic.   Nose: Nose normal.   Mouth/Throat: Mucous membranes are moist.   Cardiovascular: Normal rate, regular rhythm, normal heart sounds and normal pulses.   Pulmonary/Chest: Effort normal. He has rhonchi.   Abdominal: Normal appearance. Soft.   Musculoskeletal:         General: Tenderness (left mid scapular region) present.   Neurological: He is alert.   Nursing note and vitals reviewed.      Assessment:     1. Acute midline  thoracic back pain        Plan:       Acute midline thoracic back pain  -     X-Ray Chest PA And Lateral  -     CT Chest Without Contrast; Future; Expected date: 02/23/2024    OTC tylenol until results of CXR, CT. Suspect musculoskeletal in nature however given PMH will obtain imaging studies

## 2024-02-24 NOTE — TELEPHONE ENCOUNTER
Called left message on voice mail re CT results. Rx called into pharmacy for possibility of pneumonia per CT and CXR

## 2024-02-24 NOTE — TELEPHONE ENCOUNTER
Spoke to patient regarding CT results in detail. Informed about probable pneumonia and evidence of lytic lesion ribs, spine and possible liver. All questions answered. Advised followup as scheduled with Oncologist and will add antitussives to regimen.

## 2024-02-25 NOTE — PATIENT INSTRUCTIONS
Follow up with oncologist on Thursday  Begin taking Tylenol every 6-8 hours as needed for pain  Use lidocaine patch as needed  Please drink plenty of fluids.  Please get plenty of rest.  Please return here or go to the Emergency Department for any concerns or worsening of condition.  If you were prescribed a narcotic medication, do not drive or operate heavy equipment or machinery while taking these medications.  If you were not prescribed an anti-inflammatory medication, and if you do not have any history of stomach/intestinal ulcers, or kidney disease, or are not taking a blood thinner such as Coumadin, Plavix, Pradaxa, Eloquis, or Xaralta for example, it is OK to take over the counter Ibuprofen or Advil or Motrin or Aleve as directed.  Do not take these medications on an empty stomach.  Rest, ice, compression and elevation to the affected joint or limb as needed.  Please follow up with your primary care doctor or specialist as needed.    If you  smoke, please stop smoking.

## 2024-02-25 NOTE — PROGRESS NOTES
"Subjective:      Patient ID: Julio Rodríguez is a 58 y.o. male.    Vitals:  height is 5' 10" (1.778 m) and weight is 75.3 kg (166 lb 0.1 oz). His oral temperature is 98.4 °F (36.9 °C). His blood pressure is 124/75 and his pulse is 86. His respiration is 18 and oxygen saturation is 96%.     Chief Complaint: Back Pain    Pt is a 57 yo male with PMHx of bladder cancer, being treated with chemotherapy, is presenting with rib pain mainly with eating or increased pressure.  Onset of symptoms was 1 week.  Pt reports using OTC Tylenol once with improvement. Pt sent here from [facility] for chest xray. He was seen a few days ago in urgent care for cough. He denies EKG at this time because "heart is not bothering" him. Imaging on 2/23/2024 shows spread of bladder cancer to vertebrae and ribs. He thinks that pain is related to this and would like an Xray to r/o fracture of rib. He is not on any pain medication at this time.    Chest Pain   Pertinent negatives include no abdominal pain, cough, dizziness, fever, nausea, numbness, shortness of breath or vomiting.     Constitution: Negative for activity change, appetite change, chills, fatigue and fever.   HENT:  Negative for ear pain, congestion, postnasal drip, sinus pain, sinus pressure and sore throat.    Neck: Negative for neck pain and neck swelling.   Cardiovascular:  Negative for chest pain and sob on exertion.   Eyes:  Negative for eye trauma, eye discharge and eye redness.   Respiratory:  Negative for cough, shortness of breath and wheezing.    Gastrointestinal:  Negative for abdominal pain, nausea, vomiting, constipation and diarrhea.   Genitourinary:  Negative for dysuria, frequency, urgency and urine decreased.   Musculoskeletal:  Positive for pain. Negative for joint pain, joint swelling, abnormal ROM of joint and muscle ache.   Skin:  Negative for color change, rash, laceration and erythema.   Neurological:  Negative for dizziness, light-headedness, altered mental " status and numbness.   Psychiatric/Behavioral:  Negative for altered mental status and confusion.       Past Medical History:   Diagnosis Date    Bladder cancer     CAD (coronary artery disease) 9/12/2023    Prior CABG. Not on antiplatelets. Home medicatiosn include atorvastatin    Carcinoma     forehead    Encounter for blood transfusion     Hypertension     Hypothyroidism 9/12/2023    Home medications include levothyroxine    Malignant melanoma of skin, unspecified     right arm    Malignant neoplasm of urinary bladder 9/12/2023    Melanoma 9/12/2023    History of T1a melanoma; 0.5mm depth without ulceartion. SP wide local excision 02/2022       Past Surgical History:   Procedure Laterality Date    CORONARY ARTERY BYPASS GRAFT  10/2015    CYSTECTOMY, ROBOT-ASSISTED, LAPAROSCOPIC, USING DA MARY XI, WITH ILEAL CONDUIT CREATION  12/2017    CYSTOGRAM N/A 12/5/2023    Procedure: CYSTOGRAM;  Surgeon: Eder Oconnor MD;  Location: [facility] [address] [place] [address];  Service: Urology;  Laterality: N/A;    CYSTOSCOPY N/A 12/5/2023    Procedure: CYSTOSCOPY;  Surgeon: Eder Oconnor MD;  Location: [facility] [address] [place] [address];  Service: Urology;  Laterality: N/A;    DILATION OF BLADDER      dialation of bladder neck- due to claudia bladder- multiple procedures    DILATION OF URETHRA N/A 12/5/2023    Procedure: DILATION, URETHRA;  Surgeon: Eder Oconnor MD;  Location: [facility] [address] [place] [address];  Service: Urology;  Laterality: N/A;    LYMPHADENECTOMY      PERCUTANEOUS NEPHROSTOMY Right 12/8/2023    Procedure: Creation, Nephrostomy, Percutaneous;  Surgeon: Jens Nunez MD;  Location: [facility] CATH LAB;  Service: Radiology;  Laterality: Right;    PLACEMENT N/A 12/5/2023    Procedure: PLACEMENT;  Surgeon: Eder Oconnor MD;  Location: [facility] [address] [place] [address];  Service: Urology;  Laterality: N/A;  placement of valiente over a wire by MD Jeane MOHR ROBOTIC PROSTECTOMY, SUPRAPUBIC  12/2017       Family History   Problem Relation Age of Onset    Heart disease Father      Heart attack Paternal Uncle     Breast cancer Maternal Cousin     Colon cancer Neg Hx     Bladder Cancer Neg Hx        Social History     Socioeconomic History    Marital status: Single   Tobacco Use    Smoking status: Former     Types: Cigarettes     Passive exposure: Never   Substance and Sexual Activity    Alcohol use: Not Currently    Drug use: Never   Social History Narrative    Lives independently in [place]. 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      Social Determinants of Health     Financial Resource Strain: Low Risk  (2/12/2024)    Overall Financial Resource Strain (CARDIA)     Difficulty of Paying Living Expenses: Not hard at all   Food Insecurity: No Food Insecurity (2/12/2024)    Hunger Vital Sign     Worried About Running Out of Food in the Last Year: Never true     Ran Out of Food in the Last Year: Never true   Transportation Needs: No Transportation Needs (2/12/2024)    PRAPARE - Transportation     Lack of Transportation (Medical): No     Lack of Transportation (Non-Medical): No   Physical Activity: Insufficiently Active (2/12/2024)    Exercise Vital Sign     Days of Exercise per Week: 1 day     Minutes of Exercise per Session: 30 min   Stress: No Stress Concern Present (2/12/2024)    [employer] [place] [employer] - Occupational Stress Questionnaire     Feeling of Stress : Only a little   Social Connections: Unknown (2/12/2024)    Social Connection and Isolation Panel [NHANES]     Frequency of Communication with Friends and Family: Three times a week     Frequency of Social Gatherings with Friends and Family: Three times a week     Active Member of Clubs or Organizations: Yes     Attends Club or Organization Meetings: 1 to 4 times per year     Marital Status: Never    Housing Stability: Low Risk  (2/12/2024)    Housing Stability Vital Sign     Unable to Pay for Housing in the Last Year: No     Number of Places Lived in the  Last Year: 1     Unstable Housing in the Last Year: No       Current Outpatient Medications   Medication Sig Dispense Refill    amoxicillin-clavulanate 875-125mg (AUGMENTIN) 875-125 mg per tablet Take 1 tablet by mouth 2 (two) times daily. for 10 days 20 tablet 0    atorvastatin (LIPITOR) 20 MG tablet Take 20 mg by mouth every evening.      azithromycin (ZITHROMAX) 250 MG tablet Take 2 tablets (500 mg) on  Day 1,  followed by 1 tablet (250 mg) once daily on Days 2 through 5. 6 tablet 0    benzonatate (TESSALON PERLES) 100 MG capsule Take 1 capsule (100 mg total) by mouth every 6 (six) hours as needed for Cough. 30 capsule 1    dexAMETHasone (DECADRON) 4 MG Tab Take 2 tablets (8 mg total) by mouth once daily. Take 2 tablets (8 mg total) by mouth once daily. Take a directed on days 2, 3 and 4 of your chemotherapy cycle. 24 tablet 3    docusate sodium (COLACE) 100 MG capsule Take 220 capsules by mouth every evening.      Lactobacillus acidophilus 10 billion cell Cap Take by mouth once daily.      Lactobacillus rhamnosus GG (CULTURELLE) 10 billion cell capsule Take 1 capsule by mouth once daily.      levothyroxine (SYNTHROID) 50 MCG tablet Take 50 mcg by mouth before breakfast.      LINZESS 72 mcg Cap capsule Take 72 mcg by mouth every morning.      multivitamin (THERAGRAN) per tablet Take 1 tablet by mouth every morning.      OLANZapine (ZYPREXA) 5 MG tablet TAKE 1 TABLET BY MOUTH EVERY EVENING ON DAYS 1 THROUGH 4 OF EACH CHEMOTHERAPY CYCLE 30 tablet 0    ondansetron (ZOFRAN-ODT) 8 MG TbDL Take 1 tablet (8 mg total) by mouth every 8 (eight) hours as needed (nausea/vomiting). 60 tablet 5    TURMERIC ORAL Take by mouth every morning.      VTAMA 1 % Crea APPLY 1 APPLICATION ON THE SKIN DAILY      levoFLOXacin (LEVAQUIN) 500 MG tablet Take 500 mg by mouth.      loperamide (IMODIUM) 2 mg capsule Take 1 capsule (2 mg total) by mouth 4 (four) times daily as needed for Diarrhea. (Patient not taking: Reported  on 2/25/2024) 60 capsule 0    vitamin D (VITAMIN D3) 1000 units Tab Take 1 tablet (1,000 Units total) by mouth once daily. 30 tablet 5     No current facility-administered medications for this visit.       Review of patient's allergies indicates:  No Known Allergies   Objective:     Physical Exam   Constitutional: He is oriented to person, place, and time. He appears well-developed. He is cooperative.  Non-toxic appearance. He does not appear ill. No distress.      Comments:Pt sitting erect on examination table. No acute respiratory distress, no use of accessory muscles, no notice of nasal flaring.        HENT:   Head: Normocephalic and atraumatic.   Ears:   Right Ear: Hearing, tympanic membrane, external ear and ear canal normal.   Left Ear: Hearing, tympanic membrane, external ear and ear canal normal.   Nose: Nose normal. No mucosal edema, rhinorrhea, nasal deformity or congestion. No epistaxis. Right sinus exhibits no maxillary sinus tenderness and no frontal sinus tenderness. Left sinus exhibits no maxillary sinus tenderness and no frontal sinus tenderness.   Mouth/Throat: Uvula is midline, oropharynx is clear and moist and mucous membranes are normal. Mucous membranes are moist. No trismus in the jaw. Normal dentition. No uvula swelling. No oropharyngeal exudate, posterior oropharyngeal edema or posterior oropharyngeal erythema. Oropharynx is clear.   Eyes: Conjunctivae and lids are normal. Pupils are equal, round, and reactive to light. No scleral icterus. Extraocular movement intact   Neck: Trachea normal and phonation normal. Neck supple. No edema present. No erythema present. No neck rigidity present.   Cardiovascular: Normal rate, regular rhythm, normal heart sounds and normal pulses.   Pulmonary/Chest: Effort normal and breath sounds normal. No accessory muscle usage. No apnea, no tachypnea and no bradypnea. No respiratory distress. He has no decreased breath sounds. He has no wheezes. He has no  rhonchi.   Lungs clear to auscultation B/L           Comments: Lungs clear to auscultation B/L      Abdominal: Normal appearance.   Musculoskeletal: Normal range of motion.         General: No deformity. Normal range of motion.      Thoracic back: He exhibits tenderness and spasm.        Back:       Left hand: He exhibits laceration.      Comments: Tednder to palpation   Neurological: He is alert and oriented to person, place, and time. He exhibits normal muscle tone. Coordination normal.   Skin: Skin is warm, dry, intact, not diaphoretic and not pale. Lacerations - upper ext.:  left handNo erythema   Psychiatric: His speech is normal and behavior is normal. Judgment and thought content normal.   Nursing note and vitals reviewed.      X-Ray Ribs 2 View Left    Result Date: 2/25/2024  EXAMINATION: XR RIBS 2 VIEW LEFT CLINICAL HISTORY: Dorsalgia, unspecified TECHNIQUE: Five views of the left ribs were performed. COMPARISON: Chest radiograph and CT thorax 02/23/2024 FINDINGS: Right chest vascular port, sternotomy wires and mediastinal clips unchanged. Cardiomediastinal silhouette is midline and stable.  Hilar contours are stable.  Redemonstrated patchy consolidation with air bronchograms and adjacent reticulonodular and bandlike opacities in the lingula grossly similar to chest radiograph and chest CT 02/23/2024.  Redemonstrated several bilateral pulmonary nodules also not significantly changed from most recent priors.  Interval slight increased blunting of the left costophrenic angle suggesting trace pleural effusion new from prior.  No consolidation or sizable pleural effusion on the right.  Biapical pleuroparenchymal scarring similar to prior.  No pneumothorax. Osseous structures appear stable when compared to chest CT and chest radiograph 02/23/2024 noting numerous scattered lytic lesions in the bilateral ribs and thoracic spine are better demonstrated on cross-sectional imaging.  No displaced rib fracture.  No  dislocation.  Imaged upper abdomen is without acute abnormality.     No displaced rib fracture identified. Known numerous scattered lytic lesions in the bilateral ribs and thoracic spine are better demonstrated on recent cross-sectional imaging. Electronically signed by: Bridger Merino MD Date:    02/25/2024 Time:    16:56      Assessment:     1. Right-sided back pain, unspecified back location, unspecified chronicity        Plan:   I have reviewed the patient chart and pertinent past imaging/labs.  Due to history and complaint, rib xray ordered. Encouraged f/u with oncologist and beginning lidocaine patch for localized treatment.   Wells criteria of 2.5. Pt educated on PE and signs/symptoms to monitor due to high risk. Strict ER precaution.    Right-sided back pain, unspecified back location, unspecified chronicity  -     X-Ray Ribs 2 View Left; Future; Expected date: 02/25/2024  -     LIDOcaine (LIDODERM) 5 %; Place 3 patches onto the skin once daily. Remove & Discard patch within 12 hours or as directed by MD for 3 days  Dispense: 9 patch; Refill: 0             Additional MDM:     Well's Criteria Score:  -Clinical symptoms of DVT (leg swelling, pain with palpation) = 0.0  -PE is #1 diagnosis OR equally likely =            0.0  -Heart Rate >100 =   0.0  -Immobilization (= or > than 3 days) or surgery in the previous 4 weeks = 0.0  -Previous DVT/PE = 1.5  -Hemoptysis =          0.0  -Malignancy =           1.0  Well's Probability Score =    2.5

## 2024-02-25 NOTE — PROGRESS NOTES
Subjective:      Patient ID: Julio Rodríguez is a 58 y.o. male.    Vitals:  vitals were not taken for this visit.     Chief Complaint: Chest Pain    Pt arrives for rib pain while eating. Pain started 1 week ago. At home tx included tylenol with no relief.    Chest Pain   This is a new problem. The current episode started 1 to 4 weeks ago. The onset quality is sudden. The problem has been gradually worsening. The pain is present in the lateral region. The pain is at a severity of 8/10. The pain is moderate. The quality of the pain is described as sharp. The pain does not radiate.     Cardiovascular:  Positive for chest pain.    Objective:     Physical Exam    Assessment:     No diagnosis found.    Plan:       There are no diagnoses linked to this encounter.

## 2024-02-29 PROBLEM — C79.52 SECONDARY MALIGNANT NEOPLASM OF BONE AND BONE MARROW: Status: ACTIVE | Noted: 2024-02-29

## 2024-02-29 PROBLEM — C79.51 SECONDARY MALIGNANT NEOPLASM OF BONE AND BONE MARROW: Status: ACTIVE | Noted: 2024-02-29

## 2024-02-29 NOTE — ASSESSMENT & PLAN NOTE
See discussion above. After discussion with patient will proceed with cancer directed care including increasing SG to full dose an screening for clincial trials. Will also start Xgeva and refer to palliative care.  - [facility] peer review; clinical trials  - Proceed with C4 SG; increase to full 10mg/kg dose  - Plan to start Xgeva with dental clearance  - Palliative Care referral

## 2024-02-29 NOTE — PROGRESS NOTES
MEDICAL ONCOLOGY - FOLLOW UP VISIT    Cancer/Stage/TNM:    Cancer Staging   Malignant neoplasm of urinary bladder  Staging form: Urinary Bladder, AJCC 8th Edition  - Clinical: Stage IVB (cT4b, cNX, pM1b) - Signed by Bridger Abad MD on 9/28/2023       Reason for visit: Metastatic bladder cancer    HPI: Julio Rodríguez is a 58 y.o. male with metastatic bladder cancer previously treated with neoadjuvant ddMVAC, radical cystectomy, and then carboplatin/gemcitabine and EV+ Pembro following local and metastatic recurrence. Subsequetnly received a single dose of pembrolizumab + NTX-1088 11/2/23 as part of a phase I trial through the [facility], but was taken off trial for elevated creatinine despite PCN placement.  He presents to medical oncology clinic prior to C4D1 SG.    History has been obtained by chart review and discussion with the patient.    Interval Events:    Worsening upper right back pain a few weeks ago. Received CXR and CT scan c/f cancer progression in the bones; also possible PNA vs lymphangitic spread. Presribed abx for PNA and seemed improved. Later develoepd a similar sharp back pain on the left side. Again seen in urgent care and pain improving.  Has a few days left of augmentin. No fevers and breathing I comfortable.     Otherwise has been tolerating SG well. No fevers or chills. No N/V. Appetite is fair. Continues to live independently.    Had extensive discussion with patient regarding his wishes given clear progression on SG. He continues to have reasonably good QoL. I did explain my concern that further treatment is unlikely to meaningfully affect cancer growth. Explained options including BSC, rescreening for clinical trials, or potentially trailing full dose SG as he has been on empiric dose reduction for UGT1A1 deficiency.     He is very hesitant to stop cancer directed therapy, and requested we trial higher dose SG despite potential risks not limited to neutropenia and infection, and will plan  to start Xgeva pending dental clearance. Will also review clinical trial options with [facility] at patient request.                 Oncology History   Malignant neoplasm of urinary bladder   2016 Initial Diagnosis    Bladder CIS. Managed with BCG     2017 Notable Event    Developed recurrent muscle invasive disease.     2017 - 2017 Chemotherapy    ddMVAC      Surgery    Radical cystectomy with lymph node dissection and creation of neobladder. [id number]     7/2022 Notable Event    New onset hematuria. MRI scan of the pelvis in August showed a suspected blood clot adherent to the distal Smith catheter. There was abnormal signal and enhancement of the bilateral pubic bones adjacent to the neobladder suspicious for neoplastic infiltration. There was no pelvic lymphadenopathy.      7/2022 Imaging Significant Findings    Pet scan at the same time showed hypermetabolic retroperitoneal lymphadenopathy. There was marked activity felt to involve the bladder wall suspicious for tumor.     7/2022 Biopsy    Biopsy of the bladder neck showed recurrent high-grade urothelial carcinoma.     9/28/2022 - 5/16/2023 Chemotherapy    Mr. Rodríguez was started on chemotherapy with gemcitabine and carboplatin in September 2022.      1/25/2023 Imaging Significant Findings    Pet scan on 25 January showed changes related to prior cystoprostatectomy with neobladder creation and minimal fullness to the right renal collecting system. There was no evidence of a discrete hypermetabolic mass or lymphadenopathy. There was diffusely increased activity throughout the osseous structures, suggestive of chemotherapy with reactive marrow.     4/11/2023 Imaging Significant Findings    CT a/p  1. Pathologic fracture of the left parasymphysis pubis secondary to lytic process. Given location adjacent to neobladder, osteomyelitis is a possibility. Metastatic disease not excluded.   2. Prior cystoprostatectomy with chronic right ureteral obstruction and  hydroureteronephrosis, minimally changed from the prior. All etiologies considered including malignant obstruction. Urology consultation recommended.   3. Incidental findings including cholelithiasis, bilateral multifocal renal cortical scarring, and small hiatal hernia.       5/24/2023 -  Chemotherapy    Enfortumab vedotin + Pembrolizumab     8/10/2023 Imaging Significant Findings    PET CT  Interval development of hypermetabolic pulmonary nodules within the lingula and right lower lobe likely reflecting pulmonary metastases. Some additional tiny pulmonary nodularity is new or more conspicuous from prior but too small to accurately characterize by PET/CT. Attention on follow-up recommended.     Worsening obstructive uropathy which is relatively moderate to severe the right kidney.     Similar appearance of the urinary neobladder. There is somewhat diminished due to physiologic activity to evaluate for local neoplasm but the appearance overall appears similar to prior. Assessment of local disease could be followed with CT abdomen and pelvis with contrast.     Interval fracture of the left superior and inferior pubic rami appearing subacute in nature. Please correlate for trauma and tenderness. There is no significant FDG activity within the area to favor a pathologic fracture.     Previously seen diffuse marrow activity may have been due to previous marrow rebound or drug effect.       8/25/2023 Imaging Significant Findings    MR Pelvis  History of cystoprostatectomy and neobladder creation.     Interval increase in multilobular mass in the pelvis infiltrating the rectum, possibly due to distal sigmoid colon, anterior left pelvic bones as well as a inferior aspect of the neobladder suspicious for progression of neoplastic process as discussed above.      9/28/2023 Cancer Staged    Staging form: Urinary Bladder, AJCC 8th Edition  - Clinical: Stage IVB (cT4b, cNX, pM1b)     10/16/2023 Imaging Significant Findings     CT chest   Impression:  - Multiple solid bilateral pulmonary nodules up to 9mm showing interval increase in size concerning for metastatic disease in this patient with history of prior bladder malignancy.  - Multiple hepatic hypodensities, which may represent cystic lesions however some which are too small to characterize.  - Partially imaged right kidney showing parenchymal atrophy and suspected hydronephrosis..    MR Pelvis   Impression:     Interval increased size of multilobular mass in the cystoprostatectomy surgical bed that invades the neobladder, rectum, sigmoid colon, and left pelvis.  Multiple pelvic lymph nodes are more conspicuous from prior exam and concerning for additional metastatic disease.     10/24/2023 - 10/24/2023 Chemotherapy    Treatment Summary   Plan Name: OP SACITUZUMAB GOVITECAN-HZIY Q3W  Treatment Goal: Palliative  Status: Inactive  Start Date:   End Date:   Provider: Bridger Abad MD  Chemotherapy: sacituzumab govitecan-hziy (TRODELVY) 543 mg in sodium chloride 0.9% 500 mL chemo infusion, 7.5 mg/kg = 543 mg (original dose ), Intravenous, Clinic/[facility] 1 time, 0 of 17 cycles  Dose modification: 7.5 mg/kg (Cycle 1, Reason: MD Discretion, Comment: UGT1A1 PM )     11/2/2023 - 11/20/2023 Chemotherapy    Treatment Summary   Plan Name: [facility] NTX-1088-01 Dose Escalation INV-NTX + INV pembrolizumab  Treatment Goal: Control  Status: Inactive  Start Date: 11/2/2023  End Date: 11/20/2023  Provider: Chan Leos MD  Chemotherapy: INV [id number] (pembrolizumab) 200 mg in INV sodium chloride 0.9 % 100 mL chemo infusion, 200 mg, Intravenous, Clinic/[facility] 1 time, 1 of 35 cycles  Administration: 200 mg (11/2/2023)     11/29/2023 Imaging Significant Findings    Impression:     Postoperative change including cysto proctectomy with creation of neobladder.  Persistent multilobular soft tissue mass within the surgical bed involving the neobladder and abutting the rectum and sigmoid colon, noting limited  evaluation on this noncontrast examination.  Within these confines, appearance is similar from comparison CT 10/30/2023, noting increased distension of the neobladder from comparison imaging.     Persistent right-sided hydronephrosis and hydroureter with soft tissue attenuation at the mid to distal right ureter.  Prominent lymph nodes surrounding the right ureter, similar from comparison imaging.     Few stable mildly prominent pelvic and retroperitoneal lymph nodes.     Slight interval increase in left upper lobe nodule measuring approximately 1.2 x 1.0 cm, previously measuring 0.9 x 0.9 cm.  Additional previously described nodules are similar in size and appearance.     Remote fracture of the left pubic symphysis with associated lytic lesion.     12/9/2023 Imaging Significant Findings    CT 12/9/23    Impression:     1. Right nephrostomy in appropriate position.  2. Postoperative change of cystoproctectomy with ileal neobladder.  Similar appearance of large pelvic mass surrounding the neobladder.  3. Ill-defined hypoattenuating hepatic lesions, concerning for metastases in light of history.  4. Increased retroperitoneal and pelvic adenopathy.  5. Stable pulmonary nodules.  6. Additional findings as above.     12/28/2023 -  Chemotherapy    Treatment Summary   Plan Name: OP SACITUZUMAB GOVITECAN-HZIY Q3W  Treatment Goal: Palliative  Status: Active  Start Date: 12/28/2023  End Date: 12/5/2024 (Planned)  Provider: Bridger Abad MD  Chemotherapy: sacituzumab govitecan-hziy (TRODELVY) 540 mg in sodium chloride 0.9% 500 mL chemo infusion, 555 mg (100 % of original dose 7.5 mg/kg), Intravenous, Clinic/[facility] 1 time, 3 of 17 cycles  Dose modification: 7.5 mg/kg (original dose 7.5 mg/kg, Cycle 1, Reason: MD Discretion), 10 mg/kg (original dose 7.5 mg/kg, Cycle 4, Reason: MD Discretion)  Administration: 540 mg (12/28/2023), 540 mg (1/4/2024), 540 mg (1/19/2024), 540 mg (1/26/2024), 540 mg (2/8/2024), 540 mg (2/16/2024)      2/2024 Imaging Significant Findings    2/23/24 CT Chest  Impression:     In this patient with metastatic bladder cancer there is interval increased size of bilateral solid pulmonary nodules and new lytic lesions in the bilateral ribs and vertebra concerning for worsening metastatic disease.     New consolidation with air bronchograms in the left lingula concerning for lobar pneumonia.  New interlobular scattered septal thickening which may be related to lingular pneumonia; however, lymphangitis carcinomatosis not excluded in this patient with history of malignancy.     Stable hypodensities in the partially visualized liver that likely represent additional metastatic lesions.  Partially visualized right hydronephrosis, similar to prior exam given limited evaluation.    2/27/24  PET CT  Impression:     Widespread hypermetabolic metastatic disease throughout the chest, abdomen, and pelvis and osseous structures.     Extensive hypermetabolic soft tissue thickening/mass lesion surrounding the neobladder and involving the left pelvic sidewall.     Severe right hydronephrosis and ureteral dilatation.  Hypermetabolic mass/lymph node conglomerate at the distal aspect of the right ureter likely causing ureteral obstruction.     Near complete hypermetabolic consolidation of the lingula with air bronchograms. Findings could represent infectious lobar pneumonia versus consolidative mass/neoplasm. Recommend clinical correlation.        Melanoma        Past Medical History:   Diagnosis Date    Bladder cancer     CAD (coronary artery disease) 9/12/2023    Prior CABG. Not on antiplatelets. Home medicatiosn include atorvastatin    Carcinoma     forehead    Encounter for blood transfusion     Hypertension     Hypothyroidism 9/12/2023    Home medications include levothyroxine    Malignant melanoma of skin, unspecified     right arm    Malignant neoplasm of urinary bladder 9/12/2023    Melanoma 9/12/2023    History of T1a melanoma;  0.5mm depth without ulceartion. SP wide local excision 02/2022          Past Surgical History:   Procedure Laterality Date    CORONARY ARTERY BYPASS GRAFT  10/2015    CYSTECTOMY, ROBOT-ASSISTED, LAPAROSCOPIC, USING DA MARY XI, WITH ILEAL CONDUIT CREATION  12/2017    CYSTOGRAM N/A 12/5/2023    Procedure: CYSTOGRAM;  Surgeon: Eder Oconnor MD;  Location: [facility] OR [address] [place] [address];  Service: Urology;  Laterality: N/A;    CYSTOSCOPY N/A 12/5/2023    Procedure: CYSTOSCOPY;  Surgeon: Eder Oconnor MD;  [facility];  Service: Urology;  Laterality: N/A;    DILATION OF BLADDER      dialation of bladder neck- due to claudia bladder- multiple procedures    DILATION OF URETHRA N/A 12/5/2023    Procedure: DILATION, URETHRA;  Surgeon: Eder Oconnor MD;  Location: [facility] OR [address] [place] [address];  Service: Urology;  Laterality: N/A;    LYMPHADENECTOMY      PERCUTANEOUS NEPHROSTOMY Right 12/8/2023    Procedure: Creation, Nephrostomy, Percutaneous;  Surgeon: Jens Nunez MD;  Location: [facility] CATH LAB;  Service: Radiology;  Laterality: Right;    PLACEMENT N/A 12/5/2023    Procedure: PLACEMENT;  Surgeon: Eder Oconnor MD;  Location: [facility] OR [address] [place] [address];  Service: Urology;  Laterality: N/A;  placement of valiente over a wire by MD MOHR ROBOTIC PROSTECTOMY, SUPRAPUBIC  12/2017        Family History   Problem Relation Age of Onset    Heart disease Father     Heart attack Paternal Uncle     Breast cancer Maternal Cousin     Colon cancer Neg Hx     Bladder Cancer Neg Hx         Social History     Tobacco Use    Smoking status: Former     Types: Cigarettes     Passive exposure: Never    Smokeless tobacco: Not on file   Substance Use Topics    Alcohol use: Not Currently        I have reviewed and updated the patient's past medical, surgical, family and social histories.    Review of patient's allergies indicates:  No Known Allergies     Current Outpatient Medications   Medication Sig Dispense Refill    amoxicillin-clavulanate 875-125mg (AUGMENTIN) 875-125 mg  per tablet Take 1 tablet by mouth 2 (two) times daily. for 10 days 20 tablet 0    atorvastatin (LIPITOR) 20 MG tablet Take 20 mg by mouth every evening.      azithromycin (ZITHROMAX) 250 MG tablet Take 2 tablets (500 mg) on  Day 1,  followed by 1 tablet (250 mg) once daily on Days 2 through 5. 6 tablet 0    benzonatate (TESSALON PERLES) 100 MG capsule Take 1 capsule (100 mg total) by mouth every 6 (six) hours as needed for Cough. 30 capsule 1    dexAMETHasone (DECADRON) 4 MG Tab Take 2 tablets (8 mg total) by mouth once daily. Take 2 tablets (8 mg total) by mouth once daily. Take a directed on days 2, 3 and 4 of your chemotherapy cycle. 24 tablet 3    docusate sodium (COLACE) 100 MG capsule Take 220 capsules by mouth every evening.      Lactobacillus acidophilus 10 billion cell Cap Take by mouth once daily.      Lactobacillus rhamnosus GG (CULTURELLE) 10 billion cell capsule Take 1 capsule by mouth once daily.      levoFLOXacin (LEVAQUIN) 500 MG tablet Take 500 mg by mouth.      levothyroxine (SYNTHROID) 50 MCG tablet Take 50 mcg by mouth before breakfast.      LINZESS 72 mcg Cap capsule Take 72 mcg by mouth every morning.      loperamide (IMODIUM) 2 mg capsule Take 1 capsule (2 mg total) by mouth 4 (four) times daily as needed for Diarrhea. (Patient not taking: Reported on 2/25/2024) 60 capsule 0    multivitamin (THERAGRAN) per tablet Take 1 tablet by mouth every morning.      OLANZapine (ZYPREXA) 5 MG tablet TAKE 1 TABLET BY MOUTH EVERY EVENING ON DAYS 1 THROUGH 4 OF EACH CHEMOTHERAPY CYCLE 30 tablet 0    ondansetron (ZOFRAN-ODT) 8 MG TbDL Take 1 tablet (8 mg total) by mouth every 8 (eight) hours as needed (nausea/vomiting). 60 tablet 5    TURMERIC ORAL Take by mouth every morning.      vitamin D (VITAMIN D3) 1000 units Tab Take 1 tablet (1,000 Units total) by mouth once daily. 30 tablet 5    VTAMA 1 % Crea APPLY 1 APPLICATION ON THE SKIN DAILY       No current facility-administered medications for this visit.     "    Physical Exam:   /84 (BP Location: Left arm, Patient Position: Sitting, BP Method: Large (Automatic))   Pulse 104   Temp 97.7 °F (36.5 °C) (Oral)   Resp 18   Ht 5' 10" (1.778 m)   Wt 75.5 kg (166 lb 8.9 oz)   SpO2 100%   BMI 23.90 kg/m²      ECOG Performance status: 1            Physical Exam  Constitutional:       General: He is not in acute distress.     Appearance: Normal appearance.   HENT:      Head: Normocephalic.   Eyes:      General: No scleral icterus.     Extraocular Movements: Extraocular movements intact.      Conjunctiva/sclera: Conjunctivae normal.   Cardiovascular:      Rate and Rhythm: Normal rate and regular rhythm.      Heart sounds: No murmur heard.     No friction rub. No gallop.   Pulmonary:      Effort: Pulmonary effort is normal. No respiratory distress.      Breath sounds: Normal breath sounds. No stridor. No wheezing, rhonchi or rales.   Abdominal:      General: There is no distension.   Skin:     General: Skin is warm and dry.   Neurological:      Mental Status: He is alert and oriented to person, place, and time.      Motor: No weakness.   Psychiatric:         Mood and Affect: Mood normal.         Behavior: Behavior normal.         Thought Content: Thought content normal.           Labs:                 Lab Results   Component Value Date     02/29/2024    K 4.7 02/29/2024    CREATININE 2.4 (H) 02/29/2024    WBC 6.12 02/29/2024    HGB 8.7 (L) 02/29/2024     02/29/2024    AST 12 02/29/2024    ALT 14 02/29/2024    ALKPHOS 79 02/29/2024    BILITOT 0.3 02/29/2024          Imaging:       NM PET CT FDG Skull Base to Mid Thigh  Narrative: EXAMINATION:  NM PET CT FDG SKULL BASE TO MID THIGH    CLINICAL HISTORY:  Metastatic disease evaluation; Malignant neoplasm of bladder, unspecified    TECHNIQUE:  12.48 mCi of F18-FDG was administered intravenously in the right antecubital fossa.  After an approximately 60 min distribution time, PET/CT images were acquired from the " skull base to midthigh transmission images were acquired to correct for attenuation using a whole body low-dose CT scan without oral or IV contrast with the arms positioned above the head. Glycemia at the time of injection was 94 mg/dL.    COMPARISON:  PET-CT 08/10/2023    FINDINGS:  Quality of the study: Adequate.    In the head and neck, there are no hypermetabolic lesions worrisome for malignancy. There are no hypermetabolic mucosal lesions, and there are no pathologically enlarged or hypermetabolic lymph nodes.    In the chest, there are hypermetabolic mediastinal and hilar lymph nodes.  Subaortic lymph nodes with maximum SUV of 9.7 (image 83).  Left hilar maximum SUV of 8 (image 88).    There are multiple bilateral pulmonary nodules, many of which are hypermetabolic.  Largest nodule on the left measures 1.1 cm within the upper lobe with maximum SUV of 10 (image 78).  Largest nodule on the right measures 1.4 cm with maximum SUV of 5.4 (image 105).    Near complete hypermetabolic consolidation of the lingula with air bronchograms.  Findings    In the abdomen and pelvis, there are multiple hypermetabolic hepatic lesions.  Inferior right hepatic lobe lesion measuring 1.5 cm with maximum SUV of 14 (image 145).    Multiple pathologically enlarged hypermetabolic lymph nodes within the right hemiabdomen.  1.6 cm pericaval lymph node with maximum SUV of 13 (image 180).  Hypermetabolic mass/lymph node cluster within the right lower quadrant at the distal aspect of the right ureter measuring 1.9 cm with maximum SUV of 21 (image 196).    Patient is status post cystoproctectomy with neobladder creation.  Extensive hypermetabolic soft tissue thickening/mass lesion surrounding the neobladder and involving the left pelvic sidewall.  Lesion is difficult to measure.  There is likely involvement of adjacent bowel however difficult to evaluate on current exam.    In the bones, there are numerous scattered hypermetabolic lytic  lesions throughout the axial and appendicular skeleton.  Left 2nd rib lesion with maximum SUV of 14.  Left posterior ilium lesion with maximum SUV of 13.  L5 left transverse process lesion with maximum SUV of 22.    Additional CT findings: Severe right hydronephrosis and ureteral dilatation.  Severe right renal cortical thinning.  Impression: Widespread hypermetabolic metastatic disease throughout the chest, abdomen, and pelvis and osseous structures.    Extensive hypermetabolic soft tissue thickening/mass lesion surrounding the neobladder and involving the left pelvic sidewall.    Severe right hydronephrosis and ureteral dilatation.  Hypermetabolic mass/lymph node conglomerate at the distal aspect of the right ureter likely causing ureteral obstruction.    Near complete hypermetabolic consolidation of the lingula with air bronchograms. Findings could represent infectious lobar pneumonia versus consolidative mass/neoplasm. Recommend clinical correlation.    Please see above for additional details.    This report was flagged in Epic as abnormal.    Electronically signed by resident: Nataly Mattson  Date:    02/27/2024  Time:    13:56    Electronically signed by: Marcelino Miguel  Date:    02/27/2024  Time:    16:00      I have personally reviewed the above imaging including recent MRI and PET CT scan    Path:   Reviewed pathology as documented in oncology history      Assessment and Plan:        1. Malignant neoplasm of urinary bladder, unspecified site  Overview:  Metastatic bladder cancer previously treated with neoadjuvant ddMVAC, radical cystectomy, and then carboplatin/gemcitabine and EV+ Pembro following local and metastatic recurrence. Subsequetnly received a single dose of pembrolizumab + NTX-1088 11/2/23 as part of a phase I trial through the [facility], but was taken off trial for elevated creatinine despite PCN placement. Starting sacituzumab govitecan 12/28/23 with empiric 25% DR for UGT1A1 deficiency. Progressive  disease noted on PET CT 02/28/2024.    Assessment & Plan:  See discussion above. After discussion with patient will proceed with cancer directed care including increasing SG to full dose an screening for clincial trials. Will also start Xgeva and refer to palliative care.  - [facility] peer review; clinical trials  - Proceed with C4 SG; increase to full 10mg/kg dose  - Plan to start Xgeva with dental clearance  - Palliative Care referral         Orders:  -     Ambulatory referral/consult to CLINIC Palliative Care; Future; Expected date: 03/07/2024    2. Long term current use of therapeutic drug  -     Vitamin D; Future; Expected date: 02/29/2024  -     vitamin D (VITAMIN D3) 1000 units Tab; Take 1 tablet (1,000 Units total) by mouth once daily.  Dispense: 30 tablet; Refill: 5    3. Hypothyroidism, unspecified type  Overview:  Home medications include levothyroxine      4. Stage 3b chronic kidney disease  Assessment & Plan:  - Creatining is stable  - CTM      5. Coronary artery disease, unspecified vessel or lesion type, unspecified whether angina present, unspecified whether native or transplanted heart  Overview:  Prior CABG. Not on antiplatelets. Home medicatiosn include atorvastatin      Other orders  -     acetaminophen tablet 650 mg  -     diphenhydrAMINE (BENADRYL) 25 mg in sodium chloride 0.9% 50 mL IVPB  -     famotidine (PF) injection 20 mg  -     aprepitant (CINVANTI) injection 130 mg  -     palonosetron (ALOXI) 0.25 mg with Dexamethasone (DECADRON) 12 mg in NS 50 mL IVPB  -     sacituzumab govitecan-hziy (TRODELVY) 740 mg in sodium chloride 0.9% 500 mL chemo infusion  -     atropine injection 0.4 mg  -     prochlorperazine injection Soln 10 mg  -     EPINEPHrine (EPIPEN) 0.3 mg/0.3 mL pen injection 0.3 mg  -     diphenhydrAMINE injection 50 mg  -     hydrocortisone sodium succinate injection 100 mg  -     sodium chloride 0.9% 100 mL flush bag  -     sodium chloride 0.9% flush 10 mL  -     heparin, porcine (PF)  100 unit/mL injection flush 500 Units  -     alteplase injection 2 mg  -     acetaminophen tablet 650 mg  -     diphenhydrAMINE (BENADRYL) 25 mg in sodium chloride 0.9% 50 mL IVPB  -     famotidine (PF) injection 20 mg  -     aprepitant (CINVANTI) injection 130 mg  -     palonosetron (ALOXI) 0.25 mg with Dexamethasone (DECADRON) 12 mg in NS 50 mL IVPB  -     sacituzumab govitecan-hziy (TRODELVY) 740 mg in sodium chloride 0.9% 500 mL chemo infusion  -     atropine injection 0.4 mg  -     prochlorperazine injection Soln 10 mg  -     EPINEPHrine (EPIPEN) 0.3 mg/0.3 mL pen injection 0.3 mg  -     diphenhydrAMINE injection 50 mg  -     hydrocortisone sodium succinate injection 100 mg  -     sodium chloride 0.9% 250 mL flush bag  -     sodium chloride 0.9% flush 10 mL  -     heparin, porcine (PF) 100 unit/mL injection flush 500 Units  -     alteplase injection 2 mg              Route Chart for Scheduling    Med Onc Chart Routing      Follow up with physician . As scheduled   Follow up with LYNDSEY    Infusion scheduling note    Injection scheduling note    Labs    Imaging    Pharmacy appointment    Other referrals                  Treatment Plan Information   OP SACITUZUMAB GOVITECAN-HZIY Q3W   Bridger Abad MD   Upcoming Treatment Dates - OP SACITUZUMAB GOVITECAN-HZIY Q3W    2/29/2024       Pre-Medications       acetaminophen tablet 650 mg       diphenhydrAMINE (BENADRYL) 25 mg in sodium chloride 0.9% 50 mL IVPB       famotidine (PF) injection 20 mg       Chemotherapy       sacituzumab govitecan-hziy (TRODELVY) 740 mg in sodium chloride 0.9% 500 mL chemo infusion       Supportive Care       atropine injection 0.4 mg       prochlorperazine injection Soln 10 mg       Antiemetics       aprepitant (CINVANTI) injection 130 mg       palonosetron (ALOXI) 0.25 mg with Dexamethasone (DECADRON) 12 mg in NS 50 mL IVPB  3/7/2024       Pre-Medications       acetaminophen tablet 650 mg       diphenhydrAMINE (BENADRYL) 25 mg in sodium  chloride 0.9% 50 mL IVPB       famotidine (PF) injection 20 mg       Chemotherapy       sacituzumab govitecan-hziy (TRODELVY) 740 mg in sodium chloride 0.9% 500 mL chemo infusion       Supportive Care       atropine injection 0.4 mg       prochlorperazine injection Soln 10 mg       Antiemetics       aprepitant (CINVANTI) injection 130 mg       palonosetron (ALOXI) 0.25 mg with Dexamethasone (DECADRON) 12 mg in NS 50 mL IVPB  3/21/2024       Pre-Medications       acetaminophen tablet 650 mg       diphenhydrAMINE (BENADRYL) 25 mg in sodium chloride 0.9% 50 mL IVPB       famotidine (PF) injection 20 mg       Chemotherapy       sacituzumab govitecan-hziy (TRODELVY) 740 mg in sodium chloride 0.9% 500 mL chemo infusion       Supportive Care       atropine injection 0.4 mg       prochlorperazine injection Soln 10 mg       Antiemetics       aprepitant (CINVANTI) injection 130 mg       palonosetron (ALOXI) 0.25 mg with Dexamethasone (DECADRON) 12 mg in NS 50 mL IVPB  3/28/2024       Pre-Medications       acetaminophen tablet 650 mg       diphenhydrAMINE (BENADRYL) 25 mg in sodium chloride 0.9% 50 mL IVPB       famotidine (PF) injection 20 mg       Chemotherapy       sacituzumab govitecan-hziy (TRODELVY) 740 mg in sodium chloride 0.9% 500 mL chemo infusion       Supportive Care       atropine injection 0.4 mg       prochlorperazine injection Soln 10 mg       Antiemetics       aprepitant (CINVANTI) injection 130 mg       palonosetron (ALOXI) 0.25 mg with Dexamethasone (DECADRON) 12 mg in NS 50 mL IVPB          Bridger Abad

## 2024-03-04 NOTE — PROGRESS NOTES
Advance Care Planning   [facility] Palliative Medicine [facility]  Palliative Care   Psychosocial Assessment    Patient Name: Julio Rodríguez  MRN: [id number]  Admission Date: (Not on file)  Hospital Length of Stay: 0 days  Code Status: [unfilled]   Attending Provider: No att. providers found  Palliative Care Provider:   Primary Care Physician: Char, Primary Doctor  Principal Problem:[unfilled]    Reason for Referral: assistance with clarification of goals of care  Consult Order Date:   Primary CM/SW:    Present during Interview: patient, parent, and past medical records.      Primary Language:English   Needed: no      Past Medical Situation:   PMH:   Past Medical History:   Diagnosis Date    Bladder cancer     CAD (coronary artery disease) 9/12/2023    Prior CABG. Not on antiplatelets. Home medicatiosn include atorvastatin    Carcinoma     forehead    Encounter for blood transfusion     Hypertension     Hypothyroidism 9/12/2023    Home medications include levothyroxine    Malignant melanoma of skin, unspecified     right arm    Malignant neoplasm of urinary bladder 9/12/2023    Melanoma 9/12/2023    History of T1a melanoma; 0.5mm depth without ulceartion. SP wide local excision 02/2022     Mental Health/Substance Use History: none disclosed   Risk of Abuse, neglect or exploitation: none disclosed   Current or Previous Trauma and/or evidence of PTSD: none disclosed   Non-traditional Health practices: none disclosed     Understanding of diagnosis and prognosis: Good   Experience/Comfort level with health care system: Good      Patients Mental Status: AAOx4    Socio-Economic Factors/Resources:  Address: [address] [place] [address]  Phone Number: [phone] (home)     Marital Status: Single  Household composition: pt lives alone     Children: none     Patient/Family perceptions about Caregiving Needs; availability and capacity: Pt/family is aware that cging needs will increase as disease  progresses.    Family Structure, Dynamics/Relationships: Pt has close relationship with family. Mom, dad, brother and his family.    Patterns/Styles of Communication and Decision-making in the Family: Pt is decisional.     Patient/Family Strengths/Resilience: Pt is kind, adaptable, realistic, and willing to share.   Patient/Family Coping: Appropriate     Activities of Daily Living: Independent   Support Systems-Family & Community (Home Health, HME etc): TBD    Transportation:  yes    Work/Education History: employed  Self-Care Activities/Hobbies: traveling, spending time with friends and family, watching tv, going for walks, golfing     History: no    Financial Resources:Commercial      Advanced Care Planning & Legal Concerns:   Advanced Directives/Living Will: yes  LaPOST/POLST: no   Planning:  no    Medical Power of : yes     Oral/Written Declaration: yes  Witnesses:   Surrogate Decision Maker:     Emergency Contacts:    Spirituality, Culture & Coping Mechanisms:  F- Jany and Belief: Catholic (born again)     I - Importance: High    C - Community/Culture Values:      A - Address in Care: Yes       Goals/Hopes/Expectations: To remain as independent as possible for as long as possible. Adequate pain control.  Fears/Anxiety/Concerns: becoming debilitated.        Preferences about EOL Environment: (own bed, family nearby, pets, music, etc): TBD      Complicated Bereavement Risk Assessment Tool (CBRAT)  Reference:  [facility] Clinical Practice Group (May 2016). Bereavement Risk Screening and Management Guidelines.  Retrieved from: http://www.[email or web address].au/wp-content/uploads//SDIRU-Guwtyclczbt-Lmaahkbcw-and-Management-Guideline-2016.pdf      Bereaved Client Characteristics   Under 18      no  Was a Twin   no  Young Spouse   no  Elderly Spouse    no  Isolated    no  Lacks Meaningful Social Support   no  Dissatisfied with help available during illness    no  New to [employer] [place] no  New to Decision-Making   no    Illness  Inherited Disorder   no  Stigmatized Disease in the family/community   no  Lengthy/Burdensome   yes     Bereaved Client's History of Loss   Cumulative Multiple Losses   no  Previous Mental Health Illnesses   no  Current Mental Health Illness   no  Other Significant Health Issues   no   Migrant/Refugee   no Will the Death be:  Sudden or Unexpected   no  Traumatic Circumstances Associated with Death   no  Significant Cultural/Social Burdens as a result of Death   no   Relationship with   Profound Lifelong Partner   no  Highly Dependent    no  Antagonistic   no  Ambivalent   no  Deeply Connected   yes  Culturally Defined   yes   Risk Factors Scores  0-2  Low  3-5  Moderate  5+  High  All persons scoring moderate to high presume to be at risk**    (** It is acknowledged that protective factors and resilience may outweigh apparent risk factors.      Total Risk Factors Score:   low to moderate     Advance Care Planning     Date: 2024    [facility]  I engaged the patient and family in a voluntary conversation about advance care planning and we specifically addressed what the goals of care would be moving forward, in light of the patient's change in clinical status, specifically malignant neoplasm of bladder.  We did specifically address the patient's likely prognosis, which is poor.  We explored the patient's values and preferences for future care.  The patient and family endorses that what is most important right now is to focus on remaining as independent as possible, symptom/pain control, extending life as long as possible, even it it means sacrificing quality, and comfort and QOL     Accordingly, we have decided that the best plan to meet the patient's goals includes continuing with treatment    I did explain the role for hospice care at this stage of the patient's illness, including its ability to help the patient live with  "the best quality of life possible.  We will not be making a hospice referral.    I spent a total of 15 minutes engaging the patient in this advance care planning discussion.            Discharge Planning Needs/Plan of Care:     DUSTIN, along with Dr. Jacobs, met with pt and his mother Nohelia for initial clinic visit today. Pt AAOx4, in good spirits. Pt's only complaint is some minor back pain and fatigue. Pt comprehends the extent of his disease. Pt states that he understands his cancer has progressed through multiple lines of treatment, but he still wishes to pursue further cancer directed therapies. Pt reports that he has tolerated treatment quite well, with the only side effect being increased fatigue. Pt is independent with ADLs, works, and maintains a social life to the extent that he is comfortable with. Pt expresses that Dr. Abad informed him that he "has a few months to live," and pt states that he "hopes he's wrong." LCSW encouraged pt to use that time to travel and partake in activities that he enjoys to the best of his ability. Pt verbalized concerns that today's visit was to discuss transitioning to hospice care. Explained that pal clinic is here to support him in whatever decision that he makes, hospice or otherwise. Informed pt that should he decide that he wants to explore the option of hospice, we would gladly have that conversation. Pt's current GOC are to continue full treatment and remain independent, while also focusing on QOL. No psychosocial needs identified at this time. WCTF.    Nasra Pearson, DUSTIN  Palliative Medicine                  "

## 2024-03-04 NOTE — PROGRESS NOTES
Palliative Medicine Clinic Note        Consult Requested By: Dr. Bridger Abad      Reason for Consult/Chief Complaint: symptom management and ACP in setting of malignant neoplasm of urinary bladder        ASSESSMENT/PLAN:      Plan/Recommendations:   Diagnoses and all orders for this visit:    Malignant neoplasm of urinary bladder, unspecified site    Secondary malignant neoplasm of bone and bone marrow    Encounter for palliative care    Advanced care planning/counseling discussion          Assessment & Plan              Advance Care Planning   Advance Directives:   Living Will: No    LaPOST: No    Do Not Resuscitate Status: No    Medical Power of : No    Goals of Care: The {ACP PT FAMLIY POA:14966} endorses that what is most important right now is to focus on {ACPPTFOUCS:02286}    Accordingly, we have decided that the best plan to meet the patient's goals includes {ACPPTFOUCS2:29802}                Follow up:      Plan discussed with:             SUBJECTIVE:        History of Present Illness / Interval History:  Julio Rodríguez is 58 y.o. year old male presenting with CKD, melanoma, hypothyroidism, and malignant neoplasm of urinary bladder presents to Palliative Medicine for symptom management and ACP. Please see oncology notes for full details of oncologic history and treatment course.     03/04/2024: History of Present Illness                ROS:  Review of Systems    Review of Symptoms      Symptom Assessment (ESAS 0-10 Scale)  Pain:  0  Dyspnea:  0  Anxiety:  0  Nausea:  0  Depression:  0  Anorexia:  0  Fatigue:  0  Insomnia:  0  Restlessness:  0  Agitation:  0         Psychosocial/Cultural:   See Palliative Psychosocial Note: Yes  **Primary  to Follow**  Palliative Care  Consult: Yes          External  database queried on 03/04/2024  by Moni REYNA : Oxycodone-apap  mg Disp: 21 for 7 days    Medications:    Current Outpatient Medications:      amoxicillin-clavulanate 875-125mg (AUGMENTIN) 875-125 mg per tablet, Take 1 tablet by mouth 2 (two) times daily. for 10 days, Disp: 20 tablet, Rfl: 0    atorvastatin (LIPITOR) 20 MG tablet, Take 20 mg by mouth every evening., Disp: , Rfl:     benzonatate (TESSALON PERLES) 100 MG capsule, Take 1 capsule (100 mg total) by mouth every 6 (six) hours as needed for Cough., Disp: 30 capsule, Rfl: 1    dexAMETHasone (DECADRON) 4 MG Tab, Take 2 tablets (8 mg total) by mouth once daily. Take 2 tablets (8 mg total) by mouth once daily. Take a directed on days 2, 3 and 4 of your chemotherapy cycle., Disp: 24 tablet, Rfl: 3    docusate sodium (COLACE) 100 MG capsule, Take 220 capsules by mouth every evening., Disp: , Rfl:     Lactobacillus acidophilus 10 billion cell Cap, Take by mouth once daily., Disp: , Rfl:     Lactobacillus rhamnosus GG (CULTURELLE) 10 billion cell capsule, Take 1 capsule by mouth once daily., Disp: , Rfl:     levothyroxine (SYNTHROID) 50 MCG tablet, Take 50 mcg by mouth before breakfast., Disp: , Rfl:     LINZESS 72 mcg Cap capsule, Take 72 mcg by mouth every morning., Disp: , Rfl:     loperamide (IMODIUM) 2 mg capsule, Take 1 capsule (2 mg total) by mouth 4 (four) times daily as needed for Diarrhea. (Patient not taking: Reported on 2/25/2024), Disp: 60 capsule, Rfl: 0    multivitamin (THERAGRAN) per tablet, Take 1 tablet by mouth every morning., Disp: , Rfl:     OLANZapine (ZYPREXA) 5 MG tablet, TAKE 1 TABLET BY MOUTH EVERY EVENING ON DAYS 1 THROUGH 4 OF EACH CHEMOTHERAPY CYCLE, Disp: 30 tablet, Rfl: 0    ondansetron (ZOFRAN-ODT) 8 MG TbDL, Take 1 tablet (8 mg total) by mouth every 8 (eight) hours as needed (nausea/vomiting)., Disp: 60 tablet, Rfl: 5    TURMERIC ORAL, Take by mouth every morning., Disp: , Rfl:     vitamin D (VITAMIN D3) 1000 units Tab, Take 1 tablet (1,000 Units total) by mouth once daily., Disp: 30 tablet, Rfl: 5    VTAMA 1 % Crea, APPLY 1 APPLICATION ON THE SKIN DAILY, Disp: , Rfl:      Review of patient's allergies indicates:  No Known Allergies        OBJECTIVE:         Physical Exam:  Vitals:    Physical Exam  Physical Exam                     I spent a total of *** minutes on the day of the visit. This includes face to face time in discussion of goals of care, symptom assessment, coordination of care and emotional support.  This also includes non-face to face time preparing to see the patient (eg, review of tests/imaging), obtaining and/or reviewing separately obtained history, documenting clinical information in the electronic or other health record, independently interpreting results and communicating results to the patient/family/caregiver, or care coordinator.       Additional ***min time spent on a voluntary advance care planning and /or goals of care discussion, providing emotional support, formulating and communicating prognosis and exploring burden/benefit of various approaches of treatment.     This note was generated with the assistance of ambient listening technology. Verbal consent was obtained by the patient and accompanying visitor(s) for the recording of patient appointment to facilitate this note. I attest to having reviewed and edited the generated note for accuracy, though some syntax or spelling errors may persist. Please contact the author of this note for any clarification.

## 2024-03-04 NOTE — PROGRESS NOTES
Consult Note  Palliative Care      Consult Requested By: Dr. Bridger Abad  Reason for Consult: symptom management and ACP/GOC      ASSESSMENT/PLAN:     Plan/Recommendations:  Diagnoses and all orders for this visit:    Malignant neoplasm of urinary bladder/Secondary malignant neoplasm of bone and bone marrow  - patient followed by Dr. Abda  - currently on disease directed therapy with full dose sacituzumab govitecan (SG)  - patient also reporting that oncology looking into clinical trials at [facility]    Encounter for palliative care/Advanced care planning  Advance Care Planning   - patient decisional and accompanied by his mother today  - no ACP documents uploaded into EMR  - philosophy of palliative medicine and new patient folder given to and reviewed with patient and family today  - ACP booklet given to and reviewed with patient today  - patient states he has already completed these forms, but he is unsure who he has named as his HCPOA. He states it is either his brother, mother, or father  - patient discussed how his cancer has progressed through multiple lines of treatment, and that he has restarted SG at full dose. He reports that he was given a prognosis of months, but he is hoping that he will live longer  - he is interested in pursuing treatment including looking into clinical trials  - discussed briefly about when patients elect to enroll in hospice care. Did not discuss in detail about hospice care today and will NOT make a referral to hospice today  - code status not discussed      Cancer related pain  - patient reporting mild to moderate pain in his back  - he states some of the pain improved with treatment of PNA but he still has other pain present  - patient uses tylenol at night if needed for pain relief  - he discussed that the pain is tolerable and that he uses it as data to make sure he is not moving his body in a way that would potentially cause injury  - will hold on any pharmacologic  adjustment at this time  - opioid safety sheet in new patient folder for patient to review  - if opioid medication needed in the future, will discuss safety and order narcan at that time    Adjustment disorder with mixed anxiety and depressed mood  - patient reporting mild anxiety and depression  - patient expressed challenges with social distancing while dealing with his treatment. He also discussed the recent discussion around his prognosis with oncology and how that has affected him  - emotional support provided along with pall med SW; please see SW note for full details  - encouraged patient to plan short holidays (3 day weekend trips) that he feels comfortable with   - no need for pharmacologic intervention at this time  - will continue to monitor    Neoplastic (malignant) related fatigue  - patient reporting moderate fatigue; he notes that this has intermittently increased with full dose treatment  - he will work for a few hours then rest for a time and then can work again  - no need for pharmacologic intervention  - patient able to maintain activities at this time  - will continue to monitor      Understanding of illness/Prognosis: patient with excellent understanding of his illness and overall poor prognosis    Goals of care: life prolonging    Follow up: ~ 3-4 weeks    Patient's encounter and above plan of care discussed with patient's oncology team    SUBJECTIVE:     History of Present Illness:  Patient is a 58 y.o. year old male presenting with CAD, HTN, hypothyroidism, previous melanoma, and metastatic bladder cancer presents to Palliative Medicine for symptom management and ACP/GOC. Please see oncology notes for full details of oncologic history and treatment course.      03/04/2024:  LA  reviewed and summarized:  04/28/2023 Oxycodone-apap  mg Disp: 21 for 7 days    Patient presents today with his mother. Patient discussed his history of malignancy up to this point in time. He spoke about how  he was given a prognosis of months, but he is hopeful to have more time. Patient states he has mild to moderate pain in his back. Patient reporting some pain improved with treatment of PNA, but he still has pain. Patient uses tylenol PRN for pain. He is expressing more fatigue with full dose of treatment. Patient with mild anxiety/depression. He is still able to do activities he wants at this time. Patient has completed ACP documents previously.     Past Medical History:   Diagnosis Date    Bladder cancer     CAD (coronary artery disease) 9/12/2023    Prior CABG. Not on antiplatelets. Home medicatiosn include atorvastatin    Carcinoma     forehead    Encounter for blood transfusion     Hypertension     Hypothyroidism 9/12/2023    Home medications include levothyroxine    Malignant melanoma of skin, unspecified     right arm    Malignant neoplasm of urinary bladder 9/12/2023    Melanoma 9/12/2023    History of T1a melanoma; 0.5mm depth without ulceartion. SP wide local excision 02/2022     Past Surgical History:   Procedure Laterality Date    CORONARY ARTERY BYPASS GRAFT  10/2015    CYSTECTOMY, ROBOT-ASSISTED, LAPAROSCOPIC, USING DA MARY XI, WITH ILEAL CONDUIT CREATION  12/2017    CYSTOGRAM N/A 12/5/2023    Procedure: CYSTOGRAM;  Surgeon: Eder Oconnor MD;  Location: [facility] OR [address] [place] [address];  Service: Urology;  Laterality: N/A;    CYSTOSCOPY N/A 12/5/2023    Procedure: CYSTOSCOPY;  Surgeon: Eder Oconnor MD;  Location: [facility] OR [address] [place] [address];  Service: Urology;  Laterality: N/A;    DILATION OF BLADDER      dialation of bladder neck- due to claudia bladder- multiple procedures    DILATION OF URETHRA N/A 12/5/2023    Procedure: DILATION, URETHRA;  Surgeon: Eder Oconnor MD;  Location: [facility] OR [address] [place] [address];  Service: Urology;  Laterality: N/A;    LYMPHADENECTOMY      PERCUTANEOUS NEPHROSTOMY Right 12/8/2023    Procedure: Creation, Nephrostomy, Percutaneous;  Surgeon: Jens Nunez MD;  Location: [facility] CATH LAB;  Service: Radiology;   Laterality: Right;    PLACEMENT N/A 12/5/2023    Procedure: PLACEMENT;  Surgeon: Eder Oconnor MD;  [facility];  Service: Urology;  Laterality: N/A;  placement of valiente over a wire by MD MOHR ROBOTIC PROSTECTOMY, SUPRAPUBIC  12/2017     Family History   Problem Relation Age of Onset    Heart disease Father     Heart attack Paternal Uncle     Breast cancer Maternal Cousin     Colon cancer Neg Hx     Bladder Cancer Neg Hx      Review of patient's allergies indicates:  No Known Allergies    Medications:    Current Outpatient Medications:     amoxicillin-clavulanate 875-125mg (AUGMENTIN) 875-125 mg per tablet, Take 1 tablet by mouth 2 (two) times daily. for 10 days, Disp: 20 tablet, Rfl: 0    atorvastatin (LIPITOR) 20 MG tablet, Take 20 mg by mouth every evening., Disp: , Rfl:     benzonatate (TESSALON PERLES) 100 MG capsule, Take 1 capsule (100 mg total) by mouth every 6 (six) hours as needed for Cough., Disp: 30 capsule, Rfl: 1    dexAMETHasone (DECADRON) 4 MG Tab, Take 2 tablets (8 mg total) by mouth once daily. Take 2 tablets (8 mg total) by mouth once daily. Take a directed on days 2, 3 and 4 of your chemotherapy cycle., Disp: 24 tablet, Rfl: 3    docusate sodium (COLACE) 100 MG capsule, Take 220 capsules by mouth every evening., Disp: , Rfl:     Lactobacillus acidophilus 10 billion cell Cap, Take by mouth once daily., Disp: , Rfl:     Lactobacillus rhamnosus GG (CULTURELLE) 10 billion cell capsule, Take 1 capsule by mouth once daily., Disp: , Rfl:     levothyroxine (SYNTHROID) 50 MCG tablet, Take 50 mcg by mouth before breakfast., Disp: , Rfl:     LINZESS 72 mcg Cap capsule, Take 72 mcg by mouth every morning., Disp: , Rfl:     loperamide (IMODIUM) 2 mg capsule, Take 1 capsule (2 mg total) by mouth 4 (four) times daily as needed for Diarrhea., Disp: 60 capsule, Rfl: 0    multivitamin (THERAGRAN) per tablet, Take 1 tablet by mouth every morning., Disp: , Rfl:     OLANZapine (ZYPREXA) 5 MG tablet,  TAKE 1 TABLET BY MOUTH EVERY EVENING ON DAYS 1 THROUGH 4 OF EACH CHEMOTHERAPY CYCLE, Disp: 30 tablet, Rfl: 0    ondansetron (ZOFRAN-ODT) 8 MG TbDL, Take 1 tablet (8 mg total) by mouth every 8 (eight) hours as needed (nausea/vomiting)., Disp: 60 tablet, Rfl: 5    TURMERIC ORAL, Take by mouth every morning., Disp: , Rfl:     vitamin D (VITAMIN D3) 1000 units Tab, Take 1 tablet (1,000 Units total) by mouth once daily., Disp: 30 tablet, Rfl: 5    VTAMA 1 % Crea, APPLY 1 APPLICATION ON THE SKIN DAILY, Disp: , Rfl:     OBJECTIVE:       ROS:  Review of Systems   Constitutional:  Positive for activity change and fatigue. Negative for appetite change.   HENT: Negative.     Eyes: Negative.    Respiratory: Negative.     Cardiovascular: Negative.    Gastrointestinal: Negative.    Genitourinary: Negative.    Musculoskeletal:  Positive for back pain.   Skin: Negative.    Neurological: Negative.    Psychiatric/Behavioral:  Positive for dysphoric mood. The patient is nervous/anxious.    All other systems reviewed and are negative.      Review of Symptoms      Symptom Assessment (ESAS 0-10 Scale)  Pain:  3  Dyspnea:  0  Anxiety:  2  Nausea:  0  Depression:  2  Anorexia:  0  Fatigue:  4  Insomnia:  0  Restlessness:  0  Agitation:  0     CAM / Delirium:  Negative  Constipation:  Negative  Diarrhea:  Negative    Anxiety:  Is nervous/anxious    Bowel Management Plan (BMP):  No      Pain Assessment:  OME in 24 hours:  0  Location(s): back    Back       Location: posterior        Quality: Aching        Quantity: 3/10 in intensity        Chronicity: Onset 2 (greater than) week(s) ago, stable        Aggravating Factors: Activity        Alleviating Factors: Acetaminophen       Associated Symptoms: None    Modified Fidelia Scale:  0    ECOG Performance Status ndGndrndanddndend:nd nd2nd Living Arrangements:  Lives alone    Psychosocial/Cultural:   See Palliative Psychosocial Note: Yes  Please see Pall Med SW note from 3/4/24 for full details.    Patient enjoys  travel, watching television, working, and hanging out with friends  **Primary  to Follow**  Palliative Care  Consult: No    Spiritual:  F - Jany and Belief:  Yes - [employer]  I - Importance:  High  C - Community:  Yes  A - Address in Care:  Needs met      Advance Care Planning   Advance Directives:   Living Will: No    LaPOST: No    Do Not Resuscitate Status: No    Medical Power of : No      Decision Making:  Patient answered questions  Goals of Care: The patient endorses that what is most important right now is to focus on remaining as independent as possible, symptom/pain control, and improvement in condition but with limits to invasive therapies    Accordingly, we have decided that the best plan to meet the patient's goals includes continuing with treatment        Physical Exam:  Vitals: Pulse: 97 (03/04/24 1400)  BP: 139/81 (03/04/24 1400)  Physical Exam  Vitals reviewed.   Constitutional:       General: He is not in acute distress.     Appearance: He is not toxic-appearing or diaphoretic.   HENT:      Head: Normocephalic and atraumatic.      Right Ear: External ear normal.      Left Ear: External ear normal.      Nose: Nose normal.      Mouth/Throat:      Mouth: Mucous membranes are moist.   Eyes:      General: No scleral icterus.        Right eye: No discharge.         Left eye: No discharge.   Neck:      Comments: Trachea midline  Pulmonary:      Effort: Pulmonary effort is normal. No respiratory distress.   Abdominal:      General: Abdomen is flat. There is no distension.   Musculoskeletal:         General: No signs of injury.      Cervical back: Normal range of motion.      Right lower leg: No edema.      Left lower leg: No edema.   Skin:     General: Skin is dry.      Coloration: Skin is not jaundiced.      Findings: No rash.   Neurological:      General: No focal deficit present.      Mental Status: He is alert and oriented to person, place, and time.       "Cranial Nerves: No cranial nerve deficit.   Psychiatric:         Mood and Affect: Mood normal.         Behavior: Behavior normal.         Thought Content: Thought content normal.         Judgment: Judgment normal.       Labs:  CBC:   WBC   Date Value Ref Range Status   02/29/2024 6.12 3.90 - 12.70 K/uL Final     Hemoglobin   Date Value Ref Range Status   02/29/2024 8.7 (L) 14.0 - 18.0 g/dL Final     Hematocrit   Date Value Ref Range Status   02/29/2024 28.0 (L) 40.0 - 54.0 % Final     MCV   Date Value Ref Range Status   02/29/2024 93 82 - 98 fL Final     Platelets   Date Value Ref Range Status   02/29/2024 361 150 - 450 K/uL Final       LFT:   Lab Results   Component Value Date    AST 12 02/29/2024    ALKPHOS 79 02/29/2024    BILITOT 0.3 02/29/2024       Albumin:   Albumin   Date Value Ref Range Status   02/29/2024 2.8 (L) 3.5 - 5.2 g/dL Final     Protein:   Total Protein   Date Value Ref Range Status   02/29/2024 7.0 6.0 - 8.4 g/dL Final       Radiology:I have reviewed all pertinent imaging results/findings within the past 24 hours.    02/27/2024 NM PET CT: "Widespread hypermetabolic metastatic disease throughout the chest, abdomen, and pelvis and osseous structures. Extensive hypermetabolic soft tissue thickening/mass lesion surrounding the neobladder and involving the left pelvic sidewall. Severe right hydronephrosis and ureteral dilatation.  Hypermetabolic mass/lymph node conglomerate at the distal aspect of the right ureter likely causing ureteral obstruction. Near complete hypermetabolic consolidation of the lingula with air bronchograms. Findings could represent infectious lobar pneumonia versus consolidative mass/neoplasm. Recommend clinical correlation. Please see above for additional details."    A total of 16 min was spent on advance care planning, goals of care discussion, emotional support, formulating and communicating prognosis and goals of care, exploring burden/benefit of various approaches of " treatment. This discussion occurred on a fully voluntary basis with the verbal consent of the patient and/or family    Signature: Moni Jacobs MD

## 2024-03-08 NOTE — TELEPHONE ENCOUNTER
Late entry: I attempted to call patient yesterday, no answer. Message left on voicemail regarding change of appointments with Dr. Abad. I received a MyChart message from patient stating the change of appointments is fine.

## 2024-03-17 NOTE — PROGRESS NOTES
Subjective:      Patient ID: Julio Rodríguez is a 58 y.o. male.    Vitals:  weight is 74.8 kg (165 lb). His oral temperature is 99.4 °F (37.4 °C). His blood pressure is 128/87 and his pulse is 109. His respiration is 16 and oxygen saturation is 99%.     Chief Complaint: Cough    This is a 58 y.o. male who presents today with a chief complaint of non-productive cough, sore throat, sneezing, post nasal drip, rhinorrhea, fever (100.5 last night) x 3 weeks with waxing and waning sx. Pt also presents with ear px, ear congestion, nausea, vomiting, diarrhea, abd px, nasal congestion or headache.  Requesting testing for COVID-19 and the flu  Home tx: zyrtec, claratin (helped), tylenol    PMH: lowered immune system    Cough  This is a new problem. The current episode started 1 to 4 weeks ago. The problem has been waxing and waning. The problem occurs constantly. The cough is Non-productive. Associated symptoms include a fever, nasal congestion, postnasal drip, rhinorrhea and a sore throat. Pertinent negatives include no chills, ear congestion, ear pain, headaches or rash. There is no history of asthma, bronchitis, environmental allergies or pneumonia.       Constitution: Positive for fever. Negative for chills.   HENT:  Positive for postnasal drip and sore throat. Negative for ear pain.    Respiratory:  Positive for cough.    Skin:  Negative for rash.   Allergic/Immunologic: Negative for environmental allergies.   Neurological:  Negative for headaches.      Objective:     Physical Exam   Constitutional: normal  HENT:   Head: Normocephalic and atraumatic.   Nose: Congestion present.   Mouth/Throat: Mucous membranes are moist. No posterior oropharyngeal erythema.   Eyes: Pupils are equal, round, and reactive to light.   Neck: Neck supple.   Cardiovascular: Normal rate, regular rhythm, normal heart sounds and normal pulses.   Pulmonary/Chest: Effort normal and breath sounds normal.   Abdominal: Normal appearance.   Neurological:  He is alert.   Nursing note and vitals reviewed.    Results for orders placed or performed in visit on 03/17/24   SARS Coronavirus 2 Antigen, POCT Manual Read   Result Value Ref Range    SARS Coronavirus 2 Antigen Negative Negative     Acceptable Yes    POCT Influenza A/B MOLECULAR   Result Value Ref Range    POC Molecular Influenza A Ag Negative Negative, Not Reported    POC Molecular Influenza B Ag Negative Negative, Not Reported     Acceptable Yes         Assessment:     1. Acute viral syndrome    2. Seasonal allergies        Plan:       Acute viral syndrome  -     SARS Coronavirus 2 Antigen, POCT Manual Read  -     POCT Influenza A/B MOLECULAR  -     promethazine-dextromethorphan (PROMETHAZINE-DM) 6.25-15 mg/5 mL Syrp; Take 5 mLs by mouth nightly as needed (cough).  Dispense: 118 mL; Refill: 0  -     fluticasone propionate (FLONASE) 50 mcg/actuation nasal spray; 1 spray (50 mcg total) by Each Nostril route once daily.  Dispense: 9.9 mL; Refill: 0    Seasonal allergies  -     promethazine-dextromethorphan (PROMETHAZINE-DM) 6.25-15 mg/5 mL Syrp; Take 5 mLs by mouth nightly as needed (cough).  Dispense: 118 mL; Refill: 0  -     fluticasone propionate (FLONASE) 50 mcg/actuation nasal spray; 1 spray (50 mcg total) by Each Nostril route once daily.  Dispense: 9.9 mL; Refill: 0

## 2024-03-20 NOTE — PROGRESS NOTES
MEDICAL ONCOLOGY - FOLLOW UP VISIT    Cancer/Stage/TNM:    Cancer Staging   Malignant neoplasm of urinary bladder  Staging form: Urinary Bladder, AJCC 8th Edition  - Clinical: Stage IVB (cT4b, cNX, pM1b) - Signed by Bridger Abad MD on 9/28/2023       Reason for visit: Metastatic bladder cancer    HPI: Julio Rodríguez is a 58 y.o. male with metastatic bladder cancer previously treated with neoadjuvant ddMVAC, radical cystectomy, and then carboplatin/gemcitabine and EV+ Pembro following local and metastatic recurrence. Subsequetnly received a single dose of pembrolizumab + NTX-1088 11/2/23 as part of a phase I trial through the [facility], but was taken off trial for elevated creatinine despite PCN placement.  He presents to medical oncology clinic prior to C5D1 SG.    History has been obtained by chart review and discussion with the patient.    Interval Events:    Increased SG to full dose for C4 3 weeks ago. No trials available at [facility]. Established with palliative care.     Seemed to tolerate increased dose of SG reasonably well. Had some increased fatuge. No diarrhea. Did develop a URTI several weeks ago. Had low grade fever to 100.5 transiently that lasted only a few hours last Saturday. Has ongoing moderate cough and rhinitis. Was seen in urgent care and given supportive treatment. Not on any current abx. Notes some improvementin the upper right back pain. No longer using analgesics during the day. Using APAP at night for sleep. Reports normal bowel movements. Appetite is fair; no signficant N/V. Saw dentist today. Planning on having a filling and crown replacement next week. Planning on holding denosumab at least until this is done.    HGB slowly downtrending. Has mild hematuria with cathterization. No blood in stool. Energy is good.      Oncology History   Malignant neoplasm of urinary bladder   2016 Initial Diagnosis    Bladder CIS. Managed with BCG     2017 Notable Event    Developed recurrent muscle invasive  disease.     2017 - 2017 Chemotherapy    ddMVAC      Surgery    Radical cystectomy with lymph node dissection and creation of neobladder. [id number]     7/2022 Notable Event    New onset hematuria. MRI scan of the pelvis in August showed a suspected blood clot adherent to the distal Smith catheter. There was abnormal signal and enhancement of the bilateral pubic bones adjacent to the neobladder suspicious for neoplastic infiltration. There was no pelvic lymphadenopathy.      7/2022 Imaging Significant Findings    Pet scan at the same time showed hypermetabolic retroperitoneal lymphadenopathy. There was marked activity felt to involve the bladder wall suspicious for tumor.     7/2022 Biopsy    Biopsy of the bladder neck showed recurrent high-grade urothelial carcinoma.     9/28/2022 - 5/16/2023 Chemotherapy    Mr. Rodríguez was started on chemotherapy with gemcitabine and carboplatin in September 2022.      1/25/2023 Imaging Significant Findings    Pet scan on 25 January showed changes related to prior cystoprostatectomy with neobladder creation and minimal fullness to the right renal collecting system. There was no evidence of a discrete hypermetabolic mass or lymphadenopathy. There was diffusely increased activity throughout the osseous structures, suggestive of chemotherapy with reactive marrow.     4/11/2023 Imaging Significant Findings    CT a/p  1. Pathologic fracture of the left parasymphysis pubis secondary to lytic process. Given location adjacent to neobladder, osteomyelitis is a possibility. Metastatic disease not excluded.   2. Prior cystoprostatectomy with chronic right ureteral obstruction and hydroureteronephrosis, minimally changed from the prior. All etiologies considered including malignant obstruction. Urology consultation recommended.   3. Incidental findings including cholelithiasis, bilateral multifocal renal cortical scarring, and small hiatal hernia.       5/24/2023 -  Chemotherapy    Enfortumab  vedotin + Pembrolizumab     8/10/2023 Imaging Significant Findings    PET CT  Interval development of hypermetabolic pulmonary nodules within the lingula and right lower lobe likely reflecting pulmonary metastases. Some additional tiny pulmonary nodularity is new or more conspicuous from prior but too small to accurately characterize by PET/CT. Attention on follow-up recommended.     Worsening obstructive uropathy which is relatively moderate to severe the right kidney.     Similar appearance of the urinary neobladder. There is somewhat diminished due to physiologic activity to evaluate for local neoplasm but the appearance overall appears similar to prior. Assessment of local disease could be followed with CT abdomen and pelvis with contrast.     Interval fracture of the left superior and inferior pubic rami appearing subacute in nature. Please correlate for trauma and tenderness. There is no significant FDG activity within the area to favor a pathologic fracture.     Previously seen diffuse marrow activity may have been due to previous marrow rebound or drug effect.       8/25/2023 Imaging Significant Findings    MR Pelvis  History of cystoprostatectomy and neobladder creation.     Interval increase in multilobular mass in the pelvis infiltrating the rectum, possibly due to distal sigmoid colon, anterior left pelvic bones as well as a inferior aspect of the neobladder suspicious for progression of neoplastic process as discussed above.      9/28/2023 Cancer Staged    Staging form: Urinary Bladder, AJCC 8th Edition  - Clinical: Stage IVB (cT4b, cNX, pM1b)     10/16/2023 Imaging Significant Findings    CT chest   Impression:  - Multiple solid bilateral pulmonary nodules up to 9mm showing interval increase in size concerning for metastatic disease in this patient with history of prior bladder malignancy.  - Multiple hepatic hypodensities, which may represent cystic lesions however some which are too small to  characterize.  - Partially imaged right kidney showing parenchymal atrophy and suspected hydronephrosis..    MR Pelvis   Impression:     Interval increased size of multilobular mass in the cystoprostatectomy surgical bed that invades the neobladder, rectum, sigmoid colon, and left pelvis.  Multiple pelvic lymph nodes are more conspicuous from prior exam and concerning for additional metastatic disease.     10/24/2023 - 10/24/2023 Chemotherapy    Treatment Summary   Plan Name: OP SACITUZUMAB GOVITECAN-HZIY Q3W  Treatment Goal: Palliative  Status: Inactive  Start Date:   End Date:   Provider: Bridger Abad MD  Chemotherapy: sacituzumab govitecan-hziy (TRODELVY) 543 mg in sodium chloride 0.9% 500 mL chemo infusion, 7.5 mg/kg = 543 mg (original dose ), Intravenous, Clinic/[facility] 1 time, 0 of 17 cycles  Dose modification: 7.5 mg/kg (Cycle 1, Reason: MD Discretion, Comment: UGT1A1 PM )     11/2/2023 - 11/20/2023 Chemotherapy    Treatment Summary   Plan Name: RUST NTX-1088-01 Dose Escalation INV-NTX + INV pembrolizumab  Treatment Goal: Control  Status: Inactive  Start Date: 11/2/2023  End Date: 11/20/2023  Provider: Chan Leos MD  Chemotherapy: INV [id number] (pembrolizumab) 200 mg in INV sodium chloride 0.9 % 100 mL chemo infusion, 200 mg, Intravenous, Clinic/[facility] 1 time, 1 of 35 cycles  Administration: 200 mg (11/2/2023)     11/29/2023 Imaging Significant Findings    Impression:     Postoperative change including cysto proctectomy with creation of neobladder.  Persistent multilobular soft tissue mass within the surgical bed involving the neobladder and abutting the rectum and sigmoid colon, noting limited evaluation on this noncontrast examination.  Within these confines, appearance is similar from comparison CT 10/30/2023, noting increased distension of the neobladder from comparison imaging.     Persistent right-sided hydronephrosis and hydroureter with soft tissue attenuation at the mid to distal right ureter.   Prominent lymph nodes surrounding the right ureter, similar from comparison imaging.     Few stable mildly prominent pelvic and retroperitoneal lymph nodes.     Slight interval increase in left upper lobe nodule measuring approximately 1.2 x 1.0 cm, previously measuring 0.9 x 0.9 cm.  Additional previously described nodules are similar in size and appearance.     Remote fracture of the left pubic symphysis with associated lytic lesion.     12/9/2023 Imaging Significant Findings    CT 12/9/23    Impression:     1. Right nephrostomy in appropriate position.  2. Postoperative change of cystoproctectomy with ileal neobladder.  Similar appearance of large pelvic mass surrounding the neobladder.  3. Ill-defined hypoattenuating hepatic lesions, concerning for metastases in light of history.  4. Increased retroperitoneal and pelvic adenopathy.  5. Stable pulmonary nodules.  6. Additional findings as above.     12/28/2023 -  Chemotherapy    Treatment Summary   Plan Name: OP SACITUZUMAB GOVITECAN-HZIY Q3W  Treatment Goal: Palliative  Status: Active  Start Date: 12/28/2023  End Date: 12/6/2024 (Planned)  Provider: Bridger Abad MD  Chemotherapy: sacituzumab govitecan-hziy (TRODELVY) 540 mg in sodium chloride 0.9% 500 mL chemo infusion, 555 mg (100 % of original dose 7.5 mg/kg), Intravenous, Clinic[facility] 1 time, 4 of 17 cycles  Dose modification: 7.5 mg/kg (original dose 7.5 mg/kg, Cycle 1, Reason: MD Discretion), 10 mg/kg (original dose 7.5 mg/kg, Cycle 4, Reason: MD Discretion)  Administration: 540 mg (12/28/2023), 540 mg (1/4/2024), 540 mg (1/19/2024), 540 mg (1/26/2024), 540 mg (2/8/2024), 540 mg (2/16/2024), 720 mg (3/1/2024), 720 mg (3/8/2024)     2/2024 Imaging Significant Findings    2/23/24 CT Chest  Impression:     In this patient with metastatic bladder cancer there is interval increased size of bilateral solid pulmonary nodules and new lytic lesions in the bilateral ribs and vertebra concerning for worsening  metastatic disease.     New consolidation with air bronchograms in the left lingula concerning for lobar pneumonia.  New interlobular scattered septal thickening which may be related to lingular pneumonia; however, lymphangitis carcinomatosis not excluded in this patient with history of malignancy.     Stable hypodensities in the partially visualized liver that likely represent additional metastatic lesions.  Partially visualized right hydronephrosis, similar to prior exam given limited evaluation.    2/27/24  PET CT  Impression:     Widespread hypermetabolic metastatic disease throughout the chest, abdomen, and pelvis and osseous structures.     Extensive hypermetabolic soft tissue thickening/mass lesion surrounding the neobladder and involving the left pelvic sidewall.     Severe right hydronephrosis and ureteral dilatation.  Hypermetabolic mass/lymph node conglomerate at the distal aspect of the right ureter likely causing ureteral obstruction.     Near complete hypermetabolic consolidation of the lingula with air bronchograms. Findings could represent infectious lobar pneumonia versus consolidative mass/neoplasm. Recommend clinical correlation.        Melanoma        Past Medical History:   Diagnosis Date    Bladder cancer     CAD (coronary artery disease) 9/12/2023    Prior CABG. Not on antiplatelets. Home medicatiosn include atorvastatin    Carcinoma     forehead    Encounter for blood transfusion     Hypertension     Hypothyroidism 9/12/2023    Home medications include levothyroxine    Malignant melanoma of skin, unspecified     right arm    Malignant neoplasm of urinary bladder 9/12/2023    Melanoma 9/12/2023    History of T1a melanoma; 0.5mm depth without ulceartion. SP wide local excision 02/2022          Past Surgical History:   Procedure Laterality Date    CORONARY ARTERY BYPASS GRAFT  10/2015    CYSTECTOMY, ROBOT-ASSISTED, LAPAROSCOPIC, USING DA MARY XI, WITH ILEAL CONDUIT CREATION  12/2017     CYSTOGRAM N/A 12/5/2023    Procedure: CYSTOGRAM;  Surgeon: Eder Oconnor MD;  Location: [facility] OR [facility] FLR;  Service: Urology;  Laterality: N/A;    CYSTOSCOPY N/A 12/5/2023    Procedure: CYSTOSCOPY;  Surgeon: Eder Oconnor MD;  Location: [facility] OR 1ST FLR;  Service: Urology;  Laterality: N/A;    DILATION OF BLADDER      dialation of bladder neck- due to claudia bladder- multiple procedures    DILATION OF URETHRA N/A 12/5/2023    Procedure: DILATION, URETHRA;  Surgeon: Eder Oconnor MD;  Location: [facility] OR [facility] FLR;  Service: Urology;  Laterality: N/A;    LYMPHADENECTOMY      PERCUTANEOUS NEPHROSTOMY Right 12/8/2023    Procedure: Creation, Nephrostomy, Percutaneous;  Surgeon: Jens Nunez MD;  Location: [facility] CATH LAB;  Service: Radiology;  Laterality: Right;    PLACEMENT N/A 12/5/2023    Procedure: PLACEMENT;  Surgeon: Eder Oconnor MD;  [facility];  Service: Urology;  Laterality: N/A;  placement of valiente over a wire by MD MOHR ROBOTIC PROSTECTOMY, SUPRAPUBIC  12/2017        Family History   Problem Relation Age of Onset    Heart disease Father     Heart attack Paternal Uncle     Breast cancer Maternal Cousin     Colon cancer Neg Hx     Bladder Cancer Neg Hx         Social History     Tobacco Use    Smoking status: Former     Types: Cigarettes     Passive exposure: Never    Smokeless tobacco: Not on file   Substance Use Topics    Alcohol use: Not Currently        I have reviewed and updated the patient's past medical, surgical, family and social histories.    Review of patient's allergies indicates:  No Known Allergies     Current Outpatient Medications   Medication Sig Dispense Refill    atorvastatin (LIPITOR) 20 MG tablet Take 20 mg by mouth every evening.      benzonatate (TESSALON PERLES) 100 MG capsule Take 1 capsule (100 mg total) by mouth every 6 (six) hours as needed for Cough. 30 capsule 1    cetirizine HCl (ZYRTEC ORAL) Take by mouth as needed.      dexAMETHasone (DECADRON) 4 MG Tab Take 2 tablets  "(8 mg total) by mouth once daily. Take 2 tablets (8 mg total) by mouth once daily. Take a directed on days 2, 3 and 4 of your chemotherapy cycle. 24 tablet 3    docusate sodium (COLACE) 100 MG capsule Take 220 capsules by mouth every evening.      fluticasone propionate (FLONASE) 50 mcg/actuation nasal spray 1 spray (50 mcg total) by Each Nostril route once daily. 9.9 mL 0    Lactobacillus acidophilus 10 billion cell Cap Take by mouth once daily.      Lactobacillus rhamnosus GG (CULTURELLE) 10 billion cell capsule Take 1 capsule by mouth once daily.      levothyroxine (SYNTHROID) 50 MCG tablet Take 50 mcg by mouth before breakfast.      LINZESS 72 mcg Cap capsule Take 72 mcg by mouth every morning.      loratadine (CLARITIN ORAL) Take by mouth as needed.      multivitamin (THERAGRAN) per tablet Take 1 tablet by mouth every morning.      OLANZapine (ZYPREXA) 5 MG tablet TAKE 1 TABLET BY MOUTH EVERY EVENING ON DAYS 1 THROUGH 4 OF EACH CHEMOTHERAPY CYCLE 30 tablet 0    promethazine-dextromethorphan (PROMETHAZINE-DM) 6.25-15 mg/5 mL Syrp Take 5 mLs by mouth nightly as needed (cough). 118 mL 0    TURMERIC ORAL Take by mouth every morning.      vitamin D (VITAMIN D3) 1000 units Tab Take 1 tablet (1,000 Units total) by mouth once daily. 30 tablet 5    VTAMA 1 % Crea APPLY 1 APPLICATION ON THE SKIN DAILY      loperamide (IMODIUM) 2 mg capsule Take 1 capsule (2 mg total) by mouth 4 (four) times daily as needed for Diarrhea. (Patient not taking: Reported on 3/17/2024) 60 capsule 0    ondansetron (ZOFRAN-ODT) 8 MG TbDL Take 1 tablet (8 mg total) by mouth every 8 (eight) hours as needed (nausea/vomiting). (Patient not taking: Reported on 3/17/2024) 60 tablet 5     No current facility-administered medications for this visit.        Physical Exam:   /70 (BP Location: Left arm, Patient Position: Sitting, BP Method: Medium (Automatic))   Pulse 94   Temp 98.5 °F (36.9 °C) (Oral)   Ht 5' 10" (1.778 m)   Wt 74.6 kg (164 lb " 5.7 oz)   SpO2 99%   BMI 23.58 kg/m²      ECOG Performance status: 1            Physical Exam  Constitutional:       General: He is not in acute distress.     Appearance: Normal appearance.   HENT:      Head: Normocephalic.   Eyes:      General: No scleral icterus.     Extraocular Movements: Extraocular movements intact.      Conjunctiva/sclera: Conjunctivae normal.   Cardiovascular:      Rate and Rhythm: Normal rate and regular rhythm.      Heart sounds: No murmur heard.     No friction rub. No gallop.   Pulmonary:      Effort: Pulmonary effort is normal. No respiratory distress.      Breath sounds: Normal breath sounds. No stridor. No wheezing, rhonchi or rales.   Abdominal:      General: There is no distension.   Skin:     General: Skin is warm and dry.   Neurological:      Mental Status: He is alert and oriented to person, place, and time.      Motor: No weakness.   Psychiatric:         Mood and Affect: Mood normal.         Behavior: Behavior normal.         Thought Content: Thought content normal.           Labs:                 Lab Results   Component Value Date     (L) 03/20/2024    K 4.4 03/20/2024    CREATININE 2.6 (H) 03/20/2024    WBC 5.70 03/20/2024    HGB 8.0 (L) 03/20/2024     03/20/2024    AST 13 03/20/2024    ALT 31 03/20/2024    ALKPHOS 83 03/20/2024    BILITOT 0.3 03/20/2024          Imaging:       NM PET CT FDG Skull Base to Mid Thigh  Narrative: EXAMINATION:  NM PET CT FDG SKULL BASE TO MID THIGH    CLINICAL HISTORY:  Metastatic disease evaluation; Malignant neoplasm of bladder, unspecified    TECHNIQUE:  12.48 mCi of F18-FDG was administered intravenously in the right antecubital fossa.  After an approximately 60 min distribution time, PET/CT images were acquired from the skull base to midthigh transmission images were acquired to correct for attenuation using a whole body low-dose CT scan without oral or IV contrast with the arms positioned above the head. Glycemia at the time of  injection was 94 mg/dL.    COMPARISON:  PET-CT 08/10/2023    FINDINGS:  Quality of the study: Adequate.    In the head and neck, there are no hypermetabolic lesions worrisome for malignancy. There are no hypermetabolic mucosal lesions, and there are no pathologically enlarged or hypermetabolic lymph nodes.    In the chest, there are hypermetabolic mediastinal and hilar lymph nodes.  Subaortic lymph nodes with maximum SUV of 9.7 (image 83).  Left hilar maximum SUV of 8 (image 88).    There are multiple bilateral pulmonary nodules, many of which are hypermetabolic.  Largest nodule on the left measures 1.1 cm within the upper lobe with maximum SUV of 10 (image 78).  Largest nodule on the right measures 1.4 cm with maximum SUV of 5.4 (image 105).    Near complete hypermetabolic consolidation of the lingula with air bronchograms.  Findings    In the abdomen and pelvis, there are multiple hypermetabolic hepatic lesions.  Inferior right hepatic lobe lesion measuring 1.5 cm with maximum SUV of 14 (image 145).    Multiple pathologically enlarged hypermetabolic lymph nodes within the right hemiabdomen.  1.6 cm pericaval lymph node with maximum SUV of 13 (image 180).  Hypermetabolic mass/lymph node cluster within the right lower quadrant at the distal aspect of the right ureter measuring 1.9 cm with maximum SUV of 21 (image 196).    Patient is status post cystoproctectomy with neobladder creation.  Extensive hypermetabolic soft tissue thickening/mass lesion surrounding the neobladder and involving the left pelvic sidewall.  Lesion is difficult to measure.  There is likely involvement of adjacent bowel however difficult to evaluate on current exam.    In the bones, there are numerous scattered hypermetabolic lytic lesions throughout the axial and appendicular skeleton.  Left 2nd rib lesion with maximum SUV of 14.  Left posterior ilium lesion with maximum SUV of 13.  L5 left transverse process lesion with maximum SUV of  22.    Additional CT findings: Severe right hydronephrosis and ureteral dilatation.  Severe right renal cortical thinning.  Impression: Widespread hypermetabolic metastatic disease throughout the chest, abdomen, and pelvis and osseous structures.    Extensive hypermetabolic soft tissue thickening/mass lesion surrounding the neobladder and involving the left pelvic sidewall.    Severe right hydronephrosis and ureteral dilatation.  Hypermetabolic mass/lymph node conglomerate at the distal aspect of the right ureter likely causing ureteral obstruction.    Near complete hypermetabolic consolidation of the lingula with air bronchograms. Findings could represent infectious lobar pneumonia versus consolidative mass/neoplasm. Recommend clinical correlation.    Please see above for additional details.    This report was flagged in Epic as abnormal.    Electronically signed by resident: Nataly Mattson  Date:    02/27/2024  Time:    13:56    Electronically signed by: Marcelino Miguel  Date:    02/27/2024  Time:    16:00      I have personally reviewed the above imaging including recent MRI and PET CT scan    Path:   Reviewed pathology as documented in oncology history      Assessment and Plan:        1. Malignant neoplasm of urinary bladder, unspecified site  Overview:  Metastatic bladder cancer previously treated with neoadjuvant ddMVAC, radical cystectomy, and then carboplatin/gemcitabine and EV+ Pembro following local and metastatic recurrence. Subsequetnly received a single dose of pembrolizumab + NTX-1088 11/2/23 as part of a phase I trial through the [facility], but was taken off trial for elevated creatinine despite PCN placement. Starting sacituzumab govitecan 12/28/23 with empiric 25% DR for UGT1A1 deficiency. Progressive disease noted on PET CT 02/28/2024 and increased SG to 10mg/kg.    Assessment & Plan:  Tolerated full dose SG reasonably well. HGB continues to drift downward, likely due to chemotherapy and inflammatory  block. I don't think IV iron would be beneficial. Awaiting completion of dental work prior to starting Xgeva,  - C2D1 full dose SG tomorrow; FU next week for C2D8  - Needs CBC prior to C2D8  - FU with me in 3 weeks prior to C3  - Repeat CT imaging after C4      2. Secondary malignant neoplasm of bone and bone marrow  Assessment & Plan:  - Awaiting dental clearance for Xgeva      3. Stage 3b chronic kidney disease  Assessment & Plan:  - Stable  - CTM      Other orders  -     acetaminophen tablet 650 mg  -     diphenhydrAMINE (BENADRYL) 25 mg in sodium chloride 0.9% 50 mL IVPB  -     famotidine (PF) injection 20 mg  -     aprepitant (CINVANTI) injection 130 mg  -     palonosetron (ALOXI) 0.25 mg with Dexamethasone (DECADRON) 12 mg in NS 50 mL IVPB  -     sacituzumab govitecan-hziy (TRODELVY) 740 mg in sodium chloride 0.9% 500 mL chemo infusion  -     atropine injection 0.4 mg  -     prochlorperazine injection Soln 10 mg  -     EPINEPHrine (EPIPEN) 0.3 mg/0.3 mL pen injection 0.3 mg  -     diphenhydrAMINE injection 50 mg  -     hydrocortisone sodium succinate injection 100 mg  -     sodium chloride 0.9% 100 mL flush bag  -     sodium chloride 0.9% flush 10 mL  -     heparin, porcine (PF) 100 unit/mL injection flush 500 Units  -     alteplase injection 2 mg  -     acetaminophen tablet 650 mg  -     diphenhydrAMINE (BENADRYL) 25 mg in sodium chloride 0.9% 50 mL IVPB  -     famotidine (PF) injection 20 mg  -     aprepitant (CINVANTI) injection 130 mg  -     palonosetron (ALOXI) 0.25 mg with Dexamethasone (DECADRON) 12 mg in NS 50 mL IVPB  -     sacituzumab govitecan-hziy (TRODELVY) 740 mg in sodium chloride 0.9% 500 mL chemo infusion  -     atropine injection 0.4 mg  -     prochlorperazine injection Soln 10 mg  -     EPINEPHrine (EPIPEN) 0.3 mg/0.3 mL pen injection 0.3 mg  -     diphenhydrAMINE injection 50 mg  -     hydrocortisone sodium succinate injection 100 mg  -     sodium chloride 0.9% 250 mL flush bag  -      sodium chloride 0.9% flush 10 mL  -     heparin, porcine (PF) 100 unit/mL injection flush 500 Units  -     alteplase injection 2 mg                Route Chart for Scheduling    Med Onc Chart Routing      Follow up with physician 3 weeks. Jenniffer 4/11/24   Follow up with LYNDSEY    Infusion scheduling note   Sacituzumab 4/12/24 and 4/19/24   Injection scheduling note    Labs CBC and CMP   Scheduling:  Preferred lab:  Lab interval:  Labs 3/28 and   Imaging    Pharmacy appointment    Other referrals                  Treatment Plan Information   OP SACITUZUMAB GOVITECAN-HZIY Q3W   Bridger Abad MD   Upcoming Treatment Dates - OP SACITUZUMAB GOVITECAN-HZIY Q3W    3/22/2024       Pre-Medications       acetaminophen tablet 650 mg       diphenhydrAMINE (BENADRYL) 25 mg in sodium chloride 0.9% 50 mL IVPB       famotidine (PF) injection 20 mg       Chemotherapy       sacituzumab govitecan-hziy (TRODELVY) 740 mg in sodium chloride 0.9% 500 mL chemo infusion       Supportive Care       atropine injection 0.4 mg       prochlorperazine injection Soln 10 mg       Antiemetics       aprepitant (CINVANTI) injection 130 mg       palonosetron (ALOXI) 0.25 mg with Dexamethasone (DECADRON) 12 mg in NS 50 mL IVPB  3/29/2024       Pre-Medications       acetaminophen tablet 650 mg       diphenhydrAMINE (BENADRYL) 25 mg in sodium chloride 0.9% 50 mL IVPB       famotidine (PF) injection 20 mg       Chemotherapy       sacituzumab govitecan-hziy (TRODELVY) 740 mg in sodium chloride 0.9% 500 mL chemo infusion       Supportive Care       atropine injection 0.4 mg       prochlorperazine injection Soln 10 mg       Antiemetics       aprepitant (CINVANTI) injection 130 mg       palonosetron (ALOXI) 0.25 mg with Dexamethasone (DECADRON) 12 mg in NS 50 mL IVPB  4/12/2024       Pre-Medications       acetaminophen tablet 650 mg       diphenhydrAMINE (BENADRYL) 25 mg in sodium chloride 0.9% 50 mL IVPB       famotidine (PF) injection 20 mg        Chemotherapy       sacituzumab govitecan-hziy (TRODELVY) 740 mg in sodium chloride 0.9% 500 mL chemo infusion       Supportive Care       atropine injection 0.4 mg       prochlorperazine injection Soln 10 mg       Antiemetics       aprepitant (CINVANTI) injection 130 mg       palonosetron (ALOXI) 0.25 mg with Dexamethasone (DECADRON) 12 mg in NS 50 mL IVPB  4/19/2024       Pre-Medications       acetaminophen tablet 650 mg       diphenhydrAMINE (BENADRYL) 25 mg in sodium chloride 0.9% 50 mL IVPB       famotidine (PF) injection 20 mg       Chemotherapy       sacituzumab govitecan-hziy (TRODELVY) 740 mg in sodium chloride 0.9% 500 mL chemo infusion       Supportive Care       atropine injection 0.4 mg       prochlorperazine injection Soln 10 mg       Antiemetics       aprepitant (CINVANTI) injection 130 mg       palonosetron (ALOXI) 0.25 mg with Dexamethasone (DECADRON) 12 mg in NS 50 mL IVPB    Therapy Plan Information  Medications  denosumab (XGEVA) solution 120 mg  120 mg, Subcutaneous, Every 4 weeks          Bridger Abad

## 2024-03-20 NOTE — ASSESSMENT & PLAN NOTE
Tolerated full dose SG reasonably well. HGB continues to drift downward, likely due to chemotherapy and inflammatory block. I don't think IV iron would be beneficial. Awaiting completion of dental work prior to starting Xgeva,  - C2D1 full dose SG tomorrow; FU next week for C2D8  - Needs CBC prior to C2D8  - FU with me in 3 weeks prior to C3  - Repeat CT imaging after C4

## 2024-03-25 NOTE — PROGRESS NOTES
Consult Note  Palliative Care      Consult Requested By: Dr. Abad  Reason for Consult: symptom management and ACP/GOC      ASSESSMENT/PLAN:     Plan/Recommendations:  Diagnoses and all orders for this visit:    Malignant neoplasm of urinary bladder/Secondary malignant neoplasm of bone and bone marrow  - patient followed by Dr. Abad  - currently on disease directed therapy with full dose sacituzumab govitecan (SG)  - patient also reporting that oncology looking into clinical trials at [facility]; no trial available at [facility]    Encounter for palliative care/Advanced care planning  Advance Care Planning   - patient decisional and accompanied by his mother today  - no ACP documents uploaded into EMR  - philosophy of palliative medicine and new patient folder given to and reviewed with patient and family at first visit  - ACP booklet given to and reviewed with patient at first visit  - patient states he has already completed these forms, but he is unsure who he has named as his HCPOA. He states it is either his brother, mother, or father  - patient discussed how his cancer has progressed through multiple lines of treatment, and that he has restarted SG at full dose. He reports that he was given a prognosis of months, but he is hoping that he will live longer  - he is interested in pursuing treatment including looking into clinical trials  - discussed briefly about when patients elect to enroll in hospice care at first visit. Did not discuss in detail about hospice care today and will NOT make a referral to hospice today  - code status not discussed      Cancer related pain  - patient reporting mild to moderate pain in his back  - he states some of the pain improved with treatment of PNA but he still has other pain present  - patient uses tylenol at night if needed for pain relief  - paperwork for massage therapist completed and faxed; integrative onc referral made and message sent to integrative onc about getting  massage therapy scheduled  - he discussed that the pain is tolerable and that he uses it as data to make sure he is not moving his body in a way that would potentially cause injury  - will hold on any pharmacologic adjustment at this time  - opioid safety sheet in new patient folder for patient to review  - if opioid medication needed in the future, will discuss safety and order narcan at that time    Adjustment disorder with mixed anxiety and depressed mood  - patient reporting mild anxiety and depression  - patient expressed challenges with social distancing while dealing with his treatment. He also discussed the recent discussion around his prognosis with oncology and how that has affected him  - he did have more outings with his friends/family since last visit  - emotional support provided today  - encouraged patient to plan short holidays (3 day weekend trips) that he feels comfortable with; he has not been giving any further consideration to weekend trips  - no need for pharmacologic intervention at this time  - will continue to monitor    Neoplastic (malignant) related fatigue  - patient reporting moderate fatigue; he notes that this has intermittently increased with full dose treatment  - he reports feeling more fatigued at the time of today's visit  - he will work for a few hours then rest for a time and then can work again  - no need for pharmacologic intervention  - patient able to maintain activities at this time  - will continue to monitor      Understanding of illness/Prognosis: patient with excellent understanding of his illness and overall poor prognosis    Goals of care: life prolonging    Follow up: ~ 3-4 weeks    Patient's encounter and above plan of care discussed with patient's oncology team    SUBJECTIVE:     History of Present Illness:  Patient is a 58 y.o. year old male presenting with CAD, HTN, hypothyroidism, previous melanoma, and metastatic bladder cancer presents to Palliative Medicine  for symptom management and ACP/GOC. Please see oncology notes for full details of oncologic history and treatment course.      03/25/2024:  LA  reviewed and summarized:  03/17/2024 Promethazine-Dm 6.25-15 mg/5 ml Disp: 118 for 23 days    Patient had full dose SG about 4 weeks ago. There was no available trials at [facility]. Patient reports doing well overall. Patient still has some mild pain in his back. Patient with ongoing fatigue. He has not heard back from massage therapist about scheduling appointments.     03/04/2024:  LA  reviewed and summarized:  04/28/2023 Oxycodone-apap  mg Disp: 21 for 7 days    Patient presents today with his mother. Patient discussed his history of malignancy up to this point in time. He spoke about how he was given a prognosis of months, but he is hopeful to have more time. Patient states he has mild to moderate pain in his back. Patient reporting some pain improved with treatment of PNA, but he still has pain. Patient uses tylenol PRN for pain. He is expressing more fatigue with full dose of treatment. Patient with mild anxiety/depression. He is still able to do activities he wants at this time. Patient has completed ACP documents previously.     Past Medical History:   Diagnosis Date    Bladder cancer     CAD (coronary artery disease) 9/12/2023    Prior CABG. Not on antiplatelets. Home medicatiosn include atorvastatin    Carcinoma     forehead    Encounter for blood transfusion     Hypertension     Hypothyroidism 9/12/2023    Home medications include levothyroxine    Malignant melanoma of skin, unspecified     right arm    Malignant neoplasm of urinary bladder 9/12/2023    Melanoma 9/12/2023    History of T1a melanoma; 0.5mm depth without ulceartion. SP wide local excision 02/2022     Past Surgical History:   Procedure Laterality Date    CORONARY ARTERY BYPASS GRAFT  10/2015    CYSTECTOMY, ROBOT-ASSISTED, LAPAROSCOPIC, USING DA MARY XI, WITH ILEAL CONDUIT CREATION  12/2017     CYSTOGRAM N/A 12/5/2023    Procedure: CYSTOGRAM;  Surgeon: Eder Oconnor MD;  Location: [facility] OR 1ST FLR;  Service: Urology;  Laterality: N/A;    CYSTOSCOPY N/A 12/5/2023    Procedure: CYSTOSCOPY;  Surgeon: Eder Oconnor MD;  Location: [facility] OR 1ST FLR;  Service: Urology;  Laterality: N/A;    DILATION OF BLADDER      dialation of bladder neck- due to claudia bladder- multiple procedures    DILATION OF URETHRA N/A 12/5/2023    Procedure: DILATION, URETHRA;  Surgeon: Eder Oconnor MD;  Location: [facility] OR 1ST FLR;  Service: Urology;  Laterality: N/A;    LYMPHADENECTOMY      PERCUTANEOUS NEPHROSTOMY Right 12/8/2023    Procedure: Creation, Nephrostomy, Percutaneous;  Surgeon: Jens Nunez MD;  Location: [facility] CATH LAB;  Service: Radiology;  Laterality: Right;    PLACEMENT N/A 12/5/2023    Procedure: PLACEMENT;  Surgeon: Eder Oconnor MD;  Location: [facility] F[facility];  Service: Urology;  Laterality: N/A;  placement of valiente over a wire by MD MOHR ROBOTIC PROSTECTOMY, SUPRAPUBIC  12/2017     Family History   Problem Relation Age of Onset    Heart disease Father     Heart attack Paternal Uncle     Breast cancer Maternal Cousin     Colon cancer Neg Hx     Bladder Cancer Neg Hx      Review of patient's allergies indicates:  No Known Allergies    Medications:    Current Outpatient Medications:     atorvastatin (LIPITOR) 20 MG tablet, Take 20 mg by mouth every evening., Disp: , Rfl:     benzonatate (TESSALON PERLES) 100 MG capsule, Take 1 capsule (100 mg total) by mouth every 6 (six) hours as needed for Cough., Disp: 30 capsule, Rfl: 1    cetirizine HCl (ZYRTEC ORAL), Take by mouth as needed., Disp: , Rfl:     dexAMETHasone (DECADRON) 4 MG Tab, Take 2 tablets (8 mg total) by mouth once daily. Take 2 tablets (8 mg total) by mouth once daily. Take a directed on days 2, 3 and 4 of your chemotherapy cycle., Disp: 24 tablet, Rfl: 3    docusate sodium (COLACE) 100 MG capsule, Take 220 capsules by mouth every evening., Disp: , Rfl:      fluticasone propionate (FLONASE) 50 mcg/actuation nasal spray, 1 spray (50 mcg total) by Each Nostril route once daily., Disp: 9.9 mL, Rfl: 0    Lactobacillus acidophilus 10 billion cell Cap, Take by mouth once daily., Disp: , Rfl:     Lactobacillus rhamnosus GG (CULTURELLE) 10 billion cell capsule, Take 1 capsule by mouth once daily., Disp: , Rfl:     levothyroxine (SYNTHROID) 50 MCG tablet, Take 50 mcg by mouth before breakfast., Disp: , Rfl:     LINZESS 72 mcg Cap capsule, Take 72 mcg by mouth every morning., Disp: , Rfl:     loperamide (IMODIUM) 2 mg capsule, Take 1 capsule (2 mg total) by mouth 4 (four) times daily as needed for Diarrhea. (Patient not taking: Reported on 3/17/2024), Disp: 60 capsule, Rfl: 0    loratadine (CLARITIN ORAL), Take by mouth as needed., Disp: , Rfl:     multivitamin (THERAGRAN) per tablet, Take 1 tablet by mouth every morning., Disp: , Rfl:     OLANZapine (ZYPREXA) 5 MG tablet, TAKE 1 TABLET BY MOUTH EVERY EVENING ON DAYS 1 THROUGH 4 OF EACH CHEMOTHERAPY CYCLE, Disp: 30 tablet, Rfl: 0    ondansetron (ZOFRAN-ODT) 8 MG TbDL, Take 1 tablet (8 mg total) by mouth every 8 (eight) hours as needed (nausea/vomiting). (Patient not taking: Reported on 3/17/2024), Disp: 60 tablet, Rfl: 5    promethazine-dextromethorphan (PROMETHAZINE-DM) 6.25-15 mg/5 mL Syrp, Take 5 mLs by mouth nightly as needed (cough)., Disp: 118 mL, Rfl: 0    TURMERIC ORAL, Take by mouth every morning., Disp: , Rfl:     vitamin D (VITAMIN D3) 1000 units Tab, Take 1 tablet (1,000 Units total) by mouth once daily., Disp: 30 tablet, Rfl: 5    VTAMA 1 % Crea, APPLY 1 APPLICATION ON THE SKIN DAILY, Disp: , Rfl:     OBJECTIVE:       ROS:  Review of Systems   Constitutional:  Positive for activity change and fatigue. Negative for appetite change.   HENT: Negative.     Eyes: Negative.    Respiratory: Negative.     Cardiovascular: Negative.    Gastrointestinal: Negative.    Genitourinary: Negative.    Musculoskeletal:  Positive for  back pain.   Skin: Negative.    Neurological: Negative.    Psychiatric/Behavioral: Negative.  Negative for dysphoric mood. The patient is not nervous/anxious.    All other systems reviewed and are negative.      Review of Symptoms      Symptom Assessment (ESAS 0-10 Scale)  Pain:  1  Dyspnea:  0  Anxiety:  0  Nausea:  0  Depression:  0  Anorexia:  0  Fatigue:  4  Insomnia:  0  Restlessness:  0  Agitation:  0     CAM / Delirium:  Negative  Constipation:  Negative  Diarrhea:  Negative    Anxiety:  Is not nervous/anxious    Bowel Management Plan (BMP):  No      Pain Assessment:  OME in 24 hours:  0  Location(s): back    Back       Location: posterior        Quality: Aching        Quantity: 1/10 in intensity        Chronicity: Onset 1 (greater than) month(s) ago, stable        Aggravating Factors: Activity        Alleviating Factors: Acetaminophen       Associated Symptoms: None    Modified Fidelia Scale:  0    ECOG Performance Status ndGndrndanddndend:nd nd2nd Living Arrangements:  Lives alone    Psychosocial/Cultural:   See Palliative Psychosocial Note: Yes  Please see Pall Med SW note from 3/4/24 for full details.    Patient enjoys travel, watching television, working, and hanging out with friends  **Primary  to Follow**  Palliative Care  Consult: No    Spiritual:  F - Jany and Belief:  Yes - [employer]  I - Importance:  High  C - Community:  Yes  A - Address in Care:  Needs met      Advance Care Planning   Advance Directives:   Living Will: No    LaPOST: No    Do Not Resuscitate Status: No    Medical Power of : No      Decision Making:  Patient answered questions  Goals of Care: What is most important right now is to focus on remaining as independent as possible, symptom/pain control, improvement in condition but with limits to invasive therapies. Accordingly, we have decided that the best plan to meet the patient's goals includes continuing with treatment.        Physical Exam:  Vitals:     Vitals:    03/25/24 1457   BP: 130/74   Pulse: 86     Physical Exam  Vitals reviewed.   Constitutional:       General: He is not in acute distress.     Appearance: He is not toxic-appearing or diaphoretic.   HENT:      Head: Normocephalic and atraumatic.      Right Ear: External ear normal.      Left Ear: External ear normal.      Nose: Nose normal.      Mouth/Throat:      Mouth: Mucous membranes are moist.   Eyes:      General: No scleral icterus.        Right eye: No discharge.         Left eye: No discharge.   Neck:      Comments: Trachea midline  Pulmonary:      Effort: Pulmonary effort is normal. No respiratory distress.   Abdominal:      General: Abdomen is flat. There is no distension.   Musculoskeletal:         General: No signs of injury.      Cervical back: Normal range of motion.      Right lower leg: No edema.      Left lower leg: No edema.   Skin:     General: Skin is dry.      Coloration: Skin is not jaundiced.      Findings: No rash.   Neurological:      General: No focal deficit present.      Mental Status: He is alert and oriented to person, place, and time.      Cranial Nerves: No cranial nerve deficit.   Psychiatric:         Mood and Affect: Mood normal.         Behavior: Behavior normal.         Thought Content: Thought content normal.         Judgment: Judgment normal.       Labs:  CBC:   WBC   Date Value Ref Range Status   03/20/2024 5.70 3.90 - 12.70 K/uL Final     Hemoglobin   Date Value Ref Range Status   03/20/2024 8.0 (L) 14.0 - 18.0 g/dL Final     Hematocrit   Date Value Ref Range Status   03/20/2024 25.5 (L) 40.0 - 54.0 % Final     MCV   Date Value Ref Range Status   03/20/2024 90 82 - 98 fL Final     Platelets   Date Value Ref Range Status   03/20/2024 362 150 - 450 K/uL Final       LFT:   Lab Results   Component Value Date    AST 13 03/20/2024    ALKPHOS 83 03/20/2024    BILITOT 0.3 03/20/2024       Albumin:   Albumin   Date Value Ref Range Status   03/20/2024 2.7 (L) 3.5 - 5.2 g/dL  "Final     Protein:   Total Protein   Date Value Ref Range Status   03/20/2024 6.2 6.0 - 8.4 g/dL Final       Radiology:I have reviewed all pertinent imaging results/findings within the past 24 hours.    02/27/2024 NM PET CT: "Widespread hypermetabolic metastatic disease throughout the chest, abdomen, and pelvis and osseous structures. Extensive hypermetabolic soft tissue thickening/mass lesion surrounding the neobladder and involving the left pelvic sidewall. Severe right hydronephrosis and ureteral dilatation.  Hypermetabolic mass/lymph node conglomerate at the distal aspect of the right ureter likely causing ureteral obstruction. Near complete hypermetabolic consolidation of the lingula with air bronchograms. Findings could represent infectious lobar pneumonia versus consolidative mass/neoplasm. Recommend clinical correlation. Please see above for additional details."      Signature: Moni Jacobs MD    "

## 2024-03-26 NOTE — TELEPHONE ENCOUNTER
Spoke w/ pt in regards to scheduling integrative referral placed by Dr. Jacobs. Pt approved to schedule for in-person visit with Joanna Dubinsky, PA-C on Wednesday 4/3/24 @ 2PM. MA advised pt. That this is a consultation visit whereby the provider will review pt's overall health and all services offered by [facility]. MA also informed pt that clinic is located on the 3rd floor of the [facility]. Pt acknowledged.       [place], [place] ext [phone]

## 2024-04-03 NOTE — PROGRESS NOTES
[facility] Initial Visit      Chief Complaint:  I want to promote my overall well-being through cancer.    HPI: Julio Rodríguez is a 58 y.o. male with metastatic bladder cancer see extensive oncology history below and in most recent note from 3/20/24 from Dr. Abad, who provides his oncology care.  Patient is also under the care of Dr. Jacobs for palliative medicine, who refers the patient here today to [facility] to consider services we have available for him.  Patient has h/o CAD, HTN, hypothyroidism, previous melanoma.   Patient notes his sleep is not bad, he goes to bed late but he does not always feel rested  When he can't get back to sleep he prays or meditates and that helps.  He has a routine.  He works remotely but is able to take breaks and does take walks during the day to remain active.  He mostly goes out to eat.  He likes to spend time with friend son the weekends.  His best friend likes to pull him out to many activities and he participates if he can.  He is still recovering from a pelvic fracture.  He likes to walk and swim.   He is interested in acupuncture.  He would consider aquatherapy, but would like to wait.    Cancer/Stage/TNM:   Cancer Staging   Malignant neoplasm of urinary bladder  Staging form: Urinary Bladder, AJCC 8th Edition  - Clinical: Stage IVB (cT4b, cNX, pM1b) - Signed by Bridger Abad MD on 9/28/2023       Oncology History   Malignant neoplasm of urinary bladder   2016 Initial Diagnosis    Bladder CIS. Managed with BCG     2017 Notable Event    Developed recurrent muscle invasive disease.     2017 - 2017 Chemotherapy    ddMVAC      Surgery    Radical cystectomy with lymph node dissection and creation of neobladder. [id number]     7/2022 Notable Event    New onset hematuria. MRI scan of the pelvis in August showed a suspected blood clot adherent to the distal Smith catheter. There was abnormal signal and enhancement of the bilateral pubic  bones adjacent to the neobladder suspicious for neoplastic infiltration. There was no pelvic lymphadenopathy.      7/2022 Imaging Significant Findings    Pet scan at the same time showed hypermetabolic retroperitoneal lymphadenopathy. There was marked activity felt to involve the bladder wall suspicious for tumor.     7/2022 Biopsy    Biopsy of the bladder neck showed recurrent high-grade urothelial carcinoma.     9/28/2022 - 5/16/2023 Chemotherapy    Mr. Rodríguez was started on chemotherapy with gemcitabine and carboplatin in September 2022.      1/25/2023 Imaging Significant Findings    Pet scan on 25 January showed changes related to prior cystoprostatectomy with neobladder creation and minimal fullness to the right renal collecting system. There was no evidence of a discrete hypermetabolic mass or lymphadenopathy. There was diffusely increased activity throughout the osseous structures, suggestive of chemotherapy with reactive marrow.     4/11/2023 Imaging Significant Findings    CT a/p  1. Pathologic fracture of the left parasymphysis pubis secondary to lytic process. Given location adjacent to neobladder, osteomyelitis is a possibility. Metastatic disease not excluded.   2. Prior cystoprostatectomy with chronic right ureteral obstruction and hydroureteronephrosis, minimally changed from the prior. All etiologies considered including malignant obstruction. Urology consultation recommended.   3. Incidental findings including cholelithiasis, bilateral multifocal renal cortical scarring, and small hiatal hernia.       5/24/2023 -  Chemotherapy    Enfortumab vedotin + Pembrolizumab     8/10/2023 Imaging Significant Findings    PET CT  Interval development of hypermetabolic pulmonary nodules within the lingula and right lower lobe likely reflecting pulmonary metastases. Some additional tiny pulmonary nodularity is new or more conspicuous from prior but too small to accurately characterize by PET/CT. Attention on  follow-up recommended.     Worsening obstructive uropathy which is relatively moderate to severe the right kidney.     Similar appearance of the urinary neobladder. There is somewhat diminished due to physiologic activity to evaluate for local neoplasm but the appearance overall appears similar to prior. Assessment of local disease could be followed with CT abdomen and pelvis with contrast.     Interval fracture of the left superior and inferior pubic rami appearing subacute in nature. Please correlate for trauma and tenderness. There is no significant FDG activity within the area to favor a pathologic fracture.     Previously seen diffuse marrow activity may have been due to previous marrow rebound or drug effect.       8/25/2023 Imaging Significant Findings    MR Pelvis  History of cystoprostatectomy and neobladder creation.     Interval increase in multilobular mass in the pelvis infiltrating the rectum, possibly due to distal sigmoid colon, anterior left pelvic bones as well as a inferior aspect of the neobladder suspicious for progression of neoplastic process as discussed above.      9/28/2023 Cancer Staged    Staging form: Urinary Bladder, AJCC 8th Edition  - Clinical: Stage IVB (cT4b, cNX, pM1b)     10/16/2023 Imaging Significant Findings    CT chest   Impression:  - Multiple solid bilateral pulmonary nodules up to 9mm showing interval increase in size concerning for metastatic disease in this patient with history of prior bladder malignancy.  - Multiple hepatic hypodensities, which may represent cystic lesions however some which are too small to characterize.  - Partially imaged right kidney showing parenchymal atrophy and suspected hydronephrosis..    MR Pelvis   Impression:     Interval increased size of multilobular mass in the cystoprostatectomy surgical bed that invades the neobladder, rectum, sigmoid colon, and left pelvis.  Multiple pelvic lymph nodes are more conspicuous from prior exam and  concerning for additional metastatic disease.     10/24/2023 - 10/24/2023 Chemotherapy    Treatment Summary   Plan Name: OP SACITUZUMAB GOVITECAN-HZIY Q3W  Treatment Goal: Palliative  Status: Inactive  Start Date:   End Date:   Provider: Bridger Abad MD  Chemotherapy: sacituzumab govitecan-hziy (TRODELVY) 543 mg in sodium chloride 0.9% 500 mL chemo infusion, 7.5 mg/kg = 543 mg (original dose ), Intravenous, Clinic/[facility] 1 time, 0 of 17 cycles  Dose modification: 7.5 mg/kg (Cycle 1, Reason: MD Discretion, Comment: [id number] PM )     11/2/2023 - 11/20/2023 Chemotherapy    Treatment Summary   Plan Name: [facility] [id number] Dose Escalation INV-NTX + INV pembrolizumab  Treatment Goal: Control  Status: Inactive  Start Date: 11/2/2023  End Date: 11/20/2023  Provider: Chan Leos MD  Chemotherapy: INV [id number] (pembrolizumab) 200 mg in INV sodium chloride 0.9 % 100 mL chemo infusion, 200 mg, Intravenous, Clinic/[facility] 1 time, 1 of 35 cycles  Administration: 200 mg (11/2/2023)     11/29/2023 Imaging Significant Findings    Impression:     Postoperative change including cysto proctectomy with creation of neobladder.  Persistent multilobular soft tissue mass within the surgical bed involving the neobladder and abutting the rectum and sigmoid colon, noting limited evaluation on this noncontrast examination.  Within these confines, appearance is similar from comparison CT 10/30/2023, noting increased distension of the neobladder from comparison imaging.     Persistent right-sided hydronephrosis and hydroureter with soft tissue attenuation at the mid to distal right ureter.  Prominent lymph nodes surrounding the right ureter, similar from comparison imaging.     Few stable mildly prominent pelvic and retroperitoneal lymph nodes.     Slight interval increase in left upper lobe nodule measuring approximately 1.2 x 1.0 cm, previously measuring 0.9 x 0.9 cm.  Additional previously described nodules are similar in size and appearance.      Remote fracture of the left pubic symphysis with associated lytic lesion.     12/9/2023 Imaging Significant Findings    CT 12/9/23    Impression:     1. Right nephrostomy in appropriate position.  2. Postoperative change of cystoproctectomy with ileal neobladder.  Similar appearance of large pelvic mass surrounding the neobladder.  3. Ill-defined hypoattenuating hepatic lesions, concerning for metastases in light of history.  4. Increased retroperitoneal and pelvic adenopathy.  5. Stable pulmonary nodules.  6. Additional findings as above.     12/28/2023 -  Chemotherapy    Treatment Summary   Plan Name: OP SACITUZUMAB GOVITECAN-HZIY Q3W  Treatment Goal: Palliative  Status: Active  Start Date: 12/28/2023  End Date: 12/6/2024 (Planned)  Provider: Bridger Abad MD  Chemotherapy: sacituzumab govitecan-hziy (TRODELVY) 540 mg in sodium chloride 0.9% 500 mL chemo infusion, 555 mg (100 % of original dose 7.5 mg/kg), Intravenous, [facility] [place] 1 time, 5 of 17 cycles  Dose modification: 7.5 mg/kg (original dose 7.5 mg/kg, Cycle 1, Reason: MD Discretion), 10 mg/kg (original dose 7.5 mg/kg, Cycle 4, Reason: MD Discretion)  Administration: 540 mg (12/28/2023), 540 mg (1/4/2024), 540 mg (1/19/2024), 540 mg (1/26/2024), 540 mg (2/8/2024), 540 mg (2/16/2024), 720 mg (3/1/2024), 720 mg (3/8/2024), 720 mg (3/22/2024)     2/2024 Imaging Significant Findings    2/23/24 CT Chest  Impression:     In this patient with metastatic bladder cancer there is interval increased size of bilateral solid pulmonary nodules and new lytic lesions in the bilateral ribs and vertebra concerning for worsening metastatic disease.     New consolidation with air bronchograms in the left lingula concerning for lobar pneumonia.  New interlobular scattered septal thickening which may be related to lingular pneumonia; however, lymphangitis carcinomatosis not excluded in this patient with history of malignancy.     Stable hypodensities in the partially  visualized liver that likely represent additional metastatic lesions.  Partially visualized right hydronephrosis, similar to prior exam given limited evaluation.    2/27/24  PET CT  Impression:     Widespread hypermetabolic metastatic disease throughout the chest, abdomen, and pelvis and osseous structures.     Extensive hypermetabolic soft tissue thickening/mass lesion surrounding the neobladder and involving the left pelvic sidewall.     Severe right hydronephrosis and ureteral dilatation.  Hypermetabolic mass/lymph node conglomerate at the distal aspect of the right ureter likely causing ureteral obstruction.     Near complete hypermetabolic consolidation of the lingula with air bronchograms. Findings could represent infectious lobar pneumonia versus consolidative mass/neoplasm. Recommend clinical correlation.        Melanoma         Past Medical History:   Diagnosis Date    Bladder cancer     CAD (coronary artery disease) 9/12/2023    Prior CABG. Not on antiplatelets. Home medicatiosn include atorvastatin    Carcinoma     forehead    Encounter for blood transfusion     Hypertension     Hypothyroidism 9/12/2023    Home medications include levothyroxine    Malignant melanoma of skin, unspecified     right arm    Malignant neoplasm of urinary bladder 9/12/2023    Melanoma 9/12/2023    History of T1a melanoma; 0.5mm depth without ulceartion. SP wide local excision 02/2022        Current Outpatient Medications   Medication Instructions    atorvastatin (LIPITOR) 20 mg, Oral, Nightly    benzonatate (TESSALON PERLES) 100 mg, Oral, Every 6 hours PRN    cetirizine HCl (ZYRTEC ORAL) Oral, As needed (PRN)    dexAMETHasone (DECADRON) 8 mg, Oral, Daily, Take 2 tablets (8 mg total) by mouth once daily. Take a directed on days 2, 3 and 4 of your chemotherapy cycle.    docusate sodium (COLACE) 100 MG capsule 220 capsules, Oral, Nightly    fluticasone propionate (FLONASE) 50 mcg, Each Nostril, Daily    Lactobacillus acidophilus  10 billion cell Cap Oral, Daily    Lactobacillus rhamnosus GG (CULTURELLE) 10 billion cell capsule 1 capsule, Oral, Daily    levothyroxine (SYNTHROID) 50 mcg, Oral, Before breakfast    LINZESS 72 mcg, Oral, Every morning    loperamide (IMODIUM) 2 mg, Oral, 4 times daily PRN    loratadine (CLARITIN ORAL) Oral, As needed (PRN)    multivitamin (THERAGRAN) per tablet 1 tablet, Oral, Every morning    OLANZapine (ZYPREXA) 5 MG tablet TAKE 1 TABLET BY MOUTH EVERY EVENING ON DAYS 1 THROUGH 4 OF EACH CHEMOTHERAPY CYCLE    ondansetron (ZOFRAN-ODT) 8 mg, Oral, Every 8 hours PRN    TURMERIC ORAL Oral, Every morning    vitamin D (VITAMIN D3) 1,000 Units, Oral, Daily    VTAMA 1 % Crea APPLY 1 APPLICATION ON THE SKIN DAILY        Past Surgical History:   Procedure Laterality Date    CORONARY ARTERY BYPASS GRAFT  10/2015    CYSTECTOMY, ROBOT-ASSISTED, LAPAROSCOPIC, USING DA MARY XI, WITH ILEAL CONDUIT CREATION  12/2017    CYSTOGRAM N/A 12/5/2023    Procedure: CYSTOGRAM;  Surgeon: Eder Oconnor MD;  Location: [facility] OR [address] [place] [address];  Service: Urology;  Laterality: N/A;    CYSTOSCOPY N/A 12/5/2023    Procedure: CYSTOSCOPY;  Surgeon: Eder Oconnor MD;  Location: [facility] OR [address] [place] [address];  Service: Urology;  Laterality: N/A;    DILATION OF BLADDER      dialation of bladder neck- due to claudia bladder- multiple procedures    DILATION OF URETHRA N/A 12/5/2023    Procedure: DILATION, URETHRA;  Surgeon: Eder Oconnor MD;  [facility];  Service: Urology;  Laterality: N/A;    LYMPHADENECTOMY      PERCUTANEOUS NEPHROSTOMY Right 12/8/2023    Procedure: Creation, Nephrostomy, Percutaneous;  Surgeon: Jens Nunez MD;  Location: [facility] CATH LAB;  Service: Radiology;  Laterality: Right;    PLACEMENT N/A 12/5/2023    Procedure: PLACEMENT;  Surgeon: Eder Oconnor MD;  [facility];  Service: Urology;  Laterality: N/A;  placement of valiente over a wire by MD MOHR ROBOTIC PROSTECTOMY, SUPRAPUBIC  12/2017          Distress Score:   3      7  Pillars Assessment      Sleep  How many hours of sleep per night? 6-8 hours  Stays up late, no trouble falling asleep, often wakes up, prayers/mediation to get back to sleep    Resilience  Rate your current level of stress- mod  How do you manage stress? Exercise    Purpose  Do you feel you have a vision or a life purpose? Yes    Environment  Any exposures:no known exposures    Spirituality-  Zoroastrianism    Nutrition   Food allergies or sensitivities: no  Do you adhere to a particular type of diet? no  What type of diet do you follow? none  Do you have any concerns with your eating habits? no  Are you concerned with your level of alcohol intake? no    Exercise  How would you describe your physical activity level? mod  Do you work at a sedentary job? yes  What do you do for physical activity? Walk, swim, housework      Physical Exam   There were no vitals taken for this visit.   Wt Readings from Last 3 Encounters:   03/28/24 74.9 kg (165 lb 2 oz)   03/22/24 74.6 kg (164 lb 5.7 oz)   03/20/24 74.6 kg (164 lb 5.7 oz)     Temp Readings from Last 3 Encounters:   03/28/24 98.5 °F (36.9 °C) (Oral)   03/22/24 98.2 °F (36.8 °C)   03/20/24 98.5 °F (36.9 °C) (Oral)     BP Readings from Last 3 Encounters:   03/28/24 (!) 149/83   03/25/24 130/74   03/22/24 123/64     Pulse Readings from Last 3 Encounters:   03/28/24 77   03/25/24 86   03/22/24 90       Body mass index is There is no height or weight on file to calculate BMI.    Vitals reviewed.   Constitutional:       General: Patient is not in acute distress.     Appearance: Normal appearance.   HENT:      Head: Normocephalic and atraumatic.  Pulmonary:      Effort: Pulmonary effort is normal.       Review of Systems:   Cardiac:           No SOB, chest pain with exertion,edema, orthopnea  Distress:          No excessive sadness, no hopelessness, +anxiety  Cognitive:        No trouble with memory,   Fatigue:           Energy level sometimes adequate, performing ADL's, occ morning  "fatigue                           Fatigue  2 / 10  ( Scale 0 - 10)   Hormonal:       No hot flashes, no night sweats  Pain:                Has pain,  location: pelvis/back                           Pain 3   / 10 (Scale 0 - 10)    Neuropathy:    No numbness, no tingling, no paresthesia   Sleep:              No difficulty falling asleep,+ waking up in night, no daytime sleepiness, no snoring  Altered function:          No problems with money management,  no problems with daily organization & planning  Weight:           No concerns with weight, wants to maintain       Labs:   Lab Results   Component Value Date    WBC 2.23 (L) 03/28/2024    HGB 7.0 (L) 03/28/2024    HCT 22.7 (L) 03/28/2024    MCV 90 03/28/2024     03/28/2024           No results found for: "HGBA1C"         Assessment:   Patient is a 58 y.o.male who arrived to [facility] for consult during cancer treatment.       Plan   #Nutrition: Encourage plant-forward anti-inflammatory diet with plenty of protein, referral offered  # Sleep: Recommend 6-8 hours of restful sleep nightly; recommend strong sleep hygiene routine one hour prior to bed.   # Exercise: Recommend 60 minutes of gentle movement/light activity per day (cleaning, walking, cooking, gardening, stationary bike) at baseline and, if can tolerate, build up to at least 150 minutes weekly, plus some form of resistance  # Acupuncture--pain, anxiety, stress, sleep   # OT/PT/Aquatherapy offered for increased activity, patient declined--reach out if interested   # Reviewed all services available in [facility]   #Continue with Palliative   #Follow-Up: PRN    Face to Face Time With Patient: 35 min   I spent a total of 50 minutes on the day of the visit.This includes face to face time and non-face to face time preparing to see the patient (eg, review of tests), obtaining and/or reviewing separately obtained history, documenting clinical information in the electronic or other health " record, independently interpreting results and communicating results to the patient/family/caregiver, or care coordinator.

## 2024-04-05 NOTE — PROGRESS NOTES
"Subjective:      Patient ID: Julio Rodríguez is a 58 y.o. male.    Vitals:  height is 5' 10" (1.778 m) and weight is 74.8 kg (165 lb). His temperature is 98.2 °F (36.8 °C). His blood pressure is 132/79 and his pulse is 100. His respiration is 18 and oxygen saturation is 98%.     Chief Complaint: Edema    57 y/o male who has bladder and bone cancer, and CKD, presents to the  today with c/o redness and swelling to the left leg-that began 3 weeks ago, and has worsened. He states the pain began at the pelvic/hip area, and has spread down. Reports minimal pain (1/10 in severity). Reports he has been sedentary lately. He reports a h/o stress fracture to his left pelvis approx one year ago.         Edema  This is a new problem. The current episode started 1 to 4 weeks ago. The problem occurs constantly. The problem has been unchanged. Pertinent negatives include no abdominal pain, anorexia, change in bowel habit, chest pain, chills, congestion, coughing, diaphoresis, fatigue, fever, headaches, myalgias, nausea, neck pain, numbness, rash, sore throat, swollen glands, urinary symptoms, vertigo, visual change, vomiting or weakness. He has tried nothing for the symptoms.       Constitution: Negative for chills, sweating, fatigue and fever.   HENT:  Negative for congestion and sore throat.    Neck: Negative for neck pain.   Cardiovascular:  Positive for leg swelling. Negative for chest pain.   Respiratory:  Negative for chest tightness, cough, shortness of breath and wheezing.    Gastrointestinal:  Negative for abdominal pain, nausea and vomiting.   Musculoskeletal:  Positive for pain (with redness and swelling to upper and lower left leg) and pain with walking. Negative for muscle ache.   Skin:  Positive for color change and erythema (to upper and lower left leg). Negative for rash.   Neurological:  Negative for history of vertigo, headaches and numbness.      Objective:     Physical Exam   Constitutional: He is oriented to " person, place, and time.  Non-toxic appearance. He does not appear ill. No distress.   HENT:   Head: Normocephalic.   Nose: Nose normal.   Mouth/Throat: Mucous membranes are moist.   Cardiovascular: Normal pulses. Tachycardia present.      Comments: Borderline tachycardia   Pulmonary/Chest: Effort normal and breath sounds normal. No respiratory distress.   Abdominal: Normal appearance.   Musculoskeletal: Normal range of motion.         General: Swelling present. Normal range of motion.      Comments: Negative Farideh's sign    Neurological: He is alert and oriented to person, place, and time.   Skin: Skin is warm, dry and not diaphoretic. Capillary refill takes less than 2 seconds. erythema (to upper and lower left leg)         Comments: Upper right thigh: 51cm  Upper left thigh:  55cm    Lower right thigh: 42cm   Lower left thigh:  47cm    Right calf: 32cm  Left calf: 35cm   Psychiatric: His behavior is normal. Mood normal.   Nursing note and vitals reviewed.      Assessment:     1. Left leg swelling    2. Leg erythema    3. Suspected DVT (deep vein thrombosis)        Plan:   Attempted to make appt for imaging through the  and no appts at this time, until Monday.     Left leg swelling  -     Cancel: CV Ultrasound doppler venous DVT leg left; Future  -     Refer to Emergency Dept.    Leg erythema  -     Refer to Emergency Dept.    Suspected DVT (deep vein thrombosis)  -     Refer to Emergency Dept.      Patient Instructions   Please seek ER care now for further evaluation and care--including imaging and blood work.  ER referral placed

## 2024-04-05 NOTE — ED NOTES
Patient comes into the emergency department by POV with complaints of swelling in L leg. Patient states that he has achy pain in pelvis and groin area that's been ongoing, hx of fx in pelvis, noticed swelling in leg about a month ago, hx of blood clot in R leg, hx of cancer and currently on chemo. Patient denies fever and chills.

## 2024-04-05 NOTE — PATIENT INSTRUCTIONS
Please seek ER care now for further evaluation and care--including imaging and blood work.  ER referral placed

## 2024-04-05 NOTE — ED PROVIDER NOTES
Encounter Date: 4/5/2024       History     Chief Complaint   Patient presents with    Leg Swelling     L leg swelling and redness that began 3 weeks ago and has worsened. Sent from  for blood work and imaging to rule out DVT     58-year-old male with history superficial thrombophlebitis, bladder cancer on chemotherapy, pathologic pelvic fracture presents with 3-4 weeks of gradual swelling to his left leg.  There has been no injury.  There has an intermittent pain but no current pain to his leg.  Denies any fevers.  Sometimes her some redness to the leg but none at the moment.  He was seen at urgent care with a concern for DVT and sent here.  Denies any chest pain or shortness of breath.  He does not have a history of GI bleeding.  No recent falls.  Sometimes he gets some bleeding when he does a urinary catheterization.        Review of patient's allergies indicates:  No Known Allergies  Past Medical History:   Diagnosis Date    Bladder cancer     CAD (coronary artery disease) 9/12/2023    Prior CABG. Not on antiplatelets. Home medicatiosn include atorvastatin    Carcinoma     forehead    Encounter for blood transfusion     Hypertension     Hypothyroidism 9/12/2023    Home medications include levothyroxine    Malignant melanoma of skin, unspecified     right arm    Malignant neoplasm of urinary bladder 9/12/2023    Melanoma 9/12/2023    History of T1a melanoma; 0.5mm depth without ulceartion. SP wide local excision 02/2022     Past Surgical History:   Procedure Laterality Date    CORONARY ARTERY BYPASS GRAFT  10/2015    CYSTECTOMY, ROBOT-ASSISTED, LAPAROSCOPIC, USING DA MARY XI, WITH ILEAL CONDUIT CREATION  12/2017    CYSTOGRAM N/A 12/5/2023    Procedure: CYSTOGRAM;  Surgeon: Eder Oconnor MD;  Location: [facility] OR [address] [place] [address];  Service: Urology;  Laterality: N/A;    CYSTOSCOPY N/A 12/5/2023    Procedure: CYSTOSCOPY;  Surgeon: Eder Oconnor MD;  [facility];  Service: Urology;  Laterality: N/A;     DILATION OF BLADDER      dialation of bladder neck- due to claudia bladder- multiple procedures    DILATION OF URETHRA N/A 12/5/2023    Procedure: DILATION, URETHRA;  Surgeon: Eder Oconnor MD;  Location: [facility] OR 1ST FLR;  Service: Urology;  Laterality: N/A;    LYMPHADENECTOMY      PERCUTANEOUS NEPHROSTOMY Right 12/8/2023    Procedure: Creation, Nephrostomy, Percutaneous;  Surgeon: Jens Nunez MD;  Location: [facility] CATH LAB;  Service: Radiology;  Laterality: Right;    PLACEMENT N/A 12/5/2023    Procedure: PLACEMENT;  Surgeon: Eder Oconnor MD;  Location: [facility] OR 1ST FLR;  Service: Urology;  Laterality: N/A;  placement of valiente over a wire by MD MOHR ROBOTIC PROSTECTOMY, SUPRAPUBIC  12/2017     Family History   Problem Relation Age of Onset    Heart disease Father     Heart attack Paternal Uncle     Breast cancer Maternal Cousin     Colon cancer Neg Hx     Bladder Cancer Neg Hx      Social History     Tobacco Use    Smoking status: Former     Types: Cigarettes     Passive exposure: Never   Substance Use Topics    Alcohol use: Not Currently    Drug use: Never     Review of Systems    Physical Exam     Initial Vitals [04/05/24 1738]   BP Pulse Resp Temp SpO2   131/79 75 18 98 °F (36.7 °C) 99 %      MAP       --         Physical Exam    Constitutional: He appears well-developed and well-nourished. No distress.   Neck: Neck supple.   Pulmonary/Chest: No respiratory distress.   Abdominal: There is no abdominal tenderness.   Musculoskeletal:      Cervical back: Neck supple.      Comments: He has edema to his left leg and thigh.  It is not tense.  There has no tenderness.  There has no erythema or sign of infection.  He has a palpable dorsalis pedis pulse on the left.     Neurological: He is alert. No cranial nerve deficit or sensory deficit.   Psychiatric: He has a normal mood and affect.         ED Course   Procedures  Labs Reviewed   CBC W/ AUTO DIFFERENTIAL - Abnormal; Notable for the following components:       Result  Value    RBC 2.89 (*)     Hemoglobin 8.3 (*)     Hematocrit 26.4 (*)     MCHC 31.4 (*)     RDW 15.8 (*)     Lymph # 0.7 (*)     Gran % 76.7 (*)     Lymph % 10.0 (*)     All other components within normal limits   BASIC METABOLIC PANEL - Abnormal; Notable for the following components:    Sodium 131 (*)     BUN 31 (*)     Creatinine 2.4 (*)     eGFR 30.5 (*)     All other components within normal limits   HIV 1 / 2 ANTIBODY    Narrative:     Release to patient->Immediate   HEPATITIS C ANTIBODY    Narrative:     Release to patient->Immediate          Imaging Results               US Lower Extremity Veins Left (Final result)  Result time 04/05/24 21:05:49      Final result by Johnathan Christensen MD (04/05/24 21:05:49)                   Impression:      Nonocclusive mural partial thrombosis of the left common femoral and proximal femoral veins.    This report was flagged in Epic as abnormal.    The critical information above was relayed directly by Johnathan Christensen MD by [email or web address] secure chat to Robin Street on 4/5/2024 at 21:04.      Electronically signed by: Johnathan Christensen MD  Date:    04/05/2024  Time:    21:05               Narrative:    EXAMINATION:  US LOWER EXTREMITY VEINS LEFT    CLINICAL HISTORY:  Other specified soft tissue disorders    TECHNIQUE:  Duplex and color flow Doppler evaluation and graded compression of the left lower extremity veins was performed.    COMPARISON:  None    FINDINGS:  Left thigh veins: Nonocclusive mural partial thrombosis of the left common femoral and proximal femoral veins.  The left mid to distal femoral, popliteal, upper greater saphenous, and deep femoral veins are patent and free of thrombus, normally compressible and have normal phasic flow and augmentation response.    Left calf veins: The visualized calf veins are patent.    Contralateral CFV: The contralateral (right) common femoral vein is patent and free of thrombus.    Miscellaneous: None                                        Medications - No data to display  Medical Decision Making  Patient has a DVT.  I reviewed dosing with respect to his GFR.  Discussed with pharmacy.  Highly doubt PE.  Will discharge home.  Recommend he follow-up with Dr. Troncoso or Cardiology.    Amount and/or Complexity of Data Reviewed  Labs: ordered.    Risk  Prescription drug management.                                      Clinical Impression:  Final diagnoses:  [M79.89] Left leg swelling  [I82.4Y2] Acute deep vein thrombosis (DVT) of proximal vein of left lower extremity (Primary)          ED Disposition Condition    Discharge Stable          ED Prescriptions       Medication Sig Dispense Start Date End Date Auth. Provider    apixaban (ELIQUIS) 5 mg Tab Take 1 tablet (5 mg total) by mouth 2 (two) times daily. 60 tablet 4/5/2024 -- Robin Street MD          Follow-up Information       Follow up With Specialties Details Why Contact Info    Your Primary Care Physician  Schedule an appointment as soon as possible for a visit in 2 days      Maurilio Troncoso MD PhD Interventional Cardiology, Cardiology, Vascular Medicine   [address] [facility] [address]  [phone]      [facility] - Emergency Dept Emergency Medicine  If symptoms worsen [address] [facility] [id number] [phone]             Robin Street MD  04/05/24 3639

## 2024-04-09 PROBLEM — I82.412 ACUTE DEEP VEIN THROMBOSIS (DVT) OF LEFT FEMORAL VEIN: Status: ACTIVE | Noted: 2024-01-01

## 2024-04-09 NOTE — PROGRESS NOTES
"[facility]      Chief Complaint   Patient presents with    Deep Vein Thrombosis       Patient ID: Julio Rodríguez is a 58 y.o. male with CAD s/p CABG, HTN, hypothyroidism, metastatic bladder cancer (on chemo), melanoma, who presents for initial appointment.  Pertinent history/events are as follows:     -Pt kindly referred by Dr. Street for evaluation of Acute deep vein thrombosis (DVT) of proximal vein of left lower extremity (per his note on 4/5/2024):  "58-year-old male with history superficial thrombophlebitis, bladder cancer on chemotherapy, pathologic pelvic fracture presents with 3-4 weeks of gradual swelling to his left leg. There has been no injury. There has an intermittent pain but no current pain to his leg. Denies any fevers. Sometimes her some redness to the leg but none at the moment. He was seen at urgent care with a concern for DVT and sent here. Denies any chest pain or shortness of breath."   Venous ultrasound showed nonocclusive mural partial thrombosis of the left common femoral and proximal femoral veins.  Pt started on apixaban 5 mg bid.     HPI:  Mr. Rodríguez reports left leg swelling starting 1 month ago.  He reports "some quickened breathing".  Notes no chest pain.  On 4/5/2024, he presented to the ED for leg swelling, and was found to have nonocclusive mural partial thrombosis of the left common femoral and proximal femoral veins.  Pt started on apixaban 5 mg bid.     Past Medical History:   Diagnosis Date    Bladder cancer     CAD (coronary artery disease) 9/12/2023    Prior CABG. Not on antiplatelets. Home medicatiosn include atorvastatin    Carcinoma     forehead    Encounter for blood transfusion     Hypertension     Hypothyroidism 9/12/2023    Home medications include levothyroxine    Malignant melanoma of skin, unspecified     right arm    Malignant neoplasm of urinary bladder 9/12/2023    Melanoma 9/12/2023    History of T1a melanoma; 0.5mm depth without ulceartion. SP " wide local excision 02/2022     Past Surgical History:   Procedure Laterality Date    CORONARY ARTERY BYPASS GRAFT  10/2015    CYSTECTOMY, ROBOT-ASSISTED, LAPAROSCOPIC, USING DA MARY XI, WITH ILEAL CONDUIT CREATION  12/2017    CYSTOGRAM N/A 12/5/2023    Procedure: CYSTOGRAM;  Surgeon: Eder Oconnor MD;  Location: [facility] OR [facility] FLR;  Service: Urology;  Laterality: N/A;    CYSTOSCOPY N/A 12/5/2023    Procedure: CYSTOSCOPY;  Surgeon: Eder Oconnor MD;  [facility];  Service: Urology;  Laterality: N/A;    DILATION OF BLADDER      dialation of bladder neck- due to claudia bladder- multiple procedures    DILATION OF URETHRA N/A 12/5/2023    Procedure: DILATION, URETHRA;  Surgeon: Eder Oconnor MD;  [facility];  Service: Urology;  Laterality: N/A;    LYMPHADENECTOMY      PERCUTANEOUS NEPHROSTOMY Right 12/8/2023    Procedure: Creation, Nephrostomy, Percutaneous;  Surgeon: Jens Nunez MD;  Location: [facility] CATH LAB;  Service: Radiology;  Laterality: Right;    PLACEMENT N/A 12/5/2023    Procedure: PLACEMENT;  Surgeon: Eder Oconnor MD;  [facility];  Service: Urology;  Laterality: N/A;  placement of valiente over a wire by MD MOHR ROBOTIC PROSTECTOMY, SUPRAPUBIC  12/2017     Social History     Socioeconomic History    Marital status: Single   Tobacco Use    Smoking status: Former     Types: Cigarettes     Passive exposure: Never   Substance and Sexual Activity    Alcohol use: Not Currently    Drug use: Never   Social History Narrative    Lives independently in [place]. 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      Social Determinants of Health     Financial Resource Strain: Low Risk  (2/12/2024)    Overall Financial Resource Strain (CARDIA)     Difficulty of Paying Living Expenses: Not hard at all   Food Insecurity: No Food Insecurity (2/12/2024)    Hunger Vital Sign     Worried About Running Out of Food in the Last Year: Never true     Ran Out of Food  in the Last Year: Never true   Transportation Needs: No Transportation Needs (2/12/2024)    PRAPARE - Transportation     Lack of Transportation (Medical): No     Lack of Transportation (Non-Medical): No   Physical Activity: Insufficiently Active (2/12/2024)    Exercise Vital Sign     Days of Exercise per Week: 1 day     Minutes of Exercise per Session: 30 min   Stress: No Stress Concern Present (2/12/2024)    [employer] [place] [employer] - Occupational Stress Questionnaire     Feeling of Stress : Only a little   Social Connections: Unknown (2/12/2024)    Social Connection and Isolation Panel [NHANES]     Frequency of Communication with Friends and Family: Three times a week     Frequency of Social Gatherings with Friends and Family: Three times a week     Active Member of Clubs or Organizations: Yes     Attends Club or Organization Meetings: 1 to 4 times per year     Marital Status: Never    Housing Stability: Low Risk  (2/12/2024)    Housing Stability Vital Sign     Unable to Pay for Housing in the Last Year: No     Number of Places Lived in the Last Year: 1     Unstable Housing in the Last Year: No     Family History   Problem Relation Age of Onset    Heart disease Father     Heart attack Paternal Uncle     Breast cancer Maternal Cousin     Colon cancer Neg Hx     Bladder Cancer Neg Hx        Review of patient's allergies indicates:  No Known Allergies    Medication List with Changes/Refills   Current Medications    APIXABAN (ELIQUIS) 5 MG TAB    Take 1 tablet (5 mg total) by mouth 2 (two) times daily.    ATORVASTATIN (LIPITOR) 20 MG TABLET    Take 20 mg by mouth every evening.    BENZONATATE (TESSALON PERLES) 100 MG CAPSULE    Take 1 capsule (100 mg total) by mouth every 6 (six) hours as needed for Cough.    CETIRIZINE HCL (ZYRTEC ORAL)    Take by mouth as needed.    DEXAMETHASONE (DECADRON) 4 MG TAB    Take 2 tablets (8 mg total) by mouth once daily. Take 2 tablets (8 mg total) by mouth once  "daily. Take a directed on days 2, 3 and 4 of your chemotherapy cycle.    DOCUSATE SODIUM (COLACE) 100 MG CAPSULE    Take 220 capsules by mouth every evening.    FLUTICASONE PROPIONATE (FLONASE) 50 MCG/ACTUATION NASAL SPRAY    1 spray (50 mcg total) by Each Nostril route once daily.    LACTOBACILLUS RHAMNOSUS GG (CULTURELLE) 10 BILLION CELL CAPSULE    Take 1 capsule by mouth once daily.    LEVOTHYROXINE (SYNTHROID) 50 MCG TABLET    Take 50 mcg by mouth before breakfast.    LINZESS 72 MCG CAP CAPSULE    Take 72 mcg by mouth every morning.    LOPERAMIDE (IMODIUM) 2 MG CAPSULE    Take 1 capsule (2 mg total) by mouth 4 (four) times daily as needed for Diarrhea.    LORATADINE (CLARITIN ORAL)    Take by mouth as needed.    MULTIVITAMIN (THERAGRAN) PER TABLET    Take 1 tablet by mouth every morning.    OLANZAPINE (ZYPREXA) 5 MG TABLET    TAKE 1 TABLET BY MOUTH EVERY EVENING ON DAYS 1 THROUGH 4 OF EACH CHEMOTHERAPY CYCLE    ONDANSETRON (ZOFRAN-ODT) 8 MG TBDL    Take 1 tablet (8 mg total) by mouth every 8 (eight) hours as needed (nausea/vomiting).    TURMERIC ORAL    Take by mouth every morning.    VITAMIN D (VITAMIN D3) 1000 UNITS TAB    Take 1 tablet (1,000 Units total) by mouth once daily.    VTAMA 1 % CREA    APPLY 1 APPLICATION ON THE SKIN DAILY       Review of Systems  Constitution: Denies chills, fever, and sweats.  HENT: Denies headaches or blurry vision.  Cardiovascular: Denies chest pain or irregular heart beat.  Respiratory: Denies cough or shortness of breath.  Gastrointestinal: Denies abdominal pain, nausea, or vomiting.  Musculoskeletal: Positive for left leg swelling.  Neurological: Denies dizziness or focal weakness.  Psychiatric/Behavioral: Normal mental status.  Hematologic/Lymphatic: Denies bleeding problem or easy bruising/bleeding.  Skin: Denies rash or suspicious lesions    Physical Examination  /79   Pulse 86   Ht 5' 10" (1.778 m)   Wt 76.7 kg (169 lb 1.5 oz)   BMI 24.26 kg/m² "     Constitutional: No acute distress, conversant  HEENT: Sclera anicteric, Pupils equal, round and reactive to light, extraocular motions intact, Oropharynx clear  Neck: No JVD, no carotid bruits  Cardiovascular: regular rate and rhythm, no murmur, rubs or gallops, normal S1/S2  Pulmonary: Clear to auscultation bilaterally  Abdominal: Abdomen soft, nontender, nondistended, positive bowel sounds  Extremities: RLE with no edema  LLE with 1 pitting edema   Pulses:  Carotid pulses are 2+ on the right side, and 2+ on the left side.  Radial pulses are 2+ on the right side, and 2+ on the left side.   Femoral pulses are 2+ on the right side, and 2+ on the left side.  Popliteal pulses are 2+ on the right side, and 2+ on the left side.   Dorsalis pedis pulses are 2+ on the right side, and 2+ on the left side.   Posterior tibial pulses are 2+ on the right side, and 2+ on the left side.    Skin: No ecchymosis, erythema, or ulcers  Psych: Alert and oriented x 3, appropriate affect  Neuro: CNII-XII intact, no focal deficits    Labs:  Most Recent Data  CBC:   Lab Results   Component Value Date    WBC 7.38 04/05/2024    HGB 8.3 (L) 04/05/2024    HCT 26.4 (L) 04/05/2024     04/05/2024    MCV 91 04/05/2024    RDW 15.8 (H) 04/05/2024     BMP:   Lab Results   Component Value Date     (L) 04/05/2024    K 4.2 04/05/2024    CL 99 04/05/2024    CO2 23 04/05/2024    BUN 31 (H) 04/05/2024    CREATININE 2.4 (H) 04/05/2024    GLU 96 04/05/2024    CALCIUM 9.4 04/05/2024    PHOS 3.9 12/13/2023     LFTS;   Lab Results   Component Value Date    PROT 6.7 03/28/2024    ALBUMIN 2.5 (L) 03/28/2024    BILITOT 0.3 03/28/2024    AST 12 03/28/2024    ALKPHOS 73 03/28/2024    ALT 16 03/28/2024     COAGS:   Lab Results   Component Value Date    INR 1.0 12/13/2023     FLP:   Lab Results   Component Value Date    TRIG 67 12/13/2023     CARDIAC:   Lab Results   Component Value Date    CKTOTAL 65 12/13/2023    CKMB 0.0 12/13/2023      Imaging:    [facility] Venous Ultrasound 4/5/2024:  Nonocclusive mural partial thrombosis of the left common femoral and proximal femoral veins.     Assessment/Plan:  Julio Rodríguez is a 58 y.o. male with CAD s/p CABG, HTN, hypothyroidism, metastatic bladder cancer (on chemo), melanoma, who presents for initial appointment.    Acute deep vein thrombosis (DVT) of proximal vein of left lower extremity- Likely provoked by underlying metastatic bladder cancer.  Check V/Q scan to evaluate for PE.  Pt to take Apixaban 10 mg bid for the first 7 days,then reduce to 5 mg bid thereafter.  Repeat ultrasound in 3 months.    2. HTN- Continue current medications.    3. CAD s/p CABG- Pt with no angina.  Continue atorvastatin 20 mg daily and Eliquis.      Will call pt with results of CTA chest  Otherwise, follow up in 3 months with BLE venous ultrasound prior    Total duration of face to face visit time 30 minutes.  Total time spent counseling greater than fifty percent of total visit time.  Counseling included discussion regarding imaging findings, diagnosis, possibilities, treatment options, risks and benefits.  The patient had many questions regarding the options and long-term effects.    Maurilio Troncoso MD, PhD  Interventional Cardiology

## 2024-04-09 NOTE — TELEPHONE ENCOUNTER
"Confirmation:  4/9/2024 1:05:11 PM Transmission Record          Sent to [phone] [email or web address] "          Result: (0/339;0/0) Success          Page record: 1 - 3          Elapsed time: 01:01 on channel 15  "

## 2024-04-09 NOTE — PATIENT INSTRUCTIONS
Assessment/Plan:  Julio Rodríguez is a 58 y.o. male with CAD s/p CABG, HTN, hypothyroidism, metastatic bladder cancer (on chemo), melanoma, who presents for initial appointment.    Acute deep vein thrombosis (DVT) of proximal vein of left lower extremity- Likely provoked by underlying metastatic bladder cancer.  Check CTA chest to evaluate for PE.  Pt to take Apixaban 10 mg bid for the first 7 days,then reduce to 5 mg bid thereafter.  Repeat ultrasound in 3 months.    2. HTN- Continue current medications.    3. CAD s/p CABG- Pt with no angina.  Continue atorvastatin 20 mg daily and Eliquis.      Will call pt with results of CTA chest  Otherwise, follow up in 3 months with BLE venous ultrasound prior

## 2024-04-11 PROBLEM — D70.1 CHEMOTHERAPY-INDUCED NEUTROPENIA: Status: ACTIVE | Noted: 2024-01-01

## 2024-04-11 PROBLEM — T45.1X5A CHEMOTHERAPY-INDUCED NEUTROPENIA: Status: ACTIVE | Noted: 2024-01-01

## 2024-04-11 PROBLEM — G89.3 CANCER ASSOCIATED PAIN: Status: ACTIVE | Noted: 2024-01-01

## 2024-04-11 NOTE — PROGRESS NOTES
MEDICAL ONCOLOGY - FOLLOW UP VISIT    Cancer/Stage/TNM:    Cancer Staging   Malignant neoplasm of urinary bladder  Staging form: Urinary Bladder, AJCC 8th Edition  - Clinical: Stage IVB (cT4b, cNX, pM1b) - Signed by Bridger Abad MD on 9/28/2023       Reason for visit: Metastatic bladder cancer    HPI: Julio Rodríguez is a 58 y.o. male with metastatic bladder cancer previously treated with neoadjuvant ddMVAC, radical cystectomy, and then carboplatin/gemcitabine and EV+ Pembro following local and metastatic recurrence. Subsequetnly received a single dose of pembrolizumab + NTX-1088 11/2/23 as part of a phase I trial through the [facility], but was taken off trial for elevated creatinine despite PCN placement.  He presents to medical oncology clinic prior to C6D1 SG.    History has been obtained by chart review and discussion with the patient.    Interval Events:    Seen in ED 4/5/24 with acute left leg DVT. Started on apixaban. C5D8 held in setting anemia and neutropenia.     Aside from DVT and cytopenia, felt like he tolerated SG well. Has some increased fatigue over the last week or two but appetite is generally good. Reports normal bowel movements. No N/V. No FC. Remains active and ambulatory. Has some increaesed pain in the left hip       Oncology History   Malignant neoplasm of urinary bladder   2016 Initial Diagnosis    Bladder CIS. Managed with BCG     2017 Notable Event    Developed recurrent muscle invasive disease.     2017 - 2017 Chemotherapy    ddMVAC      Surgery    Radical cystectomy with lymph node dissection and creation of neobladder. [id number]     7/2022 Notable Event    New onset hematuria. MRI scan of the pelvis in August showed a suspected blood clot adherent to the distal Smith catheter. There was abnormal signal and enhancement of the bilateral pubic bones adjacent to the neobladder suspicious for neoplastic infiltration. There was no pelvic lymphadenopathy.      7/2022 Imaging Significant  Findings    Pet scan at the same time showed hypermetabolic retroperitoneal lymphadenopathy. There was marked activity felt to involve the bladder wall suspicious for tumor.     7/2022 Biopsy    Biopsy of the bladder neck showed recurrent high-grade urothelial carcinoma.     9/28/2022 - 5/16/2023 Chemotherapy    Mr. Rodríguez was started on chemotherapy with gemcitabine and carboplatin in September 2022.      1/25/2023 Imaging Significant Findings    Pet scan on 25 January showed changes related to prior cystoprostatectomy with neobladder creation and minimal fullness to the right renal collecting system. There was no evidence of a discrete hypermetabolic mass or lymphadenopathy. There was diffusely increased activity throughout the osseous structures, suggestive of chemotherapy with reactive marrow.     4/11/2023 Imaging Significant Findings    CT a/p  1. Pathologic fracture of the left parasymphysis pubis secondary to lytic process. Given location adjacent to neobladder, osteomyelitis is a possibility. Metastatic disease not excluded.   2. Prior cystoprostatectomy with chronic right ureteral obstruction and hydroureteronephrosis, minimally changed from the prior. All etiologies considered including malignant obstruction. Urology consultation recommended.   3. Incidental findings including cholelithiasis, bilateral multifocal renal cortical scarring, and small hiatal hernia.       5/24/2023 -  Chemotherapy    Enfortumab vedotin + Pembrolizumab     8/10/2023 Imaging Significant Findings    PET CT  Interval development of hypermetabolic pulmonary nodules within the lingula and right lower lobe likely reflecting pulmonary metastases. Some additional tiny pulmonary nodularity is new or more conspicuous from prior but too small to accurately characterize by PET/CT. Attention on follow-up recommended.     Worsening obstructive uropathy which is relatively moderate to severe the right kidney.     Similar appearance of the  urinary neobladder. There is somewhat diminished due to physiologic activity to evaluate for local neoplasm but the appearance overall appears similar to prior. Assessment of local disease could be followed with CT abdomen and pelvis with contrast.     Interval fracture of the left superior and inferior pubic rami appearing subacute in nature. Please correlate for trauma and tenderness. There is no significant FDG activity within the area to favor a pathologic fracture.     Previously seen diffuse marrow activity may have been due to previous marrow rebound or drug effect.       8/25/2023 Imaging Significant Findings    MR Pelvis  History of cystoprostatectomy and neobladder creation.     Interval increase in multilobular mass in the pelvis infiltrating the rectum, possibly due to distal sigmoid colon, anterior left pelvic bones as well as a inferior aspect of the neobladder suspicious for progression of neoplastic process as discussed above.      9/28/2023 Cancer Staged    Staging form: Urinary Bladder, AJCC 8th Edition  - Clinical: Stage IVB (cT4b, cNX, pM1b)     10/16/2023 Imaging Significant Findings    CT chest   Impression:  - Multiple solid bilateral pulmonary nodules up to 9mm showing interval increase in size concerning for metastatic disease in this patient with history of prior bladder malignancy.  - Multiple hepatic hypodensities, which may represent cystic lesions however some which are too small to characterize.  - Partially imaged right kidney showing parenchymal atrophy and suspected hydronephrosis..    MR Pelvis   Impression:     Interval increased size of multilobular mass in the cystoprostatectomy surgical bed that invades the neobladder, rectum, sigmoid colon, and left pelvis.  Multiple pelvic lymph nodes are more conspicuous from prior exam and concerning for additional metastatic disease.     10/24/2023 - 10/24/2023 Chemotherapy    Treatment Summary   Plan Name: OP SACITUZUMAB GOVITECAN-HZIY  Q3W  Treatment Goal: Palliative  Status: Inactive  Start Date:   End Date:   Provider: Bridger Abad MD  Chemotherapy: sacituzumab govitecan-hziy (TRODELVY) 543 mg in sodium chloride 0.9% 500 mL chemo infusion, 7.5 mg/kg = 543 mg (original dose ), Intravenous, Clinic/[facility] 1 time, 0 of 17 cycles  Dose modification: 7.5 mg/kg (Cycle 1, Reason: MD Discretion, Comment: UGT1A1 PM )     11/2/2023 - 11/20/2023 Chemotherapy    Treatment Summary   Plan Name: [facility] NTX-1088-01 Dose Escalation INV-NTX + INV pembrolizumab  Treatment Goal: Control  Status: Inactive  Start Date: 11/2/2023  End Date: 11/20/2023  Provider: Chan Leos MD  Chemotherapy: INV [id number] (pembrolizumab) 200 mg in INV sodium chloride 0.9 % 100 mL chemo infusion, 200 mg, Intravenous, Clinic/[facility] 1 time, 1 of 35 cycles  Administration: 200 mg (11/2/2023)     11/29/2023 Imaging Significant Findings    Impression:     Postoperative change including cysto proctectomy with creation of neobladder.  Persistent multilobular soft tissue mass within the surgical bed involving the neobladder and abutting the rectum and sigmoid colon, noting limited evaluation on this noncontrast examination.  Within these confines, appearance is similar from comparison CT 10/30/2023, noting increased distension of the neobladder from comparison imaging.     Persistent right-sided hydronephrosis and hydroureter with soft tissue attenuation at the mid to distal right ureter.  Prominent lymph nodes surrounding the right ureter, similar from comparison imaging.     Few stable mildly prominent pelvic and retroperitoneal lymph nodes.     Slight interval increase in left upper lobe nodule measuring approximately 1.2 x 1.0 cm, previously measuring 0.9 x 0.9 cm.  Additional previously described nodules are similar in size and appearance.     Remote fracture of the left pubic symphysis with associated lytic lesion.     12/9/2023 Imaging Significant Findings    CT 12/9/23    Impression:      1. Right nephrostomy in appropriate position.  2. Postoperative change of cystoproctectomy with ileal neobladder.  Similar appearance of large pelvic mass surrounding the neobladder.  3. Ill-defined hypoattenuating hepatic lesions, concerning for metastases in light of history.  4. Increased retroperitoneal and pelvic adenopathy.  5. Stable pulmonary nodules.  6. Additional findings as above.     12/28/2023 -  Chemotherapy    Treatment Summary   Plan Name: OP SACITUZUMAB GOVITECAN-HZIY Q3W  Treatment Goal: Palliative  Status: Active  Start Date: 12/28/2023  End Date: 12/6/2024 (Planned)  Provider: Bridger Abad MD  Chemotherapy: sacituzumab govitecan-hziy (TRODELVY) 540 mg in sodium chloride 0.9% 500 mL chemo infusion, 555 mg (100 % of original dose 7.5 mg/kg), Intravenous, Clinic/[facility] 1 time, 5 of 17 cycles  Dose modification: 7.5 mg/kg (original dose 7.5 mg/kg, Cycle 1, Reason: MD Discretion), 10 mg/kg (original dose 7.5 mg/kg, Cycle 4, Reason: MD Discretion)  Administration: 540 mg (12/28/2023), 540 mg (1/4/2024), 540 mg (1/19/2024), 540 mg (1/26/2024), 540 mg (2/8/2024), 540 mg (2/16/2024), 720 mg (3/1/2024), 720 mg (3/8/2024), 720 mg (3/22/2024)     2/2024 Imaging Significant Findings    2/23/24 CT Chest  Impression:     In this patient with metastatic bladder cancer there is interval increased size of bilateral solid pulmonary nodules and new lytic lesions in the bilateral ribs and vertebra concerning for worsening metastatic disease.     New consolidation with air bronchograms in the left lingula concerning for lobar pneumonia.  New interlobular scattered septal thickening which may be related to lingular pneumonia; however, lymphangitis carcinomatosis not excluded in this patient with history of malignancy.     Stable hypodensities in the partially visualized liver that likely represent additional metastatic lesions.  Partially visualized right hydronephrosis, similar to prior exam given limited  evaluation.    2/27/24  PET CT  Impression:     Widespread hypermetabolic metastatic disease throughout the chest, abdomen, and pelvis and osseous structures.     Extensive hypermetabolic soft tissue thickening/mass lesion surrounding the neobladder and involving the left pelvic sidewall.     Severe right hydronephrosis and ureteral dilatation.  Hypermetabolic mass/lymph node conglomerate at the distal aspect of the right ureter likely causing ureteral obstruction.     Near complete hypermetabolic consolidation of the lingula with air bronchograms. Findings could represent infectious lobar pneumonia versus consolidative mass/neoplasm. Recommend clinical correlation.        Melanoma        Past Medical History:   Diagnosis Date    Bladder cancer     CAD (coronary artery disease) 9/12/2023    Prior CABG. Not on antiplatelets. Home medicatiosn include atorvastatin    Carcinoma     forehead    Encounter for blood transfusion     Hypertension     Hypothyroidism 9/12/2023    Home medications include levothyroxine    Malignant melanoma of skin, unspecified     right arm    Malignant neoplasm of urinary bladder 9/12/2023    Melanoma 9/12/2023    History of T1a melanoma; 0.5mm depth without ulceartion. SP wide local excision 02/2022          Past Surgical History:   Procedure Laterality Date    CORONARY ARTERY BYPASS GRAFT  10/2015    CYSTECTOMY, ROBOT-ASSISTED, LAPAROSCOPIC, USING DA MARY XI, WITH ILEAL CONDUIT CREATION  12/2017    CYSTOGRAM N/A 12/5/2023    Procedure: CYSTOGRAM;  Surgeon: Eder Oconnor MD;  Location: [address];  Service: Urology;  Laterality: N/A;    CYSTOSCOPY N/A 12/5/2023    Procedure: CYSTOSCOPY;  Surgeon: Eder Oconnor MD;  Location: [facility] OR [address] [place] [address];  Service: Urology;  Laterality: N/A;    DILATION OF BLADDER      dialation of bladder neck- due to claudia bladder- multiple procedures    DILATION OF URETHRA N/A 12/5/2023    Procedure: DILATION, URETHRA;  Surgeon: Eder Oconnor MD;  Location: [facility]  OR 1ST FLR;  Service: Urology;  Laterality: N/A;    LYMPHADENECTOMY      PERCUTANEOUS NEPHROSTOMY Right 12/8/2023    Procedure: Creation, Nephrostomy, Percutaneous;  Surgeon: Jens Nunez MD;  Location: [facility] CATH LAB;  Service: Radiology;  Laterality: Right;    PLACEMENT N/A 12/5/2023    Procedure: PLACEMENT;  Surgeon: Eder Oconnor MD;  Location: [facility] OR 1ST FLR;  Service: Urology;  Laterality: N/A;  placement of valiente over a wire by MD MOHR ROBOTIC PROSTECTOMY, SUPRAPUBIC  12/2017        Family History   Problem Relation Age of Onset    Heart disease Father     Heart attack Paternal Uncle     Breast cancer Maternal Cousin     Colon cancer Neg Hx     Bladder Cancer Neg Hx         Social History     Tobacco Use    Smoking status: Former     Types: Cigarettes     Passive exposure: Never    Smokeless tobacco: Not on file   Substance Use Topics    Alcohol use: Not Currently        I have reviewed and updated the patient's past medical, surgical, family and social histories.    Review of patient's allergies indicates:  No Known Allergies     Current Outpatient Medications   Medication Sig Dispense Refill    apixaban (ELIQUIS) 5 mg Tab Take 1 tablet (5 mg total) by mouth 2 (two) times daily. 60 tablet 5    atorvastatin (LIPITOR) 20 MG tablet Take 20 mg by mouth every evening.      benzonatate (TESSALON PERLES) 100 MG capsule Take 1 capsule (100 mg total) by mouth every 6 (six) hours as needed for Cough. 30 capsule 1    cetirizine HCl (ZYRTEC ORAL) Take by mouth as needed.      dexAMETHasone (DECADRON) 4 MG Tab Take 2 tablets (8 mg total) by mouth once daily. Take 2 tablets (8 mg total) by mouth once daily. Take a directed on days 2, 3 and 4 of your chemotherapy cycle. 24 tablet 3    docusate sodium (COLACE) 100 MG capsule Take 220 capsules by mouth every evening.      filgrastim (NEUPOGEN) 300 mcg/0.5 mL injection Inject 0.5 mLs (300 mcg total) into the skin once daily. for 3 days 0.5 mL 2    fluticasone propionate  "(FLONASE) 50 mcg/actuation nasal spray 1 spray (50 mcg total) by Each Nostril route once daily. (Patient taking differently: 1 spray by Each Nostril route as needed.) 9.9 mL 0    Lactobacillus rhamnosus GG (CULTURELLE) 10 billion cell capsule Take 1 capsule by mouth once daily.      levothyroxine (SYNTHROID) 50 MCG tablet Take 50 mcg by mouth before breakfast.      LINZESS 72 mcg Cap capsule Take 72 mcg by mouth every morning.      loperamide (IMODIUM) 2 mg capsule Take 1 capsule (2 mg total) by mouth 4 (four) times daily as needed for Diarrhea. 60 capsule 0    loratadine (CLARITIN ORAL) Take by mouth as needed.      multivitamin (THERAGRAN) per tablet Take 1 tablet by mouth every morning.      OLANZapine (ZYPREXA) 5 MG tablet TAKE 1 TABLET BY MOUTH EVERY EVENING ON DAYS 1 THROUGH 4 OF EACH CHEMOTHERAPY CYCLE 30 tablet 0    ondansetron (ZOFRAN-ODT) 8 MG TbDL Take 1 tablet (8 mg total) by mouth every 8 (eight) hours as needed (nausea/vomiting). 60 tablet 5    oxyCODONE (ROXICODONE) 5 MG immediate release tablet Take 1 tablet (5 mg total) by mouth every 4 (four) hours as needed for Pain. 42 tablet 0    TURMERIC ORAL Take by mouth every morning.      vitamin D (VITAMIN D3) 1000 units Tab Take 1 tablet (1,000 Units total) by mouth once daily. 30 tablet 5    VTAMA 1 % Crea APPLY 1 APPLICATION ON THE SKIN DAILY       No current facility-administered medications for this visit.        Physical Exam:   /80 (BP Location: Left arm, Patient Position: Sitting, BP Method: Large (Automatic))   Pulse 93   Temp 99 °F (37.2 °C) (Oral)   Resp 18   Ht 5' 10" (1.778 m)   Wt 76 kg (167 lb 8.8 oz)   SpO2 100%   BMI 24.04 kg/m²      ECOG Performance status: 1            Physical Exam  Constitutional:       General: He is not in acute distress.     Appearance: Normal appearance.   HENT:      Head: Normocephalic.   Eyes:      General: No scleral icterus.     Extraocular Movements: Extraocular movements intact.      " Conjunctiva/sclera: Conjunctivae normal.   Cardiovascular:      Rate and Rhythm: Normal rate and regular rhythm.      Heart sounds: No murmur heard.     No friction rub. No gallop.   Pulmonary:      Effort: Pulmonary effort is normal. No respiratory distress.      Breath sounds: Normal breath sounds. No stridor. No wheezing, rhonchi or rales.   Abdominal:      General: There is no distension.   Skin:     General: Skin is warm and dry.   Neurological:      Mental Status: He is alert and oriented to person, place, and time.      Motor: No weakness.   Psychiatric:         Mood and Affect: Mood normal.         Behavior: Behavior normal.         Thought Content: Thought content normal.           Labs:                 Lab Results   Component Value Date     (L) 04/11/2024    K 5.0 04/11/2024    CREATININE 2.5 (H) 04/11/2024    WBC 7.71 04/11/2024    HGB 7.9 (L) 04/11/2024     04/11/2024    AST 13 04/11/2024    ALT 15 04/11/2024    ALKPHOS 78 04/11/2024    BILITOT 0.4 04/11/2024          Imaging:       US Lower Extremity Veins Left  Narrative: EXAMINATION:  US LOWER EXTREMITY VEINS LEFT    CLINICAL HISTORY:  Other specified soft tissue disorders    TECHNIQUE:  Duplex and color flow Doppler evaluation and graded compression of the left lower extremity veins was performed.    COMPARISON:  None    FINDINGS:  Left thigh veins: Nonocclusive mural partial thrombosis of the left common femoral and proximal femoral veins.  The left mid to distal femoral, popliteal, upper greater saphenous, and deep femoral veins are patent and free of thrombus, normally compressible and have normal phasic flow and augmentation response.    Left calf veins: The visualized calf veins are patent.    Contralateral CFV: The contralateral (right) common femoral vein is patent and free of thrombus.    Miscellaneous: None  Impression: Nonocclusive mural partial thrombosis of the left common femoral and proximal femoral veins.    This report  was flagged in Epic as abnormal.    The critical information above was relayed directly by Johnathan Christensen MD by [facility] secure chat to Robin AMORKennedy Yenifer on 4/5/2024 at 21:04.    Electronically signed by: Johnathan Christensen MD  Date:    04/05/2024  Time:    21:05      I have personally reviewed the above imaging including recent MRI and PET CT scan    Path:   Reviewed pathology as documented in oncology history      Assessment and Plan:        1. Malignant neoplasm of urinary bladder, unspecified site  Overview:  Metastatic bladder cancer previously treated with neoadjuvant ddMVAC, radical cystectomy, and then carboplatin/gemcitabine and EV+ Pembro following local and metastatic recurrence. Subsequetnly received a single dose of pembrolizumab + NTX-1088 11/2/23 as part of a phase I trial through the [facility], but was taken off trial for elevated creatinine despite PCN placement. Starting sacituzumab govitecan 12/28/23 with empiric 25% DR for UGT1A1 deficiency. Progressive disease noted on PET CT 02/28/2024 and increased SG to 10mg/kg.    Assessment & Plan:  - Clinically doing well. Has some increased pain in left hip. Will add oxycodone. Also with progressive cytopenia on SG. Will try to add GCSF support after each dose with filgrastim 300mcg x3 days. Will also check CBC with type and screen prior to D8 so we can transfuse if needed.  - Proceed with C6D1 SG tomorrow  - Check labs on D7 with type and screen  - Filgrastime 300mcg D2-4 if able to obtain  - FU in 3 weeks prior to C7D1  - Repeat imaging after C7    Orders:  -     oxyCODONE (ROXICODONE) 5 MG immediate release tablet; Take 1 tablet (5 mg total) by mouth every 4 (four) hours as needed for Pain.  Dispense: 42 tablet; Refill: 0  -     filgrastim (NEUPOGEN) 300 mcg/0.5 mL injection; Inject 0.5 mLs (300 mcg total) into the skin once daily. for 3 days  Dispense: 0.5 mL; Refill: 2    2. Secondary malignant neoplasm of bone and bone marrow  Assessment & Plan:  - Con't vitmain  D  - Start Xgeva tomorrow; received dental clearance    Orders:  -     Type & Screen; Future; Expected date: 04/11/2024    3. Cancer associated pain  Assessment & Plan:  - Start oxycodone 5mg q4hrs prn  - Maintain bowel regimen    Orders:  -     oxyCODONE (ROXICODONE) 5 MG immediate release tablet; Take 1 tablet (5 mg total) by mouth every 4 (four) hours as needed for Pain.  Dispense: 42 tablet; Refill: 0    4. Chemotherapy-induced neutropenia  Assessment & Plan:  - Plan for filgrastim 300mcg for 3 days following each SG infusion  - RX called into [facility]  - If we cannot get filgrastim, would change to dosing 10mg/kg D1 only of 21 day cycle    Orders:  -     filgrastim (NEUPOGEN) 300 mcg/0.5 mL injection; Inject 0.5 mLs (300 mcg total) into the skin once daily. for 3 days  Dispense: 0.5 mL; Refill: 2    5. Anemia in neoplastic disease  -     Type & Screen; Future; Expected date: 04/11/2024    6. Acute deep vein thrombosis (DVT) of left femoral vein  Assessment & Plan:  - Continues on apixaban  - Has VQ scan per cardiology upcoming      7. Stage 3b chronic kidney disease  Assessment & Plan:  - Stable  - CTM      Other orders  -     acetaminophen tablet 650 mg  -     diphenhydrAMINE (BENADRYL) 25 mg in sodium chloride 0.9% 50 mL IVPB  -     famotidine (PF) injection 20 mg  -     aprepitant (CINVANTI) injection 130 mg  -     palonosetron (ALOXI) 0.25 mg with Dexamethasone (DECADRON) 12 mg in NS 50 mL IVPB  -     sacituzumab govitecan-hziy (TRODELVY) 740 mg in sodium chloride 0.9% 500 mL chemo infusion  -     atropine injection 0.4 mg  -     prochlorperazine injection Soln 10 mg  -     EPINEPHrine (EPIPEN) 0.3 mg/0.3 mL pen injection 0.3 mg  -     diphenhydrAMINE injection 50 mg  -     hydrocortisone sodium succinate injection 100 mg  -     sodium chloride 0.9% 100 mL flush bag  -     sodium chloride 0.9% flush 10 mL  -     heparin, porcine (PF) 100 unit/mL injection flush 500 Units  -     alteplase injection 2 mg  -      acetaminophen tablet 650 mg  -     diphenhydrAMINE (BENADRYL) 25 mg in sodium chloride 0.9% 50 mL IVPB  -     famotidine (PF) injection 20 mg  -     aprepitant (CINVANTI) injection 130 mg  -     palonosetron (ALOXI) 0.25 mg with Dexamethasone (DECADRON) 12 mg in NS 50 mL IVPB  -     sacituzumab govitecan-hziy (TRODELVY) 740 mg in sodium chloride 0.9% 500 mL chemo infusion  -     atropine injection 0.4 mg  -     prochlorperazine injection Soln 10 mg  -     EPINEPHrine (EPIPEN) 0.3 mg/0.3 mL pen injection 0.3 mg  -     diphenhydrAMINE injection 50 mg  -     hydrocortisone sodium succinate injection 100 mg  -     sodium chloride 0.9% 250 mL flush bag  -     sodium chloride 0.9% flush 10 mL  -     heparin, porcine (PF) 100 unit/mL injection flush 500 Units  -     alteplase injection 2 mg  -     denosumab (XGEVA) solution 120 mg                  Route Chart for Scheduling    Med Onc Chart Routing      Follow up with physician 3 weeks. MD appt day before D1 treatment   Follow up with LYNDSEY    Infusion scheduling note   Sacituzumab D1 and D8 in 3 and 4 weeks   Injection scheduling note    Labs CMP and CBC   Scheduling:  Preferred lab:  Lab interval: once a week  Please check CBC and type and screen on 4/18/24; full labs day prior to D1 treatment   Imaging    Pharmacy appointment    Other referrals                  Treatment Plan Information   OP SACITUZUMAB GOVITECAN-HZIY Q3W   Bridger Abad MD   Upcoming Treatment Dates - OP SACITUZUMAB GOVITECAN-HZIY Q3W    4/12/2024       Pre-Medications       acetaminophen tablet 650 mg       diphenhydrAMINE (BENADRYL) 25 mg in sodium chloride 0.9% 50 mL IVPB       famotidine (PF) injection 20 mg       Chemotherapy       sacituzumab govitecan-hziy (TRODELVY) 740 mg in sodium chloride 0.9% 500 mL chemo infusion       Supportive Care       atropine injection 0.4 mg       prochlorperazine injection Soln 10 mg       Antiemetics       aprepitant (CINVANTI) injection 130 mg        palonosetron (ALOXI) 0.25 mg with Dexamethasone (DECADRON) 12 mg in NS 50 mL IVPB  4/19/2024       Pre-Medications       acetaminophen tablet 650 mg       diphenhydrAMINE (BENADRYL) 25 mg in sodium chloride 0.9% 50 mL IVPB       famotidine (PF) injection 20 mg       Chemotherapy       sacituzumab govitecan-hziy (TRODELVY) 740 mg in sodium chloride 0.9% 500 mL chemo infusion       Supportive Care       atropine injection 0.4 mg       prochlorperazine injection Soln 10 mg       Antiemetics       aprepitant (CINVANTI) injection 130 mg       palonosetron (ALOXI) 0.25 mg with Dexamethasone (DECADRON) 12 mg in NS 50 mL IVPB  5/3/2024       Pre-Medications       acetaminophen tablet 650 mg       diphenhydrAMINE (BENADRYL) 25 mg in sodium chloride 0.9% 50 mL IVPB       famotidine (PF) injection 20 mg       Chemotherapy       sacituzumab govitecan-hziy (TRODELVY) 740 mg in sodium chloride 0.9% 500 mL chemo infusion       Supportive Care       atropine injection 0.4 mg       prochlorperazine injection Soln 10 mg       Antiemetics       aprepitant (CINVANTI) injection 130 mg       palonosetron (ALOXI) 0.25 mg with Dexamethasone (DECADRON) 12 mg in NS 50 mL IVPB  5/10/2024       Pre-Medications       acetaminophen tablet 650 mg       diphenhydrAMINE (BENADRYL) 25 mg in sodium chloride 0.9% 50 mL IVPB       famotidine (PF) injection 20 mg       Chemotherapy       sacituzumab govitecan-hziy (TRODELVY) 740 mg in sodium chloride 0.9% 500 mL chemo infusion       Supportive Care       atropine injection 0.4 mg       prochlorperazine injection Soln 10 mg       Antiemetics       aprepitant (CINVANTI) injection 130 mg       palonosetron (ALOXI) 0.25 mg with Dexamethasone (DECADRON) 12 mg in NS 50 mL IVPB    Therapy Plan Information  Medications  denosumab (XGEVA) solution 120 mg  120 mg, Subcutaneous, Every 4 weeks          Bridger Abad

## 2024-04-11 NOTE — TELEPHONE ENCOUNTER
I attempted to call patient, no answer. Left message on voicemail that I had looked in the lobby and didn't find anything. I also said someone else may have turned it in. I said I would let him know if we found anything.

## 2024-04-11 NOTE — ASSESSMENT & PLAN NOTE
- Clinically doing well. Has some increased pain in left hip. Will add oxycodone. Also with progressive cytopenia on SG. Will try to add GCSF support after each dose with filgrastim 300mcg x3 days. Will also check CBC with type and screen prior to D8 so we can transfuse if needed.  - Proceed with C6D1 SG tomorrow  - Check labs on D7 with type and screen  - Filgrastime 300mcg D2-4 if able to obtain  - FU in 3 weeks prior to C7D1  - Repeat imaging after C7

## 2024-04-11 NOTE — ASSESSMENT & PLAN NOTE
- Plan for filgrastim 300mcg for 3 days following each SG infusion  - RX called into [facility]  - If we cannot get filgrastim, would change to dosing 10mg/kg D1 only of 21 day cycle

## 2024-04-11 NOTE — TELEPHONE ENCOUNTER
----- Message from Charla Rodrigues sent at 4/11/2024  3:50 PM CDT -----  Regarding: Consult/Advisory  Contact: Julio Rodríguez     Consult/Advisory     Name Of Caller:Julio Rodríguez         Contact Preference:[phone] (home)       Nature of call:Patient is calling to let staff know that he was just there for an appt. Patient left paperwork in lobby that he would like someone to grab for him. Patient states he will  tomorrow morning.

## 2024-04-12 NOTE — PLAN OF CARE
1115 Pt tolerated Trodelvy infusion well today, no complaints or complications,. VSS. Pt aware to call provider with any questions or concerns and is aware of upcoming appts. Pt ambulatory from clinic with steady gait, no distress noted.

## 2024-04-16 NOTE — TELEPHONE ENCOUNTER
----- Message from Jesus Turner sent at 4/16/2024 11:24 AM CDT -----  Regarding: Consult/Advisory  Contact: Selwyn,  at [facility]  Consult/Advisory    Name Of Caller: Selwyn  at [facility]      Contact Preference:   Phone:   [phone]     Fax: [phone]    Nature of call: Selwyn  at [facility] calling to get chart notes, labs, and current insurance information for the past 3 to 6 months.

## 2024-04-16 NOTE — TELEPHONE ENCOUNTER
I spoke to Selwyn. I told him I would send some records for their review, confirmed their fax number.

## 2024-04-17 NOTE — PROGRESS NOTES
Acupuncture Evaluation Note     Name: Julio Rodríguez  Clinic Number: [id number]    Traditional Chinese Medicine (TCM) Diagnosis: Qi Stagnation, Blood Stasis, Qi Deficiency, and Blood Deficiency  Medical Diagnosis:   Encounter Diagnoses   Name Primary?    Malignant neoplasm of urinary bladder, unspecified site     Chronic bilateral low back pain without sciatica         Evaluation Date: 4/17/2024    Visit #/Visits authorized: 12, 1/12    Precautions: blood thinners, cancer, and blood transfusion 1 month ago     Subjective     Chief Concern: Chronic low back pain. Pain worse on right side, found cancer on the spine 2-3 months ago. After that similar pain on left side. Rib side pain and tension.      Medical necessity is demonstrated by the following IMPAIRMENTS: Medical Necessity: Cancer related pain, Decreased mobility limits day to day activities, social, and emergent situations, and Decreased quality of life              Aggravating Factors:  pressure   Relieving Factors:  acetaminophen    Symptom Description:     Quality:  Aching and Dull  Severity:  1/10, 3-4/10 when worse   Frequency:  every day and at night/ wakes from sleep at night    Previous Treatments Tried:  Medication and Imaging    HEENT:  no headaches, tinnitus bilateral high pitch    Chest:  cough with allergies, post nasal drip. Trouble taking a deep breath in with pain/tension in ribs    Digestion:     Diet: well balanced   Fluids: coffee 1-2 /day, is drinking moderate amounts of fluids, none  Taste/Appetite: good, low appetite when constipated, no nausea, taste is fine   Symptoms: abdominal pain, bloating, and constipation    Sleep: variable, wakes to empty catheter, interrupted the past few nights     Energy Levels:  low, fatigue    Psychological Symptoms:  anxiety/depression     Other Symptoms: left leg swelling, blood clot in femoral vein     GYN Symptoms: N/A    Objective     Observation:     Tongue:  thick yellow coat, red tongue, center crack  with fur, dark sublinguals     Body:     Color:     Coating:      Pulse:  deep/wiry              New Findings:      Treatment     Treatment Principles:  Move qi and blood, nourish qi and blood, calm [place], stop pain     Acupuncture points used:  4 MCPHERSON, Du20, Gb34, Ki3, Li11, REN6, Sp6, Sp9, St36, and YIN RICHARDSON    Bilateral points:  Unilateral points:  Auricular Treatment:      Needles In: 19  Needles Out: 19  Needles W/O STIM placed: 1:35  Needles W/O STIM removed: 1:55      Other Traditional Chinese Medicine Modalities - [place]    Assessment     After treatment, patient felt relaxed      Patient prognosis is Good.     Patient will continue to benefit from acupuncture treatment to address the deficits listed in the problem list box on initial evaluation, provide patient family education and to maximize pt's level of independence in the home and community environment.     Patient's spiritual, cultural and educational needs considered and pt agreeable to plan of care and goals.     Anticipated barriers to treatment: none    Plan     Recommend 1 /week for 5 sessions then re-assess.      Education:  Patient is aware of cumulative benefit of acupuncture

## 2024-04-19 NOTE — PLAN OF CARE
1325-Pt tolerated Trodelvy infusion well, no complications or side effects, pt refused 30 min post OBS,  POC and meds discussed with pt, pt aware of upcoming appts, pt knows to call MD with any questions or concerns. Pt ambulated off unit, no distress noted.

## 2024-04-23 PROBLEM — D64.9 ANEMIA REQUIRING TRANSFUSIONS: Status: ACTIVE | Noted: 2024-01-01

## 2024-04-23 NOTE — TELEPHONE ENCOUNTER
"----- Message from Porter Self sent at 4/23/2024  4:09 PM CDT -----  Consult/Advisory    Name Of Caller: Self    Contact Preference?: [phone]    Provider Name: Jenniffer    Does patient feel the need to be seen today? No    What is the nature of the call?: Returning call to Lisa    Additional Notes:  "Thank you for all that you do for our patients"  "

## 2024-04-23 NOTE — TELEPHONE ENCOUNTER
Attempted to call patient, no answer. Message left on his voicemail that I was calling to check on him regarding his message. I told him I see in the chart that he came to the ED and we will follow along in the chart. He can call us for any questions or concerns.

## 2024-04-23 NOTE — ED PROVIDER NOTES
Encounter Date: 4/23/2024       History     Chief Complaint   Patient presents with    Near Syncope     [place] faint after having difficult BM this morning. Recent Hbg 8.3. Currently on chemo. Endorsing generalized malaise/fatigue.     The history is provided by the patient and medical records. No  was used.     Julio Rodríguez is a 58 y.o. male with medical history of recurrent metastatic bladder cancer on chemotherapy, hx of radical cystectomy, L common femoral and proximal femoral DVT, pathologic pelvic fracture, CKD presenting to the ED with the chief complaint of near syncope.     Felt lightheaded after having a bowel movement this afternoon. [place] like he was going to pass out. Reports lying on the ground until he built up enough strength to call his mother who brought him to the ED. Symptoms improved at the time of my exam. Reports feeling this way when he is anemic. Reports having increased volume of his chronic hematuria over the last few days. He is on Eliquis for DVT. Denies melena or hematochezia. He has been feeling constipated for a few months that he contributes to Vit D3 therapy. Takes Colace which helps. Denies lower back pain, numbness, paresthesias, extremity weakness. No fever or diaphoresis. No chest pain, SOB, palpitations, vomiting. Recently received his 2nd infusion of current chemotherapy round.     Review of patient's allergies indicates:  No Known Allergies  Past Medical History:   Diagnosis Date    Bladder cancer     CAD (coronary artery disease) 9/12/2023    Prior CABG. Not on antiplatelets. Home medicatiosn include atorvastatin    Carcinoma     forehead    Encounter for blood transfusion     Hypertension     Hypothyroidism 9/12/2023    Home medications include levothyroxine    Malignant melanoma of skin, unspecified     right arm    Malignant neoplasm of urinary bladder 9/12/2023    Melanoma 9/12/2023    History of T1a melanoma; 0.5mm depth without ulceartion. SP wide  local excision 02/2022     Past Surgical History:   Procedure Laterality Date    CORONARY ARTERY BYPASS GRAFT  10/2015    CYSTECTOMY, ROBOT-ASSISTED, LAPAROSCOPIC, USING DA MARY XI, WITH ILEAL CONDUIT CREATION  12/2017    CYSTOGRAM N/A 12/5/2023    Procedure: CYSTOGRAM;  Surgeon: Eder Oconnor MD;  [facility]: [facility] OR [facility];  Service: Urology;  Laterality: N/A;    CYSTOSCOPY N/A 12/5/2023    Procedure: CYSTOSCOPY;  Surgeon: Eder Oconnor MD;  [facility];  Service: Urology;  Laterality: N/A;    DILATION OF BLADDER      dialation of bladder neck- due to claudia bladder- multiple procedures    DILATION OF URETHRA N/A 12/5/2023    Procedure: DILATION, URETHRA;  Surgeon: Eder Oconnor MD;  [facility];  Service: Urology;  Laterality: N/A;    LYMPHADENECTOMY      PERCUTANEOUS NEPHROSTOMY Right 12/8/2023    Procedure: Creation, Nephrostomy, Percutaneous;  Surgeon: Jens Nunez MD;  Location: [facility] CATH LAB;  Service: Radiology;  Laterality: Right;    PLACEMENT N/A 12/5/2023    Procedure: PLACEMENT;  Surgeon: Eder Oconnor MD;  [facility];  Service: Urology;  Laterality: N/A;  placement of valiente over a wire by MD MOHR ROBOTIC PROSTECTOMY, SUPRAPUBIC  12/2017     Family History   Problem Relation Name Age of Onset    Heart disease Father      Heart attack Paternal Uncle      Breast cancer Maternal Cousin      Colon cancer Neg Hx      Bladder Cancer Neg Hx       Social History     Tobacco Use    Smoking status: Former     Types: Cigarettes     Passive exposure: Never   Substance Use Topics    Alcohol use: Not Currently    Drug use: Never     Review of Systems   Allergic/Immunologic: Positive for immunocompromised state.   Neurological:  Positive for weakness.       Physical Exam     Initial Vitals [04/23/24 1325]   BP Pulse Resp Temp SpO2   111/69 93 16 98 °F (36.7 °C) 100 %      MAP       --         Physical Exam    Constitutional: He appears well-developed and well-nourished. He  is not diaphoretic. He is easily aroused.   Pale   HENT:   Head: Normocephalic and atraumatic.   Mouth/Throat: Oropharynx is clear and moist. No oropharyngeal exudate.   Eyes: EOM and lids are normal. Pupils are equal, round, and reactive to light. No scleral icterus.   Neck: Phonation normal. Neck supple. No stridor present.   Normal range of motion.  Cardiovascular:  Normal rate and regular rhythm.           R upper chest port   Pulmonary/Chest: Breath sounds normal. No stridor. No respiratory distress. He has no wheezes. He has no rales.   Abdominal: Abdomen is soft. He exhibits no distension. There is no abdominal tenderness. There is no rebound.   Musculoskeletal:         General: No tenderness or edema. Normal range of motion.      Cervical back: Normal range of motion and neck supple.     Neurological: He is alert, oriented to person, place, and time and easily aroused. He has normal strength. No sensory deficit.   Skin: Skin is warm and dry. No rash noted. No erythema.   Psychiatric: He has a normal mood and affect. His speech is normal.         ED Course   Critical Care    Date/Time: 4/23/2024 7:02 PM    Performed by: Bridger Madison PA-C  Authorized by: Summer Lovett MD  Direct patient critical care time: 14 minutes  Additional history critical care time: 6 minutes  Ordering / reviewing critical care time: 4 minutes  Documentation critical care time: 5 minutes  Consulting other physicians critical care time: 6 minutes  Total critical care time (exclusive of procedural time) : 35 minutes  Critical care was necessary to treat or prevent imminent or life-threatening deterioration of the following conditions: circulatory failure.  Critical care was time spent personally by me on the following activities: discussions with consultants, ordering and performing treatments and interventions, ordering and review of laboratory studies and ordering and review of radiographic studies.        Labs Reviewed   CBC  W/ AUTO DIFFERENTIAL - Abnormal; Notable for the following components:       Result Value    RBC 2.40 (*)     Hemoglobin 6.7 (*)     Hematocrit 22.0 (*)     MCHC 30.5 (*)     RDW 15.9 (*)     Immature Granulocytes 1.3 (*)     Immature Grans (Abs) 0.06 (*)     Lymph # 0.4 (*)     Mono # 0.1 (*)     Gran % 86.3 (*)     Lymph % 8.9 (*)     Mono % 1.3 (*)     All other components within normal limits   COMPREHENSIVE METABOLIC PANEL - Abnormal; Notable for the following components:    Sodium 133 (*)     CO2 19 (*)     BUN 47 (*)     Creatinine 2.3 (*)     Calcium 8.6 (*)     Total Protein 5.9 (*)     Albumin 2.1 (*)     AST 9 (*)     eGFR 32.1 (*)     All other components within normal limits   URINALYSIS, REFLEX TO URINE CULTURE - Abnormal; Notable for the following components:    Appearance, UA Cloudy (*)     Protein, UA 2+ (*)     Occult Blood UA 2+ (*)     Leukocytes, UA 3+ (*)     All other components within normal limits    Narrative:     Specimen Source->Urine   URINALYSIS MICROSCOPIC - Abnormal; Notable for the following components:    RBC, UA 27 (*)     WBC, UA >100 (*)     WBC Clumps, UA Few (*)     Bacteria Few (*)     All other components within normal limits    Narrative:     Specimen Source->Urine   CULTURE, URINE   MAGNESIUM   PROTIME-INR   APTT   TYPE & SCREEN   ISTAT LACTATE   PREPARE RBC SOFT     EKG Readings: (Independently Interpreted)   NSR 76 bpm. Normal T waves. Normal axis. No STEMI     ECG Results              EKG 12-lead (Final result)        Collection Time Result Time QRS Duration [facility] QTC Calculation    04/23/24 13:29:19 04/23/24 14:36:38 88 405                     Final result by Interface, Lab In [facility] (04/23/24 14:36:42)                   Narrative:    Test Reason : R55,    Vent. Rate : 076 BPM     Atrial Rate : 076 BPM     P-R Int : 142 ms          QRS Dur : 088 ms      QT Int : 360 ms       P-R-T Axes : 000 079 141 degrees     QTc Int : 405 ms    Normal sinus rhythm  Nonspecific T wave  abnormality  Abnormal ECG  When compared with ECG of 13-DEC-2023 09:23,  Nonspecific T wave abnormality now evident in Anterior-lateral leads  Confirmed by JANIE BONILLA MD ([id number]) on 4/23/2024 2:36:37 PM    Referred By:             Confirmed By:JANIE BONILLA MD                                  Imaging Results              X-Ray Chest PA And Lateral (Final result)  Result time 04/23/24 15:50:34      Final result by Kwaku Hussein MD (04/23/24 15:50:34)                   Impression:      1. Patient has known masslike focus within the lingula, similar to the previous CT allowing for differences in modality.  Surrounding interstitial attenuation may reflect edema or other pneumonitis.  Correlation is advised.  Please see PET-CT 02/27/2024.      Electronically signed by: Kwaku Hussein MD  Date:    04/23/2024  Time:    15:50               Narrative:    EXAMINATION:  XR CHEST PA AND LATERAL    CLINICAL HISTORY:  Syncope and collapse    TECHNIQUE:  PA and lateral views of the chest were performed.    COMPARISON:  02/23/2024, PET-CT 02/27/2024    FINDINGS:  Right chest wall port catheter tip projects over the distal SVC.  The cardiomediastinal silhouette is not enlarged noting surgical change..  There is no pleural effusion.  The trachea is midline.  The lungs are symmetrically expanded bilaterally with coarse interstitial attenuation bilaterally.  There is a masslike focus within the lingula, correlating with focus seen on PET-CT 02/27/2024..  There is no pneumothorax.  The osseous structures are remarkable for degenerative change..  There is remote right rib injury.                                       Medications   ampicillin 500 MG capsule 500 mg (has no administration in time range)   0.9%  NaCl infusion (for blood administration) (has no administration in time range)   apixaban tablet 5 mg (has no administration in time range)   atorvastatin tablet 20 mg (has no administration in time range)   oxyCODONE  immediate release tablet 5 mg (has no administration in time range)   sodium chloride 0.9% flush 10 mL (has no administration in time range)   melatonin tablet 6 mg (has no administration in time range)   ondansetron injection 4 mg (has no administration in time range)   lactated ringers bolus 1,000 mL (0 mLs Intravenous Stopped 4/23/24 1857)     Medical Decision Making  58 y.o. male with medical history of recurrent metastatic bladder cancer on chemotherapy, hx of radical cystectomy, L common femoral and proximal femoral DVT, pathologic pelvic fracture, CKD presenting to the ED c/o near syncopal episode this afternoon. Symptoms improved at the time of my exam. Concerned about being anemic.     DDx includes but not limited to AoCD, blood loss anemia, dehydration, electrolyte disturbance, CHI, orthostatic hypotension, cardiac arrhythmia    Amount and/or Complexity of Data Reviewed  External Data Reviewed: labs and notes.  Labs: ordered. Decision-making details documented in ED Course.  Radiology: ordered and independent interpretation performed.  ECG/medicine tests: ordered and independent interpretation performed.  Discussion of management or test interpretation with external provider(s):   Oncology    Risk  OTC drugs.  Prescription drug management.               ED Course as of 04/23/24 1902 Tue Apr 23, 2024   1555 Orthostatics negative [BA]   1555 Hemoglobin(!): 6.7  T&S and blood consent obtained. 1 unit RBC ordered for transfusion [BA]   1556 Magnesium : 1.6  masslike focus within the lingula [BA]   1556 WBC: 4.47  masslike focus within the lingula [BA]   1556 POC Lactate: 0.96 [BA]   1556 CXR with similar masslike focus within the lingula. No consolidation or edema. [BA]   1859 Leukocyte Esterase, UA(!): 3+  UA consistent with UTI. UCx reviewed. Will start patient on Ampicillin for coverage. [BA]   1900 Creatinine(!): 2.3  Baseline [BA]   1900 Reviewed patient with oncology fellow. No indications for  admission at this time and okay for patient to be discharged after transfusion. Discussed with EDOU provider. Will place in EDOU for 1 unit RBC transfusion. Okay for discharge afterwards with oncology follow-up. RX for Ampicillin for UTI coverage. Patient expresses understanding and agreeable to the plan. I have discussed the care of this patient with my supervising physician.  [BA]      ED Course User Index  [BA] Bridger Madison PA-C                           Clinical Impression:  Final diagnoses:  [R55] Near syncope  [C67.9] Malignant neoplasm of urinary bladder, unspecified site  [D64.9] Anemia requiring transfusions (Primary)  [N39.0, R31.9] Urinary tract infection with hematuria, site unspecified          ED Disposition Condition    Observation Stable                Bridger Madison PA-C  04/23/24 4549

## 2024-04-23 NOTE — TELEPHONE ENCOUNTER
----- Message from Leslie Miller sent at 4/23/2024 12:26 PM CDT -----  Regarding: Consult/Advisory      Name Of Caller:   Julio      Contact Preference:   [phone]       Nature of call:   Pt would like to speak with a nurse. He states he almost fainted. Pt has some questions.

## 2024-04-23 NOTE — ED NOTES
Pt states he had a near syncopal episode after a BM this morning. Pt states he laid on the floor until he recovered but felt it took a while to recover. Pt states he has had similar episodes in the past. Pt states he has a history of bladder cancer and is currently on chemo.

## 2024-04-23 NOTE — DISCHARGE INSTRUCTIONS
Take the prescribed Ampicillin for coverage of an underlying urinary tract infection.    Follow-up with Dr. Abad in clinic for further evaluation.     Return to the emergency room for new, worsening, or concerning symptoms.     Future Appointments   Date Time Provider Department Center   4/24/2024  9:00 AM [facility]   4/24/2024 10:00 AM NOM NM4 MG2 400LB LIMIT [facility] NUCLEAR [address]   4/24/2024  3:30 PM Lydia Mendieta LAC [facility] INTWEO [address]   4/29/2024  1:00 PM Moni Jacobs MD [facility] PLMDBEN [place]   5/1/2024  4:00 PM Lydia Mendieta LAC [facility] INTWEO [address]   5/2/2024  2:30 PM LAB, [facility] CANCER BLDG [facility] [place] [facility]   5/2/2024  3:40 PM Bridger Abad MD [facility] HEMONC2 [place] [facility]   5/3/2024  8:00 AM CHEMO 7 [facility] CHEMO [place] [facility]   5/6/2024  3:45 PM Eder Oconnor MD [facility] UROLOGC [place] [address]   5/8/2024  3:00 PM Lydia Mendieta LAC [facility] INTWEO [address]   5/10/2024  7:10 AM LAB, [facility] CANCER BLDG [facility] HO [place] [facility]   5/10/2024  8:00 AM NURSE 5, [facility] CHEMO [facility] CHEMO [place] [facility]   5/15/2024  3:30 PM Lydia Mendieta LAC [facility] INTWEO [address]   5/22/2024  3:30 PM Lydia Mendieta LAC [facility] INTWEO [address]   6/25/2024 10:00 AM VASCULAR, [place] [facility] [place]   7/5/2024  8:30 AM Maurilio Troncoso MD PhD [facility] [place]

## 2024-04-24 NOTE — H&P
ED Observation Unit  History and Physical      I assumed care of this patient from the Main ED at onset of observation time, 1844 on 04/23/2024.       History of Present Illness:    The history is provided by the patient and medical records. No  was used.      Julio Rodríguez is a 58 y.o. male with medical history of recurrent metastatic bladder cancer on chemotherapy, hx of radical cystectomy, L common femoral and proximal femoral DVT, pathologic pelvic fracture, CKD presenting to the ED with the chief complaint of near syncope.      Felt lightheaded after having a bowel movement this afternoon. [place] like he was going to pass out. Reports lying on the ground until he built up enough strength to call his mother who brought him to the ED. Symptoms improved at the time of my exam. Reports feeling this way when he is anemic. Reports having increased volume of his chronic hematuria over the last few days. He is on Eliquis for DVT. Denies melena or hematochezia. He has been feeling constipated for a few months that he contributes to Vit D3 therapy. Takes Colace which helps. Denies lower back pain, numbness, paresthesias, extremity weakness. No fever or diaphoresis. No chest pain, SOB, palpitations, vomiting. Recently received his 2nd infusion of current chemotherapy round.      I reviewed the ED Provider Note dated 40/23/2024 prior to my evaluation of this patient.  I reviewed all labs and imaging performed in the Main ED, prior to patient being placed in Observation. Patient was placed in the ED Observation Unit for Anemia requiring transfusions.    PMHx   Past Medical History:   Diagnosis Date    Bladder cancer     CAD (coronary artery disease) 9/12/2023    Prior CABG. Not on antiplatelets. Home medicatiosn include atorvastatin    Carcinoma     forehead    Encounter for blood transfusion     Hypertension     Hypothyroidism 9/12/2023    Home medications include levothyroxine    Malignant melanoma of  skin, unspecified     right arm    Malignant neoplasm of urinary bladder 9/12/2023    Melanoma 9/12/2023    History of T1a melanoma; 0.5mm depth without ulceartion. SP wide local excision 02/2022      Past Surgical History:   Procedure Laterality Date    CORONARY ARTERY BYPASS GRAFT  10/2015    CYSTECTOMY, ROBOT-ASSISTED, LAPAROSCOPIC, USING DA MARY XI, WITH ILEAL CONDUIT CREATION  12/2017    CYSTOGRAM N/A 12/5/2023    Procedure: CYSTOGRAM;  Surgeon: Eder Oconnor MD;  [facility];  Service: Urology;  Laterality: N/A;    CYSTOSCOPY N/A 12/5/2023    Procedure: CYSTOSCOPY;  Surgeon: Eder Oconnor MD;  [facility];  Service: Urology;  Laterality: N/A;    DILATION OF BLADDER      dialation of bladder neck- due to claudia bladder- multiple procedures    DILATION OF URETHRA N/A 12/5/2023    Procedure: DILATION, URETHRA;  Surgeon: Eder Oconnor MD;  Location: [facility] OR [address] [place] [address];  Service: Urology;  Laterality: N/A;    LYMPHADENECTOMY      PERCUTANEOUS NEPHROSTOMY Right 12/8/2023    Procedure: Creation, Nephrostomy, Percutaneous;  Surgeon: Jens Nunez MD;  Location: [facility] CATH LAB;  Service: Radiology;  Laterality: Right;    PLACEMENT N/A 12/5/2023    Procedure: PLACEMENT;  Surgeon: Eder Oconnor MD;  [facility];  Service: Urology;  Laterality: N/A;  placement of valiente over a wire by MD MOHR ROBOTIC PROSTECTOMY, SUPRAPUBIC  12/2017        Family Hx   Family History   Problem Relation Name Age of Onset    Heart disease Father      Heart attack Paternal Uncle      Breast cancer Maternal Cousin      Colon cancer Neg Hx      Bladder Cancer Neg Hx          Social Hx   Social History     Socioeconomic History    Marital status: Single   Tobacco Use    Smoking status: Former     Types: Cigarettes     Passive exposure: Never   Substance and Sexual Activity    Alcohol use: Not Currently    Drug use: Never   Social History Narrative    Lives independently in [facility]. 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      Social Determinants of Health     Financial Resource Strain: Low Risk  (4/23/2024)    Overall Financial Resource Strain (CARDIA)     Difficulty of Paying Living Expenses: Not hard at all   Food Insecurity: No Food Insecurity (4/23/2024)    Hunger Vital Sign     Worried About Running Out of Food in the Last Year: Never true     Ran Out of Food in the Last Year: Never true   Transportation Needs: No Transportation Needs (4/23/2024)    PRAPARE - Transportation     Lack of Transportation (Medical): No     Lack of Transportation (Non-Medical): No   Physical Activity: Sufficiently Active (4/23/2024)    Exercise Vital Sign     Days of Exercise per Week: 5 days     Minutes of Exercise per Session: 60 min   Recent Concern: Physical Activity - Insufficiently Active (2/12/2024)    Exercise Vital Sign     Days of Exercise per Week: 1 day     Minutes of Exercise per Session: 30 min   Stress: Patient Declined (4/23/2024)    [place] [employer] - Occupational Stress Questionnaire     Feeling of Stress : Patient declined   Social Connections: Unknown (4/23/2024)    Social Connection and Isolation Panel [NHANES]     Frequency of Communication with Friends and Family: Twice a week     Frequency of Social Gatherings with Friends and Family: Once a week     Attends Amish Services: Patient declined     Active Member of Clubs or Organizations: Patient declined     Attends Club or Organization Meetings: Patient declined     Marital Status: Never    Housing Stability: Low Risk  (4/23/2024)    Housing Stability Vital Sign     Unable to Pay for Housing in the Last Year: No     Number of Times Moved in the Last Year: 1     Homeless in the Last Year: No        Vital Signs   Vitals:    04/23/24 1933 04/23/24 2002 04/23/24 2033 04/23/24 2047   BP: (!) 140/74 139/76 133/77 136/77   BP Location:       Patient Position:       Pulse: 79 79 80 78   Resp: 18       Temp: 98.2 °F (36.8 °C)      TempSrc: Oral      SpO2: 100% 100% 100% 99%   Weight:       Height:            Review of Systems  Review of Systems   Constitutional:  Positive for malaise/fatigue. Negative for chills and fever.   Respiratory:  Negative for shortness of breath.    Cardiovascular:  Negative for chest pain.   Genitourinary: Negative.    Musculoskeletal: Negative.    Neurological:  Positive for dizziness.       Physical Exam  Physical Exam  Vitals and nursing note reviewed.   Constitutional:       Appearance: Normal appearance.   HENT:      Head: Normocephalic and atraumatic.      Mouth/Throat:      Pharynx: Oropharynx is clear.   Eyes:      Conjunctiva/sclera: Conjunctivae normal.   Cardiovascular:      Rate and Rhythm: Normal rate.   Pulmonary:      Effort: Pulmonary effort is normal.      Breath sounds: Normal breath sounds.   Abdominal:      General: Abdomen is flat.      Tenderness: There is no abdominal tenderness.   Musculoskeletal:         General: Normal range of motion.      Cervical back: Normal range of motion and neck supple.   Skin:     General: Skin is warm.   Neurological:      General: No focal deficit present.      Mental Status: He is alert and oriented to person, place, and time.         Medications:   Scheduled Meds:  Current Facility-Administered Medications   Medication Dose Route Frequency    apixaban  5 mg Oral BID    atorvastatin  20 mg Oral QHS     Continuous Infusions:  Current Facility-Administered Medications   Medication Dose Route Frequency Last Rate Last Admin     PRN Meds:.  Current Facility-Administered Medications:     0.9%  NaCl infusion (for blood administration), , Intravenous, Q24H PRN    melatonin, 6 mg, Oral, Nightly PRN    ondansetron, 4 mg, Intravenous, Q8H PRN    oxyCODONE, 5 mg, Oral, Q4H PRN    sodium chloride 0.9%, 10 mL, Intravenous, PRN      Assessment/Plan:  Symptomatic anemia:  -history of chronic hematuria.  No gross hematuria on exam today.   Hemoglobin is 6.7.  -patient was consented and 1 unit PRBCs was initiated in the ED.    Urinary tract infection:  -UA concerning for UTI.  Chart review shows previous Enterococcus faecalis UTIs.  Will treat with ampicillin.    Case was discussed with the ED provider, PA Los

## 2024-04-24 NOTE — PLAN OF CARE
[facility] - Emergency Dept  Initial Discharge Assessment       Primary Care Provider: No, Primary Doctor    Admission Diagnosis: Anemia requiring transfusions [D64.9]    Admission Date: 4/23/2024  Expected Discharge Date: 4/23/2024  Sw met pt at bedside. Pt lives alone and ambulates without assistance. Pt has no acute case management needs at this time.      Transition of Care Barriers: (P) None    Payor: BLUE CROSS BLUE SHIELD / Plan: [place] PPO / Product Type: PPO /     Extended Emergency Contact Information  Primary Emergency Contact: Nohelia Rodríguez  Address: [address] [place], [place] [address] [address] [facility]  Home Phone: [phone]  Mobile Phone: [phone]  Relation: Mother    Discharge Plan A: (P) Home  Discharge Plan B: (P) Home      [id number] - [place] - [address]  AT [facility] [address]  [address] [place] [address]  Phone: [phone] Fax: [phone]    [facility]  [address] [facility] [address]  Phone: [phone] Fax: [phone]    [facility] [place] - [address] [place]  [address] [place] [address]  Phone: [phone] Fax: [phone]      Initial Assessment (most recent)       Adult Discharge Assessment - 04/23/24 2053          Discharge Assessment    Assessment Type Discharge Planning Assessment (P)      Confirmed/corrected address, phone number and insurance Yes (P)      Confirmed Demographics Correct on Facesheet (P)      Source of Information patient (P)      Does patient/caregiver understand observation status Yes (P)      Communicated JUNIOR with patient/caregiver Date not available/Unable to determine (P)      People in Home alone (P)      Do you expect to return to your current living situation? Yes (P)      Do you have help at home or someone to help you manage your care at home? No (P)      Prior to hospitilization cognitive status: Alert/Oriented  (P)      Current cognitive status: Alert/Oriented (P)      Walking or Climbing Stairs Difficulty no (P)      Dressing/Bathing Difficulty no (P)      Home Accessibility not wheelchair accessible (P)      Home Layout Able to live on 1st floor (P)      Equipment Currently Used at Home none (P)      Readmission within 30 days? No (P)      Patient currently being followed by outpatient case management? No (P)      Do you currently have service(s) that help you manage your care at home? No (P)      Do you take prescription medications? Yes (P)      Do you have prescription coverage? Yes (P)      Do you have any problems affording any of your prescribed medications? No (P)      Is the patient taking medications as prescribed? yes (P)      Who is going to help you get home at discharge? Nohelia Rodríguez (P)      How do you get to doctors appointments? car, drives self (P)      Are you on dialysis? No (P)      Do you take coumadin? -- (P)    eliquis    Discharge Plan A Home (P)      Discharge Plan B Home (P)      DME Needed Upon Discharge  none (P)      Discharge Plan discussed with: Patient (P)      Transition of Care Barriers None (P)         Physical Activity    On average, how many days per week do you engage in moderate to strenuous exercise (like a brisk walk)? 5 days (P)      On average, how many minutes do you engage in exercise at this level? 60 min (P)         Financial Resource Strain    How hard is it for you to pay for the very basics like food, housing, medical care, and heating? Not hard at all (P)         Housing Stability    In the last 12 months, was there a time when you were not able to pay the mortgage or rent on time? No (P)      In the past 12 months, how many times have you moved where you were living? 1 (P)      At any time in the past 12 months, were you homeless or living in a shelter (including now)? No (P)         Transportation Needs    In the past 12 months, has lack of transportation kept you  from medical appointments or from getting medications? No (P)      In the past 12 months, has lack of transportation kept you from meetings, work, or from getting things needed for daily living? No (P)         Food Insecurity    Within the past 12 months, you worried that your food would run out before you got the money to buy more. Never true (P)      Within the past 12 months, the food you bought just didn't last and you didn't have money to get more. Never true (P)         Stress    Do you feel stress - tense, restless, nervous, or anxious, or unable to sleep at night because your mind is troubled all the time - these days? Patient declined (P)         Social Connections    In a typical week, how many times do you talk on the phone with family, friends, or neighbors? Twice a week (P)      How often do you get together with friends or relatives? Once a week (P)      How often do you attend Tenriism or Christian services? Patient declined (P)      Do you belong to any clubs or organizations such as Tenriism groups, unions, fraternal or athletic groups, or school groups? Patient declined (P)      How often do you attend meetings of the clubs or organizations you belong to? Patient declined (P)      Are you , , , , never , or living with a partner? Never  (P)         Alcohol Use    Q1: How often do you have a drink containing alcohol? Never (P)      Q2: How many drinks containing alcohol do you have on a typical day when you are drinking? Patient does not drink (P)      Q3: How often do you have six or more drinks on one occasion? Never (P)                       Michael Mcnally LMSW  Case Management  Emergency Department  [phone]

## 2024-04-24 NOTE — DISCHARGE SUMMARY
ED Observation Unit  Discharge Summary        History of Present Illness:    The history is provided by the patient and medical records. No  was used.      Julio Rodríguez is a 58 y.o. male with medical history of recurrent metastatic bladder cancer on chemotherapy, hx of radical cystectomy, L common femoral and proximal femoral DVT, pathologic pelvic fracture, CKD presenting to the ED with the chief complaint of near syncope.      Felt lightheaded after having a bowel movement this afternoon. [place] like he was going to pass out. Reports lying on the ground until he built up enough strength to call his mother who brought him to the ED. Symptoms improved at the time of my exam. Reports feeling this way when he is anemic. Reports having increased volume of his chronic hematuria over the last few days. He is on Eliquis for DVT. Denies melena or hematochezia. He has been feeling constipated for a few months that he contributes to Vit D3 therapy. Takes Colace which helps. Denies lower back pain, numbness, paresthesias, extremity weakness. No fever or diaphoresis. No chest pain, SOB, palpitations, vomiting. Recently received his 2nd infusion of current chemotherapy round.      Observation Course:    Patient was admitted to ED observation unit for symptomatic anemia secondary to chronic hematuria.  1 unit PRBC was transfused in the ED. No complications during transfusion.  Also has a UTI with recent history of Enterococcus faecalis and was started on ampicillin.      Consultants:    Hematology    Final Diagnosis:  Anemia requiring transfusion, UTI    Discharge Condition: Good    Disposition: Home or Self Care     Time spent on the discharge of the patient including review of hospital course with the patient. reviewing discharge medications and arranging follow-up care 35 minutes.  Patient was seen and examined on the date of discharge and determined to be suitable for discharge.    Follow Up:  Future  Appointments   Date Time Provider Department Center   4/24/2024  9:00 AM LAB, HEMON CANCER BLDG [facility]   4/24/2024 10:00 AM NOM NM4 MG2 400LB LIMIT [facility] NUCLEAR [facility] [address]   4/24/2024  3:30 PM Gayatri, Lydia, LAC [facility] INTWEO [address]   4/29/2024  1:00 PM Moni Jacobs MD [facility] PLMDBEN [facility]   5/1/2024  4:00 PM GayatriLydia cervantes, LAC [facility]WEO [address]   5/2/2024  2:30 PM [facility]   5/2/2024  3:40 PM Bridger Abad MD [facility] HEMONC2 [facility]   5/3/2024  8:00 AM CHEMO 7 [facility] CHE[facility]   5/6/2024  3:45 PM Eder Oconnor MD [facility] UROLOGC [facility]   5/8/2024  3:00 PM Gayatri, Lydia, LAC [facility] [address]   5/10/2024  7:10 AM LAB, HEMON CANCER BLDG [facility]   5/10/2024  8:00 AM NURSE 5, [facility] CHEMO [facility] CHE[facility]   5/15/2024  3:30 PM GayatriLydia cervantes, LAC [facility]WE[facility] [address]   5/22/2024  3:30 PM Lydia Mendieta, LAC [facility] INTWEO [address]   6/25/2024 10:00 AM VASCULAR, [place] METH VASLAB [place]   7/5/2024  8:30 AM Maurilio Troncoso MD PhD [facility] PERVAS [place]

## 2024-04-25 NOTE — TELEPHONE ENCOUNTER
I spoke with [facility] pharmacy about Mr. Rodríguez's eliquis. The pharmacy didn't give enough medication for the starter dose of 10mg BID for 5 days. [facility] that [facility] has to reach out to his insurance to get the approval for the shortage of the medication. I spoke with Mr. Rodríguez and explained everything. He will call his insurance.

## 2024-04-29 NOTE — TELEPHONE ENCOUNTER
----- Message from Deanna Harris sent at 4/29/2024  8:38 AM CDT -----  Regarding: Refill  Contact: lyn  [phone]        Name of Pharmacy:    [facility]    [phone]    Fax [phone]       Name Of caller: Lyn   with  [facility]       Name of Medication: filgrastim (NEUPOGEN) 300 mcg/0.5 mL injection    Re# [id number]     Comments/advisory:  Requesting new prescription on pt's behalf

## 2024-04-29 NOTE — PROGRESS NOTES
Consult Note  Palliative Care    The patient location is: home  The chief complaint leading to consultation is: symptom management and GOC    Visit type: audiovisual    Face to Face time with patient: 45 minutes  55 minutes of total time spent on the encounter, which includes face to face time and non-face to face time preparing to see the patient (eg, review of tests), Obtaining and/or reviewing separately obtained history, Documenting clinical information in the electronic or other health record, Independently interpreting results (not separately reported) and communicating results to the patient/family/caregiver, or Care coordination (not separately reported).     Each patient to whom he or she provides medical services by telemedicine is:  (1) informed of the relationship between the physician and patient and the respective role of any other health care provider with respect to management of the patient; and (2) notified that he or she may decline to receive medical services by telemedicine and may withdraw from such care at any time.    Notes:       Consult Requested By: Dr. Abad  Reason for Consult: symptom management and ACP/GOC      ASSESSMENT/PLAN:     Plan/Recommendations:  Diagnoses and all orders for this visit:    Malignant neoplasm of urinary bladder/Secondary malignant neoplasm of bone and bone marrow  - patient followed by Dr. Abad  - currently on disease directed therapy with full dose sacituzumab govitecan (SG)  - patient also reporting that oncology looking into clinical trials at [facility]; no trial available at [facility]  - patient seen in ED on 4/5/24 for acute DVT and started on apixaban.  - seen in ED for symptomatic anemia requiring blood transfusion on 04/23/2024    Encounter for palliative care/Advance Care Planning   - patient decisional and by himself on telemedicine visit  - no ACP documents uploaded into EMR  - philosophy of palliative medicine and new patient folder given to and  reviewed with patient and family at first visit  - ACP booklet given to and reviewed with patient at first visit  - patient states he has already completed these forms, but he is unsure who he has named as his HCPOA. He states it is either his brother, mother, or father  - patient discussed how his cancer has progressed through multiple lines of treatment, and that he has restarted SG at full dose. He reports that he was given a prognosis of months, but he is hoping that he will live longer  - he is interested in pursuing treatment including looking into clinical trials  - discussed briefly about when patients elect to enroll in hospice care at first visit. Did not discuss in detail about hospice care today and will NOT make a referral to hospice today  - code status not discussed      Cancer related pain  - patient reporting increasing pain in his bladder and with catheterization for past four days; he has decreased amount of self catheterizations during the day and mostly just uses his valiente at night due to increased pain with catheterization.  - he was told that he had UTI in ED recently but he never received antibiotics. He will get repeat UA and Ucx with labs this week to determine if it is a UTI vs possible progression of disease vs other issue.  - patient uses tylenol 1000 mgat night if needed for pain relief. He has used some oxycodone over past four days for increased bladder pain with relief.   - patient's Cr clearance too low for phenazopyridine  - patient reporting back pain is better. He now has the appropriate paperwork for massage therapy, but he states [facility] told him he must drop it off in person (not faxed as was told to this clinic).  - will hold on any pharmacologic adjustment at this time  - opioid safety sheet in new patient folder for patient to review    Adjustment disorder with mixed anxiety and depressed mood  - patient reporting mild anxiety and depression  - patient expressed challenges  with social distancing while dealing with his treatment. He also discussed the recent discussion around his prognosis with oncology and how that has affected him  - he expressed frustration around trying to obtain prescriptions for anticoagulant and antibiotic for possible UTI.   - emotional support provided today  - discussed other ways for patient to remain social while being safe with distancing if his WBC count is low. He states he really enjoyed sitting in [facility] drinking coffee and having a muffin while other people moved through and ate around him.   - no need for pharmacologic intervention at this time  - will continue to monitor    Neoplastic (malignant) related fatigue  - patient reporting moderate fatigue; he notes that this has intermittently increased with full dose treatment. Treatment weeks are worse than off weeks  - he will work for a few hours then rest for a time and then can work again  - no need for pharmacologic intervention  - patient able to maintain activities at this time  - will continue to monitor      Understanding of illness/Prognosis: patient with excellent understanding of his illness and overall poor prognosis    Goals of care: life prolonging    Follow up: ~ 4 weeks    Patient's encounter and above plan of care discussed with patient's oncology team    SUBJECTIVE:     History of Present Illness:  Patient is a 58 y.o. year old male presenting with CAD, HTN, hypothyroidism, previous melanoma, and metastatic bladder cancer presents to Palliative Medicine for symptom management and ACP/GOC. Please see oncology notes for full details of oncologic history and treatment course.      04/29/2024:  LA  reviewed and summarized:  04/26/2024 Lomotil Disp: 20 for 5 days  04/11/2024 Oxycodone 5 mg Disp: 42 for 7 days    Patient presents today by himself on telemedicine visit. He has developed new/worsening pain in his bladder area with catheterizations over the past 4 days. He states  that the oxycodone does provide relief for the pain. He has been seen in ED due to DVT as well as then needing blood transfusion for symptomatic anemia. Patient expressed frustration around challenges obtaining recent medications. He also noted concerns around WBC count with acupuncture. Patient's fatigue worsens with treatment weeks. He also noted that he has been having socialization, but being safe with lower WBC counts at times. He discussed seeing his parents, his brother, as well as sitting in [facility] enjoying coffee and muffin around other people.     03/25/2024:  LA  reviewed and summarized:  03/17/2024 Promethazine-Dm 6.25-15 mg/5 ml Disp: 118 for 23 days    Patient had full dose SG about 4 weeks ago. There was no available trials at [facility]. Patient reports doing well overall. Patient still has some mild pain in his back. Patient with ongoing fatigue. He has not heard back from massage therapist about scheduling appointments.     03/04/2024:  LA  reviewed and summarized:  04/28/2023 Oxycodone-apap  mg Disp: 21 for 7 days    Patient presents today with his mother. Patient discussed his history of malignancy up to this point in time. He spoke about how he was given a prognosis of months, but he is hopeful to have more time. Patient states he has mild to moderate pain in his back. Patient reporting some pain improved with treatment of PNA, but he still has pain. Patient uses tylenol PRN for pain. He is expressing more fatigue with full dose of treatment. Patient with mild anxiety/depression. He is still able to do activities he wants at this time. Patient has completed ACP documents previously.     Past Medical History:   Diagnosis Date    Bladder cancer     CAD (coronary artery disease) 9/12/2023    Prior CABG. Not on antiplatelets. Home medicatiosn include atorvastatin    Carcinoma     forehead    Encounter for blood transfusion     Hypertension     Hypothyroidism 9/12/2023    Home  medications include levothyroxine    Malignant melanoma of skin, unspecified     right arm    Malignant neoplasm of urinary bladder 9/12/2023    Melanoma 9/12/2023    History of T1a melanoma; 0.5mm depth without ulceartion. SP wide local excision 02/2022     Past Surgical History:   Procedure Laterality Date    CORONARY ARTERY BYPASS GRAFT  10/2015    CYSTECTOMY, ROBOT-ASSISTED, LAPAROSCOPIC, USING DA MARY XI, WITH ILEAL CONDUIT CREATION  12/2017    CYSTOGRAM N/A 12/5/2023    Procedure: CYSTOGRAM;  Surgeon: Eder Oconnor MD;  [facility];  Service: Urology;  Laterality: N/A;    CYSTOSCOPY N/A 12/5/2023    Procedure: CYSTOSCOPY;  Surgeon: Eder Oconnor MD;  [facility];  Service: Urology;  Laterality: N/A;    DILATION OF BLADDER      dialation of bladder neck- due to claudia bladder- multiple procedures    DILATION OF URETHRA N/A 12/5/2023    Procedure: DILATION, URETHRA;  Surgeon: Eder Oconnor MD;  [facility];  Service: Urology;  Laterality: N/A;    LYMPHADENECTOMY      PERCUTANEOUS NEPHROSTOMY Right 12/8/2023    Procedure: Creation, Nephrostomy, Percutaneous;  Surgeon: Jens Nunez MD;  Location: [facility] CATH LAB;  Service: Radiology;  Laterality: Right;    PLACEMENT N/A 12/5/2023    Procedure: PLACEMENT;  Surgeon: Eder Oconnor MD;  Location: [facility] OR [address] [place] [address];  Service: Urology;  Laterality: N/A;  placement of valiente over a wire by MD MOHR ROBOTIC PROSTECTOMY, SUPRAPUBIC  12/2017     Family History   Problem Relation Name Age of Onset    Heart disease Father      Heart attack Paternal Uncle      Breast cancer Maternal Cousin      Colon cancer Neg Hx      Bladder Cancer Neg Hx       Review of patient's allergies indicates:  No Known Allergies    Medications:    Current Outpatient Medications:     apixaban (ELIQUIS) 5 mg Tab, Take 1 tablet (5 mg total) by mouth 2 (two) times daily., Disp: 60 tablet, Rfl: 5    atorvastatin (LIPITOR) 20 MG tablet, Take 20 mg by mouth every  evening., Disp: , Rfl:     benzonatate (TESSALON PERLES) 100 MG capsule, Take 1 capsule (100 mg total) by mouth every 6 (six) hours as needed for Cough., Disp: 30 capsule, Rfl: 1    cetirizine HCl (ZYRTEC ORAL), Take by mouth as needed., Disp: , Rfl:     dexAMETHasone (DECADRON) 4 MG Tab, Take 2 tablets (8 mg total) by mouth once daily. Take 2 tablets (8 mg total) by mouth once daily. Take a directed on days 2, 3 and 4 of your chemotherapy cycle., Disp: 24 tablet, Rfl: 3    diphenoxylate-atropine 2.5-0.025 mg (LOMOTIL) 2.5-0.025 mg per tablet, Take 1 tablet by mouth 4 (four) times daily as needed for Diarrhea., Disp: 20 tablet, Rfl: 0    docusate sodium (COLACE) 100 MG capsule, Take 220 capsules by mouth every evening., Disp: , Rfl:     fluticasone propionate (FLONASE) 50 mcg/actuation nasal spray, 1 spray (50 mcg total) by Each Nostril route once daily. (Patient taking differently: 1 spray by Each Nostril route as needed.), Disp: 9.9 mL, Rfl: 0    Lactobacillus rhamnosus GG (CULTURELLE) 10 billion cell capsule, Take 1 capsule by mouth once daily., Disp: , Rfl:     levothyroxine (SYNTHROID) 50 MCG tablet, Take 50 mcg by mouth before breakfast., Disp: , Rfl:     LINZESS 72 mcg Cap capsule, Take 72 mcg by mouth every morning., Disp: , Rfl:     loperamide (IMODIUM) 2 mg capsule, Take 1 capsule (2 mg total) by mouth 4 (four) times daily as needed for Diarrhea., Disp: 60 capsule, Rfl: 0    loratadine (CLARITIN ORAL), Take by mouth as needed., Disp: , Rfl:     multivitamin (THERAGRAN) per tablet, Take 1 tablet by mouth every morning., Disp: , Rfl:     OLANZapine (ZYPREXA) 5 MG tablet, TAKE 1 TABLET BY MOUTH EVERY EVENING ON DAYS 1 THROUGH 4 OF EACH CHEMOTHERAPY CYCLE, Disp: 30 tablet, Rfl: 0    ondansetron (ZOFRAN-ODT) 8 MG TbDL, Take 1 tablet (8 mg total) by mouth every 8 (eight) hours as needed (nausea/vomiting)., Disp: 60 tablet, Rfl: 5    oxyCODONE (ROXICODONE) 5 MG immediate release tablet, Take 1 tablet (5 mg total)  by mouth every 4 (four) hours as needed for Pain., Disp: 42 tablet, Rfl: 0    TURMERIC ORAL, Take by mouth every morning., Disp: , Rfl:     vitamin D (VITAMIN D3) 1000 units Tab, Take 1 tablet (1,000 Units total) by mouth once daily., Disp: 30 tablet, Rfl: 5    VTAMA 1 % Crea, APPLY 1 APPLICATION ON THE SKIN DAILY, Disp: , Rfl:     OBJECTIVE:       ROS:  Review of Systems   Constitutional:  Positive for activity change and fatigue.   HENT: Negative.     Eyes: Negative.    Respiratory: Negative.     Cardiovascular: Negative.    Gastrointestinal:  Positive for nausea.   Genitourinary:  Positive for penile pain (wtih catheterization).   Musculoskeletal:  Positive for back pain.   Skin: Negative.    Neurological: Negative.    Psychiatric/Behavioral:  Positive for dysphoric mood. The patient is not nervous/anxious.    All other systems reviewed and are negative.      Review of Symptoms      Symptom Assessment (ESAS 0-10 Scale)  Pain:  7  Dyspnea:  0  Anxiety:  2  Nausea:  1  Depression:  2  Anorexia:  2  Fatigue:  3  Insomnia:  0  Restlessness:  0  Agitation:  0     CAM / Delirium:  Negative  Constipation:  Positive  Diarrhea:  Positive    Anxiety:  Is not nervous/anxious    Bowel Management Plan (BMP):  No      Pain Assessment:  OME in 24 hours:  0-10  Location(s): back and genitalia    Back       Location: posterior        Quality: Aching        Quantity: 1/10 in intensity        Chronicity: Onset 1 (greater than) month(s) ago, stable        Aggravating Factors: Activity        Alleviating Factors: Acetaminophen       Associated Symptoms: None  Genitalia       Location: generalized        Quality: Burning, aching and sharp       Quantity: 7/10 in intensity        Chronicity: Onset 4 day(s) ago, gradually worsening        Aggravating Factors: Urination        Alleviating Factors: Acetaminophen and opiates        Associated Symptoms: None    Modified Fidelia Scale:  0    ECOG Performance Status ndGndrndanddndend:nd nd2nd Living  Arrangements:  Lives alone    Psychosocial/Cultural:   See Palliative Psychosocial Note: Yes  Please see Pall Med SW note from 3/4/24 for full details.    Patient enjoys travel, watching television, working, and hanging out with friends  **Primary  to Follow**  Palliative Care  Consult: No    Spiritual:  F - Jany and Belief:  Yes - [employer]  I - Importance:  High  C - Community:  Yes  A - Address in Care:  Needs met      Advance Care Planning   Advance Directives:   Living Will: No    LaPOST: No    Do Not Resuscitate Status: No    Medical Power of : No      Decision Making:  Patient answered questions  Goals of Care: What is most important right now is to focus on remaining as independent as possible, symptom/pain control, improvement in condition but with limits to invasive therapies. Accordingly, we have decided that the best plan to meet the patient's goals includes continuing with treatment.        Physical Exam: limited due to telemedicine visit  Vitals:    There were no vitals filed for this visit.    Physical Exam  Constitutional:       General: He is not in acute distress.     Appearance: He is not toxic-appearing or diaphoretic.   HENT:      Head: Normocephalic and atraumatic.      Right Ear: External ear normal.      Left Ear: External ear normal.      Nose: Nose normal.      Mouth/Throat:      Mouth: Mucous membranes are moist.   Eyes:      General: No scleral icterus.        Right eye: No discharge.         Left eye: No discharge.   Neck:      Comments: Trachea midline  Pulmonary:      Effort: Pulmonary effort is normal. No respiratory distress.   Musculoskeletal:      Cervical back: Normal range of motion.      Comments: Sitting up on couch by himself; able to adjust position as necessary throughout visit   Skin:     General: Skin is dry.      Coloration: Skin is not jaundiced.      Findings: No rash.   Neurological:      General: No focal deficit present.  "     Mental Status: He is alert and oriented to person, place, and time.      Cranial Nerves: No cranial nerve deficit.   Psychiatric:         Mood and Affect: Mood normal.         Behavior: Behavior normal.         Thought Content: Thought content normal.         Judgment: Judgment normal.       Labs:  CBC:   WBC   Date Value Ref Range Status   04/24/2024 1.17 (LL) 3.90 - 12.70 K/uL Final     Comment:     wbc critical called to cassandra chambers rn by [facility] 04/24/2024 12:22     Hemoglobin   Date Value Ref Range Status   04/24/2024 8.1 (L) 14.0 - 18.0 g/dL Final     Hematocrit   Date Value Ref Range Status   04/24/2024 25.4 (L) 40.0 - 54.0 % Final     MCV   Date Value Ref Range Status   04/24/2024 92 82 - 98 fL Final     Platelets   Date Value Ref Range Status   04/24/2024 284 150 - 450 K/uL Final       LFT:   Lab Results   Component Value Date    AST 8 (L) 04/24/2024    ALKPHOS 79 04/24/2024    BILITOT 0.4 04/24/2024       Albumin:   Albumin   Date Value Ref Range Status   04/24/2024 2.3 (L) 3.5 - 5.2 g/dL Final     Protein:   Total Protein   Date Value Ref Range Status   04/24/2024 6.7 6.0 - 8.4 g/dL Final       Radiology:I have reviewed all pertinent imaging results/findings within the past 24 hours.    04/23/2024 CXR: "1. Patient has known masslike focus within the lingula, similar to the previous CT allowing for differences in modality. Surrounding interstitial attenuation may reflect edema or other pneumonitis. Correlation is advised. Please see PET-CT 02/27/2024."    02/27/2024 NM PET CT: "Widespread hypermetabolic metastatic disease throughout the chest, abdomen, and pelvis and osseous structures. Extensive hypermetabolic soft tissue thickening/mass lesion surrounding the neobladder and involving the left pelvic sidewall. Severe right hydronephrosis and ureteral dilatation.  Hypermetabolic mass/lymph node conglomerate at the distal aspect of the right ureter likely causing ureteral obstruction. Near complete " "hypermetabolic consolidation of the lingula with air bronchograms. Findings could represent infectious lobar pneumonia versus consolidative mass/neoplasm. Recommend clinical correlation. Please see above for additional details."    I spent a total of 60 minutes on the day of the visit.This includes face to face time in discussion of goals of care, symptom assessment, coordination of care and emotional support.  This also includes non-face to face time preparing to see the patient (eg, review of tests/imaging), obtaining and/or reviewing separately obtained history, documenting clinical information in the electronic or other health record, independently interpreting results and communicating results to the patient/family/caregiver, or care coordinator.       Signature: Moni Jacobs MD        "

## 2024-04-30 NOTE — TELEPHONE ENCOUNTER
----- Message from Leslie Miller sent at 4/30/2024 10:09 AM CDT -----  Regarding: Consult/Advisory      Name Of Caller:    Sanna jenkins/ [facility]      Contact Preference:    [phone], Reference # [id number]      Nature of call:   [facility] hasn't received the pt's script for filgrastim (NEUPOGEN). They would like a call or script sent over before Thursday 05/02.

## 2024-05-02 PROBLEM — N17.9 ACUTE RENAL FAILURE: Status: ACTIVE | Noted: 2024-01-01

## 2024-05-02 PROBLEM — R03.0 ELEVATED BLOOD PRESSURE READING: Status: RESOLVED | Noted: 2023-01-01 | Resolved: 2024-01-01

## 2024-05-02 NOTE — ASSESSMENT & PLAN NOTE
- Plan for pRBC trnasfusion tomorrow  - Likely exacerbated by ongoing hematuria; worsened after starting apixaban

## 2024-05-02 NOTE — ASSESSMENT & PLAN NOTE
Likely obstructive in setting of limited catheterization and ongoing overflow incontinence. Pt to place valiente until follow up with Dr. Oconnor. Also obtain renal US. Repeat labs next Monday

## 2024-05-02 NOTE — ASSESSMENT & PLAN NOTE
Generally poor tolerance of last cycle SG. Seen in ED with presyncope and now with [facility]. Holdign SG tomorrow. Will have patient get a blood transfusion and obtain renal US. Asked him to place valiente catheter until he sees Dr. Oconnor on Monday. Will also recheck labs on Monday. I will see him next Thursday to re-evaluate potential systemic therapy, although  I have warned him that risks of ongoing treatment are increasingly high with likely limited benefit.  - Hold C7 SG tomorrow  - Repeat CT chest non-con and MR abdomen/pelvis  - Blood transfusion tomorrow  - Schedule renal US  - Valiente catheter until seen by Dr. Oconnor  - Repeat labs on Monday  - FU with me next Thursday; consider if any systemic therapy remains viable   3

## 2024-05-02 NOTE — Clinical Note
Ro - he is seeing you next week. Cr up to 5 today. Sounds like he hasn't been self cathing during the day and just lets himself leak. I asked him to put the valiente in until he sees you, and I'm trying to around renal US. I''ll recheck labs on Monday.  Thanks, Bridger

## 2024-05-02 NOTE — PROGRESS NOTES
MEDICAL ONCOLOGY - FOLLOW UP VISIT    Cancer/Stage/TNM:    Cancer Staging   Malignant neoplasm of urinary bladder  Staging form: Urinary Bladder, AJCC 8th Edition  - Clinical: Stage IVB (cT4b, cNX, pM1b) - Signed by Bridger Abad MD on 9/28/2023       Reason for visit: Metastatic bladder cancer    HPI: Julio Rodríguez is a 58 y.o. male with metastatic bladder cancer previously treated with neoadjuvant ddMVAC, radical cystectomy, and then carboplatin/gemcitabine and EV+ Pembro following local and metastatic recurrence. Subsequetnly received a single dose of pembrolizumab + NTX-1088 11/2/23 as part of a phase I trial through the [facility], but was taken off trial for elevated creatinine despite PCN placement.  He presents to medical oncology clinic prior to C6D1 SG.    History has been obtained by chart review and discussion with the patient.    Interval Events:    Received SG 4/12 and 4/19. Seen in ED 4/23 with presyncope and generalized malaise. Was neutropenic. HGB 8.1. Cr today is up to 5.0. Admits oral intake has been low over the last week. Has some intermittent nausea; patricularly sensitive to certain tastes and odors. Had associated emesis a few days ago. Appetite has been generally good. Does not void spontaneously; has chronic overflow incontinence. Using valiente catheter overnight with intermittent catheter during the day. Has had increased pain with catheterization. As such has not been using the intermittent cathter during the day - allowing himself to continually leak.     Notes increasing fatigue as well. Did have presyncope a few weeks ago prompting ED visit. Has had rare episodes of diarrhea. No signficant neuorpathy.     Saw palliative care 4/29. Increasing bladder pain and catheterization pain. Repeating UA and UCX.      Oncology History   Malignant neoplasm of urinary bladder   2016 Initial Diagnosis    Bladder CIS. Managed with BCG     2017 Notable Event    Developed recurrent muscle invasive  disease.     2017 - 2017 Chemotherapy    ddMVAC      Surgery    Radical cystectomy with lymph node dissection and creation of neobladder. [id number]     7/2022 Notable Event    New onset hematuria. MRI scan of the pelvis in August showed a suspected blood clot adherent to the distal Smith catheter. There was abnormal signal and enhancement of the bilateral pubic bones adjacent to the neobladder suspicious for neoplastic infiltration. There was no pelvic lymphadenopathy.      7/2022 Imaging Significant Findings    Pet scan at the same time showed hypermetabolic retroperitoneal lymphadenopathy. There was marked activity felt to involve the bladder wall suspicious for tumor.     7/2022 Biopsy    Biopsy of the bladder neck showed recurrent high-grade urothelial carcinoma.     9/28/2022 - 5/16/2023 Chemotherapy    Mr. Rodríguez was started on chemotherapy with gemcitabine and carboplatin in September 2022.      1/25/2023 Imaging Significant Findings    Pet scan on 25 January showed changes related to prior cystoprostatectomy with neobladder creation and minimal fullness to the right renal collecting system. There was no evidence of a discrete hypermetabolic mass or lymphadenopathy. There was diffusely increased activity throughout the osseous structures, suggestive of chemotherapy with reactive marrow.     4/11/2023 Imaging Significant Findings    CT a/p  1. Pathologic fracture of the left parasymphysis pubis secondary to lytic process. Given location adjacent to neobladder, osteomyelitis is a possibility. Metastatic disease not excluded.   2. Prior cystoprostatectomy with chronic right ureteral obstruction and hydroureteronephrosis, minimally changed from the prior. All etiologies considered including malignant obstruction. Urology consultation recommended.   3. Incidental findings including cholelithiasis, bilateral multifocal renal cortical scarring, and small hiatal hernia.       5/24/2023 -  Chemotherapy    Enfortumab  vedotin + Pembrolizumab     8/10/2023 Imaging Significant Findings    PET CT  Interval development of hypermetabolic pulmonary nodules within the lingula and right lower lobe likely reflecting pulmonary metastases. Some additional tiny pulmonary nodularity is new or more conspicuous from prior but too small to accurately characterize by PET/CT. Attention on follow-up recommended.     Worsening obstructive uropathy which is relatively moderate to severe the right kidney.     Similar appearance of the urinary neobladder. There is somewhat diminished due to physiologic activity to evaluate for local neoplasm but the appearance overall appears similar to prior. Assessment of local disease could be followed with CT abdomen and pelvis with contrast.     Interval fracture of the left superior and inferior pubic rami appearing subacute in nature. Please correlate for trauma and tenderness. There is no significant FDG activity within the area to favor a pathologic fracture.     Previously seen diffuse marrow activity may have been due to previous marrow rebound or drug effect.       8/25/2023 Imaging Significant Findings    MR Pelvis  History of cystoprostatectomy and neobladder creation.     Interval increase in multilobular mass in the pelvis infiltrating the rectum, possibly due to distal sigmoid colon, anterior left pelvic bones as well as a inferior aspect of the neobladder suspicious for progression of neoplastic process as discussed above.      9/28/2023 Cancer Staged    Staging form: Urinary Bladder, AJCC 8th Edition  - Clinical: Stage IVB (cT4b, cNX, pM1b)     10/16/2023 Imaging Significant Findings    CT chest   Impression:  - Multiple solid bilateral pulmonary nodules up to 9mm showing interval increase in size concerning for metastatic disease in this patient with history of prior bladder malignancy.  - Multiple hepatic hypodensities, which may represent cystic lesions however some which are too small to  characterize.  - Partially imaged right kidney showing parenchymal atrophy and suspected hydronephrosis..    MR Pelvis   Impression:     Interval increased size of multilobular mass in the cystoprostatectomy surgical bed that invades the neobladder, rectum, sigmoid colon, and left pelvis.  Multiple pelvic lymph nodes are more conspicuous from prior exam and concerning for additional metastatic disease.     10/24/2023 - 10/24/2023 Chemotherapy    Treatment Summary   Plan Name: OP SACITUZUMAB GOVITECAN-HZIY Q3W  Treatment Goal: Palliative  Status: Inactive  Start Date:   End Date:   Provider: Bridger Abad MD  Chemotherapy: sacituzumab govitecan-hziy (TRODELVY) 543 mg in sodium chloride 0.9% 500 mL chemo infusion, 7.5 mg/kg = 543 mg (original dose ), Intravenous, Clinic/HOD 1 time, 0 of 17 cycles  Dose modification: 7.5 mg/kg (Cycle 1, Reason: MD Discretion, Comment: UGT1A1 PM )     11/2/2023 - 11/20/2023 Chemotherapy    Treatment Summary   Plan Name: [facility] NTX-1088-01 Dose Escalation INV-NTX + INV pembrolizumab  Treatment Goal: Control  Status: Inactive  Start Date: 11/2/2023  End Date: 11/20/2023  Provider: Chan Leos MD  Chemotherapy: INV [id number] (pembrolizumab) 200 mg in INV sodium chloride 0.9 % 100 mL chemo infusion, 200 mg, Intravenous, Clinic/HOD 1 time, 1 of 35 cycles  Administration: 200 mg (11/2/2023)     11/29/2023 Imaging Significant Findings    Impression:     Postoperative change including cysto proctectomy with creation of neobladder.  Persistent multilobular soft tissue mass within the surgical bed involving the neobladder and abutting the rectum and sigmoid colon, noting limited evaluation on this noncontrast examination.  Within these confines, appearance is similar from comparison CT 10/30/2023, noting increased distension of the neobladder from comparison imaging.     Persistent right-sided hydronephrosis and hydroureter with soft tissue attenuation at the mid to distal right ureter.   Prominent lymph nodes surrounding the right ureter, similar from comparison imaging.     Few stable mildly prominent pelvic and retroperitoneal lymph nodes.     Slight interval increase in left upper lobe nodule measuring approximately 1.2 x 1.0 cm, previously measuring 0.9 x 0.9 cm.  Additional previously described nodules are similar in size and appearance.     Remote fracture of the left pubic symphysis with associated lytic lesion.     12/9/2023 Imaging Significant Findings    CT 12/9/23    Impression:     1. Right nephrostomy in appropriate position.  2. Postoperative change of cystoproctectomy with ileal neobladder.  Similar appearance of large pelvic mass surrounding the neobladder.  3. Ill-defined hypoattenuating hepatic lesions, concerning for metastases in light of history.  4. Increased retroperitoneal and pelvic adenopathy.  5. Stable pulmonary nodules.  6. Additional findings as above.     12/28/2023 -  Chemotherapy    Treatment Summary   Plan Name: OP SACITUZUMAB GOVITECAN-HZIY Q3W  Treatment Goal: Palliative  Status: Active  Start Date: 12/28/2023  End Date: 12/6/2024 (Planned)  Provider: Bridger Abad MD  Chemotherapy: sacituzumab govitecan-hziy (TRODELVY) 540 mg in sodium chloride 0.9% 500 mL chemo infusion, 555 mg (100 % of original dose 7.5 mg/kg), Intravenous, Clinic/\[facility] [place] [facility]\"" 1 time, 6 of 17 cycles  Dose modification: 7.5 mg/kg (original dose 7.5 mg/kg, Cycle 1, Reason: MD Discretion), 10 mg/kg (original dose 7.5 mg/kg, Cycle 4, Reason: MD Discretion)  Administration: 540 mg (12/28/2023), 540 mg (1/4/2024), 540 mg (1/19/2024), 540 mg (1/26/2024), 540 mg (2/8/2024), 540 mg (2/16/2024), 720 mg (3/1/2024), 720 mg (3/8/2024), 720 mg (3/22/2024), 720 mg (4/12/2024), 720 mg (4/19/2024)     2/2024 Imaging Significant Findings    2/23/24 CT Chest  Impression:     In this patient with metastatic bladder cancer there is interval increased size of bilateral solid pulmonary nodules and new lytic lesions in  the bilateral ribs and vertebra concerning for worsening metastatic disease.     New consolidation with air bronchograms in the left lingula concerning for lobar pneumonia.  New interlobular scattered septal thickening which may be related to lingular pneumonia; however, lymphangitis carcinomatosis not excluded in this patient with history of malignancy.     Stable hypodensities in the partially visualized liver that likely represent additional metastatic lesions.  Partially visualized right hydronephrosis, similar to prior exam given limited evaluation.    2/27/24  PET CT  Impression:     Widespread hypermetabolic metastatic disease throughout the chest, abdomen, and pelvis and osseous structures.     Extensive hypermetabolic soft tissue thickening/mass lesion surrounding the neobladder and involving the left pelvic sidewall.     Severe right hydronephrosis and ureteral dilatation.  Hypermetabolic mass/lymph node conglomerate at the distal aspect of the right ureter likely causing ureteral obstruction.     Near complete hypermetabolic consolidation of the lingula with air bronchograms. Findings could represent infectious lobar pneumonia versus consolidative mass/neoplasm. Recommend clinical correlation.        Melanoma        Past Medical History:   Diagnosis Date    Bladder cancer     CAD (coronary artery disease) 9/12/2023    Prior CABG. Not on antiplatelets. Home medicatiosn include atorvastatin    Carcinoma     forehead    Encounter for blood transfusion     Hypertension     Hypothyroidism 9/12/2023    Home medications include levothyroxine    Malignant melanoma of skin, unspecified     right arm    Malignant neoplasm of urinary bladder 9/12/2023    Melanoma 9/12/2023    History of T1a melanoma; 0.5mm depth without ulceartion. SP wide local excision 02/2022          Past Surgical History:   Procedure Laterality Date    CORONARY ARTERY BYPASS GRAFT  10/2015    CYSTECTOMY, ROBOT-ASSISTED, LAPAROSCOPIC, USING DA  MARY MOHR, WITH ILEAL CONDUIT CREATION  12/2017    CYSTOGRAM N/A 12/5/2023    Procedure: CYSTOGRAM;  Surgeon: Eder Oconnor MD;  [facility];  Service: Urology;  Laterality: N/A;    CYSTOSCOPY N/A 12/5/2023    Procedure: CYSTOSCOPY;  Surgeon: Eder Oconnor MD;  [facility];  Service: Urology;  Laterality: N/A;    DILATION OF BLADDER      dialation of bladder neck- due to claudia bladder- multiple procedures    DILATION OF URETHRA N/A 12/5/2023    Procedure: DILATION, URETHRA;  Surgeon: Eder Oconnor MD;  Location: [facility] OR [facility];  Service: Urology;  Laterality: N/A;    LYMPHADENECTOMY      PERCUTANEOUS NEPHROSTOMY Right 12/8/2023    Procedure: Creation, Nephrostomy, Percutaneous;  Surgeon: Jens Nunez MD;  Location: [facility] CATH LAB;  Service: Radiology;  Laterality: Right;    PLACEMENT N/A 12/5/2023    Procedure: PLACEMENT;  Surgeon: Eder Oconnor MD;  [facility];  Service: Urology;  Laterality: N/A;  placement of valiente over a wire by MD MOHR ROBOTIC PROSTECTOMY, SUPRAPUBIC  12/2017        Family History   Problem Relation Name Age of Onset    Heart disease Father      Heart attack Paternal Uncle      Breast cancer Maternal Cousin      Colon cancer Neg Hx      Bladder Cancer Neg Hx          Social History     Tobacco Use    Smoking status: Former     Types: Cigarettes     Passive exposure: Never    Smokeless tobacco: Not on file   Substance Use Topics    Alcohol use: Not Currently        I have reviewed and updated the patient's past medical, surgical, family and social histories.    Review of patient's allergies indicates:  No Known Allergies     Current Outpatient Medications   Medication Sig Dispense Refill    apixaban (ELIQUIS) 5 mg Tab Take 1 tablet (5 mg total) by mouth 2 (two) times daily. 60 tablet 5    atorvastatin (LIPITOR) 20 MG tablet Take 20 mg by mouth every evening.      benzonatate (TESSALON PERLES) 100 MG capsule Take 1 capsule (100 mg total) by mouth every  6 (six) hours as needed for Cough. 30 capsule 1    cetirizine HCl (ZYRTEC ORAL) Take by mouth as needed.      dexAMETHasone (DECADRON) 4 MG Tab Take 2 tablets (8 mg total) by mouth once daily. Take 2 tablets (8 mg total) by mouth once daily. Take a directed on days 2, 3 and 4 of your chemotherapy cycle. 24 tablet 3    docusate sodium (COLACE) 100 MG capsule Take 220 capsules by mouth every evening.      filgrastim (NEUPOGEN) 300 mcg/0.5 mL injection Inject 300mcg subcutaneously for 3 days following each chemotherapy 3 mL 5    fluticasone propionate (FLONASE) 50 mcg/actuation nasal spray 1 spray (50 mcg total) by Each Nostril route once daily. (Patient taking differently: 1 spray by Each Nostril route as needed.) 9.9 mL 0    Lactobacillus rhamnosus GG (CULTURELLE) 10 billion cell capsule Take 1 capsule by mouth once daily.      levothyroxine (SYNTHROID) 50 MCG tablet Take 50 mcg by mouth before breakfast.      LINZESS 72 mcg Cap capsule Take 72 mcg by mouth every morning.      loperamide (IMODIUM) 2 mg capsule Take 1 capsule (2 mg total) by mouth 4 (four) times daily as needed for Diarrhea. 60 capsule 0    loratadine (CLARITIN ORAL) Take by mouth as needed.      multivitamin (THERAGRAN) per tablet Take 1 tablet by mouth every morning.      OLANZapine (ZYPREXA) 5 MG tablet TAKE 1 TABLET BY MOUTH EVERY EVENING ON DAYS 1 THROUGH 4 OF EACH CHEMOTHERAPY CYCLE 30 tablet 0    ondansetron (ZOFRAN-ODT) 8 MG TbDL Take 1 tablet (8 mg total) by mouth every 8 (eight) hours as needed (nausea/vomiting). 60 tablet 5    oxyCODONE (ROXICODONE) 5 MG immediate release tablet Take 1 tablet (5 mg total) by mouth every 4 (four) hours as needed for Pain. 42 tablet 0    TURMERIC ORAL Take by mouth every morning.      vitamin D (VITAMIN D3) 1000 units Tab Take 1 tablet (1,000 Units total) by mouth once daily. 30 tablet 5    VTAMA 1 % Crea APPLY 1 APPLICATION ON THE SKIN DAILY       No current facility-administered medications for this visit.  "       Physical Exam:   /73 (BP Location: Left arm, Patient Position: Sitting, BP Method: Large (Automatic))   Pulse 101   Temp 98.1 °F (36.7 °C) (Oral)   Resp 18   Ht 5' 10" (1.778 m)   Wt 75.8 kg (167 lb)   SpO2 98%   BMI 23.96 kg/m²      ECOG Performance status: 1            Physical Exam  Constitutional:       General: He is not in acute distress.     Appearance: Normal appearance.   HENT:      Head: Normocephalic.   Eyes:      General: No scleral icterus.     Extraocular Movements: Extraocular movements intact.      Conjunctiva/sclera: Conjunctivae normal.   Cardiovascular:      Rate and Rhythm: Normal rate and regular rhythm.      Heart sounds: No murmur heard.     No friction rub. No gallop.   Pulmonary:      Effort: Pulmonary effort is normal. No respiratory distress.      Breath sounds: Normal breath sounds. No stridor. No wheezing, rhonchi or rales.   Abdominal:      General: There is no distension.   Skin:     General: Skin is warm and dry.   Neurological:      Mental Status: He is alert and oriented to person, place, and time.      Motor: No weakness.   Psychiatric:         Mood and Affect: Mood normal.         Behavior: Behavior normal.         Thought Content: Thought content normal.           Labs:                 Lab Results   Component Value Date     (L) 05/02/2024    K 5.0 05/02/2024    CREATININE 5.0 (H) 05/02/2024    WBC 4.64 05/02/2024    HGB 7.4 (L) 05/02/2024     05/02/2024    AST 21 05/02/2024    ALT 61 (H) 05/02/2024    ALKPHOS 94 05/02/2024    BILITOT 0.4 05/02/2024          Imaging:       X-Ray Chest PA And Lateral  Narrative: EXAMINATION:  XR CHEST PA AND LATERAL    CLINICAL HISTORY:  Syncope and collapse    TECHNIQUE:  PA and lateral views of the chest were performed.    COMPARISON:  02/23/2024, PET-CT 02/27/2024    FINDINGS:  Right chest wall port catheter tip projects over the distal SVC.  The cardiomediastinal silhouette is not enlarged noting surgical " change..  There is no pleural effusion.  The trachea is midline.  The lungs are symmetrically expanded bilaterally with coarse interstitial attenuation bilaterally.  There is a masslike focus within the lingula, correlating with focus seen on PET-CT 02/27/2024..  There is no pneumothorax.  The osseous structures are remarkable for degenerative change..  There is remote right rib injury.  Impression: 1. Patient has known masslike focus within the lingula, similar to the previous CT allowing for differences in modality.  Surrounding interstitial attenuation may reflect edema or other pneumonitis.  Correlation is advised.  Please see PET-CT 02/27/2024.    Electronically signed by: Kwaku Hussein MD  Date:    04/23/2024  Time:    15:50      I have personally reviewed the above imaging including recent MRI and PET CT scan    Path:   Reviewed pathology as documented in oncology history      Assessment and Plan:        1. Malignant neoplasm of urinary bladder, unspecified site  Overview:  Metastatic bladder cancer previously treated with neoadjuvant ddMVAC, radical cystectomy, and then carboplatin/gemcitabine and EV+ Pembro following local and metastatic recurrence. Subsequetnly received a single dose of pembrolizumab + NTX-1088 11/2/23 as part of a phase I trial through the [facility], but was taken off trial for elevated creatinine despite PCN placement. Starting sacituzumab govitecan 12/28/23 with empiric 25% DR for UGT1A1 deficiency. Progressive disease noted on PET CT 02/28/2024 and increased SG to 10mg/kg.    Assessment & Plan:  Generally poor tolerance of last cycle SG. Seen in ED with presyncope and now with [facility]. Holdign SG tomorrow. Will have patient get a blood transfusion and obtain renal US. Asked him to place valiente catheter until he sees Dr. Oconnor on Monday. Will also recheck labs on Monday. I will see him next Thursday to re-evaluate potential systemic therapy, although  I have warned him that risks of ongoing  treatment are increasingly high with likely limited benefit.  - Hold C7 SG tomorrow  - Repeat CT chest non-con and MR abdomen/pelvis  - Blood transfusion tomorrow  - Schedule renal US  - Valiente catheter until seen by Dr. Oconnor  - Repeat labs on Monday  - FU with me next Thursday; consider if any systemic therapy remains viable    Orders:  -     MRI Abdomen Pelvis Without Contrast (XPD); Future; Expected date: 05/02/2024  -     CT Chest Without Contrast; Future; Expected date: 05/02/2024    2. Acute renal failure, unspecified acute renal failure type  Assessment & Plan:  Likely obstructive in setting of limited catheterization and ongoing overflow incontinence. Pt to place valiente until follow up with Dr. Oconnor. Also obtain renal US. Repeat labs next Monday      3. Renal failure, unspecified chronicity  -     Cancel: US Abdomen Complete; Future; Expected date: 05/02/2024  -     Type & Screen; Future; Expected date: 05/02/2024  -     US Retroperitoneal Complete; Future; Expected date: 05/02/2024    4. Anemia requiring transfusions  Assessment & Plan:  - Plan for pRBC trnasfusion tomorrow  - Likely exacerbated by ongoing hematuria; worsened after starting apixaban      5. Anemia due to chronic blood loss  -     Type & Screen; Future; Expected date: 05/02/2024  -     Verify informed consent for blood administration; Standing  -     Vital signs Document Pre-transfusion, 15 minutes after transfusion begins and immediately Post-transfusion for each unit transfused; Standing  -     Discharge instructions; Standing  -     Hold Transfusion and Notify Physician if:; Standing  -     If transfusion reaction confirmed by physician/mid-level:; Standing  -     0.9%  NaCl infusion (for blood administration)  -     Prepare RBC 1 Unit; Future  -     Transfuse RBC 1 Unit; Standing    6. Acute deep vein thrombosis (DVT) of left femoral vein  Assessment & Plan:  - Continues on apixaban despite anemia and hematuria      7. Cancer associated  pain  Assessment & Plan:  - Con't oxycodone prn                      Route Chart for Scheduling    Med Onc Chart Routing      Follow up with physician 1 week. See Jenniffer next Thursday   Follow up with LYNDSEY    Infusion scheduling note    Injection scheduling note    Labs CBC and CMP   Scheduling:  Preferred lab:  Lab interval:  Labs on Monday and Thursday next week   Imaging CT chest abdomen pelvis, MRI and other   CT chest and MR abdomen ASAP   Pharmacy appointment    Other referrals                  Treatment Plan Information   OP SACITUZUMAB GOVITECAN-HZIY Q3W   Bridger Abad MD   Upcoming Treatment Dates - OP SACITUZUMAB GOVITECAN-HZIY Q3W    5/3/2024       Pre-Medications       acetaminophen tablet 650 mg       diphenhydrAMINE (BENADRYL) 25 mg in sodium chloride 0.9% 50 mL IVPB       famotidine (PF) injection 20 mg       Chemotherapy       sacituzumab govitecan-hziy (TRODELVY) 740 mg in sodium chloride 0.9% 500 mL chemo infusion       Supportive Care       atropine injection 0.4 mg       prochlorperazine injection Soln 10 mg       Antiemetics       aprepitant (CINVANTI) injection 130 mg       palonosetron (ALOXI) 0.25 mg with Dexamethasone (DECADRON) 12 mg in NS 50 mL IVPB  5/10/2024       Pre-Medications       acetaminophen tablet 650 mg       diphenhydrAMINE (BENADRYL) 25 mg in sodium chloride 0.9% 50 mL IVPB       famotidine (PF) injection 20 mg       Chemotherapy       sacituzumab govitecan-hziy (TRODELVY) 740 mg in sodium chloride 0.9% 500 mL chemo infusion       Supportive Care       atropine injection 0.4 mg       prochlorperazine injection Soln 10 mg       Antiemetics       aprepitant (CINVANTI) injection 130 mg       palonosetron (ALOXI) 0.25 mg with Dexamethasone (DECADRON) 12 mg in NS 50 mL IVPB  5/24/2024       Pre-Medications       acetaminophen tablet 650 mg       diphenhydrAMINE (BENADRYL) 25 mg in sodium chloride 0.9% 50 mL IVPB       famotidine (PF) injection 20 mg       Chemotherapy        sacituzumab govitecan-hziy (TRODELVY) 740 mg in sodium chloride 0.9% 500 mL chemo infusion       Supportive Care       atropine injection 0.4 mg       prochlorperazine injection Soln 10 mg       Antiemetics       aprepitant (CINVANTI) injection 130 mg       palonosetron (ALOXI) 0.25 mg with Dexamethasone (DECADRON) 12 mg in NS 50 mL IVPB  5/31/2024       Pre-Medications       acetaminophen tablet 650 mg       diphenhydrAMINE (BENADRYL) 25 mg in sodium chloride 0.9% 50 mL IVPB       famotidine (PF) injection 20 mg       Chemotherapy       sacituzumab govitecan-hziy (TRODELVY) 740 mg in sodium chloride 0.9% 500 mL chemo infusion       Supportive Care       atropine injection 0.4 mg       prochlorperazine injection Soln 10 mg       Antiemetics       aprepitant (CINVANTI) injection 130 mg       palonosetron (ALOXI) 0.25 mg with Dexamethasone (DECADRON) 12 mg in NS 50 mL IVPB    Therapy Plan Information  Medications  denosumab (XGEVA) solution 120 mg  120 mg, Subcutaneous, Every 4 weeks          Bridger Abad

## 2024-05-03 NOTE — TELEPHONE ENCOUNTER
Attempted to call number left, message saying wait time longer than normal, on hold for 5 minutes, will try next week.    No

## 2024-05-03 NOTE — TELEPHONE ENCOUNTER
"----- Message from Porter Rodrigezon sent at 5/3/2024  3:15 PM CDT -----  Consult/Advisory      Name Of Caller:  [facility]     Contact Preference?:  [phone]    Fax [phone]    Provider Name: Jenniffer    Does patient feel the need to be seen today? No    What is the nature of the call?: Calling to verify pt's medical ID for filgrastim (NEUPOGEN) 300 mcg/0.5 mL injection refill      Additional Notes:  "Thank you for all that you do for our patients"  "

## 2024-05-06 PROBLEM — E87.5 HYPERKALEMIA: Status: ACTIVE | Noted: 2024-01-01

## 2024-05-06 PROBLEM — E87.20 METABOLIC ACIDOSIS: Status: ACTIVE | Noted: 2024-01-01

## 2024-05-06 PROBLEM — C79.9 METASTATIC DISEASE: Status: ACTIVE | Noted: 2024-01-01

## 2024-05-06 NOTE — PROGRESS NOTES
[facility]  Urologic Oncology Clinic Note        Date of Service: 5/6/2024        Chief Complaint/Reason for Consultation: Metastatic Bladder Cancer, Right Hydronephrosis, Urethral stricture, Acute Renal Failure    Requesting Provider:   No referring provider defined for this encounter.      History of Present Illness:   Patient 58 y.o. 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 He underwent radical cystectomy w/ ileal neobladder in 2017. He then subsequently developed local and systemic recurrence. He is being followed by Dr Leos and part of the Phase 1 program. He did also receive palliative radiation to the pelvis after pathologic pelvic fracture.     He currently catheterizes his pouch as needed, but reports he has had multiple dilations in the past and continues to struggle to catheterize his bladder. Last dilation 12/5/2023 and since then able to catheterize successfully.     Had right nephrostomy tube placed in the past to try to improve overall GFR, but this was unsuccessful. Nephrostomy tube was removed on 12/21/2023. Since then GFR has been stable if not improved.     Following with Dr. Abad for chemo (sacituzumab) -- reports doing well on chemotherapy.    Blood thinners:  None    No Hx of TIA, MI, and CVA   Abdominal surgeries:  Radical cystectomy with creation of ileal neobladder        Seen by Dr. Abad last week on 5/2/24. At that time he was having difficulty with CIC, was in overflow incontinence, and Cr was 5.0. An indwelling Smith was left in place. Today he presents with a BMP that resulted during the clinic visit - Cr 8.2, K 6.4. He has an indwelling Smith catheter in place that has been putting out about 1L of urine per day.       Urological History:    12/5/2023 -- Cystoscopy, urethral dilation of neobladder  12/8/2023 -- IR right nephrostomy tube placement  12/9/2023 -- CT AP w/o contrast - stable lung disease, progressive RP LND, possible  metastatic diease to liver   12/21/2023 -- Right nephrostomy tube removal in clinic  2/27/2024 -- NM PET -- widely metastatic urothelial carcinoma  5/2/2024 -- CT Chest -- widely metastatic disease, progression from previous scan despite systemic therapy    Patient Active Problem List    Diagnosis Date Noted    Acute renal failure 05/02/2024    Anemia requiring transfusions 04/23/2024    Cancer associated pain 04/11/2024    Chemotherapy-induced neutropenia 04/11/2024    Acute deep vein thrombosis (DVT) of left femoral vein 04/09/2024    Secondary malignant neoplasm of bone and bone marrow 02/29/2024    Recurrent UTI 02/07/2024    CKD (chronic kidney disease) 10/23/2023    Malignant neoplasm of urinary bladder 09/12/2023    Melanoma 09/12/2023    CAD (coronary artery disease) 09/12/2023    Hypothyroidism 09/12/2023    Psoriasis 09/12/2023          Review of patient's allergies indicates:  No Known Allergies     Past Medical History:   Diagnosis Date    Bladder cancer     CAD (coronary artery disease) 9/12/2023    Prior CABG. Not on antiplatelets. Home medicatiosn include atorvastatin    Carcinoma     forehead    Encounter for blood transfusion     Hypertension     Hypothyroidism 9/12/2023    Home medications include levothyroxine    Malignant melanoma of skin, unspecified     right arm    Malignant neoplasm of urinary bladder 9/12/2023    Melanoma 9/12/2023    History of T1a melanoma; 0.5mm depth without ulceartion. SP wide local excision 02/2022      Past Surgical History:   Procedure Laterality Date    CORONARY ARTERY BYPASS GRAFT  10/2015    CYSTECTOMY, ROBOT-ASSISTED, LAPAROSCOPIC, USING DA MARY XI, WITH ILEAL CONDUIT CREATION  12/2017    CYSTOGRAM N/A 12/5/2023    Procedure: CYSTOGRAM;  Surgeon: Eder Oconnor MD;  Location: [facility] OR [address] [place] [address];  Service: Urology;  Laterality: N/A;    CYSTOSCOPY N/A 12/5/2023    Procedure: CYSTOSCOPY;  Surgeon: Eder Oconnor MD;  Location: [facility] OR [address] [place] [address];  Service: Urology;   "Laterality: N/A;    DILATION OF BLADDER      dialation of bladder neck- due to claudia bladder- multiple procedures    DILATION OF URETHRA N/A 12/5/2023    Procedure: DILATION, URETHRA;  Surgeon: Eder Oconnor MD;  [facility];  Service: Urology;  Laterality: N/A;    LYMPHADENECTOMY      PERCUTANEOUS NEPHROSTOMY Right 12/8/2023    Procedure: Creation, Nephrostomy, Percutaneous;  Surgeon: Jens Nunez MD;  Location: [facility] CATH LAB;  Service: Radiology;  Laterality: Right;    PLACEMENT N/A 12/5/2023    Procedure: PLACEMENT;  Surgeon: Eder Oconnor MD;  [facility];  Service: Urology;  Laterality: N/A;  placement of valiente over a wire by MD MOHR ROBOTIC PROSTECTOMY, SUPRAPUBIC  12/2017      Family History   Problem Relation Name Age of Onset    Heart disease Father      Heart attack Paternal Uncle      Breast cancer Maternal Cousin      Colon cancer Neg Hx      Bladder Cancer Neg Hx        Social History     Tobacco Use    Smoking status: Former     Types: Cigarettes     Passive exposure: Never    Smokeless tobacco: Not on file   Substance Use Topics    Alcohol use: Not Currently        Review of Systems   Genitourinary:  Positive for dysuria and frequency. Negative for difficulty urinating, flank pain and hematuria.             OBJECTIVE:     Vitals:    05/06/24 1543   BP: (!) 149/93   Pulse: 98   Weight: 77.2 kg (170 lb 3.1 oz)   Height: 5' 10" (1.778 m)              General Appearance: Alert, cooperative, no distress  Head: Normocephalic  Eyes: Clear conjunctiva  Neck: No obvious LND or JVD  Lungs: Normal chest excursion, no accessory muscle use  Chest: Regular rate rhythm by palpation, no pedal edema  Abdomen: Soft, non-tender, non-distended, surgical scars lower midline well-healed  : 16Fr Valiente catheter in place draining concentrated urine.   Extremities: Atraumatic   Lymph Nodes: No appreciable lymph adenopathy  Neurologic: No gross gait, motor or sensory deficits      LAB:  "       CMP  Sodium   Date Value Ref Range Status   05/06/2024 127 (L) 136 - 145 mmol/L Final     Potassium   Date Value Ref Range Status   05/06/2024 6.4 (HH) 3.5 - 5.1 mmol/L Final     Comment:     *Critical value notification by EIC with confirmation of receipt to   RONI TRUONG RN at Date 5/6/24 Time 15:43        Chloride   Date Value Ref Range Status   05/06/2024 100 95 - 110 mmol/L Final     CO2   Date Value Ref Range Status   05/06/2024 18 (L) 23 - 29 mmol/L Final     Glucose   Date Value Ref Range Status   05/06/2024 124 (H) 70 - 110 mg/dL Final     BUN   Date Value Ref Range Status   05/06/2024 74 (H) 6 - 20 mg/dL Final     Creatinine   Date Value Ref Range Status   05/06/2024 8.2 (H) 0.5 - 1.4 mg/dL Final     Calcium   Date Value Ref Range Status   05/06/2024 9.2 8.7 - 10.5 mg/dL Final     Total Protein   Date Value Ref Range Status   05/06/2024 6.5 6.0 - 8.4 g/dL Final     Albumin   Date Value Ref Range Status   05/06/2024 1.9 (L) 3.5 - 5.2 g/dL Final     Total Bilirubin   Date Value Ref Range Status   05/06/2024 0.4 0.1 - 1.0 mg/dL Final     Comment:     For infants and newborns, interpretation of results should be based  on gestational age, weight and in agreement with clinical  observations.    Premature Infant recommended reference ranges:  Up to 24 hours.............<8.0 mg/dL  Up to 48 hours............<12.0 mg/dL  3-5 days..................<15.0 mg/dL  6-29 days.................<15.0 mg/dL       Alkaline Phosphatase   Date Value Ref Range Status   05/06/2024 88 55 - 135 U/L Final     AST   Date Value Ref Range Status   05/06/2024 20 10 - 40 U/L Final     ALT   Date Value Ref Range Status   05/06/2024 38 10 - 44 U/L Final     Anion Gap   Date Value Ref Range Status   05/06/2024 9 8 - 16 mmol/L Final     eGFR   Date Value Ref Range Status   05/06/2024 7.0 (A) >60 mL/min/1.73 m^2 Final     Lab Results   Component Value Date    WBC 8.85 05/06/2024    HGB 7.8 (L) 05/06/2024    HCT 24.6 (L) 05/06/2024     MCV 91 2024     2024             IMAGIN/27/2024  NM PET CT FDG SKULL BASE TO MID THIGH     CLINICAL HISTORY:  Metastatic disease evaluation; Malignant neoplasm of bladder, unspecified     TECHNIQUE:  12.48 mCi of F18-FDG was administered intravenously in the right antecubital fossa.  After an approximately 60 min distribution time, PET/CT images were acquired from the skull base to midthigh transmission images were acquired to correct for attenuation using a whole body low-dose CT scan without oral or IV contrast with the arms positioned above the head. Glycemia at the time of injection was 94 mg/dL.     COMPARISON:  PET-CT 08/10/2023     FINDINGS:  Quality of the study: Adequate.     In the head and neck, there are no hypermetabolic lesions worrisome for malignancy. There are no hypermetabolic mucosal lesions, and there are no pathologically enlarged or hypermetabolic lymph nodes.     In the chest, there are hypermetabolic mediastinal and hilar lymph nodes.  Subaortic lymph nodes with maximum SUV of 9.7 (image 83).  Left hilar maximum SUV of 8 (image 88).     There are multiple bilateral pulmonary nodules, many of which are hypermetabolic.  Largest nodule on the left measures 1.1 cm within the upper lobe with maximum SUV of 10 (image 78).  Largest nodule on the right measures 1.4 cm with maximum SUV of 5.4 (image 105).     Near complete hypermetabolic consolidation of the lingula with air bronchograms.  Findings     In the abdomen and pelvis, there are multiple hypermetabolic hepatic lesions.  Inferior right hepatic lobe lesion measuring 1.5 cm with maximum SUV of 14 (image 145).     Multiple pathologically enlarged hypermetabolic lymph nodes within the right hemiabdomen.  1.6 cm pericaval lymph node with maximum SUV of 13 (image 180).  Hypermetabolic mass/lymph node cluster within the right lower quadrant at the distal aspect of the right ureter measuring 1.9 cm with maximum SUV of 21  (image 196).     Patient is status post cystoproctectomy with neobladder creation.  Extensive hypermetabolic soft tissue thickening/mass lesion surrounding the neobladder and involving the left pelvic sidewall.  Lesion is difficult to measure.  There is likely involvement of adjacent bowel however difficult to evaluate on current exam.     In the bones, there are numerous scattered hypermetabolic lytic lesions throughout the axial and appendicular skeleton.  Left 2nd rib lesion with maximum SUV of 14.  Left posterior ilium lesion with maximum SUV of 13.  L5 left transverse process lesion with maximum SUV of 22.     Additional CT findings: Severe right hydronephrosis and ureteral dilatation.  Severe right renal cortical thinning.     Impression:     Widespread hypermetabolic metastatic disease throughout the chest, abdomen, and pelvis and osseous structures.     Extensive hypermetabolic soft tissue thickening/mass lesion surrounding the neobladder and involving the left pelvic sidewall.     Severe right hydronephrosis and ureteral dilatation.  Hypermetabolic mass/lymph node conglomerate at the distal aspect of the right ureter likely causing ureteral obstruction.     Near complete hypermetabolic consolidation of the lingula with air bronchograms. Findings could represent infectious lobar pneumonia versus consolidative mass/neoplasm. Recommend clinical correlation.     Please see above for additional details.     This report was flagged in Epic as abnormal.     Electronically signed by resident: Nataly Mattson  Date:                                            02/27/2024  Time:                                           13:56     Electronically signed by:Marcelino Miguel  Date:                                            02/27/2024  Time:                                           16:00    5/6/2024  CT CHEST WITHOUT CONTRAST     CLINICAL HISTORY:  Bladder cancer, invasive, monitor; Malignant neoplasm of bladder,  unspecified     TECHNIQUE:  Low dose axial images, sagittal and coronal reformations were obtained from the thoracic inlet to the lung bases. Contrast was not administered.     COMPARISON:  CT chest 02/23/2024, PET CT 02/27/2024     FINDINGS:  Chest wall and lower neck: Right chest port with catheter tip terminating in the SVC.  No axillary or supraclavicular lymphadenopathy.Visualized thyroid gland is unremarkable.     Heart and mediastinum: No mediastinal or hilar adenopathy.  Surgical clips in the anterior mediastinum.  Heart is normal size.No pericardial effusion. Mild coronary artery calcific atherosclerosis. Main pulmonary artery is enlarged measuring 3.5 cm.     Aorta: Left-sided aortic arch.Non-aneurysmal. Mildatheromatous disease in the aorta.     Lungs and pleura: Airways are patent. Biapical scarring similar to prior.  Interval increase in size of 7.4 x 5.9 cm masslike soft tissue density in the lingula (series 4, image 274), measuring approximately 6.7 x 5.0 cm on PET-CT dated 02/27/2024.  Interval increase in size and development of several other pulmonary nodules.  For example, 0.5 cm nodule in the right upper lobe (series 4, image 173), previously 0.2 cm on CT chest dated 02/23/2024.  New 3 mm nodule right upper lobe (series 4, image 199) when compared to CT chest dated 02/23/2024.  Increase in size of left pleural effusion.  No right pleural effusion.  No pneumothorax.     Esophagus and upper abdomen: Several hepatic hypodensities, presumably cysts and unchanged.  Cholelithiasis.  No definitive evidence cholecystitis.  Severe right-sided hydronephrosis, similar to prior.  Moderate left hydronephrosis, increased compared to PET-CT dated 02/27/2024.     Bones: Several lucent lesions throughout the visualized skeleton which appear increased in size, for example a 1.0 cm lesion in the vertebral body of T4 and several lesions in the body of L1.  No acute fracture.     Impression:     1. Masslike soft  tissue density in the lingula which is mildly enlarged compared to PET-CT dated 02/27/2024 when it was hypermetabolic. Malignancy leads the differential.  2. Numerous bilateral pulmonary nodules, some which are new and some of which appear enlarged compared to prior.  Consider secondary lesions in a context of underlying bladder mass and skeletal lytic lesions.  3. Interval increase in size of left pleural effusion.  4. Numerous lucent bony lesions concerning for metastatic disease which appear increased in size and number compared to prior.  5. Additional findings as above.  This report was flagged in Epic as abnormal.     Electronically signed by resident: Abdiaziz Calix  Date:                                            05/06/2024  Time:                                           16:14     Electronically signed by:Cain Littlejohn  Date:                                            05/06/2024  Time:                                           17:00    ASSESSMENT/PLAN:     59 yo M w hx of MIBC s/p radical cystectomy w/ ileal neobladder in 2017 with urethral stricture, and right hydronephrosis likely 2/2 to persistent urothelial carcinoma, now with local and systemic recurrence with progression despite systemic therapy with Dr. Abad -- comes today in acute renal failure    Plan:    Right Hydronephrosis  Most recent imaging reviewed: he has severe right hydronephrosis with a thin parenchyma. There is hydroureter to the level of a soft tissue recurrence. We discussed management options for him in order to maximize his renal function for the phase 1 program. Had IR nephrostomy tube placed 12/8/23, GFR did not improve with tube in place, as a result tube was removed on 12/21/2023.     Acute Renal Failure  Recommend transfer to ED  Recommend maintain valiente for now  Recommend IR consult for left nephrostomy tube placement    - Cr 8.2. K 6.4. Discussed implications of this with the patient. Patient escorted to the ED for  further workup and admission to the Hem/Onc service.     Muscle Invasive Bladder Cancer, metastatic   - Progressed through systemic therapy  - Most recent imaging reviewed  - Currently following with Dr. Abad for palliative chemotherapy    Bladder Neck Contracture  - Bladder neck dilation 12/5/2023 -- doing well since  - Currently found to be in overflow incontinence as a result has been maintaining indwelling valiente              Michelle Bradley MD  Urology, PGY4    Attending Note:  I have personally seen and examined the patient and agree with the documentation and plan above as described by the resident.          The total time for the established patient visit was at least 30 minutes in both face-to-face and non-face-to-face activities, which included chart review, interpretation of results, counseling, education, ordering meds/tests/procedures, and/or coordination of care.         Eder Oconnor MD  Urologic Oncology  P: [phone]

## 2024-05-06 NOTE — HPI
Mr. Rodríguez is a 58yoM w/hx of metastatic bladder cancer s/p radical cystectomy w/ileal neobladder (2017) c/b urethral stricture, R hydronephrosis, L common femoral and proximal femoral DVT, pathologic fracture, ESRD who was sent to the hospital from clinic for abnormal labs. He was being seen at Urology Oncology clinic today for follow-up of acute renal failure in the setting of R hydronephrosis and urethral stricture with overflow incontinence. Previously seen at Oncology clinic on 5/2 where a BMP demonstrated Cr 82, K 6.4. Patient was escorted to the ED for further workup.  Patient states that he has been dealing with worsening fatigue, nausea/vomiting, and generalized anorexia.  It has been associated with sinus congestion as well as postnasal drip resulting in recurrent cough which exacerbates his abdominal discomfort.    Diagnosed with malignant neoplasm of urinary bladder in 2016. Initially treated with BCG but with subsequent development of T2N1 disease in 2017. Treated with ddMVAC followed by radical cystectomy with creation of neobladder and lymph node dissection in December. Final stage was [id number]. In July 2022, he developed hematuria leading to secondary anemia. CT Urogram negative for recurrence. MRI of pelvis in august 2022 demonstrated abnormal signal and enhancement of the b/l pubic bones adjacent to the neobladder suspicous for neoplastic infiltration. Biopsy of the bladder neck revealed recurrent high-grade urothelial carcinoma. PET scan showed hypermetabolic retroperitoneal lymphadenopathy and marked activity involve the bladder wall suspicious for tumor. Started on Gemcitabine/carboplatin and EV+ Pembro. Received a single dose of pembrolixumab + [id number] (11/2023) as a part of a phase I through the PCTP but was taken off the trial d/t PCN placement. Has received 6 of 17 cycles of Sacituzumab Govitecan-HZIY.    In the ED, patient was tachycardic with pulses in the 120s, systolic blood  pressures as high as 177/93, afebrile, no respiratory distress or oxygen requirements.  Labs were collected and the patient during his visit with Dr. Abad, his oncologist which revealed a sodium of 126, a potassium of 6.6, a CO2 of 15, acute worsening in his BUN and creatinine to 75 and 8.0 respectively, a magnesium of 1.4.  He was shifted in the emergency room with albuterol and furosemide.  He has a chronic Smith which drained approximately 1 L of urine daily.  A CT of the chest was done which revealed a soft masslike density in the lingula which had enlarged compared to most recent PET-CT scan.  He had interval worsening of left pleural diffuse metastatic disease throughout the lung and bone.  A CT of the abdomen and pelvis revealed severe bilateral hydroureter with right parenchymal thinning as well as a left pelvic mass with new development of worsening left hydronephrosis.    Urology saw the patient in the emergency room and recommended a Nephrology consult as well as IR consultation for left nephrostomy tube placement.

## 2024-05-06 NOTE — FIRST PROVIDER EVALUATION
"Medical screening examination initiated.  I have conducted a focused provider triage encounter, findings are as follows:    Brief history of present illness:      Hx bladder CA, l;ast chemo 2 weeks ago, has neobladder and valiente  Sent in for [facility] on CKD and hyperkalemia  No dizziness and lightheadedness  No fevers  +fatigue  Labs reviewed    Vitals:    05/06/24 1651   BP: (!) 151/80   Pulse: 87   Resp: 18   Temp: 98.9 °F (37.2 °C)   TempSrc: Oral   SpO2: 98%   Weight: 76.7 kg (169 lb)   Height: 5' 10" (1.778 m)       Pertinent physical exam:  Pale, ill appearing  In wheelchair    Brief workup plan:  EKG    Preliminary workup initiated; this workup will be continued and followed by the physician or advanced practice provider that is assigned to the patient when roomed.  "

## 2024-05-07 PROBLEM — C67.9 BLADDER CARCINOMA: Status: ACTIVE | Noted: 2024-01-01

## 2024-05-07 PROBLEM — Z51.5 ENCOUNTER FOR PALLIATIVE CARE: Status: ACTIVE | Noted: 2024-01-01

## 2024-05-07 PROBLEM — Z51.5 HOSPICE CARE: Status: ACTIVE | Noted: 2024-01-01

## 2024-05-07 PROBLEM — N18.4 ACUTE RENAL FAILURE SUPERIMPOSED ON STAGE 4 CHRONIC KIDNEY DISEASE: Status: ACTIVE | Noted: 2024-01-01

## 2024-05-07 NOTE — DISCHARGE SUMMARY
[facility] - Transplant Stepdown  Hematology/Oncology  Discharge Summary      Patient Name: Julio Rodríguez  MRN: [id number]  Admission Date: 5/6/2024  Hospital Length of Stay: 1 days  Discharge Date and Time:  05/07/2024 6:08 PM  Attending Physician: Char att. providers found   Discharging Provider: Damian Dickson MD  Primary Care Provider: Char Primary Doctor    HPI: Mr. Rodríguez is a 58yoM w/hx of metastatic bladder cancer s/p radical cystectomy w/ileal neobladder (2017) c/b urethral stricture, R hydronephrosis, L common femoral and proximal femoral DVT, pathologic fracture, ESRD who was sent to the hospital from clinic for abnormal labs. He was being seen at Urology Oncology clinic today for follow-up of acute renal failure in the setting of R hydronephrosis and urethral stricture with overflow incontinence. Previously seen at Oncology clinic on 5/2 where a BMP demonstrated Cr 82, K 6.4. Patient was escorted to the ED for further workup.  Patient states that he has been dealing with worsening fatigue, nausea/vomiting, and generalized anorexia.  It has been associated with sinus congestion as well as postnasal drip resulting in recurrent cough which exacerbates his abdominal discomfort.    Diagnosed with malignant neoplasm of urinary bladder in 2016. Initially treated with BCG but with subsequent development of T2N1 disease in 2017. Treated with ddMVAC followed by radical cystectomy with creation of neobladder and lymph node dissection in December. Final stage was [id number]. In July 2022, he developed hematuria leading to secondary anemia. CT Urogram negative for recurrence. MRI of pelvis in august 2022 demonstrated abnormal signal and enhancement of the b/l pubic bones adjacent to the neobladder suspicous for neoplastic infiltration. Biopsy of the bladder neck revealed recurrent high-grade urothelial carcinoma. PET scan showed hypermetabolic retroperitoneal lymphadenopathy and marked activity involve the  bladder wall suspicious for tumor. Started on Gemcitabine/carboplatin and EV+ Pembro. Received a single dose of pembrolixumab + NTX-1088 (11/2023) as a part of a phase I through the [facility] but was taken off the trial d/t PCN placement. Has received 6 of 17 cycles of Sacituzumab Govitecan-HZIY.    In the ED, patient was tachycardic with pulses in the 120s, systolic blood pressures as high as 177/93, afebrile, no respiratory distress or oxygen requirements.  Labs were collected and the patient during his visit with Dr. Abad, his oncologist which revealed a sodium of 126, a potassium of 6.6, a CO2 of 15, acute worsening in his BUN and creatinine to 75 and 8.0 respectively, a magnesium of 1.4.  He was shifted in the emergency room with albuterol and furosemide.  He has a chronic Smith which drained approximately 1 L of urine daily.  A CT of the chest was done which revealed a soft masslike density in the lingula which had enlarged compared to most recent PET-CT scan.  He had interval worsening of left pleural diffuse metastatic disease throughout the lung and bone.  A CT of the abdomen and pelvis revealed severe bilateral hydroureter with right parenchymal thinning as well as a left pelvic mass with new development of worsening left hydronephrosis.    Urology saw the patient in the emergency room and recommended a Nephrology consult as well as IR consultation for left nephrostomy tube placement.    * No surgery found *     Hospital Course: Pt with hx of metastatic bladder cancer s/p radical cystectomy w/ileal neobladder (2017) c/b urethral stricture admitted to med/onc services for acute renal failure. Creatine trending up to 8.2 and potassium 6.4 after multiple attempts of shifting him. IR consulted for nephrostomy tube placement, however procedure was canceled due to hyperkalemia. After in depth discussion with the patient and after worsening clinical picture, the patient decided that hospice is the best route for  him. Hospice arrangement started.     Goals of Care Treatment Preferences:  Code Status: DNR          What is most important right now is to focus on remaining as independent as possible, symptom/pain control, improvement in condition but with limits to invasive therapies.  Accordingly, we have decided that the best plan to meet the patient's goals includes continuing with treatment.      Consults:   Consults (From admission, onward)          Status Ordering Provider     Inpatient consult to Palliative Care  Once        Provider:  (Not yet assigned)    Acknowledged BECKY SANTOS     Inpatient consult to Nephrology  Once        Provider:  (Not yet assigned)    Completed MADIE RAMIREZ     Inpatient consult to Urology  Once        Provider:  (Not yet assigned)    Completed KEN STAHL     Inpatient consult to Interventional Radiology  Once        Provider:  (Not yet assigned)    Completed MADIE RAMIREZ            Significant Diagnostic Studies: N/A    Pending Diagnostic Studies:       Procedure Component Value Units Date/Time    IR Nephrostomy Tube Placement [[id number]]     Order Status: Sent Lab Status: No result           Final Active Diagnoses:    Diagnosis Date Noted POA    PRINCIPAL PROBLEM:  Acute renal failure superimposed on stage 4 chronic kidney disease [N17.9, N18.4] 05/02/2024 Yes    Bladder carcinoma [C67.9] 05/07/2024 Unknown    Hospice care [Z51.5] 05/07/2024 Not Applicable    Metabolic acidosis [E87.20] 05/06/2024 Yes    Hyperkalemia [E87.5] 05/06/2024 Yes    Acute deep vein thrombosis (DVT) of left femoral vein [I82.412] 04/09/2024 Yes    Metastatic disease [C79.9] 02/29/2024 Yes    CKD (chronic kidney disease) [N18.9] 10/23/2023 Yes    Malignant neoplasm of urinary bladder [C67.9] 09/12/2023 Yes    Hypothyroidism [E03.9] 09/12/2023 Yes      Problems Resolved During this Admission:      Discharged Condition: stable    Disposition: Hospice/Home    Follow Up:    Patient Instructions:   No discharge  procedures on file.  Medications:  Reconciled Home Medications:      Medication List        CONTINUE taking these medications      benzonatate 100 MG capsule  Commonly known as: TESSALON PERLES  Take 1 capsule (100 mg total) by mouth every 6 (six) hours as needed for Cough.     CLARITIN ORAL  Take by mouth as needed.     levothyroxine 50 MCG tablet  Commonly known as: SYNTHROID  Take 50 mcg by mouth before breakfast.     LINZESS 72 mcg Cap capsule  Generic drug: linaCLOtide  Take 72 mcg by mouth every morning.     loperamide 2 mg capsule  Commonly known as: IMODIUM  Take 1 capsule (2 mg total) by mouth 4 (four) times daily as needed for Diarrhea.     multivitamin per tablet  Commonly known as: THERAGRAN  Take 1 tablet by mouth every morning.     OLANZapine 5 MG tablet  Commonly known as: ZyPREXA  TAKE 1 TABLET BY MOUTH EVERY EVENING ON DAYS 1 THROUGH 4 OF EACH CHEMOTHERAPY CYCLE     ondansetron 8 MG Tbdl  Commonly known as: ZOFRAN-ODT  Take 1 tablet (8 mg total) by mouth every 8 (eight) hours as needed (nausea/vomiting).     oxyCODONE 5 MG immediate release tablet  Commonly known as: ROXICODONE  Take 1 tablet (5 mg total) by mouth every 4 (four) hours as needed for Pain.     vitamin D 1000 units Tab  Commonly known as: VITAMIN D3  Take 1 tablet (1,000 Units total) by mouth once daily.     VTAMA 1 % Crea  Generic drug: tapinarof  APPLY 1 APPLICATION ON THE SKIN DAILY     ZYRTEC ORAL  Take by mouth as needed.            STOP taking these medications      apixaban 5 mg Tab  Commonly known as: ELIQUIS     atorvastatin 20 MG tablet  Commonly known as: LIPITOR     dexAMETHasone 4 MG Tab  Commonly known as: DECADRON     docusate sodium 100 MG capsule  Commonly known as: COLACE     filgrastim 300 mcg/0.5 mL injection  Commonly known as: NEUPOGEN     fluticasone propionate 50 mcg/actuation nasal spray  Commonly known as: FLONASE     Lactobacillus rhamnosus GG 10 billion cell capsule  Commonly known as: CULTURELLE      TURMERIC ORAL              Damian Dickson MD  Hematology/Oncology  [facility] - [facility]

## 2024-05-07 NOTE — ASSESSMENT & PLAN NOTE
Per Dr. Abad: Metastatic bladder cancer previously treated with neoadjuvant ddMVAC, radical cystectomy, and then carboplatin/gemcitabine and EV+ Pembro following local and metastatic recurrence. Subsequetnly received a single dose of pembrolizumab + NTX-1088 11/2/23 as part of a phase I trial through the [facility], but was taken off trial for elevated creatinine despite PCN placement. Starting sacituzumab govitecan 12/28/23 with empiric 25% DR for UGT1A1 deficiency. Progressive disease noted on PET CT 02/28/2024 and increased SG to 10mg/kg. Now cycle 6 of 17

## 2024-05-07 NOTE — PLAN OF CARE
Pt admitted from ED for hyperkalemia and CHI. Pt shifted with insulin for a k+ of 6.3. NSR on bedside telemetry. VSS. Bicarb infusing at 75 ml/hr. Mother at bedside. Bed locked and in lowest settings. Call bell in reach. Reminded to call for assistance.

## 2024-05-07 NOTE — PROGRESS NOTES
Met with patient and his mother at the bedside, Discussed the recommendations for hospice care. He would like to go home today. He reached out to his friend Yvonne, [phone], who he wanted to get a recommendation from. Referral placed to [facility]. Patient does not need any equipment to go home. [facility] coming to do paperwork. Patient can discharge home after consents have been signed.

## 2024-05-07 NOTE — ASSESSMENT & PLAN NOTE
Pt with hx of metastatic bladder cancer s/p radical cystectomy w/ileal neobladder (2017) c/b urethral stricture currently on sacituzumab govitecan with no improvement presenting to C with acute renal failure and hyperkalemia. Pt not a candidate at this moment for nephrostomy tube.     Plan  - Hospice

## 2024-05-07 NOTE — ED PROVIDER NOTES
Emergency Department Provider Note    Julio Rodríguez   58 y.o. male   [id number]      5/6/2024       History     This history was obtained from the patient without limitations.  He presented by personal transportation.    He is a 58-year-old with the below past medical history. He is currently receiving chemotherapy for metastatic bladder cancer. He's previously undergone radical cystectomy with ileal neobladder. He catheterizes his pouch as needed with difficulty and has undergone multiple dilations. He was directed to the ED for worsening renal function with hyperkalemia. He currently has a Smith that has been outputting about a liter daily. He complains of intermittent nausea. He feels fatigued. He has ongoing postnasal drip that causes cough. He denies fever and chills. He denies chest pain and shortness of breath.     Last CT imaging was on 12/9/23 and revealed a large pelvic mass, a right nephrostomy tube in appropriate position (since removed since it did not improve GFR), a large pelvic mass surrounding he neobladder, hepatic lesions concerning for metastasis, and increased retroperitoneal and pelvic adenopathy.    He was sent for admission, abdominal imaging, and treatment of hyperkalemia.          Past Medical History:   Diagnosis Date    Bladder cancer     CAD (coronary artery disease) 9/12/2023    Prior CABG. Not on antiplatelets. Home medicatiosn include atorvastatin    Carcinoma     forehead    Encounter for blood transfusion     Hypertension     Hypothyroidism 9/12/2023    Home medications include levothyroxine    Malignant melanoma of skin, unspecified     right arm    Malignant neoplasm of urinary bladder 9/12/2023    Melanoma 9/12/2023    History of T1a melanoma; 0.5mm depth without ulceartion. SP wide local excision 02/2022      Past Surgical History:   Procedure Laterality Date    CORONARY ARTERY BYPASS GRAFT  10/2015    CYSTECTOMY, ROBOT-ASSISTED, LAPAROSCOPIC, USING DA MARY XI, WITH ILEAL  CONDUIT CREATION  12/2017    CYSTOGRAM N/A 12/5/2023    Procedure: CYSTOGRAM;  Surgeon: Eder Oconnor MD;  Location: [facility] OR [facility] F[facility];  Service: Urology;  Laterality: N/A;    CYSTOSCOPY N/A 12/5/2023    Procedure: CYSTOSCOPY;  Surgeon: Eder Oconnor MD;  Location: [facility] OR [facility] FLR;  Service: Urology;  Laterality: N/A;    DILATION OF BLADDER      dialation of bladder neck- due to claudia bladder- multiple procedures    DILATION OF URETHRA N/A 12/5/2023    Procedure: DILATION, URETHRA;  Surgeon: Eder Oconnor MD;  Location: [facility] OR [facility] FLR;  Service: Urology;  Laterality: N/A;    LYMPHADENECTOMY      PERCUTANEOUS NEPHROSTOMY Right 12/8/2023    Procedure: Creation, Nephrostomy, Percutaneous;  Surgeon: Jens Nunez MD;  Location: [facility] CATH LAB;  Service: Radiology;  Laterality: Right;    PLACEMENT N/A 12/5/2023    Procedure: PLACEMENT;  Surgeon: Eder Oconnor MD;  [facility];  Service: Urology;  Laterality: N/A;  placement of valiente over a wire by MD MOHR ROBOTIC PROSTECTOMY, SUPRAPUBIC  12/2017      Family History   Problem Relation Name Age of Onset    Heart disease Father      Heart attack Paternal Uncle      Breast cancer Maternal Cousin      Colon cancer Neg Hx      Bladder Cancer Neg Hx        Social History     Socioeconomic History    Marital status: Single   Tobacco Use    Smoking status: Former     Types: Cigarettes     Passive exposure: Never   Substance and Sexual Activity    Alcohol use: Not Currently    Drug use: Never   Social History Narrative    Lives independently in [facility]. 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      Social Determinants of Health     Financial Resource Strain: Low Risk  (4/23/2024)    Overall Financial Resource Strain (CARDIA)     Difficulty of Paying Living Expenses: Not hard at all   Food Insecurity: No Food Insecurity (4/23/2024)    Hunger Vital Sign     Worried About Running Out of Food in the Last Year: Never true     Ran Out of  Food in the Last Year: Never true   Transportation Needs: No Transportation Needs (4/23/2024)    PRAPARE - Transportation     Lack of Transportation (Medical): No     Lack of Transportation (Non-Medical): No   Physical Activity: Sufficiently Active (4/23/2024)    Exercise Vital Sign     Days of Exercise per Week: 5 days     Minutes of Exercise per Session: 60 min   Recent Concern: Physical Activity - Insufficiently Active (2/12/2024)    Exercise Vital Sign     Days of Exercise per Week: 1 day     Minutes of Exercise per Session: 30 min   Stress: Patient Declined (4/23/2024)    [employer] - Occupational Stress Questionnaire     Feeling of Stress : Patient declined   Housing Stability: Low Risk  (4/23/2024)    Housing Stability Vital Sign     Unable to Pay for Housing in the Last Year: No     Homeless in the Last Year: No      Review of patient's allergies indicates:  No Known Allergies        Physical Examination     Initial Vitals [05/06/24 1651]   BP Pulse Resp Temp SpO2   (!) 151/80 87 18 98.9 °F (37.2 °C) 98 %      MAP       --           Physical Exam    Nursing note and vitals reviewed.  Constitutional: He is not diaphoretic. No distress.   HENT:   Head: Normocephalic and atraumatic.   Mouth/Throat: Oropharynx is clear and moist.   Eyes: Conjunctivae are normal. No scleral icterus.   Cardiovascular:  Normal rate, regular rhythm and normal heart sounds.     Exam reveals no gallop and no friction rub.       No murmur heard.  Pulmonary/Chest: No respiratory distress. He has no wheezes. He has no rhonchi.   Abdominal: Abdomen is soft. He exhibits no distension. There is no abdominal tenderness.     Neurological: He is alert and oriented to person, place, and time. GCS score is 15. GCS eye subscore is 4. GCS verbal subscore is 5. GCS motor subscore is 6.   Skin: Skin is warm and dry. No pallor.            Labs     Labs Reviewed   CBC W/ AUTO DIFFERENTIAL - Abnormal; Notable for the  following components:       Result Value    RBC 3.04 (*)     Hemoglobin 8.8 (*)     Hematocrit 27.9 (*)     MCHC 31.5 (*)     RDW 15.1 (*)     Immature Granulocytes 0.8 (*)     Gran # (ANC) 7.8 (*)     Immature Grans (Abs) 0.08 (*)     Lymph # 0.7 (*)     Gran % 80.8 (*)     Lymph % 6.7 (*)     All other components within normal limits   BASIC METABOLIC PANEL - Abnormal; Notable for the following components:    Sodium 126 (*)     Potassium 6.6 (*)     CO2 15 (*)     BUN 75 (*)     Creatinine 8.0 (*)     eGFR 7.2 (*)     All other components within normal limits   MAGNESIUM - Abnormal; Notable for the following components:    Magnesium 1.4 (*)     All other components within normal limits   POTASSIUM - Abnormal; Notable for the following components:    Potassium 6.3 (*)     All other components within normal limits    Narrative:     Continue until potassium is less than 5.4 mEq/L  Continue until potassium is less than 5.0 mEq/L  _ acknowledged and accepted results on test(s) _ via secure chat.     gerald esparza rn by [facility] 05/07/2024 02:28   BASIC METABOLIC PANEL - Abnormal; Notable for the following components:    Sodium 124 (*)     Potassium 6.3 (*)     CO2 14 (*)     Glucose 128 (*)     BUN 75 (*)     Creatinine 8.1 (*)     Calcium 8.4 (*)     eGFR 7.1 (*)     All other components within normal limits    Narrative:     Continue until potassium is less than 5.4 mEq/L  Continue until potassium is less than 5.0 mEq/L  _ acknowledged and accepted results on test(s) _ via secure chat.     gerald esparza rn by [facility] 05/07/2024 02:28   PHOSPHORUS   SARS-COV-2 RNA AMPLIFICATION, QUAL   POCT GLUCOSE   POCT GLUCOSE MONITORING CONTINUOUS   POCT GLUCOSE MONITORING CONTINUOUS   POCT GLUCOSE MONITORING CONTINUOUS        Imaging     Imaging Results               CT Abdomen Pelvis  Without Contrast (Final result)  Result time 05/06/24 21:51:04      Final result by Angel Acevedo MD (05/06/24 21:51:04)                    Impression:      Abdomen CT and Pelvis CT:    Prior cystoprostatectomy with neobladder creation, with continued neobladder wall thickening that is likely invading adjacent structures, such as the pelvic side wall and perhaps also in the left pubic bone/superior pubic ramus.    Marked new left hydroureteronephrosis and pre-existing severe right hydroureteronephrosis, possibly secondary to the new bladder wall thickening.  No radiopaque urinary tract calculi apparent.    Mottled gas within the neobladder lumen could be related to recent manipulation or placement of the Smith catheter.  However, a fistulous communication between the bladder dome adjacent bowel is questioned.  A gas-forming UTI could also cause intraluminal gas in the bladder.  Correlate clinically.    Mildly increased suspected metastatic disease in the skeleton, pericaval lymph node, and liver, since 02/27/2024.    Bilateral pulmonary nodules, partially visual lingular atelectasis or consolidation, and small but enlarging left pleural effusion, similar to today's earlier chest CT, and again suspicious for possible metastatic disease.    Other observations as detailed in the body of the report.    This report was flagged in Epic as abnormal.      Electronically signed by: Angel Acevedo  Date:    05/06/2024  Time:    21:51               Narrative:    EXAMINATION:  CT ABDOMEN PELVIS WITHOUT CONTRAST    CLINICAL HISTORY:  Severe CHI, neobladder, concern for hydro, n/v;    TECHNIQUE:  Low dose axial images, sagittal and coronal reformations were obtained from the lung bases to the pubic symphysis.  No contrast was administered.    COMPARISON:  Chest CT obtained earlier today, 05/06/2024.    PET-CT 02/27/2024.    Noncontrast CT abdomen pelvis 12/09/2023    FINDINGS:  Abdomen CT and Pelvis CT:    Artifacts related to beam hardening and/or motion degrade portions of the scan.    The absence of IV contrast, although beneficial in terms of increasing the  conspicuity of calculi and avoiding the potential adverse effects of contrast administration, limits the overall CT assessment of the imaged organ systems.    As previously described, the patient is status post cystoprostatectomy with neobladder creation.    Mottled gas within the neobladder lumen could be related to recent manipulation or placement of the Smith catheter.  However, a fistulous communication between the bladder dome adjacent bowel is questioned.    There is pre-existing severe right hydronephrosis, stable to perhaps minimally increased relative to 02/27/2024.    However, there is markedly increased left hydroureteronephrosis, with hydroureter extending down to the level of the pelvis/neobladder, new since 02/27/2024.    There remains marked eccentric wall thickening in the region of the neobladder, with possible invasion of surrounding structures (especially the left pelvic sidewall), suspicious for malignancy.  This new bladder wall thickening is likely causing the hydroureteronephrosis, as no radiopaque distal ureteral calculi are demonstrated on either side.  Note however that direct neoplastic invasion of the distal ureters is not excluded.    Main findings in the visualized lower thorax include multiple bilateral pulmonary nodules suspicious for metastatic disease, incompletely visualized dense consolidation or atelectasis of the lingula, and a small left pleural effusion that has enlarged since 02/27/2024.    In the abdomen pelvis, the liver, gallbladder with gallstones, bile ducts, pancreas, spleen, right adrenal gland, left adrenal gland, stomach, small hiatal hernia, duodenal C-loop, small bowel, large bowel, aortoiliac and branch vessel atherosclerotic calcifications, IVC, and visualized body wall demonstrate no adverse interval changes.    No pneumoperitoneum or discrete intraperitoneal fluid.    There are intestinal surgical changes in the lower abdomen.    The appendix is not identified  with certainty.    The previously described hypermetabolic right pericaval lymph node now measures approximately 27.2 x 22.5 mm, increased from previously reported (short axis) diameter measurement of 16.4 mm.    There remain several hepatic hypodense lesions, in both hepatic lobes.  The largest of these remains in the left hepatic lobe medial segment and I measures approximately 18.7 x 16 mm, increased from 16.9 x 16 mm on 02/27/2024.  The previously reported 1.5 mm hypermetabolic hepatic lesion in the caudal aspect the right hepatic lobe, near the gallbladder fossa, is poorly visualized on today's scan.    Numerous, predominantly osteolytic lesions scattered within the visualized skeleton, at multiple vertebral levels and in the osseous pelvic ring, remains suspicious for metastatic disease.  Some of these have mildly enlarged since 02/27/2024.                                        ED Course     The patient received the following medications:  Medications   prochlorperazine injection Soln 5 mg (5 mg Intravenous Given 5/6/24 2043)   sodium chloride 0.9% bolus 1,000 mL 1,000 mL (0 mLs Intravenous Stopped 5/6/24 2252)   furosemide injection 80 mg (80 mg Intravenous Given 5/6/24 2037)   sodium zirconium cyclosilicate packet 10 g (10 g Oral Given 5/6/24 2036)   albuterol sulfate nebulizer solution 10 mg (10 mg Nebulization Given 5/6/24 2031)   albuterol sulfate nebulizer solution 2.5 mg (2.5 mg Nebulization Given 5/7/24 0747)           ED Course as of 05/07/24 1800   Mon May 06, 2024   2008 WBC: 9.64 [LP]   2008 Hemoglobin(!): 8.8 [LP]   2008 Hematocrit(!): 27.9 [LP]   2008 Platelet Count: 432 [LP]   2008 EKG 12-lead  Independently interpreted by me:   Normal sinus rhythm.  Ventricular rate 80 beats per minute.  Normal axis.  Normal QRS duration.  Prolonged QT interval (375).  No ST segment elevation or depression. No peaked T waves. [LP]   2021 Called and discussed the patient's presentation, exam, lab results, and  ED management with the on-call oncology fellow, Dr. Greenfield.  He will admit the patient. [LP]      ED Course User Index  [LP] Lambert Duncan III, MD        Medical Decision Making                 Medical Decision Making  Patient sent for emergent management of hyperkalemia.  Initial blood pressure was mildly elevated.  All other vital signs were WNL.  He looked chronically ill but was in no distress.  EKG was negative for concerning findings.  Management of hyperkalemia with IV fluids, IV Lasix, nebulized albuterol, and Lokelma was initiated.  Hematology-oncology was consulted and he was admitted for further management.    Amount and/or Complexity of Data Reviewed  Labs:  Decision-making details documented in ED Course.  ECG/medicine tests:  Decision-making details documented in ED Course.    Risk  Prescription drug management.  Decision regarding hospitalization.          Critical Care   Date: 05/07/2024  Performed by: Lambert Duncan III, MD   Authorized by: Lambert Duncan III, MD    Total critical care time (exclusive of procedural time) : 30 minutes  Critical care was necessary to treat or prevent imminent or life-threatening deterioration of the following conditions:  Hyperkalemia        Diagnoses       ICD-10-CM ICD-9-CM   1. Acute renal failure, unspecified acute renal failure type  N17.9 584.9   2. Hyperkalemia  E87.5 276.7   3. Chest pain  R07.9 786.50   4. Bladder carcinoma  C67.9 188.9         Dispostion      ED Disposition Condition    Admit Stable             Lambert Duncan III, MD  05/07/24 5552

## 2024-05-07 NOTE — SUBJECTIVE & OBJECTIVE
Past Medical History:   Diagnosis Date    Bladder cancer     CAD (coronary artery disease) 9/12/2023    Prior CABG. Not on antiplatelets. Home medicatiosn include atorvastatin    Carcinoma     forehead    Encounter for blood transfusion     Hypertension     Hypothyroidism 9/12/2023    Home medications include levothyroxine    Malignant melanoma of skin, unspecified     right arm    Malignant neoplasm of urinary bladder 9/12/2023    Melanoma 9/12/2023    History of T1a melanoma; 0.5mm depth without ulceartion. SP wide local excision 02/2022       Past Surgical History:   Procedure Laterality Date    CORONARY ARTERY BYPASS GRAFT  10/2015    CYSTECTOMY, ROBOT-ASSISTED, LAPAROSCOPIC, USING DA MARY XI, WITH ILEAL CONDUIT CREATION  12/2017    CYSTOGRAM N/A 12/5/2023    Procedure: CYSTOGRAM;  Surgeon: Eder Oconnor MD;  [facility];  Service: Urology;  Laterality: N/A;    CYSTOSCOPY N/A 12/5/2023    Procedure: CYSTOSCOPY;  Surgeon: Eder Oconnor MD;  Location: [facility] OR [address] [place] [address];  Service: Urology;  Laterality: N/A;    DILATION OF BLADDER      dialation of bladder neck- due to claudia bladder- multiple procedures    DILATION OF URETHRA N/A 12/5/2023    Procedure: DILATION, URETHRA;  Surgeon: Eder Oconnor MD;  [facility];  Service: Urology;  Laterality: N/A;    LYMPHADENECTOMY      PERCUTANEOUS NEPHROSTOMY Right 12/8/2023    Procedure: Creation, Nephrostomy, Percutaneous;  Surgeon: Jens Nunez MD;  Location: [facility] CATH LAB;  Service: Radiology;  Laterality: Right;    PLACEMENT N/A 12/5/2023    Procedure: PLACEMENT;  Surgeon: Eder Oconnor MD;  [facility];  Service: Urology;  Laterality: N/A;  placement of valiente over a wire by MD MOHR ROBOTIC PROSTECTOMY, SUPRAPUBIC  12/2017       Review of patient's allergies indicates:  No Known Allergies    Family History       Problem Relation (Age of Onset)    Breast cancer Maternal Cousin    Heart attack Paternal Uncle    Heart disease Father             Tobacco Use    Smoking status: Former     Types: Cigarettes     Passive exposure: Never    Smokeless tobacco: Not on file   Substance and Sexual Activity    Alcohol use: Not Currently    Drug use: Never    Sexual activity: Not on file       Review of Systems    Objective:     Temp:  [98.9 °F (37.2 °C)] 98.9 °F (37.2 °C)  Pulse:  [87-98] 88  Resp:  [18] 18  SpO2:  [98 %-100 %] 100 %  BP: (149-151)/(80-93) 151/80  Weight: 76.7 kg (169 lb)  Body mass index is 24.25 kg/m².    Date 05/06/24 0700 - 05/07/24 0659   Shift 9238-9790 5378-3698 2825-3738 24 Hour Total   INTAKE   Shift Total(mL/kg)       OUTPUT   Urine  200  200   Shift Total(mL/kg)  200(2.6)  200(2.6)   Weight (kg)  76.7 76.7 76.7          Drains       Drain  Duration                  Nephrostomy 12/08/23 1038 Right 8 Fr. 150 days                     Physical Exam  Constitutional:       Comments: Cachectic    HENT:      Head: Normocephalic.   Cardiovascular:      Rate and Rhythm: Normal rate.   Pulmonary:      Effort: Respiratory distress present.   Abdominal:      Tenderness: There is abdominal tenderness. There is right CVA tenderness and left CVA tenderness.   Genitourinary:     Comments: 16 Fr valiente catheter draining concentrated urine         Significant Labs:    BMP:  Recent Labs   Lab 05/02/24  1440 05/06/24  1448 05/06/24 1940   * 127* 126*   K 5.0 6.4* 6.6*    100 96   CO2 16* 18* 15*   BUN 54* 74* 75*   CREATININE 5.0* 8.2* 8.0*   CALCIUM 9.1 9.2 9.6       CBC:  Recent Labs   Lab 05/02/24  1440 05/06/24  1448 05/06/24 1940   WBC 4.64 8.85 9.64   HGB 7.4* 7.8* 8.8*   HCT 24.1* 24.6* 27.9*    393 432       All pertinent labs results from the past 24 hours have been reviewed.    Significant Imaging:  All pertinent imaging results/findings from the past 24 hours have been reviewed.

## 2024-05-07 NOTE — H&P
Please see IR consult note dated 5/7/2024    Ritu Moraes PA-C  Interventional Radiology  Spectra [phone]  5/7/2024

## 2024-05-07 NOTE — ASSESSMENT & PLAN NOTE
Patient with acute kidney injury/acute renal failure likely due to post-obstructive d/t malignant obstruction.  CHI is currently worsening. Baseline creatinine  5.0  - Labs reviewed- Renal function/electrolytes with Estimated Creatinine Clearance: 10.4 mL/min (A) (based on SCr of 8 mg/dL (H)). according to latest data. Monitor urine output and serial BMP and adjust therapy as needed. Avoid nephrotoxins and renally dose meds for GFR listed above.      Plan:  - Continue furosemide  - Consult nephrology  - No acute indication for HD at this time but monitor UOP, oxygen requirement, and EKG  - Monitor intake/output and daily standing weights.   - Avoid nephrotoxic agents such as NSAIDs, gadolinium and IV radiocontrast.  - Renally dose meds to current GFR.  - Maintain MAP > 65.

## 2024-05-07 NOTE — PROGRESS NOTES
[facility] - Transplant Stepdown  Hematology/Oncology  Progress Note    Patient Name: Julio Rodríguez  Admission Date: 5/6/2024  Hospital Length of Stay: 1 days  Code Status: DNR     Subjective:     HPI:  Mr. Rodríguez is a 58yoM w/hx of metastatic bladder cancer s/p radical cystectomy w/ileal neobladder (2017) c/b urethral stricture, R hydronephrosis, L common femoral and proximal femoral DVT, pathologic fracture, ESRD who was sent to the hospital from clinic for abnormal labs. He was being seen at Urology Oncology clinic today for follow-up of acute renal failure in the setting of R hydronephrosis and urethral stricture with overflow incontinence. Previously seen at Oncology clinic on 5/2 where a BMP demonstrated Cr 82, K 6.4. Patient was escorted to the ED for further workup.  Patient states that he has been dealing with worsening fatigue, nausea/vomiting, and generalized anorexia.  It has been associated with sinus congestion as well as postnasal drip resulting in recurrent cough which exacerbates his abdominal discomfort.    Diagnosed with malignant neoplasm of urinary bladder in 2016. Initially treated with BCG but with subsequent development of T2N1 disease in 2017. Treated with ddMVAC followed by radical cystectomy with creation of neobladder and lymph node dissection in December. Final stage was [id number]. In July 2022, he developed hematuria leading to secondary anemia. CT Urogram negative for recurrence. MRI of pelvis in august 2022 demonstrated abnormal signal and enhancement of the b/l pubic bones adjacent to the neobladder suspicous for neoplastic infiltration. Biopsy of the bladder neck revealed recurrent high-grade urothelial carcinoma. PET scan showed hypermetabolic retroperitoneal lymphadenopathy and marked activity involve the bladder wall suspicious for tumor. Started on Gemcitabine/carboplatin and EV+ Pembro. Received a single dose of pembrolixumab + NTX-1088 (11/2023) as a part of a phase I through  the [facility] but was taken off the trial d/t PCN placement. Has received 6 of 17 cycles of Sacituzumab Govitecan-HZIY.    In the ED, patient was tachycardic with pulses in the 120s, systolic blood pressures as high as 177/93, afebrile, no respiratory distress or oxygen requirements.  Labs were collected and the patient during his visit with Dr. Abad, his oncologist which revealed a sodium of 126, a potassium of 6.6, a CO2 of 15, acute worsening in his BUN and creatinine to 75 and 8.0 respectively, a magnesium of 1.4.  He was shifted in the emergency room with albuterol and furosemide.  He has a chronic Smith which drained approximately 1 L of urine daily.  A CT of the chest was done which revealed a soft masslike density in the lingula which had enlarged compared to most recent PET-CT scan.  He had interval worsening of left pleural diffuse metastatic disease throughout the lung and bone.  A CT of the abdomen and pelvis revealed severe bilateral hydroureter with right parenchymal thinning as well as a left pelvic mass with new development of worsening left hydronephrosis.    Urology saw the patient in the emergency room and recommended a Nephrology consult as well as IR consultation for left nephrostomy tube placement.    Interval History: Hyperkalemia not improving. IR cancelled procedure due to hyperkalemia. After discussing prognosis with pt he decided to move on with hospice care.     Oncology Treatment Plan:   OP SACITUZUMAB GOVITECAN-HZIY Q3W    Medications:  Continuous Infusions:   sodium bicarbonate 150 mEq in dextrose 5 % (D5W) 1,000 mL infusion   Intravenous Continuous 75 mL/hr at 05/06/24 2339 New Bag at 05/06/24 2339     Scheduled Meds:   sodium chloride 0.9%   Intravenous Once    atorvastatin  20 mg Oral QHS    dextrose 10%  50 g Intravenous Once    furosemide (LASIX) injection  80 mg Intravenous Q12H    heparin (porcine)  5,000 Units Subcutaneous Q8H    Lactobacillus rhamnosus GG  1 capsule Oral Daily     levothyroxine  50 mcg Oral Before breakfast    LIDOcaine  1 patch Transdermal Daily    mupirocin   Nasal BID    sodium zirconium cyclosilicate  10 g Oral TID     PRN Meds:  Current Facility-Administered Medications:     0.9%  NaCl infusion (for blood administration), , Intravenous, Q24H PRN    benzonatate, 100 mg, Oral, Q4H PRN    dextrose 10%, 12.5 g, Intravenous, PRN    dextrose 10%, 25 g, Intravenous, PRN    [COMPLETED] dextrose 10%, 50 g, Intravenous, Once **AND** dextrose 10%, 25 g, Intravenous, PRN **AND** [COMPLETED] insulin regular, 0.1 Units/kg, Intravenous, Once    dextrose 10%, 50 g, Intravenous, Once **AND** dextrose 10%, 25 g, Intravenous, PRN **AND** [COMPLETED] insulin regular, 0.1 Units/kg, Intravenous, Once    glucagon (human recombinant), 1 mg, Intramuscular, PRN    glucose, 16 g, Oral, PRN    glucose, 24 g, Oral, PRN    heparin (porcine), 1,000 Units, Intra-Catheter, PRN    melatonin, 6 mg, Oral, Nightly PRN    naloxone, 0.02 mg, Intravenous, PRN    oxyCODONE, 5 mg, Oral, Q4H PRN    oxyCODONE, 10 mg, Oral, Q4H PRN    sodium chloride 0.9%, 250 mL, Intravenous, PRN    sodium chloride 0.9%, 10 mL, Intravenous, PRN     Review of Systems   Constitutional:  Positive for activity change, appetite change and fatigue. Negative for chills and fever.   HENT:  Positive for congestion and sore throat.    Respiratory:  Positive for cough. Negative for shortness of breath.    Cardiovascular:  Positive for leg swelling. Negative for chest pain and palpitations.   Gastrointestinal:  Positive for abdominal pain, nausea and vomiting. Negative for abdominal distention, constipation and diarrhea.   Musculoskeletal:  Positive for back pain.   Skin:  Negative for rash.   Neurological:  Positive for weakness. Negative for light-headedness and headaches.   Hematological:  Does not bruise/bleed easily.   Psychiatric/Behavioral:  The patient is nervous/anxious.      Objective:     Vital Signs (Most Recent):  Temp: 99.2  °F (37.3 °C) (05/07/24 1108)  Pulse: 84 (05/07/24 1251)  Resp: 18 (05/07/24 1108)  BP: 133/79 (05/07/24 1108)  SpO2: 97 % (05/07/24 1108) Vital Signs (24h Range):  Temp:  [98.9 °F (37.2 °C)-99.9 °F (37.7 °C)] 99.2 °F (37.3 °C)  Pulse:  [] 84  Resp:  [16-23] 18  SpO2:  [97 %-100 %] 97 %  BP: (127-177)/(69-93) 133/79     Weight: 76.7 kg (169 lb)  Body mass index is 24.25 kg/m².  Body surface area is 1.95 meters squared.      Intake/Output Summary (Last 24 hours) at 5/7/2024 1448  Last data filed at 5/6/2024 2016  Gross per 24 hour   Intake --   Output 200 ml   Net -200 ml        Physical Exam  Constitutional:       General: He is not in acute distress.     Appearance: Normal appearance. He is not ill-appearing.   HENT:      Head: Normocephalic and atraumatic.      Mouth/Throat:      Mouth: Mucous membranes are dry.   Eyes:      Conjunctiva/sclera: Conjunctivae normal.   Cardiovascular:      Rate and Rhythm: Normal rate and regular rhythm.   Pulmonary:      Effort: Pulmonary effort is normal. No respiratory distress.   Abdominal:      General: There is no distension.   Musculoskeletal:      Cervical back: Normal range of motion. No rigidity.      Right lower leg: No edema.      Left lower leg: Edema present.   Skin:     Coloration: Skin is pale. Skin is not jaundiced.      Findings: No bruising.   Neurological:      General: No focal deficit present.      Mental Status: He is alert and oriented to person, place, and time.   Psychiatric:         Mood and Affect: Mood normal.         Behavior: Behavior normal.          Significant Labs:   CMP:   Recent Labs   Lab 05/06/24  1448 05/06/24  1940 05/07/24  0450 05/07/24  0830 05/07/24  1136   *   < > 123*  123* 123*  123* 122*   K 6.4*   < > 6.3*  6.3*  6.3* 6.4*  6.4*  6.4* 6.4*  6.4*      < > 99  99 98  98 98   CO2 18*   < > 13*  13* 16*  16* 15*   *   < > 129*  129* 96  96 111*   BUN 74*   < > 74*  74* 75*  75* 76*   CREATININE 8.2*    < > 8.2*  8.2* 8.3*  8.3* 8.4*   CALCIUM 9.2   < > 8.3*  8.3* 8.2*  8.2* 8.2*   PROT 6.5  --  5.7*  --   --    ALBUMIN 1.9*  --  1.7*  --   --    BILITOT 0.4  --  0.3  --   --    ALKPHOS 88  --  86  --   --    AST 20  --  27  --   --    ALT 38  --  38  --   --    ANIONGAP 9   < > 11  11 9  9 9    < > = values in this interval not displayed.       Diagnostic Results:  I have reviewed all pertinent imaging results/findings within the past 24 hours.  Assessment/Plan:     * Acute renal failure  Patient is a 58-year-old male with past medical history significant for metastatic bladder cancer on SG who is presenting with malignant ureter obstruction resulting in renal failure and electrolyte abnormalities.  Patient has had progressively worsening symptoms of fatigue, nausea/vomiting/anorexia, itching, and generalized malaise over the last several weeks.  He presented to clinic today and his labs showed severe hyperkalemia.  He was taken to the emergency room from clinic for further evaluation and admitted to medical oncology floor for urgent left nephrostomy tube placement.    Patient with acute kidney injury/acute renal failure likely due to post-obstructive d/t malignant obstruction. CHI is currently worsening. Baseline creatinine 5.0 - Labs reviewed- Renal function/electrolytes with Estimated Creatinine Clearance: 10.4 mL/min (A) (based on SCr of 8 mg/dL (H)). according to latest data. Monitor urine output and serial BMP and adjust therapy as needed. Avoid nephrotoxins and renally dose meds for GFR listed above.      Plan:  - Continue furosemide  - Consult nephrology  - Renal Diet  - NPO at midnight with plan for L nephrostomy tube (R hydro felt to be chronic)  - No acute indication for HD at this time but monitor UOP, oxygen requirement, and EKG  - Monitor intake/output and daily standing weights.   - Avoid nephrotoxic agents such as NSAIDs, gadolinium and IV radiocontrast.  - Renally dose meds to current  GFR.  - Maintain MAP > 65.        Hospice care  Pt with hx of metastatic bladder cancer s/p radical cystectomy w/ileal neobladder (2017) c/b urethral stricture currently on sacituzumab govitecan with no improvement presenting to [facility] with acute renal failure and hyperkalemia. Pt not a candidate at this moment for nephrostomy tube.     Plan  - Hospice    Hyperkalemia  Patient with potassium of 6.4 s/p shifting.       This patient has hyperkalemia which is uncontrolled. We will monitor for arrhythmias with EKG or continuous telemetry. We will treat the hyperkalemia with Potassium Binders, Nebulized albuterol sulfate, Furosemide, and Sodium Bicarbonate. The likely etiology of the hyperkalemia is CHI.  The patients latest potassium has been reviewed and the results are listed below  Recent Labs   Lab 05/07/24  1136   K 6.4*  6.4*         Metabolic acidosis  Monitor for need for HD or bicarbonate drip    Acute deep vein thrombosis (DVT) of left femoral vein  Patient with left femoral DVT on apixaban.  Holding apixaban currently for planned IR procedure and left nephrostomy tube.  Should resume after procedure.  Continue to monitor for signs and symptoms of embolization.    Metastatic disease  Patient with diffuse lung and bone Mets with progression despite immunotherapy.  Now with urinary obstruction with renal failure causing electrolyte abnormalities.    CKD (chronic kidney disease)  Patient with acute kidney injury/acute renal failure likely due to post-obstructive d/t malignant obstruction.  CHI is currently worsening. Baseline creatinine  5.0  - Labs reviewed- Renal function/electrolytes with Estimated Creatinine Clearance: 10.4 mL/min (A) (based on SCr of 8 mg/dL (H)). according to latest data. Monitor urine output and serial BMP and adjust therapy as needed. Avoid nephrotoxins and renally dose meds for GFR listed above.      Plan:  - Continue furosemide  - Consult nephrology  - No acute indication for HD at this  time but monitor UOP, oxygen requirement, and EKG  - Monitor intake/output and daily standing weights.   - Avoid nephrotoxic agents such as NSAIDs, gadolinium and IV radiocontrast.  - Renally dose meds to current GFR.  - Maintain MAP > 65.        Hypothyroidism  Continue home levothyroxine    Malignant neoplasm of urinary bladder  Per Dr. Abad: Metastatic bladder cancer previously treated with neoadjuvant ddMVAC, radical cystectomy, and then carboplatin/gemcitabine and EV+ Pembro following local and metastatic recurrence. Subsequetnly received a single dose of pembrolizumab + NTX-1088 11/2/23 as part of a phase I trial through the [facility], but was taken off trial for elevated creatinine despite PCN placement. Starting sacituzumab govitecan 12/28/23 with empiric 25% DR for UGT1A1 deficiency. Progressive disease noted on PET CT 02/28/2024 and increased SG to 10mg/kg. Now cycle 6 of 17           Damian Dickson MD  Hematology/Oncology  [facility] - Trans[facility] Stepdown        I have reviewed the notes, assessments, and/or procedures performed by the housestaff, as above.  I have personally interviewed and examined the patient at the beside, and rounded with the housestaff. I concur with her/his assessment and plan and the documentation of Julio Rodríguez.      Pt unable to get nephrostomy tubes placed due to worsening K+ despite multiple interventions to correct this.  Given possibility of of ICU, HD, etc., pt made decision to transition to hospice care, which I fully support.      More than 40 mins total time were spent during this encounter.      Chan Leos M.D., M.S., F.A.C.P.  Hematology/Oncology Attending  [facility]

## 2024-05-07 NOTE — SUBJECTIVE & OBJECTIVE
Oncology Treatment Plan:   OP SACITUZUMAB GOVITECAN-HZIY Q3W    Medications:  Continuous Infusions:  Current Facility-Administered Medications   Medication Dose Route Frequency Last Rate Last Admin     Scheduled Meds:  Current Facility-Administered Medications   Medication Dose Route Frequency    atorvastatin  20 mg Oral QHS    heparin (porcine)  5,000 Units Subcutaneous Q8H    [START ON 5/7/2024] Lactobacillus rhamnosus GG  1 capsule Oral Daily    [START ON 5/7/2024] levothyroxine  50 mcg Oral Before breakfast    [START ON 5/7/2024] LIDOcaine  1 patch Transdermal Daily    sodium zirconium cyclosilicate  10 g Oral TID     PRN Meds:  Current Facility-Administered Medications:     dextrose 10%, 12.5 g, Intravenous, PRN    dextrose 10%, 25 g, Intravenous, PRN    glucagon (human recombinant), 1 mg, Intramuscular, PRN    glucose, 16 g, Oral, PRN    glucose, 24 g, Oral, PRN    melatonin, 6 mg, Oral, Nightly PRN    naloxone, 0.02 mg, Intravenous, PRN    oxyCODONE, 10 mg, Oral, Q4H PRN    oxyCODONE, 5 mg, Oral, Q4H PRN    sodium chloride 0.9%, 10 mL, Intravenous, PRN     Review of patient's allergies indicates:  No Known Allergies     Past Medical History:   Diagnosis Date    Bladder cancer     CAD (coronary artery disease) 9/12/2023    Prior CABG. Not on antiplatelets. Home medicatiosn include atorvastatin    Carcinoma     forehead    Encounter for blood transfusion     Hypertension     Hypothyroidism 9/12/2023    Home medications include levothyroxine    Malignant melanoma of skin, unspecified     right arm    Malignant neoplasm of urinary bladder 9/12/2023    Melanoma 9/12/2023    History of T1a melanoma; 0.5mm depth without ulceartion. SP wide local excision 02/2022     Past Surgical History:   Procedure Laterality Date    CORONARY ARTERY BYPASS GRAFT  10/2015    CYSTECTOMY, ROBOT-ASSISTED, LAPAROSCOPIC, USING DA MARY XI, WITH ILEAL CONDUIT CREATION  12/2017    CYSTOGRAM N/A 12/5/2023    Procedure: CYSTOGRAM;  Surgeon:  Eder Oconnor MD;  Location: [address];  Service: Urology;  Laterality: N/A;    CYSTOSCOPY N/A 12/5/2023    Procedure: CYSTOSCOPY;  Surgeon: Eder Oconnor MD;  Location: [facility] OR [address] [place] [address];  Service: Urology;  Laterality: N/A;    DILATION OF BLADDER      dialation of bladder neck- due to claudia bladder- multiple procedures    DILATION OF URETHRA N/A 12/5/2023    Procedure: DILATION, URETHRA;  Surgeon: Eder Oconnor MD;  Location: [facility] OR [address] [place] [address];  Service: Urology;  Laterality: N/A;    LYMPHADENECTOMY      PERCUTANEOUS NEPHROSTOMY Right 12/8/2023    Procedure: Creation, Nephrostomy, Percutaneous;  Surgeon: Jens Nunez MD;  Location: [facility] CATH LAB;  Service: Radiology;  Laterality: Right;    PLACEMENT N/A 12/5/2023    Procedure: PLACEMENT;  Surgeon: Eder Oconnor MD;  Location: [address];  Service: Urology;  Laterality: N/A;  placement of valiente over a wire by MD MOHR ROBOTIC PROSTECTOMY, SUPRAPUBIC  12/2017     Family History       Problem Relation (Age of Onset)    Breast cancer Maternal Cousin    Heart attack Paternal Uncle    Heart disease Father          Tobacco Use    Smoking status: Former     Types: Cigarettes     Passive exposure: Never    Smokeless tobacco: Not on file   Substance and Sexual Activity    Alcohol use: Not Currently    Drug use: Never    Sexual activity: Not on file       Review of Systems   Constitutional:  Positive for activity change, appetite change and fatigue. Negative for chills and fever.   HENT:  Positive for congestion and sore throat.    Respiratory:  Positive for cough. Negative for shortness of breath.    Cardiovascular:  Positive for leg swelling. Negative for chest pain and palpitations.   Gastrointestinal:  Positive for abdominal pain, nausea and vomiting. Negative for abdominal distention, constipation and diarrhea.   Musculoskeletal:  Positive for back pain.   Skin:  Negative for rash.   Neurological:  Positive for weakness. Negative for light-headedness and  headaches.   Hematological:  Does not bruise/bleed easily.     Objective:     Vital Signs (Most Recent):  Temp: 98.9 °F (37.2 °C) (05/06/24 1651)  Pulse: 95 (05/06/24 2042)  Resp: 18 (05/06/24 2031)  BP: (!) 177/86 (05/06/24 2042)  SpO2: 100 % (05/06/24 2042) Vital Signs (24h Range):  Temp:  [98.9 °F (37.2 °C)] 98.9 °F (37.2 °C)  Pulse:  [87-98] 95  Resp:  [18] 18  SpO2:  [98 %-100 %] 100 %  BP: (149-177)/(80-93) 177/86     Weight: 76.7 kg (169 lb)  Body mass index is 24.25 kg/m².  Body surface area is 1.95 meters squared.      Intake/Output Summary (Last 24 hours) at 5/6/2024 2139  Last data filed at 5/6/2024 2016  Gross per 24 hour   Intake --   Output 200 ml   Net -200 ml        Physical Exam  Constitutional:       General: He is not in acute distress.     Appearance: Normal appearance. He is not ill-appearing.   HENT:      Head: Normocephalic and atraumatic.      Mouth/Throat:      Mouth: Mucous membranes are dry.   Eyes:      Conjunctiva/sclera: Conjunctivae normal.   Cardiovascular:      Rate and Rhythm: Normal rate and regular rhythm.   Pulmonary:      Effort: Pulmonary effort is normal. No respiratory distress.   Abdominal:      General: There is no distension.   Musculoskeletal:      Cervical back: Normal range of motion. No rigidity.      Right lower leg: No edema.      Left lower leg: Edema present.   Skin:     Coloration: Skin is pale. Skin is not jaundiced.      Findings: No bruising.   Neurological:      General: No focal deficit present.      Mental Status: He is alert and oriented to person, place, and time.   Psychiatric:         Mood and Affect: Mood normal.         Behavior: Behavior normal.          Significant Labs:   CBC:   Recent Labs   Lab 05/06/24 1448 05/06/24 1940   WBC 8.85 9.64   HGB 7.8* 8.8*   HCT 24.6* 27.9*    432   , CMP:   Recent Labs   Lab 05/06/24 1448 05/06/24 1940   * 126*   K 6.4* 6.6*    96   CO2 18* 15*   * 102   BUN 74* 75*   CREATININE 8.2*  8.0*   CALCIUM 9.2 9.6   PROT 6.5  --    ALBUMIN 1.9*  --    BILITOT 0.4  --    ALKPHOS 88  --    AST 20  --    ALT 38  --    ANIONGAP 9 15   , and All pertinent labs from the last 24 hours have been reviewed.    Diagnostic Results:  I have reviewed all pertinent imaging results/findings within the past 24 hours.   Stelara Counseling:  I discussed with the patient the risks of ustekinumab including but not limited to immunosuppression, malignancy, posterior leukoencephalopathy syndrome, and serious infections.  The patient understands that monitoring is required including a PPD at baseline and must alert us or the primary physician if symptoms of infection or other concerning signs are noted.

## 2024-05-07 NOTE — ASSESSMENT & PLAN NOTE
Patient with diffuse lung and bone Mets with progression despite immunotherapy.  Now with urinary obstruction with renal failure causing electrolyte abnormalities.

## 2024-05-07 NOTE — SUBJECTIVE & OBJECTIVE
Interval History: NAEO. AFVSS. NPO. L NT placement today by IR.     Review of Systems  Objective:     Temp:  [98.9 °F (37.2 °C)-99.9 °F (37.7 °C)] 99.9 °F (37.7 °C)  Pulse:  [] 107  Resp:  [18-23] 20  SpO2:  [97 %-100 %] 97 %  BP: (127-177)/(69-93) 158/84     Body mass index is 24.25 kg/m².           Drains       Drain  Duration                  Nephrostomy 12/08/23 1038 Right 8 Fr. 150 days                     Physical Exam  Constitutional:       Comments: Cachectic    HENT:      Head: Normocephalic.   Cardiovascular:      Rate and Rhythm: Normal rate.   Pulmonary:      Effort: Pulmonary effort is normal. No respiratory distress.   Abdominal:      Tenderness: There is no abdominal tenderness. There is left CVA tenderness. There is no right CVA tenderness.   Genitourinary:     Comments: 16 Fr valiente catheter draining concentrated urine            Significant Labs:    BMP:  Recent Labs   Lab 05/06/24  1940 05/07/24  0118 05/07/24  0450   * 124* 123*  123*   K 6.6* 6.3*  6.3* 6.3*   CL 96 100 99  99   CO2 15* 14* 13*  13*   BUN 75* 75* 74*  74*   CREATININE 8.0* 8.1* 8.2*  8.2*   CALCIUM 9.6 8.4* 8.3*  8.3*       CBC:   Recent Labs   Lab 05/06/24  1448 05/06/24  1940 05/07/24  0450   WBC 8.85 9.64 7.42   HGB 7.8* 8.8* 6.7*   HCT 24.6* 27.9* 21.2*    432 318       All pertinent labs results from the past 24 hours have been reviewed.    Significant Imaging:  All pertinent imaging results/findings from the past 24 hours have been reviewed.

## 2024-05-07 NOTE — SUBJECTIVE & OBJECTIVE
Past Medical History:   Diagnosis Date    Bladder cancer     CAD (coronary artery disease) 9/12/2023    Prior CABG. Not on antiplatelets. Home medicatiosn include atorvastatin    Carcinoma     forehead    Encounter for blood transfusion     Hypertension     Hypothyroidism 9/12/2023    Home medications include levothyroxine    Malignant melanoma of skin, unspecified     right arm    Malignant neoplasm of urinary bladder 9/12/2023    Melanoma 9/12/2023    History of T1a melanoma; 0.5mm depth without ulceartion. SP wide local excision 02/2022       Past Surgical History:   Procedure Laterality Date    CORONARY ARTERY BYPASS GRAFT  10/2015    CYSTECTOMY, ROBOT-ASSISTED, LAPAROSCOPIC, USING DA MARY XI, WITH ILEAL CONDUIT CREATION  12/2017    CYSTOGRAM N/A 12/5/2023    Procedure: CYSTOGRAM;  Surgeon: Eder Oconnor MD;  [facility];  Service: Urology;  Laterality: N/A;    CYSTOSCOPY N/A 12/5/2023    Procedure: CYSTOSCOPY;  Surgeon: Eder Oconnor MD;  Location: [facility] OR [address] [place] [address];  Service: Urology;  Laterality: N/A;    DILATION OF BLADDER      dialation of bladder neck- due to claudia bladder- multiple procedures    DILATION OF URETHRA N/A 12/5/2023    Procedure: DILATION, URETHRA;  Surgeon: Eder Oconnor MD;  [facility];  Service: Urology;  Laterality: N/A;    LYMPHADENECTOMY      PERCUTANEOUS NEPHROSTOMY Right 12/8/2023    Procedure: Creation, Nephrostomy, Percutaneous;  Surgeon: Jens Nunez MD;  Location: [facility] CATH LAB;  Service: Radiology;  Laterality: Right;    PLACEMENT N/A 12/5/2023    Procedure: PLACEMENT;  Surgeon: Eder Oconnor MD;  [facility];  Service: Urology;  Laterality: N/A;  placement of valiente over a wire by MD MOHR ROBOTIC PROSTECTOMY, SUPRAPUBIC  12/2017       Review of patient's allergies indicates:  No Known Allergies  Current Facility-Administered Medications   Medication Frequency    atorvastatin tablet 20 mg QHS    benzonatate capsule 100 mg Q4H PRN     dextrose 10% bolus 125 mL 125 mL PRN    dextrose 10% bolus 250 mL 250 mL PRN    dextrose 10% bolus 250 mL 250 mL PRN    furosemide injection 80 mg Q12H    glucagon (human recombinant) injection 1 mg PRN    glucose chewable tablet 16 g PRN    glucose chewable tablet 24 g PRN    heparin (porcine) injection 5,000 Units Q8H    Lactobacillus rhamnosus GG capsule 1 capsule Daily    levothyroxine tablet 50 mcg Before breakfast    LIDOcaine 5 % patch 1 patch Daily    melatonin tablet 6 mg Nightly PRN    naloxone 0.4 mg/mL injection 0.02 mg PRN    oxyCODONE immediate release tablet 5 mg Q4H PRN    oxyCODONE immediate release tablet Tab 10 mg Q4H PRN    sodium bicarbonate 150 mEq in dextrose 5 % (D5W) 1,000 mL infusion Continuous    sodium chloride 0.9% flush 10 mL PRN    sodium zirconium cyclosilicate packet 10 g TID     Family History       Problem Relation (Age of Onset)    Breast cancer Maternal Cousin    Heart attack Paternal Uncle    Heart disease Father          Tobacco Use    Smoking status: Former     Types: Cigarettes     Passive exposure: Never    Smokeless tobacco: Not on file   Substance and Sexual Activity    Alcohol use: Not Currently    Drug use: Never    Sexual activity: Not on file     Review of Systems   Constitutional:  Positive for activity change, appetite change and fatigue. Negative for chills and fever.   HENT:  Positive for congestion and sore throat.    Respiratory:  Positive for cough. Negative for shortness of breath.    Cardiovascular:  Positive for leg swelling. Negative for chest pain and palpitations.   Gastrointestinal:  Positive for abdominal pain, nausea and vomiting. Negative for abdominal distention, constipation and diarrhea.   Musculoskeletal:  Positive for back pain.   Skin:  Negative for rash.   Neurological:  Positive for weakness. Negative for light-headedness and headaches.   Hematological:  Does not bruise/bleed easily.     Objective:     Vital Signs (Most Recent):  Temp: 99.8 °F  (37.7 °C) (05/07/24 0653)  Pulse: 92 (05/07/24 0747)  Resp: 18 (05/07/24 0747)  BP: (!) 146/82 (05/07/24 0653)  SpO2: 97 % (05/07/24 0747) Vital Signs (24h Range):  Temp:  [98.9 °F (37.2 °C)-99.9 °F (37.7 °C)] 99.8 °F (37.7 °C)  Pulse:  [] 92  Resp:  [18-23] 18  SpO2:  [97 %-100 %] 97 %  BP: (127-177)/(69-93) 146/82     Weight: 76.7 kg (169 lb) (05/07/24 0552)  Body mass index is 24.25 kg/m².  Body surface area is 1.95 meters squared.    I/O last 3 completed shifts:  In: -   Out: 200 [Urine:200]     Physical Exam  Vitals and nursing note reviewed.   Constitutional:       General: He is not in acute distress.     Appearance: He is ill-appearing. He is not diaphoretic.   HENT:      Head: Normocephalic and atraumatic.      Right Ear: External ear normal.      Left Ear: External ear normal.      Nose: Nose normal.      Mouth/Throat:      Mouth: Mucous membranes are moist.      Pharynx: Oropharynx is clear. No oropharyngeal exudate or posterior oropharyngeal erythema.   Eyes:      General: No scleral icterus.        Right eye: No discharge.         Left eye: No discharge.      Extraocular Movements: Extraocular movements intact.      Conjunctiva/sclera: Conjunctivae normal.   Cardiovascular:      Rate and Rhythm: Normal rate.   Pulmonary:      Effort: Pulmonary effort is normal. No respiratory distress.      Breath sounds: No wheezing, rhonchi or rales.   Chest:      Comments: Well healed sternotomy scar and Port in place.  Abdominal:      General: A surgical scar is present. Bowel sounds are normal. There is no distension.      Palpations: Abdomen is soft.      Tenderness: There is no abdominal tenderness.   Musculoskeletal:      Cervical back: Neck supple.      Right lower leg: No edema.      Left lower leg: Edema present.   Skin:     General: Skin is warm and dry.      Coloration: Skin is pale. Skin is not jaundiced.   Neurological:      General: No focal deficit present.      Mental Status: He is alert. Mental  status is at baseline.      Cranial Nerves: No cranial nerve deficit.      Motor: No weakness.   Psychiatric:         Mood and Affect: Affect is tearful.         Behavior: Behavior normal.          Significant Labs:  BMP:   Recent Labs   Lab 05/07/24  0450   *  129*   *  123*   K 6.3*  6.3*  6.3*   CL 99  99   CO2 13*  13*   BUN 74*  74*   CREATININE 8.2*  8.2*   CALCIUM 8.3*  8.3*   MG 1.2*     CBC:   Recent Labs   Lab 05/07/24  0830   WBC 7.25   RBC 2.29*   HGB 6.6*   HCT 20.7*      MCV 90   MCH 28.8   MCHC 31.9*     CMP:   Recent Labs   Lab 05/07/24  0450   *  129*   CALCIUM 8.3*  8.3*   ALBUMIN 1.7*   PROT 5.7*   *  123*   K 6.3*  6.3*  6.3*   CO2 13*  13*   CL 99  99   BUN 74*  74*   CREATININE 8.2*  8.2*   ALKPHOS 86   ALT 38   AST 27   BILITOT 0.3     LFTs:   Recent Labs   Lab 05/07/24  0450   ALT 38   AST 27   ALKPHOS 86   BILITOT 0.3   PROT 5.7*   ALBUMIN 1.7*     Microbiology Results (last 7 days)       ** No results found for the last 168 hours. **          Specimen (24h ago, onward)      None          Recent Labs   Lab 05/02/24  0650   COLORU Yellow   SPECGRAV 1.010   PHUR 7.0   PROTEINUA 2+*   BACTERIA Rare   NITRITE Negative   LEUKOCYTESUR 2+*   HYALINECASTS 0     Significant Imaging:  I have reviewed all imagining in the last 24 hours.

## 2024-05-07 NOTE — ACP (ADVANCE CARE PLANNING)
Advance Care Planning     Date: 05/07/2024    Code Status  In light of the patients advanced and life limiting illness,I engaged the patient and his mother  in a voluntary conversation about the patient's preferences for care at the very end of life. The patient has metastatic bladder cancer that is progressing despite our best treatments. He is now in acute renal failure and has an elevated potassium level. Medical interventions are no improving the potassium so the only other option would be dialysis as his potassium is too high for nephrostomy tubes. Patient stated he is not interested in invasive interventions at this time and just wants to be at home. The patient wishes to have a natural, peaceful death.  Along those lines, the patient does not wish to have CPR or other invasive treatments performed when his heart and/or breathing stops. I communicated to the patient and his mother  that a DNR order would be placed in his medical record to reflect this preference.  I spent a total of 30 minutes engaging the patient in this advance care planning discussion.    Denzel Santizo MD  Hematology/Oncology Fellow PGY-IV

## 2024-05-07 NOTE — PROGRESS NOTES
[facility] - Transplant Stepdown  Urology  Progress Note    Patient Name: Julio Rodríguez  MRN: [id number]  Admission Date: 5/6/2024  Hospital Length of Stay: 1 days  Code Status: Full Code   Attending Provider: Chan Leos MD   Primary Care Physician: Char, Primary Doctor    Subjective:     HPI:  Julio Rodríguez is a  58 y.o. 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 He underwent radical cystectomy w/ ileal neobladder in 2017. He then subsequently developed local and systemic recurrence. He is being followed by Dr Leos and part of the Phase 1 program. He did also receive palliative radiation to the pelvis after pathologic pelvic fracture. He was seen in clinic today with Dr. Oconnor and sent to the ED due to concerns for electrolyte abnormalities and severe CHI.     He currently catheterizes his pouch as needed, but reports he has had multiple dilations in the past and continues to struggle to catheterize his bladder. Last dilation 12/5/2023 and since then able to catheterize successfully.      Had right nephrostomy tube placed in the past to try to improve overall GFR, but this was unsuccessful. Nephrostomy tube was removed on 12/21/2023. Since then GFR has been stable if not improved.      Following with Dr. Abad for chemo (sacituzumab) -- reports doing well on chemotherapy.     Blood thinners:  None    No Hx of TIA, MI, and CVA   Abdominal surgeries:  Radical cystectomy with creation of ileal neobladder          From clinic 5/6/2024: Seen by Dr. Abad last week on 5/2/24. At that time he was having difficulty with CIC, was in overflow incontinence, and Cr was 5.0. An indwelling Smith was left in place. Today he presents with a BMP that resulted during the clinic visit - Cr 8.2, K 6.4. He has an indwelling Smith catheter in place that has been putting out about 1L of urine per day.     Urological History:     12/5/2023 -- Cystoscopy, urethral dilation of neobladder  12/8/2023 -- IR  right nephrostomy tube placement  12/9/2023 -- CT AP w/o contrast - stable lung disease, progressive RP LND, possible metastatic diease to liver   12/21/2023 -- Right nephrostomy tube removal in clinic  2/27/2024 -- NM PET -- widely metastatic urothelial carcinoma  5/2/2024 -- CT Chest -- widely metastatic disease, progression from previous scan despite systemic therapy     In the ED, AFVSS, he is noticeably fatigued, reports n/v, and difficulty with PO intake. Reports mucus clogging of catheter over the past week requiring flushing of the catheter    , K 6.6, Mg 1.4, Cr 8.0, Hgb 8.8  CT AP shows severe bilateral hydroureter to the level of the neobladder, significant R parenchymal thinning, neobladder with valiente in place, left pelvic mass        Interval History: NAEO. AFVSS. NPO. L NT placement today by IR.     Review of Systems  Objective:     Temp:  [98.9 °F (37.2 °C)-99.9 °F (37.7 °C)] 99.9 °F (37.7 °C)  Pulse:  [] 107  Resp:  [18-23] 20  SpO2:  [97 %-100 %] 97 %  BP: (127-177)/(69-93) 158/84     Body mass index is 24.25 kg/m².           Drains       Drain  Duration                  Nephrostomy 12/08/23 1038 Right 8 Fr. 150 days                     Physical Exam  Constitutional:       Comments: Cachectic    HENT:      Head: Normocephalic.   Cardiovascular:      Rate and Rhythm: Normal rate.   Pulmonary:      Effort: Pulmonary effort is normal. No respiratory distress.   Abdominal:      Tenderness: There is no abdominal tenderness. There is left CVA tenderness. There is no right CVA tenderness.   Genitourinary:     Comments: 16 Fr valiente catheter draining concentrated urine            Significant Labs:    BMP:  Recent Labs   Lab 05/06/24  1940 05/07/24  0118 05/07/24  0450   * 124* 123*  123*   K 6.6* 6.3*  6.3* 6.3*   CL 96 100 99  99   CO2 15* 14* 13*  13*   BUN 75* 75* 74*  74*   CREATININE 8.0* 8.1* 8.2*  8.2*   CALCIUM 9.6 8.4* 8.3*  8.3*       CBC:   Recent Labs   Lab 05/06/24  5014  05/06/24  1940 05/07/24  0450   WBC 8.85 9.64 7.42   HGB 7.8* 8.8* 6.7*   HCT 24.6* 27.9* 21.2*    432 318       All pertinent labs results from the past 24 hours have been reviewed.    Significant Imaging:  All pertinent imaging results/findings from the past 24 hours have been reviewed.                  Assessment/Plan:     Malignant neoplasm of urinary bladder  -Admission to Heme/Onc service  -Recommend urgent Nephrology consult given electrolyte abnormalities. Shift vs Dialysis  -CT shows stable chronic R hydro, new onset L hydronephrosis, L pelvic mass  -IR to place L NT tube placement, right hydronephrosis is chronic  -Trend electrolytes  -Trend Cr  -PRN irrigate valiente catheter to prevent mucus buildup and allow valiente to drain freely  -Urology to continue to follow           VTE Risk Mitigation (From admission, onward)           Ordered     heparin (porcine) injection 5,000 Units  Every 8 hours         05/06/24 2034     IP VTE HIGH RISK PATIENT  Once         05/06/24 2034     Place sequential compression device  Until discontinued         05/06/24 2034                    Edgar Dodson MD  Urology  [facility] - Transplant Stepdown

## 2024-05-07 NOTE — HPI
"Per Chart Review: "Mr. Rodríguez is a 58yoM w/hx of metastatic bladder cancer s/p radical cystectomy w/ileal neobladder (2017) c/b urethral stricture, R hydronephrosis, L common femoral and proximal femoral DVT, pathologic fracture, ESRD who was sent to the hospital from clinic for abnormal labs. He was being seen at Urology Oncology clinic today for follow-up of acute renal failure in the setting of R hydronephrosis and urethral stricture with overflow incontinence. Previously seen at Oncology clinic on 5/2 where a BMP demonstrated Cr 82, K 6.4. Patient was escorted to the ED for further workup.  Patient states that he has been dealing with worsening fatigue, nausea/vomiting, and generalized anorexia.  It has been associated with sinus congestion as well as postnasal drip resulting in recurrent cough which exacerbates his abdominal discomfort.     Diagnosed with malignant neoplasm of urinary bladder in 2016. Initially treated with BCG but with subsequent development of T2N1 disease in 2017. Treated with ddMVAC followed by radical cystectomy with creation of neobladder and lymph node dissection in December. Final stage was [id number]. In July 2022, he developed hematuria leading to secondary anemia. CT Urogram negative for recurrence. MRI of pelvis in august 2022 demonstrated abnormal signal and enhancement of the b/l pubic bones adjacent to the neobladder suspicous for neoplastic infiltration. Biopsy of the bladder neck revealed recurrent high-grade urothelial carcinoma. PET scan showed hypermetabolic retroperitoneal lymphadenopathy and marked activity involve the bladder wall suspicious for tumor. Started on Gemcitabine/carboplatin and EV+ Pembro. Received a single dose of pembrolixumab + [id number] (11/2023) as a part of a phase I through the PCTP but was taken off the trial d/t PCN placement. Has received 6 of 17 cycles of Sacituzumab [id number]-HZIY.     In the ED, patient was tachycardic with pulses in the 120s, " "systolic blood pressures as high as 177/93, afebrile, no respiratory distress or oxygen requirements.  Labs were collected and the patient during his visit with Dr. Abad, his oncologist which revealed a sodium of 126, a potassium of 6.6, a CO2 of 15, acute worsening in his BUN and creatinine to 75 and 8.0 respectively, a magnesium of 1.4.  He was shifted in the emergency room with albuterol and furosemide.  He has a chronic Smith which drained approximately 1 L of urine daily.  A CT of the chest was done which revealed a soft masslike density in the lingula which had enlarged compared to most recent PET-CT scan.  He had interval worsening of left pleural diffuse metastatic disease throughout the lung and bone.  A CT of the abdomen and pelvis revealed severe bilateral hydroureter with right parenchymal thinning as well as a left pelvic mass with new development of worsening left hydronephrosis.     Urology saw the patient in the emergency room and recommended a Nephrology consult as well as IR consultation for left nephrostomy tube placement." Palliative Medicine consulted for GOC  "

## 2024-05-07 NOTE — ASSESSMENT & PLAN NOTE
Patient with left femoral DVT on apixaban.  Holding apixaban currently for planned IR procedure and left nephrostomy tube.  Should resume after procedure.  Continue to monitor for signs and symptoms of embolization.

## 2024-05-07 NOTE — ASSESSMENT & PLAN NOTE
Patient is a 58-year-old male with past medical history significant for metastatic bladder cancer on SG who is presenting with malignant ureter obstruction resulting in renal failure and electrolyte abnormalities.  Patient has had progressively worsening symptoms of fatigue, nausea/vomiting/anorexia, itching, and generalized malaise over the last several weeks.  He presented to clinic today and his labs showed severe hyperkalemia.  He was taken to the emergency room from clinic for further evaluation and admitted to medical oncology floor for urgent left nephrostomy tube placement.    Patient with acute kidney injury/acute renal failure likely due to post-obstructive d/t malignant obstruction. CHI is currently worsening. Baseline creatinine 5.0 - Labs reviewed- Renal function/electrolytes with Estimated Creatinine Clearance: 10.4 mL/min (A) (based on SCr of 8 mg/dL (H)). according to latest data. Monitor urine output and serial BMP and adjust therapy as needed. Avoid nephrotoxins and renally dose meds for GFR listed above.      Plan:  - Continue furosemide  - Consult nephrology  - Renal Diet  - NPO at midnight with plan for L nephrostomy tube (R hydro felt to be chronic)  - No acute indication for HD at this time but monitor UOP, oxygen requirement, and EKG  - Monitor intake/output and daily standing weights.   - Avoid nephrotoxic agents such as NSAIDs, gadolinium and IV radiocontrast.  - Renally dose meds to current GFR.  - Maintain MAP > 65.

## 2024-05-07 NOTE — ASSESSMENT & PLAN NOTE
-Admission to Heme/Onc service  -Recommend urgent Nephrology consult given electrolyte abnormalities. Shift vs Dialysis  -CT shows stable chronic R hydro, new onset L hydronephrosis, L pelvic mass  -IR to place L NT tube placement, right hydronephrosis is chronic  -Trend electrolytes  -Trend Cr  -PRN irrigate valiente catheter to prevent mucus buildup and allow valiente to drain freely  -Urology to continue to follow

## 2024-05-07 NOTE — ASSESSMENT & PLAN NOTE
Patient with potassium of 6.6      This patient has hyperkalemia which is uncontrolled. We will monitor for arrhythmias with EKG or continuous telemetry. We will treat the hyperkalemia with Potassium Binders, Nebulized albuterol sulfate, Furosemide, and Sodium Bicarbonate. The likely etiology of the hyperkalemia is CHI.  The patients latest potassium has been reviewed and the results are listed below  Recent Labs   Lab 05/06/24 1940   K 6.6*

## 2024-05-07 NOTE — CONSULTS
Interventional Radiology   Consult Note      Date: 5/7/2024   Primary team: Networked reference to record PCT , Chan Leos MD   Room/bed: 24108/58567 A    Inpatient consult to Interventional Radiology  Consult performed by: Ritu Moraes PA-C  Consult ordered by: Indio Messer MD             History of Present Illness:  Julio Rodríguez is a 58 y.o. male with a history of BCG refractory NMIBC, MIBC, radical cystectomy w/ ileal neobladder in 2017 admitted on 5/6/2024 for [facility]. CT AP done revealed marked new left hydroureteronephrosis and pre-existing severe right hydroureteronephrosis     Urology evaluated the pt and recommended nephrostomy tube placement.     He previously had a R neph tube placed by IR 12/2023 which was removed.       ROS:   Review of Systems   Constitutional: Negative.    HENT: Negative.     Respiratory: Negative.     Cardiovascular: Negative.    Gastrointestinal: Negative.    Musculoskeletal: Negative.    Skin: Negative.    Neurological: Negative.    Psychiatric/Behavioral: Negative.            Past Medical History:  Past Medical History:   Diagnosis Date    Bladder cancer     CAD (coronary artery disease) 9/12/2023    Prior CABG. Not on antiplatelets. Home medicatiosn include atorvastatin    Carcinoma     forehead    Encounter for blood transfusion     Hypertension     Hypothyroidism 9/12/2023    Home medications include levothyroxine    Malignant melanoma of skin, unspecified     right arm    Malignant neoplasm of urinary bladder 9/12/2023    Melanoma 9/12/2023    History of T1a melanoma; 0.5mm depth without ulceartion. SP wide local excision 02/2022       Past Surgical History:  Past Surgical History:   Procedure Laterality Date    CORONARY ARTERY BYPASS GRAFT  10/2015    CYSTECTOMY, ROBOT-ASSISTED, LAPAROSCOPIC, USING DA MARY XI, WITH ILEAL CONDUIT CREATION  12/2017    CYSTOGRAM N/A 12/5/2023    Procedure: CYSTOGRAM;  Surgeon: Eder Oconnor MD;  Location: [facility] OR [address] [place] [address];  Service:  Urology;  Laterality: N/A;    CYSTOSCOPY N/A 12/5/2023    Procedure: CYSTOSCOPY;  Surgeon: Eder Oconnor MD;  [facility];  Service: Urology;  Laterality: N/A;    DILATION OF BLADDER      dialation of bladder neck- due to claudia bladder- multiple procedures    DILATION OF URETHRA N/A 12/5/2023    Procedure: DILATION, URETHRA;  Surgeon: Eder Oconnor MD;  [facility];  Service: Urology;  Laterality: N/A;    LYMPHADENECTOMY      PERCUTANEOUS NEPHROSTOMY Right 12/8/2023    Procedure: Creation, Nephrostomy, Percutaneous;  Surgeon: Jens Nunez MD;  Location: [facility] CATH LAB;  Service: Radiology;  Laterality: Right;    PLACEMENT N/A 12/5/2023    Procedure: PLACEMENT;  Surgeon: Eder Oconnor MD;  Location: [facility];  Service: Urology;  Laterality: N/A;  placement of valiente over a wire by MD MOHR ROBOTIC PROSTECTOMY, SUPRAPUBIC  12/2017        Sedation History:    No known adverse reactions.     Social History:  Social History     Tobacco Use    Smoking status: Former     Types: Cigarettes     Passive exposure: Never   Substance Use Topics    Alcohol use: Not Currently    Drug use: Never        Home Medications:   Prior to Admission medications    Medication Sig Start Date End Date Taking? Authorizing Provider   apixaban (ELIQUIS) 5 mg Tab Take 1 tablet (5 mg total) by mouth 2 (two) times daily. 4/5/24   Robin Street MD   atorvastatin (LIPITOR) 20 MG tablet Take 20 mg by mouth every evening. 7/19/23   Provider, Historical   benzonatate (TESSALON PERLES) 100 MG capsule Take 1 capsule (100 mg total) by mouth every 6 (six) hours as needed for Cough. 2/24/24 2/23/25  Brenden Newsome MD   cetirizine HCl (ZYRTEC ORAL) Take by mouth as needed.    Provider, Historical   dexAMETHasone (DECADRON) 4 MG Tab Take 2 tablets (8 mg total) by mouth once daily. Take 2 tablets (8 mg total) by mouth once daily. Take a directed on days 2, 3 and 4 of your chemotherapy cycle. 12/15/23   Bridger Abad,  MD   docusate sodium (COLACE) 100 MG capsule Take 220 capsules by mouth every evening.    Provider, Historical   filgrastim (NEUPOGEN) 300 mcg/0.5 mL injection Inject 300mcg subcutaneously for 3 days following each chemotherapy 4/30/24   Bridger Abad MD   fluticasone propionate (FLONASE) 50 mcg/actuation nasal spray 1 spray (50 mcg total) by Each Nostril route once daily.  Patient taking differently: 1 spray by Each Nostril route as needed. 3/17/24   Brenden Newsome MD   Lactobacillus rhamnosus GG (CULTURELLE) 10 billion cell capsule Take 1 capsule by mouth once daily.    Provider, Historical   levothyroxine (SYNTHROID) 50 MCG tablet Take 50 mcg by mouth before breakfast. 7/17/23   Provider, Historical   LINZESS 72 mcg Cap capsule Take 72 mcg by mouth every morning. 8/22/23   Provider, Historical   loperamide (IMODIUM) 2 mg capsule Take 1 capsule (2 mg total) by mouth 4 (four) times daily as needed for Diarrhea. 12/27/23   Bridger Abad MD   loratadine (CLARITIN ORAL) Take by mouth as needed.    Provider, Historical   multivitamin (THERAGRAN) per tablet Take 1 tablet by mouth every morning.    Provider, Historical   OLANZapine (ZYPREXA) 5 MG tablet TAKE 1 TABLET BY MOUTH EVERY EVENING ON DAYS 1 THROUGH 4 OF EACH CHEMOTHERAPY CYCLE 1/11/24   Bridger Abad MD   ondansetron (ZOFRAN-ODT) 8 MG TbDL Take 1 tablet (8 mg total) by mouth every 8 (eight) hours as needed (nausea/vomiting). 12/14/23   Bridger Abad MD   oxyCODONE (ROXICODONE) 5 MG immediate release tablet Take 1 tablet (5 mg total) by mouth every 4 (four) hours as needed for Pain. 4/11/24   Bridger Abad MD   TURMERIC ORAL Take by mouth every morning.    Provider, Historical   vitamin D (VITAMIN D3) 1000 units Tab Take 1 tablet (1,000 Units total) by mouth once daily. 2/29/24   Bridger Abad MD   VTAMA 1 % Crea APPLY 1 APPLICATION ON THE SKIN DAILY 8/14/23   Provider, Historical       Inpatient Medications:    Current  Facility-Administered Medications:     atorvastatin tablet 20 mg, 20 mg, Oral, QHS, Indio Messer MD, 20 mg at 05/06/24 2300    benzonatate capsule 100 mg, 100 mg, Oral, Q4H PRN, Indio Messer MD, 100 mg at 05/07/24 0523    dextrose 10% bolus 125 mL 125 mL, 12.5 g, Intravenous, PRN, Indio Messer MD    dextrose 10% bolus 250 mL 250 mL, 25 g, Intravenous, PRN, Indio Messer MD    [COMPLETED] dextrose 10% bolus 500 mL 500 mL, 50 g, Intravenous, Once, Stopped at 05/07/24 0518 **AND** dextrose 10% bolus 250 mL 250 mL, 25 g, Intravenous, PRN **AND** [COMPLETED] insulin regular injection 7.67 Units 0.0767 mL, 0.1 Units/kg, Intravenous, Once, Indio Messer MD, 7.67 Units at 05/07/24 0530    furosemide injection 80 mg, 80 mg, Intravenous, Q12H, Indio Messer MD, 80 mg at 05/07/24 0907    glucagon (human recombinant) injection 1 mg, 1 mg, Intramuscular, PRN, Indio Messer MD    glucose chewable tablet 16 g, 16 g, Oral, PRN, Indio Messer MD    glucose chewable tablet 24 g, 24 g, Oral, PRN, Indio Messer MD    heparin (porcine) injection 5,000 Units, 5,000 Units, Subcutaneous, Q8H, Indio Messer MD, 5,000 Units at 05/06/24 2339    Lactobacillus rhamnosus GG capsule 1 capsule, 1 capsule, Oral, Daily, Indio Messer MD, 1 capsule at 05/07/24 0906    levothyroxine tablet 50 mcg, 50 mcg, Oral, Before breakfast, Indio Messer MD, 50 mcg at 05/07/24 0523    LIDOcaine 5 % patch 1 patch, 1 patch, Transdermal, Daily, Idnio Messer MD, 1 patch at 05/07/24 0907    melatonin tablet 6 mg, 6 mg, Oral, Nightly PRN, Indio Messer MD    naloxone 0.4 mg/mL injection 0.02 mg, 0.02 mg, Intravenous, PRN, Indio Messer MD    oxyCODONE immediate release tablet 5 mg, 5 mg, Oral, Q4H PRN, Indio Messer MD    oxyCODONE immediate release tablet Tab 10 mg, 10 mg, Oral, Q4H PRN, Indio Messer MD    sodium bicarbonate 150 mEq in dextrose 5 % (D5W) 1,000 mL infusion, , Intravenous, Continuous, Indio Messer MD, Last Rate: 75 mL/hr at 05/06/24 2339, New Bag  at 05/06/24 2339    sodium chloride 0.9% flush 10 mL, 10 mL, Intravenous, PRN, Indio Messer MD    sodium zirconium cyclosilicate packet 10 g, 10 g, Oral, TID, Indio Messer MD, 10 g at 05/07/24 0855     Anticoagulants/Antiplatelets:   Apixaban    Allergies:   Review of patient's allergies indicates:  No Known Allergies    Vital Signs:  Temp: 98.9 °F (37.2 °C) (05/07/24 0803)  Pulse: 95 (05/07/24 0803)  Resp: 16 (05/07/24 0803)  BP: 137/72 (05/07/24 0803)  SpO2: 98 % (05/07/24 0803)    Temp:  [98.9 °F (37.2 °C)-99.9 °F (37.7 °C)]   Pulse:  []   Resp:  [16-23]   BP: (127-177)/(69-93)   SpO2:  [97 %-100 %]      Physical Exam:   Physical Exam  Constitutional:       General: He is not in acute distress.  HENT:      Head: Normocephalic.   Cardiovascular:      Rate and Rhythm: Normal rate and regular rhythm.   Pulmonary:      Effort: Pulmonary effort is normal.   Abdominal:      General: Abdomen is flat.   Neurological:      Mental Status: He is alert and oriented to person, place, and time. Mental status is at baseline.   Psychiatric:         Mood and Affect: Mood normal.         Behavior: Behavior normal.           Sedation Exam:  ASA: III - Patient appears to have severe systemic disease not posing a constant threat to life  Mallampati score: II (hard and soft palate, upper portion of tonsils anduvula visible)    Laboratory:  Lab Results   Component Value Date    INR 1.0 04/23/2024       Lab Results   Component Value Date    WBC 7.25 05/07/2024    HGB 6.6 (L) 05/07/2024    HCT 20.7 (L) 05/07/2024    MCV 90 05/07/2024     05/07/2024      Lab Results   Component Value Date    GLU 96 05/07/2024    GLU 96 05/07/2024     (L) 05/07/2024     (L) 05/07/2024    K 6.4 (HH) 05/07/2024    K 6.4 (HH) 05/07/2024    K 6.4 (HH) 05/07/2024    CL 98 05/07/2024    CL 98 05/07/2024    CO2 16 (L) 05/07/2024    CO2 16 (L) 05/07/2024    BUN 75 (H) 05/07/2024    BUN 75 (H) 05/07/2024    CREATININE 8.3 (H) 05/07/2024     CREATININE 8.3 (H) 05/07/2024    CALCIUM 8.2 (L) 05/07/2024    CALCIUM 8.2 (L) 05/07/2024    MG 1.2 (L) 05/07/2024    ALT 38 05/07/2024    AST 27 05/07/2024    ALBUMIN 1.7 (L) 05/07/2024    BILITOT 0.3 05/07/2024       Imaging:   Reviewed by Magdaleno Whitley MD        ASSESSMENT/PLAN/RECOMMENDATIONS:    BCG refractory NMIBC, MIBC, radical cystectomy w/ ileal neobladder in 2017 admitted on 5/6/2024 for [facility]. CT AP done revealed marked new left hydroureteronephrosis and pre-existing severe right hydroureteronephrosis. Urology evaluated the pt and recommended nephrostomy tube placement.   After d/w urology, will proceed with BILATERAL nephrostomy tube placement.     Plan:  Sedation Plan: up to moderate  Patient will undergo: BILATERAL nephrostomy tube placement 5/7/2024      Ritu Moraes PA-C  Interventional Radiology  Spectra: [phone]  5/7/2024

## 2024-05-07 NOTE — ED TRIAGE NOTES
Presents to ED for hyperkalemia. Reports recently had lab work done and was told to come to ER by Oncologist and Urologist. Hx of Bladder cancer, on Chemo and CKD. Pt also reports frequent nonproductive cough and runny nose. Denies fever, chest pain, sob. Pt Aox4. Moves all extremities spontaneously

## 2024-05-07 NOTE — PROGRESS NOTES
Patient dcd home with hospice. No acute distress noted. Patients mother and father at bedside throughout DC. Consents signed for hospice care. PIV dcd. Patient gave full verbal understanding on all written and verbal dc instructions.

## 2024-05-07 NOTE — HPI
Mr. Rodríguez is a 58-year-old man with metastatic bladder cancer (diagnosed back in 2016) with progression of metastasis despite prior BCG, radical cystectomy with ileal conduit neobladder back in 2017 in addition to chemotherapy complicated by chronic hydronephrosis on th right requiring percutaneous nephrostomy tube although remains present, urethral stricture, left common femoral and proximal femoral DVT, pathologic fractures with invasion of pubic bones & vertebrae, liver, lymphatics and likely metastasis to chest now, CAD, hypertension, chronic indwelling Smith catheter, CKD IV (baseline creatinine ~2.5 to 3), hypothyroidism, CAD with prior x3 vessel CABG and HLD who was admitted with acute renal failure and new left hydroureteronephrosis. On presentation metabolic panel notable for serum sodium 126, potassium  6.6, bicarbonate 15, BUN 75 and creatinine 8. Urology, IR and Nephrology consulted for assistance with management he was ultimately admitted to Oncology service. Hyperkalemia being managed with furosemide IV and shifting.  Tentative plans for IR consultation for left nephrostomy tube placement. Nephrology consulted for assistance with management of ARF on advanced CKD.

## 2024-05-07 NOTE — HOSPITAL COURSE
Pt with hx of metastatic bladder cancer s/p radical cystectomy w/ileal neobladder (2017) c/b urethral stricture admitted to med/onc services for acute renal failure. Creatine trending up to 8.2 and potassium 6.4 after multiple attempts of shifting him. IR consulted for nephrostomy tube placement, however procedure was canceled due to hyperkalemia. After in depth discussion with the patient and after worsening clinical picture, the patient decided that hospice is the best route for him. Hospice arrangement started.

## 2024-05-07 NOTE — ASSESSMENT & PLAN NOTE
- IR canceled plans for bilateral nephrostomy tube placement in light of persist hyperkalemia despite aggressive medical management  in the setting of bilateral hydronephrosis with ARF  - extensive discussion held today with primary team and nephrology service with patient regarding utility of RRT  - ultimately given patient's poor prognosis in the setting progressive malignancy refractory to resection and chemotherapy decision was made not pursue RRT  - patient made DNR/DNI and being transitioned to comfort focused measures only moving forward with tentative plans for hospice placement

## 2024-05-07 NOTE — CONSULTS
[facility] - Transplant Stepdown  Nephrology  Consult Note    Patient Name: Julio Rodríguez  MRN: [id number]  Admission Date: 5/6/2024  Hospital Length of Stay: 1 days  Attending Provider: Chan Leos MD   Primary Care Physician: Char, Primary Doctor  Principal Problem:Acute renal failure superimposed on stage 4 chronic kidney disease    Inpatient consult to Nephrology  Consult performed by: Marcelino Nance MD  Consult ordered by: Indio Messer MD        Subjective:     HPI: Mr. Rodríguez is a 58-year-old man with metastatic bladder cancer (diagnosed back in 2016) with progression of metastasis despite prior BCG, radical cystectomy with ileal conduit neobladder back in 2017 in addition to chemotherapy complicated by chronic hydronephrosis on th right requiring percutaneous nephrostomy tube although remains present, urethral stricture, left common femoral and proximal femoral DVT, pathologic fractures with invasion of pubic bones & vertebrae, liver, lymphatics and likely metastasis to chest now, CAD, hypertension, chronic indwelling Smith catheter, CKD IV (baseline creatinine ~2.5 to 3), hypothyroidism, CAD with prior x3 vessel CABG and HLD who was admitted with acute renal failure and new left hydroureteronephrosis. On presentation metabolic panel notable for serum sodium 126, potassium  6.6, bicarbonate 15, BUN 75 and creatinine 8. Urology, IR and Nephrology consulted for assistance with management he was ultimately admitted to Oncology service. Hyperkalemia being managed with furosemide IV and shifting.  Tentative plans for IR consultation for left nephrostomy tube placement. Nephrology consulted for assistance with management of ARF on advanced CKD.    Past Medical History:   Diagnosis Date    Bladder cancer     CAD (coronary artery disease) 9/12/2023    Prior CABG. Not on antiplatelets. Home medicatiosn include atorvastatin    Carcinoma     forehead    Encounter for blood transfusion     Hypertension      Hypothyroidism 9/12/2023    Home medications include levothyroxine    Malignant melanoma of skin, unspecified     right arm    Malignant neoplasm of urinary bladder 9/12/2023    Melanoma 9/12/2023    History of T1a melanoma; 0.5mm depth without ulceartion. SP wide local excision 02/2022       Past Surgical History:   Procedure Laterality Date    CORONARY ARTERY BYPASS GRAFT  10/2015    CYSTECTOMY, ROBOT-ASSISTED, LAPAROSCOPIC, USING DA MARY XI, WITH ILEAL CONDUIT CREATION  12/2017    CYSTOGRAM N/A 12/5/2023    Procedure: CYSTOGRAM;  Surgeon: Eder Oconnor MD;  [facility];  Service: Urology;  Laterality: N/A;    CYSTOSCOPY N/A 12/5/2023    Procedure: CYSTOSCOPY;  Surgeon: Eder Oconnor MD;  [facility];  Service: Urology;  Laterality: N/A;    DILATION OF BLADDER      dialation of bladder neck- due to claudia bladder- multiple procedures    DILATION OF URETHRA N/A 12/5/2023    Procedure: DILATION, URETHRA;  Surgeon: Eder Oconnor MD;  Location: [facility] OR [address] [place] [address];  Service: Urology;  Laterality: N/A;    LYMPHADENECTOMY      PERCUTANEOUS NEPHROSTOMY Right 12/8/2023    Procedure: Creation, Nephrostomy, Percutaneous;  Surgeon: Jens Nunez MD;  Location: [facility] CATH LAB;  Service: Radiology;  Laterality: Right;    PLACEMENT N/A 12/5/2023    Procedure: PLACEMENT;  Surgeon: Eder Oconnor MD;  [facility];  Service: Urology;  Laterality: N/A;  placement of valiente over a wire by MD MOHR ROBOTIC PROSTECTOMY, SUPRAPUBIC  12/2017       Review of patient's allergies indicates:  No Known Allergies  Current Facility-Administered Medications   Medication Frequency    atorvastatin tablet 20 mg QHS    benzonatate capsule 100 mg Q4H PRN    dextrose 10% bolus 125 mL 125 mL PRN    dextrose 10% bolus 250 mL 250 mL PRN    dextrose 10% bolus 250 mL 250 mL PRN    furosemide injection 80 mg Q12H    glucagon (human recombinant) injection 1 mg PRN    glucose chewable tablet 16 g PRN    glucose chewable  tablet 24 g PRN    heparin (porcine) injection 5,000 Units Q8H    Lactobacillus rhamnosus GG capsule 1 capsule Daily    levothyroxine tablet 50 mcg Before breakfast    LIDOcaine 5 % patch 1 patch Daily    melatonin tablet 6 mg Nightly PRN    naloxone 0.4 mg/mL injection 0.02 mg PRN    oxyCODONE immediate release tablet 5 mg Q4H PRN    oxyCODONE immediate release tablet Tab 10 mg Q4H PRN    sodium bicarbonate 150 mEq in dextrose 5 % (D5W) 1,000 mL infusion Continuous    sodium chloride 0.9% flush 10 mL PRN    sodium zirconium cyclosilicate packet 10 g TID     Family History       Problem Relation (Age of Onset)    Breast cancer Maternal Cousin    Heart attack Paternal Uncle    Heart disease Father          Tobacco Use    Smoking status: Former     Types: Cigarettes     Passive exposure: Never    Smokeless tobacco: Not on file   Substance and Sexual Activity    Alcohol use: Not Currently    Drug use: Never    Sexual activity: Not on file     Review of Systems   Constitutional:  Positive for activity change, appetite change and fatigue. Negative for chills and fever.   HENT:  Positive for congestion and sore throat.    Respiratory:  Positive for cough. Negative for shortness of breath.    Cardiovascular:  Positive for leg swelling. Negative for chest pain and palpitations.   Gastrointestinal:  Positive for abdominal pain, nausea and vomiting. Negative for abdominal distention, constipation and diarrhea.   Musculoskeletal:  Positive for back pain.   Skin:  Negative for rash.   Neurological:  Positive for weakness. Negative for light-headedness and headaches.   Hematological:  Does not bruise/bleed easily.     Objective:     Vital Signs (Most Recent):  Temp: 99.8 °F (37.7 °C) (05/07/24 0653)  Pulse: 92 (05/07/24 0747)  Resp: 18 (05/07/24 0747)  BP: (!) 146/82 (05/07/24 0653)  SpO2: 97 % (05/07/24 0747) Vital Signs (24h Range):  Temp:  [98.9 °F (37.2 °C)-99.9 °F (37.7 °C)] 99.8 °F (37.7 °C)  Pulse:  [] 92  Resp:   [18-23] 18  SpO2:  [97 %-100 %] 97 %  BP: (127-177)/(69-93) 146/82     Weight: 76.7 kg (169 lb) (05/07/24 9026)  Body mass index is 24.25 kg/m².  Body surface area is 1.95 meters squared.    I/O last 3 completed shifts:  In: -   Out: 200 [Urine:200]     Physical Exam  Vitals and nursing note reviewed.   Constitutional:       General: He is not in acute distress.     Appearance: He is ill-appearing. He is not diaphoretic.   HENT:      Head: Normocephalic and atraumatic.      Right Ear: External ear normal.      Left Ear: External ear normal.      Nose: Nose normal.      Mouth/Throat:      Mouth: Mucous membranes are moist.      Pharynx: Oropharynx is clear. No oropharyngeal exudate or posterior oropharyngeal erythema.   Eyes:      General: No scleral icterus.        Right eye: No discharge.         Left eye: No discharge.      Extraocular Movements: Extraocular movements intact.      Conjunctiva/sclera: Conjunctivae normal.   Cardiovascular:      Rate and Rhythm: Normal rate.   Pulmonary:      Effort: Pulmonary effort is normal. No respiratory distress.      Breath sounds: No wheezing, rhonchi or rales.   Chest:      Comments: Well healed sternotomy scar and Port in place.  Abdominal:      General: A surgical scar is present. Bowel sounds are normal. There is no distension.      Palpations: Abdomen is soft.      Tenderness: There is no abdominal tenderness.   Musculoskeletal:      Cervical back: Neck supple.      Right lower leg: No edema.      Left lower leg: Edema present.   Skin:     General: Skin is warm and dry.      Coloration: Skin is pale. Skin is not jaundiced.   Neurological:      General: No focal deficit present.      Mental Status: He is alert. Mental status is at baseline.      Cranial Nerves: No cranial nerve deficit.      Motor: No weakness.   Psychiatric:         Mood and Affect: Affect is tearful.         Behavior: Behavior normal.          Significant Labs:  BMP:   Recent Labs   Lab 05/07/24  5295    *  129*   *  123*   K 6.3*  6.3*  6.3*   CL 99  99   CO2 13*  13*   BUN 74*  74*   CREATININE 8.2*  8.2*   CALCIUM 8.3*  8.3*   MG 1.2*     CBC:   Recent Labs   Lab 05/07/24  0830   WBC 7.25   RBC 2.29*   HGB 6.6*   HCT 20.7*      MCV 90   MCH 28.8   MCHC 31.9*     CMP:   Recent Labs   Lab 05/07/24  0450   *  129*   CALCIUM 8.3*  8.3*   ALBUMIN 1.7*   PROT 5.7*   *  123*   K 6.3*  6.3*  6.3*   CO2 13*  13*   CL 99  99   BUN 74*  74*   CREATININE 8.2*  8.2*   ALKPHOS 86   ALT 38   AST 27   BILITOT 0.3     LFTs:   Recent Labs   Lab 05/07/24  0450   ALT 38   AST 27   ALKPHOS 86   BILITOT 0.3   PROT 5.7*   ALBUMIN 1.7*     Microbiology Results (last 7 days)       ** No results found for the last 168 hours. **          Specimen (24h ago, onward)      None          Recent Labs   Lab 05/02/24  0650   COLORU Yellow   SPECGRAV 1.010   PHUR 7.0   PROTEINUA 2+*   BACTERIA Rare   NITRITE Negative   LEUKOCYTESUR 2+*   HYALINECASTS 0     Significant Imaging:  I have reviewed all imagining in the last 24 hours.  Assessment/Plan:     Renal/  * Acute renal failure superimposed on stage 4 chronic kidney disease  Hyperkalemia  Metabolic acidosis  Bilateral hydronephrosis  - IR canceled plans for bilateral nephrostomy tube placement in light of persist hyperkalemia despite aggressive medical management  in the setting of bilateral hydronephrosis with ARF  - extensive discussion held today with primary team and nephrology service with patient regarding utility of RRT  - ultimately given patient's poor prognosis in the setting progressive malignancy refractory to resection and chemotherapy decision was made not pursue RRT  - patient made DNR/DNI and being transitioned to comfort focused measures only moving forward with tentative plans for hospice placement       Oncology  Metastatic disease  Malignant neoplasm of urinary bladder  - management per Oncology service    Thank you for  your consult. I will sign off. Please contact us if you have any additional questions.    Marcelino Nance MD  Nephrology  [facility] - Transplant Stepdown

## 2024-05-07 NOTE — CONSULTS
[facility] - Emergency Dept  Urology  Consult Note    Patient Name: Julio Rodríguez  MRN: [id number]  Admission Date: 5/6/2024  Hospital Length of Stay: 0   Code Status: Full Code   Attending Provider: Chan Leos MD   Consulting Provider: Edgar Dodson MD  Primary Care Physician: Char, Primary Doctor  Principal Problem:Acute renal failure    Inpatient consult to Urology  Consult performed by: Edgar Dodson MD  Consult ordered by: Edgar Dodson MD  Reason for consult: bilateral hydro in neobladder, chi, electrolyte abnormalities          Subjective:     HPI:  Julio Rodríguez is a  58 y.o. 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 He underwent radical cystectomy w/ ileal neobladder in 2017. He then subsequently developed local and systemic recurrence. He is being followed by Dr Leos and part of the Phase 1 program. He did also receive palliative radiation to the pelvis after pathologic pelvic fracture. He was seen in clinic today with Dr. Oconnor and sent to the ED due to concerns for electrolyte abnormalities and severe CHI.     He currently catheterizes his pouch as needed, but reports he has had multiple dilations in the past and continues to struggle to catheterize his bladder. Last dilation 12/5/2023 and since then able to catheterize successfully.      Had right nephrostomy tube placed in the past to try to improve overall GFR, but this was unsuccessful. Nephrostomy tube was removed on 12/21/2023. Since then GFR has been stable if not improved.      Following with Dr. Abad for chemo (sacituzumab) -- reports doing well on chemotherapy.     Blood thinners:  None    No Hx of TIA, MI, and CVA   Abdominal surgeries:  Radical cystectomy with creation of ileal neobladder          From clinic 5/6/2024: Seen by Dr. Abad last week on 5/2/24. At that time he was having difficulty with CIC, was in overflow incontinence, and Cr was 5.0. An indwelling Smith was left in place. Today he  presents with a BMP that resulted during the clinic visit - Cr 8.2, K 6.4. He has an indwelling Valiente catheter in place that has been putting out about 1L of urine per day.     Urological History:     12/5/2023 -- Cystoscopy, urethral dilation of neobladder  12/8/2023 -- IR right nephrostomy tube placement  12/9/2023 -- CT AP w/o contrast - stable lung disease, progressive RP LND, possible metastatic diease to liver   12/21/2023 -- Right nephrostomy tube removal in clinic  2/27/2024 -- NM PET -- widely metastatic urothelial carcinoma  5/2/2024 -- CT Chest -- widely metastatic disease, progression from previous scan despite systemic therapy     In the ED, AFVSS, he is noticeably fatigued, reports n/v, and difficulty with PO intake. Reports mucus clogging of catheter over the past week requiring flushing of the catheter    , K 6.6, Mg 1.4, Cr 8.0, Hgb 8.8  CT AP shows severe bilateral hydroureter to the level of the neobladder, significant R parenchymal thinning, neobladder with valiente in place, left pelvic mass        Past Medical History:   Diagnosis Date    Bladder cancer     CAD (coronary artery disease) 9/12/2023    Prior CABG. Not on antiplatelets. Home medicatiosn include atorvastatin    Carcinoma     forehead    Encounter for blood transfusion     Hypertension     Hypothyroidism 9/12/2023    Home medications include levothyroxine    Malignant melanoma of skin, unspecified     right arm    Malignant neoplasm of urinary bladder 9/12/2023    Melanoma 9/12/2023    History of T1a melanoma; 0.5mm depth without ulceartion. SP wide local excision 02/2022       Past Surgical History:   Procedure Laterality Date    CORONARY ARTERY BYPASS GRAFT  10/2015    CYSTECTOMY, ROBOT-ASSISTED, LAPAROSCOPIC, USING DA MARY XI, WITH ILEAL CONDUIT CREATION  12/2017    CYSTOGRAM N/A 12/5/2023    Procedure: CYSTOGRAM;  Surgeon: Eder Oconnor MD;  Location: [facility] OR [address] [place] [address];  Service: Urology;  Laterality: N/A;    CYSTOSCOPY N/A  12/5/2023    Procedure: CYSTOSCOPY;  Surgeon: Eder Oconnor MD;  Location: [facility] OR [facility] FLR;  Service: Urology;  Laterality: N/A;    DILATION OF BLADDER      dialation of bladder neck- due to claudia bladder- multiple procedures    DILATION OF URETHRA N/A 12/5/2023    Procedure: DILATION, URETHRA;  Surgeon: Eder Oconnor MD;  Location: [facility] OR [facility] FLR;  Service: Urology;  Laterality: N/A;    LYMPHADENECTOMY      PERCUTANEOUS NEPHROSTOMY Right 12/8/2023    Procedure: Creation, Nephrostomy, Percutaneous;  Surgeon: Jens Nunez MD;  Location: [facility] CATH LAB;  Service: Radiology;  Laterality: Right;    PLACEMENT N/A 12/5/2023    Procedure: PLACEMENT;  Surgeon: Eder Oconnor MD;  [facility];  Service: Urology;  Laterality: N/A;  placement of valiente over a wire by MD MOHR ROBOTIC PROSTECTOMY, SUPRAPUBIC  12/2017       Review of patient's allergies indicates:  No Known Allergies    Family History       Problem Relation (Age of Onset)    Breast cancer Maternal Cousin    Heart attack Paternal Uncle    Heart disease Father            Tobacco Use    Smoking status: Former     Types: Cigarettes     Passive exposure: Never    Smokeless tobacco: Not on file   Substance and Sexual Activity    Alcohol use: Not Currently    Drug use: Never    Sexual activity: Not on file       Review of Systems    Objective:     Temp:  [98.9 °F (37.2 °C)] 98.9 °F (37.2 °C)  Pulse:  [87-98] 88  Resp:  [18] 18  SpO2:  [98 %-100 %] 100 %  BP: (149-151)/(80-93) 151/80  Weight: 76.7 kg (169 lb)  Body mass index is 24.25 kg/m².    Date 05/06/24 0700 - 05/07/24 0659   Shift 6999-9220 4264-8654 4146-2411 24 Hour Total   INTAKE   Shift Total(mL/kg)       OUTPUT   Urine  200  200   Shift Total(mL/kg)  200(2.6)  200(2.6)   Weight (kg)  76.7 76.7 76.7          Drains       Drain  Duration                  Nephrostomy 12/08/23 1038 Right 8 Fr. 150 days                     Physical Exam  Constitutional:       Comments: Cachectic    HENT:      Head:  Normocephalic.   Cardiovascular:      Rate and Rhythm: Normal rate.   Pulmonary:      Effort: Respiratory distress present.   Abdominal:      Tenderness: There is abdominal tenderness. There is right CVA tenderness and left CVA tenderness.   Genitourinary:     Comments: 16 Fr valiente catheter draining concentrated urine         Significant Labs:    BMP:  Recent Labs   Lab 05/02/24  1440 05/06/24  1448 05/06/24 1940   * 127* 126*   K 5.0 6.4* 6.6*    100 96   CO2 16* 18* 15*   BUN 54* 74* 75*   CREATININE 5.0* 8.2* 8.0*   CALCIUM 9.1 9.2 9.6       CBC:  Recent Labs   Lab 05/02/24  1440 05/06/24  1448 05/06/24 1940   WBC 4.64 8.85 9.64   HGB 7.4* 7.8* 8.8*   HCT 24.1* 24.6* 27.9*    393 432       All pertinent labs results from the past 24 hours have been reviewed.    Significant Imaging:  All pertinent imaging results/findings from the past 24 hours have been reviewed.                    Assessment and Plan:     Malignant neoplasm of urinary bladder  -Recommend admission to Heme/Onc service  -Recommend urgent Nephrology consult given electrolyte abnormalities. Shift vs Dialysis  -CT shows stable chronic R hydro, new onset L hydronephrosis, L pelvic mass  -Recommend IR consult for L NT tube placement as right hydronephrosis is chronic  -Trend electrolytes  -Trend Cr  -PRN irrigate valiente catheter to prevent mucus buildup and allow valiente to drain freely  -Urology to continue to follow           VTE Risk Mitigation (From admission, onward)           Ordered     heparin (porcine) injection 5,000 Units  Every 8 hours         05/06/24 2034     IP VTE HIGH RISK PATIENT  Once         05/06/24 2034     Place sequential compression device  Until discontinued         05/06/24 2034                    Thank you for your consult. I will follow-up with patient. Please contact us if you have any additional questions.    Edgar Dodson MD  Urology  [facility] - Emergency Dept

## 2024-05-07 NOTE — PROGRESS NOTES
Nurses Note -- 4 Eyes      5/7/2024   1:32 PM      Skin assessed during: Daily Assessment      [] No Altered Skin Integrity Present    []Prevention Measures Documented      [] Yes- Altered Skin Integrity Present or Discovered   [] LDA Added if Not in Epic (Describe Wound)   [] New Altered Skin Integrity was Present on Admit and Documented in LDA   [] Wound Image Taken    Wound Care Consulted? No    Attending Nurse:  Summer Norman RN/Staff Member:   Tiffany

## 2024-05-07 NOTE — PLAN OF CARE
[facility]  Department of Hospital Medicine  [address] [place] [address]  [phone] after hours  [phone] fax    HOSPICE  ORDERS    05/07/2024    Admit to Hospice:  Home Service     Diagnoses:   Active Hospital Problems    Diagnosis  POA    *Acute renal failure [N17.9]  Yes    Bladder carcinoma [C67.9]  Unknown    Metabolic acidosis [E87.20]  Yes    Hyperkalemia [E87.5]  Yes    Acute deep vein thrombosis (DVT) of left femoral vein [I82.412]  Yes    Metastatic disease [C79.9]  Yes    CKD (chronic kidney disease) [N18.9]  Yes    Malignant neoplasm of urinary bladder [C67.9]  Yes     Metastatic bladder cancer previously treated with neoadjuvant ddMVAC, radical cystectomy, and then carboplatin/gemcitabine and EV+ Pembro following local and metastatic recurrence. Subsequetnly received a single dose of pembrolizumab + [id number] 11/2/23 as part of a phase I trial through the [facility], but was taken off trial for elevated creatinine despite PCN placement. Starting sacituzumab govitecan 12/28/23 with empiric 25% DR for UGT1A1 deficiency. Progressive disease noted on PET CT 02/28/2024 and increased SG to 10mg/kg.      Hypothyroidism [E03.9]  Yes     Home medications include levothyroxine        Resolved Hospital Problems   No resolved problems to display.       Hospice Qualifying Diagnoses: metastatic bladder cancer        Patient has a life expectancy < 6 months due to:  Primary Hospice Diagnosis: metastatic bladder cancer     Comorbid Conditions Contributing to Decline: Acute renal failure     Vital Signs: Routine per Hospice Protocol.    Code Status: DNR    Allergies: Review of patient's allergies indicates:  No Known Allergies    Diet: Regular diet      Activities: As tolerated    Goals of Care Treatment Preferences:  Code Status: DNR          What is most important right now is to focus on remaining as independent as possible, symptom/pain control, improvement in  condition but with limits to invasive therapies.  Accordingly, we have decided that the best plan to meet the patient's goals includes continuing with treatment.      Nursing: Per Hospice Routine.     Routine Skin for Bedridden Patients: Apply moisture barrier cream to all skin folds and   wet areas in perineal area daily and after baths and all bowel movements.      Oxygen: Currently on RA     Medications:        Medication List        CONTINUE taking these medications      benzonatate 100 MG capsule  Commonly known as: TESSALON PERLES  Take 1 capsule (100 mg total) by mouth every 6 (six) hours as needed for Cough.     CLARITIN ORAL  Take by mouth as needed.     levothyroxine 50 MCG tablet  Commonly known as: SYNTHROID  Take 50 mcg by mouth before breakfast.     LINZESS 72 mcg Cap capsule  Generic drug: linaCLOtide  Take 72 mcg by mouth every morning.     loperamide 2 mg capsule  Commonly known as: IMODIUM  Take 1 capsule (2 mg total) by mouth 4 (four) times daily as needed for Diarrhea.     multivitamin per tablet  Commonly known as: THERAGRAN  Take 1 tablet by mouth every morning.     OLANZapine 5 MG tablet  Commonly known as: ZyPREXA  TAKE 1 TABLET BY MOUTH EVERY EVENING ON DAYS 1 THROUGH 4 OF EACH CHEMOTHERAPY CYCLE     ondansetron 8 MG Tbdl  Commonly known as: ZOFRAN-ODT  Take 1 tablet (8 mg total) by mouth every 8 (eight) hours as needed (nausea/vomiting).     oxyCODONE 5 MG immediate release tablet  Commonly known as: ROXICODONE  Take 1 tablet (5 mg total) by mouth every 4 (four) hours as needed for Pain.     vitamin D 1000 units Tab  Commonly known as: VITAMIN D3  Take 1 tablet (1,000 Units total) by mouth once daily.     VTAMA 1 % Crea  Generic drug: tapinarof  APPLY 1 APPLICATION ON THE SKIN DAILY     ZYRTEC ORAL  Take by mouth as needed.            STOP taking these medications      apixaban 5 mg Tab  Commonly known as: ELIQUIS     atorvastatin 20 MG tablet  Commonly known as: LIPITOR     dexAMETHasone 4  MG Tab  Commonly known as: DECADRON     docusate sodium 100 MG capsule  Commonly known as: COLACE     filgrastim 300 mcg/0.5 mL injection  Commonly known as: NEUPOGEN     fluticasone propionate 50 mcg/actuation nasal spray  Commonly known as: FLONASE     Lactobacillus rhamnosus GG 10 billion cell capsule  Commonly known as: CULTURELLE     TURMERIC ORAL                DIABETES CARE: Nurse to perform and educate diabetic management with blood glucose monitoring:      Fingerstick blood sugar a.m. and p.m.     Fingerstick blood sugar AC and HS     Fingerstick blood sugar every 6 hours if patient is unable to eat    Report CBG < 60 or > 350 to physician.         Insulin Sliding Scale         Glucose  Novolog Insulin Subcutaneous        0 - 60   Orange juice or glucose tablet      No insulin   201-250  2 units   251-300  4 units   301-350  6 units   351-400  8 units   >400   10 units then call physician      Future Orders:  Hospice Medical Director may dictate new orders for comfortable care measures & sign death certificate.        _________________________________  Damian Dickson MD  05/07/2024

## 2024-05-07 NOTE — PLAN OF CARE
Shifted patient w/ insulin for K+ 6.3  Pre insulin glucose- 238  30 min post - 190  60 min post - 137    Pt not diabetic

## 2024-05-07 NOTE — PLAN OF CARE
Nurses Note -- 4 Eyes      5/7/2024   6:33 AM      Skin assessed during: Admit      [x] No Altered Skin Integrity Present    []Prevention Measures Documented      [] Yes- Altered Skin Integrity Present or Discovered   [] LDA Added if Not in Epic (Describe Wound)   [] New Altered Skin Integrity was Present on Admit and Documented in LDA   [] Wound Image Taken    Wound Care Consulted? No    Attending Nurse:  Mohini Vences RN     Second RN/Staff Member:   Dolores Pineda RN

## 2024-05-07 NOTE — HPI
Julio Rodríguez is a  58 y.o. 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 He underwent radical cystectomy w/ ileal neobladder in 2017. He then subsequently developed local and systemic recurrence. He is being followed by Dr Leos and part of the Phase 1 program. He did also receive palliative radiation to the pelvis after pathologic pelvic fracture. He was seen in clinic today with Dr. Oconnor and sent to the ED due to concerns for electrolyte abnormalities and severe CHI.     He currently catheterizes his pouch as needed, but reports he has had multiple dilations in the past and continues to struggle to catheterize his bladder. Last dilation 12/5/2023 and since then able to catheterize successfully.      Had right nephrostomy tube placed in the past to try to improve overall GFR, but this was unsuccessful. Nephrostomy tube was removed on 12/21/2023. Since then GFR has been stable if not improved.      Following with Dr. Abad for chemo (sacituzumab) -- reports doing well on chemotherapy.     Blood thinners:  None    No Hx of TIA, MI, and CVA   Abdominal surgeries:  Radical cystectomy with creation of ileal neobladder          From clinic 5/6/2024: Seen by Dr. Abad last week on 5/2/24. At that time he was having difficulty with CIC, was in overflow incontinence, and Cr was 5.0. An indwelling Smith was left in place. Today he presents with a BMP that resulted during the clinic visit - Cr 8.2, K 6.4. He has an indwelling Smith catheter in place that has been putting out about 1L of urine per day.     Urological History:     12/5/2023 -- Cystoscopy, urethral dilation of neobladder  12/8/2023 -- IR right nephrostomy tube placement  12/9/2023 -- CT AP w/o contrast - stable lung disease, progressive RP LND, possible metastatic diease to liver   12/21/2023 -- Right nephrostomy tube removal in clinic  2/27/2024 -- NM PET -- widely metastatic urothelial carcinoma  5/2/2024 -- CT Chest -- widely  metastatic disease, progression from previous scan despite systemic therapy     In the ED, AFVSS, he is noticeably fatigued, reports n/v, and difficulty with PO intake. Reports mucus clogging of catheter over the past week requiring flushing of the catheter    , K 6.6, Mg 1.4, Cr 8.0, Hgb 8.8  CT AP shows severe bilateral hydroureter to the level of the neobladder, significant R parenchymal thinning, neobladder with valiente in place, left pelvic mass

## 2024-05-07 NOTE — H&P
[facility] - Emergency Dept  Hematology/Oncology  H&P    Patient Name: Julio Rodríguez  MRN: [id number]  Admission Date: 5/6/2024  Code Status: Full Code   Attending Provider: Chan Leos MD  Primary Care Physician: No, Primary Doctor  Principal Problem:Acute renal failure    Subjective:     HPI: Mr. Rodríguez is a 58yoM w/hx of metastatic bladder cancer s/p radical cystectomy w/ileal neobladder (2017) c/b urethral stricture, R hydronephrosis, L common femoral and proximal femoral DVT, pathologic fracture, ESRD who was sent to the hospital from clinic for abnormal labs. He was being seen at Urology Oncology clinic today for follow-up of acute renal failure in the setting of R hydronephrosis and urethral stricture with overflow incontinence. Previously seen at Oncology clinic on 5/2 where a BMP demonstrated Cr 82, K 6.4. Patient was escorted to the ED for further workup.  Patient states that he has been dealing with worsening fatigue, nausea/vomiting, and generalized anorexia.  It has been associated with sinus congestion as well as postnasal drip resulting in recurrent cough which exacerbates his abdominal discomfort.    Diagnosed with malignant neoplasm of urinary bladder in 2016. Initially treated with BCG but with subsequent development of T2N1 disease in 2017. Treated with ddMVAC followed by radical cystectomy with creation of neobladder and lymph node dissection in December. Final stage was [id number]. In July 2022, he developed hematuria leading to secondary anemia. CT Urogram negative for recurrence. MRI of pelvis in august 2022 demonstrated abnormal signal and enhancement of the b/l pubic bones adjacent to the neobladder suspicous for neoplastic infiltration. Biopsy of the bladder neck revealed recurrent high-grade urothelial carcinoma. PET scan showed hypermetabolic retroperitoneal lymphadenopathy and marked activity involve the bladder wall suspicious for tumor. Started on Gemcitabine/carboplatin and EV+  Pembro. Received a single dose of pembrolixumab + NTX-1088 (11/2023) as a part of a phase I through the [facility] but was taken off the trial d/t PCN placement. Has received 6 of 17 cycles of Sacituzumab Govitecan-HZIY.    In the ED, patient was tachycardic with pulses in the 120s, systolic blood pressures as high as 177/93, afebrile, no respiratory distress or oxygen requirements.  Labs were collected and the patient during his visit with Dr. Abad, his oncologist which revealed a sodium of 126, a potassium of 6.6, a CO2 of 15, acute worsening in his BUN and creatinine to 75 and 8.0 respectively, a magnesium of 1.4.  He was shifted in the emergency room with albuterol and furosemide.  He has a chronic Smith which drained approximately 1 L of urine daily.  A CT of the chest was done which revealed a soft masslike density in the lingula which had enlarged compared to most recent PET-CT scan.  He had interval worsening of left pleural diffuse metastatic disease throughout the lung and bone.  A CT of the abdomen and pelvis revealed severe bilateral hydroureter with right parenchymal thinning as well as a left pelvic mass with new development of worsening left hydronephrosis.    Urology saw the patient in the emergency room and recommended a Nephrology consult as well as IR consultation for left nephrostomy tube placement.     Oncology Treatment Plan:   OP SACITUZUMAB GOVITECAN-HZIY Q3W    Medications:  Continuous Infusions:  Current Facility-Administered Medications   Medication Dose Route Frequency Last Rate Last Admin     Scheduled Meds:  Current Facility-Administered Medications   Medication Dose Route Frequency    atorvastatin  20 mg Oral QHS    heparin (porcine)  5,000 Units Subcutaneous Q8H    [START ON 5/7/2024] Lactobacillus rhamnosus GG  1 capsule Oral Daily    [START ON 5/7/2024] levothyroxine  50 mcg Oral Before breakfast    [START ON 5/7/2024] LIDOcaine  1 patch Transdermal Daily    sodium zirconium  cyclosilicate  10 g Oral TID     PRN Meds:  Current Facility-Administered Medications:     dextrose 10%, 12.5 g, Intravenous, PRN    dextrose 10%, 25 g, Intravenous, PRN    glucagon (human recombinant), 1 mg, Intramuscular, PRN    glucose, 16 g, Oral, PRN    glucose, 24 g, Oral, PRN    melatonin, 6 mg, Oral, Nightly PRN    naloxone, 0.02 mg, Intravenous, PRN    oxyCODONE, 10 mg, Oral, Q4H PRN    oxyCODONE, 5 mg, Oral, Q4H PRN    sodium chloride 0.9%, 10 mL, Intravenous, PRN     Review of patient's allergies indicates:  No Known Allergies     Past Medical History:   Diagnosis Date    Bladder cancer     CAD (coronary artery disease) 9/12/2023    Prior CABG. Not on antiplatelets. Home medicatiosn include atorvastatin    Carcinoma     forehead    Encounter for blood transfusion     Hypertension     Hypothyroidism 9/12/2023    Home medications include levothyroxine    Malignant melanoma of skin, unspecified     right arm    Malignant neoplasm of urinary bladder 9/12/2023    Melanoma 9/12/2023    History of T1a melanoma; 0.5mm depth without ulceartion. SP wide local excision 02/2022     Past Surgical History:   Procedure Laterality Date    CORONARY ARTERY BYPASS GRAFT  10/2015    CYSTECTOMY, ROBOT-ASSISTED, LAPAROSCOPIC, USING DA MARY XI, WITH ILEAL CONDUIT CREATION  12/2017    CYSTOGRAM N/A 12/5/2023    Procedure: CYSTOGRAM;  Surgeon: Eder Oconnor MD;  Location: [address];  Service: Urology;  Laterality: N/A;    CYSTOSCOPY N/A 12/5/2023    Procedure: CYSTOSCOPY;  Surgeon: Eder Oconnor MD;  Location: [facility] OR [address] [place] [address];  Service: Urology;  Laterality: N/A;    DILATION OF BLADDER      dialation of bladder neck- due to claudia bladder- multiple procedures    DILATION OF URETHRA N/A 12/5/2023    Procedure: DILATION, URETHRA;  Surgeon: Eder Oconnor MD;  Location: [facility] OR [address] [place] [address];  Service: Urology;  Laterality: N/A;    LYMPHADENECTOMY      PERCUTANEOUS NEPHROSTOMY Right 12/8/2023    Procedure: Creation, Nephrostomy,  Percutaneous;  Surgeon: Jens Nunez MD;  Location: [facility] CATH LAB;  Service: Radiology;  Laterality: Right;    PLACEMENT N/A 12/5/2023    Procedure: PLACEMENT;  Surgeon: Eder Oconnor MD;  Location: [facility] OR [address] [place] [address];  Service: Urology;  Laterality: N/A;  placement of valiente over a wire by MD MOHR ROBOTIC PROSTECTOMY, SUPRAPUBIC  12/2017     Family History       Problem Relation (Age of Onset)    Breast cancer Maternal Cousin    Heart attack Paternal Uncle    Heart disease Father          Tobacco Use    Smoking status: Former     Types: Cigarettes     Passive exposure: Never    Smokeless tobacco: Not on file   Substance and Sexual Activity    Alcohol use: Not Currently    Drug use: Never    Sexual activity: Not on file       Review of Systems   Constitutional:  Positive for activity change, appetite change and fatigue. Negative for chills and fever.   HENT:  Positive for congestion and sore throat.    Respiratory:  Positive for cough. Negative for shortness of breath.    Cardiovascular:  Positive for leg swelling. Negative for chest pain and palpitations.   Gastrointestinal:  Positive for abdominal pain, nausea and vomiting. Negative for abdominal distention, constipation and diarrhea.   Musculoskeletal:  Positive for back pain.   Skin:  Negative for rash.   Neurological:  Positive for weakness. Negative for light-headedness and headaches.   Hematological:  Does not bruise/bleed easily.     Objective:     Vital Signs (Most Recent):  Temp: 98.9 °F (37.2 °C) (05/06/24 1651)  Pulse: 95 (05/06/24 2042)  Resp: 18 (05/06/24 2031)  BP: (!) 177/86 (05/06/24 2042)  SpO2: 100 % (05/06/24 2042) Vital Signs (24h Range):  Temp:  [98.9 °F (37.2 °C)] 98.9 °F (37.2 °C)  Pulse:  [87-98] 95  Resp:  [18] 18  SpO2:  [98 %-100 %] 100 %  BP: (149-177)/(80-93) 177/86     Weight: 76.7 kg (169 lb)  Body mass index is 24.25 kg/m².  Body surface area is 1.95 meters squared.      Intake/Output Summary (Last 24 hours) at 5/6/2024 5208  Last data  filed at 5/6/2024 2016  Gross per 24 hour   Intake --   Output 200 ml   Net -200 ml        Physical Exam  Constitutional:       General: He is not in acute distress.     Appearance: Normal appearance. He is not ill-appearing.   HENT:      Head: Normocephalic and atraumatic.      Mouth/Throat:      Mouth: Mucous membranes are dry.   Eyes:      Conjunctiva/sclera: Conjunctivae normal.   Cardiovascular:      Rate and Rhythm: Normal rate and regular rhythm.   Pulmonary:      Effort: Pulmonary effort is normal. No respiratory distress.   Abdominal:      General: There is no distension.   Musculoskeletal:      Cervical back: Normal range of motion. No rigidity.      Right lower leg: No edema.      Left lower leg: Edema present.   Skin:     Coloration: Skin is pale. Skin is not jaundiced.      Findings: No bruising.   Neurological:      General: No focal deficit present.      Mental Status: He is alert and oriented to person, place, and time.   Psychiatric:         Mood and Affect: Mood normal.         Behavior: Behavior normal.          Significant Labs:   CBC:   Recent Labs   Lab 05/06/24  1448 05/06/24 1940   WBC 8.85 9.64   HGB 7.8* 8.8*   HCT 24.6* 27.9*    432   , CMP:   Recent Labs   Lab 05/06/24  1448 05/06/24 1940   * 126*   K 6.4* 6.6*    96   CO2 18* 15*   * 102   BUN 74* 75*   CREATININE 8.2* 8.0*   CALCIUM 9.2 9.6   PROT 6.5  --    ALBUMIN 1.9*  --    BILITOT 0.4  --    ALKPHOS 88  --    AST 20  --    ALT 38  --    ANIONGAP 9 15   , and All pertinent labs from the last 24 hours have been reviewed.    Diagnostic Results:  I have reviewed all pertinent imaging results/findings within the past 24 hours.  Assessment/Plan:     * Acute renal failure  Patient is a 58-year-old male with past medical history significant for metastatic bladder cancer on SG who is presenting with malignant ureter obstruction resulting in renal failure and electrolyte abnormalities.  Patient has had  progressively worsening symptoms of fatigue, nausea/vomiting/anorexia, itching, and generalized malaise over the last several weeks.  He presented to clinic today and his labs showed severe hyperkalemia.  He was taken to the emergency room from clinic for further evaluation and admitted to medical oncology floor for urgent left nephrostomy tube placement.    Patient with acute kidney injury/acute renal failure likely due to post-obstructive d/t malignant obstruction. CHI is currently worsening. Baseline creatinine 5.0 - Labs reviewed- Renal function/electrolytes with Estimated Creatinine Clearance: 10.4 mL/min (A) (based on SCr of 8 mg/dL (H)). according to latest data. Monitor urine output and serial BMP and adjust therapy as needed. Avoid nephrotoxins and renally dose meds for GFR listed above.      Plan:  - Continue furosemide  - Consult nephrology  - Renal Diet  - NPO at midnight with plan for L nephrostomy tube (R hydro felt to be chronic)  - No acute indication for HD at this time but monitor UOP, oxygen requirement, and EKG  - Monitor intake/output and daily standing weights.   - Avoid nephrotoxic agents such as NSAIDs, gadolinium and IV radiocontrast.  - Renally dose meds to current GFR.  - Maintain MAP > 65.        Hyperkalemia  Patient with potassium of 6.6      This patient has hyperkalemia which is uncontrolled. We will monitor for arrhythmias with EKG or continuous telemetry. We will treat the hyperkalemia with Potassium Binders, Nebulized albuterol sulfate, Furosemide, and Sodium Bicarbonate. The likely etiology of the hyperkalemia is CHI.  The patients latest potassium has been reviewed and the results are listed below  Recent Labs   Lab 05/06/24 1940   K 6.6*       Metabolic acidosis  Monitor for need for HD or bicarbonate drip    Acute deep vein thrombosis (DVT) of left femoral vein  Patient with left femoral DVT on apixaban.  Holding apixaban currently for planned IR procedure and left nephrostomy  tube.  Should resume after procedure.  Continue to monitor for signs and symptoms of embolization.    Metastatic disease  Patient with diffuse lung and bone Mets with progression despite immunotherapy.  Now with urinary obstruction with renal failure causing electrolyte abnormalities.    CKD (chronic kidney disease)  Patient with acute kidney injury/acute renal failure likely due to post-obstructive d/t malignant obstruction.  CHI is currently worsening. Baseline creatinine  5.0  - Labs reviewed- Renal function/electrolytes with Estimated Creatinine Clearance: 10.4 mL/min (A) (based on SCr of 8 mg/dL (H)). according to latest data. Monitor urine output and serial BMP and adjust therapy as needed. Avoid nephrotoxins and renally dose meds for GFR listed above.      Plan:  - Continue furosemide  - Consult nephrology  - No acute indication for HD at this time but monitor UOP, oxygen requirement, and EKG  - Monitor intake/output and daily standing weights.   - Avoid nephrotoxic agents such as NSAIDs, gadolinium and IV radiocontrast.  - Renally dose meds to current GFR.  - Maintain MAP > 65.        Hypothyroidism  Continue home levothyroxine    Malignant neoplasm of urinary bladder  Per Dr. Abad: Metastatic bladder cancer previously treated with neoadjuvant ddMVAC, radical cystectomy, and then carboplatin/gemcitabine and EV+ Pembro following local and metastatic recurrence. Subsequetnly received a single dose of pembrolizumab + NTX-1088 11/2/23 as part of a phase I trial through the [facility], but was taken off trial for elevated creatinine despite PCN placement. Starting sacituzumab govitecan 12/28/23 with empiric 25% DR for UGT1A1 deficiency. Progressive disease noted on PET CT 02/28/2024 and increased SG to 10mg/kg. Now cycle 6 of 17    The attending portion of this evaluation, treatment, and documentation was performed per Indio Messer MD via Telemedicine AudioVisual using the secure [employer] software platform with two-way  audio/video. The provider was located off-site and the patient is located in the hospital. The aforementioned video software was utilized to document the relevant history and physical exam.      Indio Messer MD  Hematology/Oncology  [facility] - Emergency Dept

## 2024-05-07 NOTE — SUBJECTIVE & OBJECTIVE
Interval History: Hyperkalemia not improving. IR cancelled procedure due to hyperkalemia. After discussing prognosis with pt he decided to move on with hospice care.     Oncology Treatment Plan:   OP SACITUZUMAB GOVITECAN-HZIY Q3W    Medications:  Continuous Infusions:   sodium bicarbonate 150 mEq in dextrose 5 % (D5W) 1,000 mL infusion   Intravenous Continuous 75 mL/hr at 05/06/24 2339 New Bag at 05/06/24 2339     Scheduled Meds:   sodium chloride 0.9%   Intravenous Once    atorvastatin  20 mg Oral QHS    dextrose 10%  50 g Intravenous Once    furosemide (LASIX) injection  80 mg Intravenous Q12H    heparin (porcine)  5,000 Units Subcutaneous Q8H    Lactobacillus rhamnosus GG  1 capsule Oral Daily    levothyroxine  50 mcg Oral Before breakfast    LIDOcaine  1 patch Transdermal Daily    mupirocin   Nasal BID    sodium zirconium cyclosilicate  10 g Oral TID     PRN Meds:  Current Facility-Administered Medications:     0.9%  NaCl infusion (for blood administration), , Intravenous, Q24H PRN    benzonatate, 100 mg, Oral, Q4H PRN    dextrose 10%, 12.5 g, Intravenous, PRN    dextrose 10%, 25 g, Intravenous, PRN    [COMPLETED] dextrose 10%, 50 g, Intravenous, Once **AND** dextrose 10%, 25 g, Intravenous, PRN **AND** [COMPLETED] insulin regular, 0.1 Units/kg, Intravenous, Once    dextrose 10%, 50 g, Intravenous, Once **AND** dextrose 10%, 25 g, Intravenous, PRN **AND** [COMPLETED] insulin regular, 0.1 Units/kg, Intravenous, Once    glucagon (human recombinant), 1 mg, Intramuscular, PRN    glucose, 16 g, Oral, PRN    glucose, 24 g, Oral, PRN    heparin (porcine), 1,000 Units, Intra-Catheter, PRN    melatonin, 6 mg, Oral, Nightly PRN    naloxone, 0.02 mg, Intravenous, PRN    oxyCODONE, 5 mg, Oral, Q4H PRN    oxyCODONE, 10 mg, Oral, Q4H PRN    sodium chloride 0.9%, 250 mL, Intravenous, PRN    sodium chloride 0.9%, 10 mL, Intravenous, PRN     Review of Systems   Constitutional:  Positive for activity change, appetite change and  fatigue. Negative for chills and fever.   HENT:  Positive for congestion and sore throat.    Respiratory:  Positive for cough. Negative for shortness of breath.    Cardiovascular:  Positive for leg swelling. Negative for chest pain and palpitations.   Gastrointestinal:  Positive for abdominal pain, nausea and vomiting. Negative for abdominal distention, constipation and diarrhea.   Musculoskeletal:  Positive for back pain.   Skin:  Negative for rash.   Neurological:  Positive for weakness. Negative for light-headedness and headaches.   Hematological:  Does not bruise/bleed easily.   Psychiatric/Behavioral:  The patient is nervous/anxious.      Objective:     Vital Signs (Most Recent):  Temp: 99.2 °F (37.3 °C) (05/07/24 1108)  Pulse: 84 (05/07/24 1251)  Resp: 18 (05/07/24 1108)  BP: 133/79 (05/07/24 1108)  SpO2: 97 % (05/07/24 1108) Vital Signs (24h Range):  Temp:  [98.9 °F (37.2 °C)-99.9 °F (37.7 °C)] 99.2 °F (37.3 °C)  Pulse:  [] 84  Resp:  [16-23] 18  SpO2:  [97 %-100 %] 97 %  BP: (127-177)/(69-93) 133/79     Weight: 76.7 kg (169 lb)  Body mass index is 24.25 kg/m².  Body surface area is 1.95 meters squared.      Intake/Output Summary (Last 24 hours) at 5/7/2024 1448  Last data filed at 5/6/2024 2016  Gross per 24 hour   Intake --   Output 200 ml   Net -200 ml        Physical Exam  Constitutional:       General: He is not in acute distress.     Appearance: Normal appearance. He is not ill-appearing.   HENT:      Head: Normocephalic and atraumatic.      Mouth/Throat:      Mouth: Mucous membranes are dry.   Eyes:      Conjunctiva/sclera: Conjunctivae normal.   Cardiovascular:      Rate and Rhythm: Normal rate and regular rhythm.   Pulmonary:      Effort: Pulmonary effort is normal. No respiratory distress.   Abdominal:      General: There is no distension.   Musculoskeletal:      Cervical back: Normal range of motion. No rigidity.      Right lower leg: No edema.      Left lower leg: Edema present.   Skin:      Coloration: Skin is pale. Skin is not jaundiced.      Findings: No bruising.   Neurological:      General: No focal deficit present.      Mental Status: He is alert and oriented to person, place, and time.   Psychiatric:         Mood and Affect: Mood normal.         Behavior: Behavior normal.          Significant Labs:   CMP:   Recent Labs   Lab 05/06/24  1448 05/06/24  1940 05/07/24  0450 05/07/24  0830 05/07/24  1136   *   < > 123*  123* 123*  123* 122*   K 6.4*   < > 6.3*  6.3*  6.3* 6.4*  6.4*  6.4* 6.4*  6.4*      < > 99  99 98  98 98   CO2 18*   < > 13*  13* 16*  16* 15*   *   < > 129*  129* 96  96 111*   BUN 74*   < > 74*  74* 75*  75* 76*   CREATININE 8.2*   < > 8.2*  8.2* 8.3*  8.3* 8.4*   CALCIUM 9.2   < > 8.3*  8.3* 8.2*  8.2* 8.2*   PROT 6.5  --  5.7*  --   --    ALBUMIN 1.9*  --  1.7*  --   --    BILITOT 0.4  --  0.3  --   --    ALKPHOS 88  --  86  --   --    AST 20  --  27  --   --    ALT 38  --  38  --   --    ANIONGAP 9   < > 11  11 9  9 9    < > = values in this interval not displayed.       Diagnostic Results:  I have reviewed all pertinent imaging results/findings within the past 24 hours.

## 2024-05-07 NOTE — ASSESSMENT & PLAN NOTE
Patient with potassium of 6.4 s/p shifting.       This patient has hyperkalemia which is uncontrolled. We will monitor for arrhythmias with EKG or continuous telemetry. We will treat the hyperkalemia with Potassium Binders, Nebulized albuterol sulfate, Furosemide, and Sodium Bicarbonate. The likely etiology of the hyperkalemia is CHI.  The patients latest potassium has been reviewed and the results are listed below  Recent Labs   Lab 05/07/24  1136   K 6.4*  6.4*

## 2024-05-07 NOTE — ASSESSMENT & PLAN NOTE
-Recommend admission to Heme/Onc service  -Recommend urgent Nephrology consult given electrolyte abnormalities. Shift vs Dialysis  -CT shows stable chronic R hydro, new onset L hydronephrosis, L pelvic mass  -Recommend IR consult for L NT tube placement as right hydronephrosis is chronic  -Trend electrolytes  -Trend Cr  -PRN irrigate valiente catheter to prevent mucus buildup and allow valiente to drain freely  -Urology to continue to follow

## 2024-05-08 NOTE — CONSULTS
[facility] - Transplant Stepdown  Palliative Medicine  Consult Note    Patient Name: Julio Rodríguez  MRN: [id number]  Admission Date: 5/6/2024  Hospital Length of Stay: 1 days  Code Status: DNR   Attending Provider: Char att. providers found  Consulting Provider: Moni Jacobs MD  Primary Care Physician: Char, Primary Doctor  Principal Problem:Acute renal failure superimposed on stage 4 chronic kidney disease    Patient information was obtained from patient, parent, past medical records, ER records, and primary team.      Inpatient consult to Palliative Care  Consult performed by: Moni Jacobs MD  Consult ordered by: Marcelino Nance MD  Reason for consult: GOC        Assessment/Plan:     Palliative Care  Encounter for palliative care  radha Rodríguez is a 58yoM w/hx of metastatic bladder cancer s/p radical cystectomy w/ileal neobladder (2017) c/b urethral stricture, R hydronephrosis, L common femoral and proximal femoral DVT, pathologic fracture, ESRD who was sent to the hospital from clinic for abnormal labs. Work up in ED and hospitalization showed worsening disease along with renal failure and persistent electrolyte abnormalities. Urology and Nephrology were consulted. Patient elected to not pursue dialysis. Palliative Medicine was consulted for GOC.    Metastatic bladder cancer/ESRD/electrolyte abnormalities/other medical problems  - plan per primary team and other specialty consultants    GOC/Advance Care Planning   - patient decisional and patient's mother at bedside  - no ACP documents uploaded into EMR  - patient followed by Palliative Medicine team in clinic  - patient able to discuss the last 24 hours including worsening renal function, electrolyte abnormalities, unable to nephrostomy tube placement, and previous discussion with him and team about dialysis. Patient reporting that he has elected to not pursue dialysis but rather focus on comfort/quality of life with hospice care.   - discussion about  "what home hospice care would look like for patient including but not limited to nursing visits, DME, medication management, SW/, and 24 hour number. Discussed that patient would be able to still do things he enjoys such as visiting with family/friends, going out to eat, etc.   - discussed overall prognosis is poor and likely days-weeks  - patient is going to go to his parents house upon discharge with hospice care  - oncology SW assisting with set up of hospice care upon discharge.  - he elected to be DNR  - all questions/concerns were addressed       Pain  - patient has pain in abdomen as well as bilateral legs  - he has not required any pain medication during hospitalization  - discussed he may ask for oxycodone 5 mg or 10 mg depending on level of pain during hospitalization  - patient has pain medication at home; discussed use of APAP or opioid medication upon discharge at home. Also discussed hospice role in managing medications moving forward    Nausea  - intermittent  - will ask team to start zofran 4 mg q6h prn while hospitalized  - discussed using anti-emetic medication PRN prior to eating upon discharge    Anorexia/Fatigue  - discussed changes that will likely occur as time becomes shorter  - discussed liberalizing diet upon discharge and not eliminating foods due to concerns for electrolytes    Adjustment disorder  - patient emotional given changes that have occurred in last 24 hours  - emotional support provided today  - no need for pharmacologic intervention at this time    Above plan of care was discussed with team.          Thank you for your consult. I will sign off. Please contact us if you have any additional questions.    Subjective:     HPI:   Per Chart Review: "Mr. Rodríguez is a 58yoM w/hx of metastatic bladder cancer s/p radical cystectomy w/ileal neobladder (2017) c/b urethral stricture, R hydronephrosis, L common femoral and proximal femoral DVT, pathologic fracture, ESRD who was sent " to the hospital from clinic for abnormal labs. He was being seen at Urology Oncology clinic today for follow-up of acute renal failure in the setting of R hydronephrosis and urethral stricture with overflow incontinence. Previously seen at Oncology clinic on 5/2 where a BMP demonstrated Cr 82, K 6.4. Patient was escorted to the ED for further workup.  Patient states that he has been dealing with worsening fatigue, nausea/vomiting, and generalized anorexia.  It has been associated with sinus congestion as well as postnasal drip resulting in recurrent cough which exacerbates his abdominal discomfort.     Diagnosed with malignant neoplasm of urinary bladder in 2016. Initially treated with BCG but with subsequent development of T2N1 disease in 2017. Treated with ddMVAC followed by radical cystectomy with creation of neobladder and lymph node dissection in December. Final stage was [id number]. In July 2022, he developed hematuria leading to secondary anemia. CT Urogram negative for recurrence. MRI of pelvis in august 2022 demonstrated abnormal signal and enhancement of the b/l pubic bones adjacent to the neobladder suspicous for neoplastic infiltration. Biopsy of the bladder neck revealed recurrent high-grade urothelial carcinoma. PET scan showed hypermetabolic retroperitoneal lymphadenopathy and marked activity involve the bladder wall suspicious for tumor. Started on Gemcitabine/carboplatin and EV+ Pembro. Received a single dose of pembrolixumab + [id number]-[id number] (11/2023) as a part of a phase I through the [facility] but was taken off the trial d/t PCN placement. Has received 6 of 17 cycles of Sacituzumab Govitecan-HZIY.     In the ED, patient was tachycardic with pulses in the 120s, systolic blood pressures as high as 177/93, afebrile, no respiratory distress or oxygen requirements.  Labs were collected and the patient during his visit with Dr. Abad, his oncologist which revealed a sodium of 126, a potassium of 6.6, a CO2 of  "15, acute worsening in his BUN and creatinine to 75 and 8.0 respectively, a magnesium of 1.4.  He was shifted in the emergency room with albuterol and furosemide.  He has a chronic Smith which drained approximately 1 L of urine daily.  A CT of the chest was done which revealed a soft masslike density in the lingula which had enlarged compared to most recent PET-CT scan.  He had interval worsening of left pleural diffuse metastatic disease throughout the lung and bone.  A CT of the abdomen and pelvis revealed severe bilateral hydroureter with right parenchymal thinning as well as a left pelvic mass with new development of worsening left hydronephrosis.     Urology saw the patient in the emergency room and recommended a Nephrology consult as well as IR consultation for left nephrostomy tube placement." Palliative Medicine consulted for [facility]    Hospital Course:  No notes on file    Interval History: Patient seen at bedside today. Patient's mother also present in room. Please see A&P for full details of discussion. Patient reporting mild pain in his lower extremities bilaterally and intermittent cough.     Past Medical History:   Diagnosis Date    Bladder cancer     CAD (coronary artery disease) 9/12/2023    Prior CABG. Not on antiplatelets. Home medicatiosn include atorvastatin    Carcinoma     forehead    Encounter for blood transfusion     Hypertension     Hypothyroidism 9/12/2023    Home medications include levothyroxine    Malignant melanoma of skin, unspecified     right arm    Malignant neoplasm of urinary bladder 9/12/2023    Melanoma 9/12/2023    History of T1a melanoma; 0.5mm depth without ulceartion. SP wide local excision 02/2022       Past Surgical History:   Procedure Laterality Date    CORONARY ARTERY BYPASS GRAFT  10/2015    CYSTECTOMY, ROBOT-ASSISTED, LAPAROSCOPIC, USING DA MARY XI, WITH ILEAL CONDUIT CREATION  12/2017    CYSTOGRAM N/A 12/5/2023    Procedure: CYSTOGRAM;  Surgeon: Eder Oconnor MD;  " Location: [facility] OR 1ST FLR;  Service: Urology;  Laterality: N/A;    CYSTOSCOPY N/A 12/5/2023    Procedure: CYSTOSCOPY;  Surgeon: Eder Oconnor MD;  Location: [facility] OR 1ST FLR;  Service: Urology;  Laterality: N/A;    DILATION OF BLADDER      dialation of bladder neck- due to claudia bladder- multiple procedures    DILATION OF URETHRA N/A 12/5/2023    Procedure: DILATION, URETHRA;  Surgeon: Eder Oconnor MD;  Location: [facility] OR 1ST FLR;  Service: Urology;  Laterality: N/A;    LYMPHADENECTOMY      PERCUTANEOUS NEPHROSTOMY Right 12/8/2023    Procedure: Creation, Nephrostomy, Percutaneous;  Surgeon: Jens Nunez MD;  Location: [facility] CATH LAB;  Service: Radiology;  Laterality: Right;    PLACEMENT N/A 12/5/2023    Procedure: PLACEMENT;  Surgeon: Eder Oconnor MD;  Location: [facility] OR [facility] FLR;  Service: Urology;  Laterality: N/A;  placement of valiente over a wire by MD MOHR ROBOTIC PROSTECTOMY, SUPRAPUBIC  12/2017       Review of patient's allergies indicates:  No Known Allergies    Medications:  Continuous Infusions:   sodium bicarbonate 150 mEq in dextrose 5 % (D5W) 1,000 mL infusion   Intravenous Continuous 75 mL/hr at 05/07/24 1554 New Bag at 05/07/24 1554     Scheduled Meds:   sodium chloride 0.9%   Intravenous Once    atorvastatin  20 mg Oral QHS    furosemide (LASIX) injection  80 mg Intravenous Q12H    heparin (porcine)  5,000 Units Subcutaneous Q8H    Lactobacillus rhamnosus GG  1 capsule Oral Daily    levothyroxine  50 mcg Oral Before breakfast    LIDOcaine  1 patch Transdermal Daily    mupirocin   Nasal BID    sodium zirconium cyclosilicate  10 g Oral TID     PRN Meds:  Current Facility-Administered Medications:     0.9%  NaCl infusion (for blood administration), , Intravenous, Q24H PRN    benzonatate, 100 mg, Oral, Q4H PRN    dextrose 10%, 12.5 g, Intravenous, PRN    dextrose 10%, 25 g, Intravenous, PRN    [COMPLETED] dextrose 10%, 50 g, Intravenous, Once **AND** dextrose 10%, 25 g, Intravenous, PRN **AND** [COMPLETED]  insulin regular, 0.1 Units/kg, Intravenous, Once    [COMPLETED] dextrose 10%, 50 g, Intravenous, Once **AND** dextrose 10%, 25 g, Intravenous, PRN **AND** [COMPLETED] insulin regular, 0.1 Units/kg, Intravenous, Once    glucagon (human recombinant), 1 mg, Intramuscular, PRN    glucose, 16 g, Oral, PRN    glucose, 24 g, Oral, PRN    heparin (porcine), 1,000 Units, Intra-Catheter, PRN    melatonin, 6 mg, Oral, Nightly PRN    naloxone, 0.02 mg, Intravenous, PRN    ondansetron, 8 mg, Intravenous, Q6H PRN    oxyCODONE, 5 mg, Oral, Q4H PRN    oxyCODONE, 10 mg, Oral, Q4H PRN    sodium chloride 0.9%, 250 mL, Intravenous, PRN    sodium chloride 0.9%, 10 mL, Intravenous, PRN    Family History       Problem Relation (Age of Onset)    Breast cancer Maternal Cousin    Heart attack Paternal Uncle    Heart disease Father          Tobacco Use    Smoking status: Former     Types: Cigarettes     Passive exposure: Never    Smokeless tobacco: Not on file   Substance and Sexual Activity    Alcohol use: Not Currently    Drug use: Never    Sexual activity: Not on file       Review of Systems   Constitutional:  Positive for activity change, appetite change and fatigue.   HENT: Negative.     Eyes: Negative.    Respiratory:  Positive for cough.    Cardiovascular: Negative.    Gastrointestinal:  Positive for abdominal pain and nausea (intermittent).   Genitourinary:         + self cath/valiente   Musculoskeletal:  Positive for myalgias.   Skin: Negative.    Neurological:  Positive for weakness.   Psychiatric/Behavioral:  Positive for dysphoric mood.    All other systems reviewed and are negative.    Objective:     Vital Signs (Most Recent):  Temp: 98 °F (36.7 °C) (05/07/24 1715)  Pulse: 62 (05/07/24 1715)  Resp: 14 (05/07/24 1715)  BP: 132/70 (05/07/24 1715)  SpO2: 100 % (05/07/24 1715) Vital Signs (24h Range):  Temp:  [98 °F (36.7 °C)-99.9 °F (37.7 °C)] 98 °F (36.7 °C)  Pulse:  [] 62  Resp:  [14-23] 14  SpO2:  [97 %-100 %] 100 %  BP:  (127-177)/(69-86) 132/70     Weight: 76.7 kg (169 lb)  Body mass index is 24.25 kg/m².       Physical Exam  Vitals and nursing note reviewed.   Constitutional:       Appearance: He is not toxic-appearing or diaphoretic.   HENT:      Head: Normocephalic and atraumatic.      Right Ear: External ear normal.      Left Ear: External ear normal.      Nose: Nose normal.      Mouth/Throat:      Mouth: Mucous membranes are moist.   Eyes:      General: No scleral icterus.        Right eye: No discharge.         Left eye: No discharge.      Extraocular Movements: Extraocular movements intact.   Pulmonary:      Effort: Pulmonary effort is normal. No respiratory distress.   Abdominal:      General: Abdomen is flat. There is no distension.   Musculoskeletal:         General: No deformity or signs of injury.   Skin:     General: Skin is warm and dry.      Coloration: Skin is pale. Skin is not jaundiced.   Neurological:      General: No focal deficit present.      Mental Status: He is alert and oriented to person, place, and time.      Cranial Nerves: No cranial nerve deficit.   Psychiatric:         Attention and Perception: Attention normal.         Mood and Affect: Affect is tearful.         Behavior: Behavior normal.         Thought Content: Thought content normal.         Cognition and Memory: Cognition normal.         Judgment: Judgment normal.            Review of Symptoms      Symptom Assessment (ESAS 0-10 Scale)  Pain:  2  Dyspnea:  0  Anxiety:  4  Nausea:  0  Depression:  8  Anorexia:  5  Fatigue:  7  Insomnia:  0  Restlessness:  0  Agitation:  0     CAM / Delirium:  Negative  Constipation:  Negative  Diarrhea:  Negative      Bowel Management Plan (BMP):  No      Pain Assessment:  OME in 24 hours:  0  Location(s): leg    Leg       Location: bilateral        Quality: Aching        Quantity: 2/10 in intensity        Chronicity: Onset 1 day(s) ago, unchanged        Aggravating Factors: Activity        Alleviating Factors:  None        Associated Symptoms: None    Modified Fidelia Scale:  0    ECOG Performance Status stGstrstastdstest:st st1st Living Arrangements:  Lives alone    Psychosocial/Cultural:   See Palliative Psychosocial Note: No  Social Issues Identified: Coping deficit pt/family  Bereavement Risk: No  Caregiver Needs Discussed. Caregiver Distress: No: n/a  Cultural: Patient enjoys travel, watching television, working, and hanging out with friends  **Primary  to Follow**  Palliative Care  Consult: No    Spiritual:  F - Jany and Belief:  Yes  I - Importance:  Yes  C - Community:  Yes  A - Address in Care:  Needs met        Advance Care Planning  Advance Directives:   Living Will: No    LaPOST: No    Do Not Resuscitate Status: Yes    Medical Power of : No      Decision Making:  Patient answered questions and Family answered questions  Goals of Care: The patient and family endorses that what is most important right now is to focus on comfort and QOL     Accordingly, we have decided that the best plan to meet the patient's goals includes enrolling in hospice care           Significant Labs: All pertinent labs within the past 24 hours have been reviewed.  CBC:   Recent Labs   Lab 05/07/24  0830   WBC 7.25   HGB 6.6*   HCT 20.7*   MCV 90        BMP:  Recent Labs   Lab 05/07/24  0450 05/07/24  0830 05/07/24  1136   *  129*   < > 111*   *  123*   < > 122*   K 6.3*  6.3*  6.3*   < > 6.4*  6.4*   CL 99  99   < > 98   CO2 13*  13*   < > 15*   BUN 74*  74*   < > 76*   CREATININE 8.2*  8.2*   < > 8.4*   CALCIUM 8.3*  8.3*   < > 8.2*   MG 1.2*  --   --     < > = values in this interval not displayed.     LFT:  Lab Results   Component Value Date    AST 27 05/07/2024    ALKPHOS 86 05/07/2024    BILITOT 0.3 05/07/2024     Albumin:   Albumin   Date Value Ref Range Status   05/07/2024 1.7 (L) 3.5 - 5.2 g/dL Final     Protein:   Total Protein   Date Value Ref Range Status   05/07/2024 5.7 (L) 6.0  "- 8.4 g/dL Final     Lactic acid:   No results found for: "LACTATE"    Significant Imaging: I have reviewed all pertinent imaging results/findings within the past 24 hours.    05/06/2024 CT A/P: "Prior cystoprostatectomy with neobladder creation, with continued neobladder wall thickening that is likely invading adjacent structures, such as the pelvic side wall and perhaps also in the left pubic bone/superior pubic ramus.     Marked new left hydroureteronephrosis and pre-existing severe right hydroureteronephrosis, possibly secondary to the new bladder wall thickening.  No radiopaque urinary tract calculi apparent.     Mottled gas within the neobladder lumen could be related to recent manipulation or placement of the Smith catheter.  However, a fistulous communication between the bladder dome adjacent bowel is questioned.  A gas-forming UTI could also cause intraluminal gas in the bladder.  Correlate clinically.     Mildly increased suspected metastatic disease in the skeleton, pericaval lymph node, and liver, since 02/27/2024.     Bilateral pulmonary nodules, partially visual lingular atelectasis or consolidation, and small but enlarging left pleural effusion, similar to today's earlier chest CT, and again suspicious for possible metastatic disease.     Other observations as detailed in the body of the report."    A total of 30 min was spent on advance care planning, goals of care discussion, emotional support, formulating and communicating prognosis and goals of care, exploring burden/benefit of various approaches of treatment. This discussion occurred on a fully voluntary basis with the verbal consent of the patient and/or family      Moni Jacobs MD  Palliative Medicine  [facility] - Transplant Stepdown              "

## 2024-05-08 NOTE — SUBJECTIVE & OBJECTIVE
Interval History: Patient seen at bedside today. Patient's mother also present in room. Please see A&P for full details of discussion. Patient reporting mild pain in his lower extremities bilaterally and intermittent cough.     Past Medical History:   Diagnosis Date    Bladder cancer     CAD (coronary artery disease) 9/12/2023    Prior CABG. Not on antiplatelets. Home medicatiosn include atorvastatin    Carcinoma     forehead    Encounter for blood transfusion     Hypertension     Hypothyroidism 9/12/2023    Home medications include levothyroxine    Malignant melanoma of skin, unspecified     right arm    Malignant neoplasm of urinary bladder 9/12/2023    Melanoma 9/12/2023    History of T1a melanoma; 0.5mm depth without ulceartion. SP wide local excision 02/2022       Past Surgical History:   Procedure Laterality Date    CORONARY ARTERY BYPASS GRAFT  10/2015    CYSTECTOMY, ROBOT-ASSISTED, LAPAROSCOPIC, USING DA [place] XI, WITH ILEAL CONDUIT CREATION  12/2017    CYSTOGRAM N/A 12/5/2023    Procedure: CYSTOGRAM;  Surgeon: Eder Oconnor MD;  Location: [facility] OR [address] [place] [address];  Service: Urology;  Laterality: N/A;    CYSTOSCOPY N/A 12/5/2023    Procedure: CYSTOSCOPY;  Surgeon: Eder Oconnor MD;  Location: [facility] OR [address] [place] [address];  Service: Urology;  Laterality: N/A;    DILATION OF BLADDER      dialation of bladder neck- due to claudia bladder- multiple procedures    DILATION OF URETHRA N/A 12/5/2023    Procedure: DILATION, URETHRA;  Surgeon: Eder Oconnor MD;  Location: [facility] OR [address] [place] [address];  Service: Urology;  Laterality: N/A;    LYMPHADENECTOMY      PERCUTANEOUS NEPHROSTOMY Right 12/8/2023    Procedure: Creation, Nephrostomy, Percutaneous;  Surgeon: Jens Nunez MD;  Location: [facility] [place] by [facility] C[facility] LAB;  Service: Radiology;  Laterality: Right;    PLACEMENT N/A 12/5/2023    Procedure: PLACEMENT;  Surgeon: Eder Oconnor MD;  Location: [facility] OR [address] [place] [address];  Service: Urology;  Laterality: N/A;  placement of valiente over a wire by MD MOHR ROBOTIC PROSTECTOMY,  SUPRAPUBIC  12/2017       Review of patient's allergies indicates:  No Known Allergies    Medications:  Continuous Infusions:   sodium bicarbonate 150 mEq in dextrose 5 % (D5W) 1,000 mL infusion   Intravenous Continuous 75 mL/hr at 05/07/24 1554 New Bag at 05/07/24 1554     Scheduled Meds:   sodium chloride 0.9%   Intravenous Once    atorvastatin  20 mg Oral QHS    furosemide (LASIX) injection  80 mg Intravenous Q12H    heparin (porcine)  5,000 Units Subcutaneous Q8H    Lactobacillus rhamnosus GG  1 capsule Oral Daily    levothyroxine  50 mcg Oral Before breakfast    LIDOcaine  1 patch Transdermal Daily    mupirocin   Nasal BID    sodium zirconium cyclosilicate  10 g Oral TID     PRN Meds:  Current Facility-Administered Medications:     0.9%  NaCl infusion (for blood administration), , Intravenous, Q24H PRN    benzonatate, 100 mg, Oral, Q4H PRN    dextrose 10%, 12.5 g, Intravenous, PRN    dextrose 10%, 25 g, Intravenous, PRN    [COMPLETED] dextrose 10%, 50 g, Intravenous, Once **AND** dextrose 10%, 25 g, Intravenous, PRN **AND** [COMPLETED] insulin regular, 0.1 Units/kg, Intravenous, Once    [COMPLETED] dextrose 10%, 50 g, Intravenous, Once **AND** dextrose 10%, 25 g, Intravenous, PRN **AND** [COMPLETED] insulin regular, 0.1 Units/kg, Intravenous, Once    glucagon (human recombinant), 1 mg, Intramuscular, PRN    glucose, 16 g, Oral, PRN    glucose, 24 g, Oral, PRN    heparin (porcine), 1,000 Units, Intra-Catheter, PRN    melatonin, 6 mg, Oral, Nightly PRN    naloxone, 0.02 mg, Intravenous, PRN    ondansetron, 8 mg, Intravenous, Q6H PRN    oxyCODONE, 5 mg, Oral, Q4H PRN    oxyCODONE, 10 mg, Oral, Q4H PRN    sodium chloride 0.9%, 250 mL, Intravenous, PRN    sodium chloride 0.9%, 10 mL, Intravenous, PRN    Family History       Problem Relation (Age of Onset)    Breast cancer Maternal Cousin    Heart attack Paternal Uncle    Heart disease Father          Tobacco Use    Smoking status: Former     Types: Cigarettes      Passive exposure: Never    Smokeless tobacco: Not on file   Substance and Sexual Activity    Alcohol use: Not Currently    Drug use: Never    Sexual activity: Not on file       Review of Systems   Constitutional:  Positive for activity change, appetite change and fatigue.   HENT: Negative.     Eyes: Negative.    Respiratory:  Positive for cough.    Cardiovascular: Negative.    Gastrointestinal:  Positive for abdominal pain and nausea (intermittent).   Genitourinary:         + self cath/valiente   Musculoskeletal:  Positive for myalgias.   Skin: Negative.    Neurological:  Positive for weakness.   Psychiatric/Behavioral:  Positive for dysphoric mood.    All other systems reviewed and are negative.    Objective:     Vital Signs (Most Recent):  Temp: 98 °F (36.7 °C) (05/07/24 1715)  Pulse: 62 (05/07/24 1715)  Resp: 14 (05/07/24 1715)  BP: 132/70 (05/07/24 1715)  SpO2: 100 % (05/07/24 1715) Vital Signs (24h Range):  Temp:  [98 °F (36.7 °C)-99.9 °F (37.7 °C)] 98 °F (36.7 °C)  Pulse:  [] 62  Resp:  [14-23] 14  SpO2:  [97 %-100 %] 100 %  BP: (127-177)/(69-86) 132/70     Weight: 76.7 kg (169 lb)  Body mass index is 24.25 kg/m².       Physical Exam  Vitals and nursing note reviewed.   Constitutional:       Appearance: He is not toxic-appearing or diaphoretic.   HENT:      Head: Normocephalic and atraumatic.      Right Ear: External ear normal.      Left Ear: External ear normal.      Nose: Nose normal.      Mouth/Throat:      Mouth: Mucous membranes are moist.   Eyes:      General: No scleral icterus.        Right eye: No discharge.         Left eye: No discharge.      Extraocular Movements: Extraocular movements intact.   Pulmonary:      Effort: Pulmonary effort is normal. No respiratory distress.   Abdominal:      General: Abdomen is flat. There is no distension.   Musculoskeletal:         General: No deformity or signs of injury.   Skin:     General: Skin is warm and dry.      Coloration: Skin is pale. Skin is not  jaundiced.   Neurological:      General: No focal deficit present.      Mental Status: He is alert and oriented to person, place, and time.      Cranial Nerves: No cranial nerve deficit.   Psychiatric:         Attention and Perception: Attention normal.         Mood and Affect: Affect is tearful.         Behavior: Behavior normal.         Thought Content: Thought content normal.         Cognition and Memory: Cognition normal.         Judgment: Judgment normal.            Review of Symptoms      Symptom Assessment (ESAS 0-10 Scale)  Pain:  2  Dyspnea:  0  Anxiety:  4  Nausea:  0  Depression:  8  Anorexia:  5  Fatigue:  7  Insomnia:  0  Restlessness:  0  Agitation:  0     CAM / Delirium:  Negative  Constipation:  Negative  Diarrhea:  Negative      Bowel Management Plan (BMP):  No      Pain Assessment:  OME in 24 hours:  0  Location(s): leg    Leg       Location: bilateral        Quality: Aching        Quantity: 2/10 in intensity        Chronicity: Onset 1 day(s) ago, unchanged        Aggravating Factors: Activity        Alleviating Factors: None        Associated Symptoms: None    Modified Fidelia Scale:  0    ECOG Performance Status rdGrdrrdarddrderd:rd rd3rd Living Arrangements:  Lives alone    Psychosocial/Cultural:   See Palliative Psychosocial Note: No  Social Issues Identified: Coping deficit pt/family  Bereavement Risk: No  Caregiver Needs Discussed. Caregiver Distress: No: n/a  Cultural: Patient enjoys travel, watching television, working, and hanging out with friends  **Primary  to Follow**  Palliative Care  Consult: No    Spiritual:  F - Jany and Belief:  Yes  I - Importance:  Yes  C - Community:  Yes  A - Address in Care:  Needs met        Advance Care Planning   Advance Directives:   Living Will: No    LaPOST: No    Do Not Resuscitate Status: Yes    Medical Power of : No      Decision Making:  Patient answered questions and Family answered questions  Goals of Care: The patient and family  "endorses that what is most important right now is to focus on comfort and QOL     Accordingly, we have decided that the best plan to meet the patient's goals includes enrolling in hospice care           Significant Labs: All pertinent labs within the past 24 hours have been reviewed.  CBC:   Recent Labs   Lab 05/07/24  0830   WBC 7.25   HGB 6.6*   HCT 20.7*   MCV 90        BMP:  Recent Labs   Lab 05/07/24  0450 05/07/24  0830 05/07/24  1136   *  129*   < > 111*   *  123*   < > 122*   K 6.3*  6.3*  6.3*   < > 6.4*  6.4*   CL 99  99   < > 98   CO2 13*  13*   < > 15*   BUN 74*  74*   < > 76*   CREATININE 8.2*  8.2*   < > 8.4*   CALCIUM 8.3*  8.3*   < > 8.2*   MG 1.2*  --   --     < > = values in this interval not displayed.     LFT:  Lab Results   Component Value Date    AST 27 05/07/2024    ALKPHOS 86 05/07/2024    BILITOT 0.3 05/07/2024     Albumin:   Albumin   Date Value Ref Range Status   05/07/2024 1.7 (L) 3.5 - 5.2 g/dL Final     Protein:   Total Protein   Date Value Ref Range Status   05/07/2024 5.7 (L) 6.0 - 8.4 g/dL Final     Lactic acid:   No results found for: "LACTATE"    Significant Imaging: I have reviewed all pertinent imaging results/findings within the past 24 hours.    05/06/2024 CT A/P: "Prior cystoprostatectomy with neobladder creation, with continued neobladder wall thickening that is likely invading adjacent structures, such as the pelvic side wall and perhaps also in the left pubic bone/superior pubic ramus.     Marked new left hydroureteronephrosis and pre-existing severe right hydroureteronephrosis, possibly secondary to the new bladder wall thickening.  No radiopaque urinary tract calculi apparent.     Mottled gas within the neobladder lumen could be related to recent manipulation or placement of the Smith catheter.  However, a fistulous communication between the bladder dome adjacent bowel is questioned.  A gas-forming UTI could also cause intraluminal gas in the " "bladder.  Correlate clinically.     Mildly increased suspected metastatic disease in the skeleton, pericaval lymph node, and liver, since 02/27/2024.     Bilateral pulmonary nodules, partially visual lingular atelectasis or consolidation, and small but enlarging left pleural effusion, similar to today's earlier chest CT, and again suspicious for possible metastatic disease.     Other observations as detailed in the body of the report."    "

## 2024-05-08 NOTE — ASSESSMENT & PLAN NOTE
radha Rodríguez is a 58yoM w/hx of metastatic bladder cancer s/p radical cystectomy w/ileal neobladder (2017) c/b urethral stricture, R hydronephrosis, L common femoral and proximal femoral DVT, pathologic fracture, ESRD who was sent to the hospital from clinic for abnormal labs. Work up in ED and hospitalization showed worsening disease along with renal failure and persistent electrolyte abnormalities. Urology and Nephrology were consulted. Patient elected to not pursue dialysis. Palliative Medicine was consulted for GOC.    Metastatic bladder cancer/ESRD/electrolyte abnormalities/other medical problems  - plan per primary team and other specialty consultants    GOC/Advance Care Planning    - patient decisional and patient's mother at bedside  - no ACP documents uploaded into EMR  - patient followed by Palliative Medicine team in clinic  - patient able to discuss the last 24 hours including worsening renal function, electrolyte abnormalities, unable to nephrostomy tube placement, and previous discussion with him and team about dialysis. Patient reporting that he has elected to not pursue dialysis but rather focus on comfort/quality of life with hospice care.   - discussion about what home hospice care would look like for patient including but not limited to nursing visits, DME, medication management, SW/, and 24 hour number. Discussed that patient would be able to still do things he enjoys such as visiting with family/friends, going out to eat, etc.   - discussed overall prognosis is poor and likely days-weeks  - patient is going to go to his parents house upon discharge with hospice care  - oncology SW assisting with set up of hospice care upon discharge.  - he elected to be DNR  - all questions/concerns were addressed       Pain  - patient has pain in abdomen as well as bilateral legs  - he has not required any pain medication during hospitalization  - discussed he may ask for oxycodone 5 mg or 10 mg depending  on level of pain during hospitalization  - patient has pain medication at home; discussed use of APAP or opioid medication upon discharge at home. Also discussed hospice role in managing medications moving forward    Nausea  - intermittent  - will ask team to start zofran 4 mg q6h prn while hospitalized  - discussed using anti-emetic medication PRN prior to eating upon discharge    Anorexia/Fatigue  - discussed changes that will likely occur as time becomes shorter  - discussed liberalizing diet upon discharge and not eliminating foods due to concerns for electrolytes    Adjustment disorder  - patient emotional given changes that have occurred in last 24 hours  - emotional support provided today  - no need for pharmacologic intervention at this time    Above plan of care was discussed with team.

## 2024-05-13 PROBLEM — N39.0 RECURRENT UTI: Status: RESOLVED | Noted: 2024-01-01 | Resolved: 2024-01-01

## 2024-05-22 NOTE — PHYSICIAN QUERY
Given the conflicting documentation, please clarify the renal diagnosis of CKD.        CKD stage 5 not on dialysis

## (undated) DEVICE — OXISENSOR ADULT DIGIT N/S

## (undated) DEVICE — SYR 10CC LUER LOCK

## (undated) DEVICE — Device

## (undated) DEVICE — CATH POLLACK OPEN-END FLEXI-TI

## (undated) DEVICE — WIRE GD LUB STD 3CM .038 150CM

## (undated) DEVICE — BAG DRAINAGE DISP LF 600ML

## (undated) DEVICE — TRAY ANGIO BAPTIST

## (undated) DEVICE — SOL NACL IRR 3000ML

## (undated) DEVICE — SET DILATOR URTH 8-20FR 37CM

## (undated) DEVICE — WIRE BENTSON 035/180

## (undated) DEVICE — ADAPTER HOSE 10FT 8MM

## (undated) DEVICE — CONTAINER SPECIMEN OR STER 4OZ

## (undated) DEVICE — FLOWSWITCH HP 1-W W/O LL

## (undated) DEVICE — TRAY CYSTO BASIN OMC

## (undated) DEVICE — INTRODUCER ACCUSTICK RO MARKER

## (undated) DEVICE — DILATOR VESSEL 8FR

## (undated) DEVICE — KIT PROBE COVER WITH GEL

## (undated) DEVICE — GUIDEWIRE PTFE .038INX145CM

## (undated) DEVICE — CATH SKATER 8FR 25CM

## (undated) DEVICE — FASTENER CATH 12-22FR SM/LRG

## (undated) DEVICE — UNDERGLOVES BIOGEL PI SIZE 7.5

## (undated) DEVICE — PACK CYSTOSCOPY III SIRUS